# Patient Record
Sex: FEMALE | Race: WHITE | NOT HISPANIC OR LATINO | Employment: PART TIME | URBAN - METROPOLITAN AREA
[De-identification: names, ages, dates, MRNs, and addresses within clinical notes are randomized per-mention and may not be internally consistent; named-entity substitution may affect disease eponyms.]

---

## 2017-01-18 ENCOUNTER — DOCUMENTATION ONLY (OUTPATIENT)
Dept: FAMILY MEDICINE | Facility: CLINIC | Age: 67
End: 2017-01-18

## 2017-01-18 DIAGNOSIS — M79.672 BILATERAL FOOT PAIN: Primary | ICD-10-CM

## 2017-01-18 DIAGNOSIS — M79.671 BILATERAL FOOT PAIN: Primary | ICD-10-CM

## 2017-01-18 NOTE — PROGRESS NOTES
Pre-Visit Chart Review  For Appointment Scheduled on 01/19/2017    Health Maintenance Due   Topic Date Due    Zoster Vaccine  12/13/2008

## 2017-01-19 ENCOUNTER — OFFICE VISIT (OUTPATIENT)
Dept: ORTHOPEDICS | Facility: CLINIC | Age: 67
End: 2017-01-19
Payer: MEDICARE

## 2017-01-19 ENCOUNTER — HOSPITAL ENCOUNTER (OUTPATIENT)
Dept: RADIOLOGY | Facility: HOSPITAL | Age: 67
Discharge: HOME | End: 2017-01-19
Attending: ORTHOPAEDIC SURGERY
Payer: MEDICARE

## 2017-01-19 ENCOUNTER — OFFICE VISIT (OUTPATIENT)
Dept: FAMILY MEDICINE | Facility: CLINIC | Age: 67
End: 2017-01-19
Payer: MEDICARE

## 2017-01-19 ENCOUNTER — TELEPHONE (OUTPATIENT)
Dept: FAMILY MEDICINE | Facility: CLINIC | Age: 67
End: 2017-01-19

## 2017-01-19 VITALS
DIASTOLIC BLOOD PRESSURE: 77 MMHG | WEIGHT: 188.5 LBS | TEMPERATURE: 99 F | BODY MASS INDEX: 31.4 KG/M2 | HEIGHT: 65 IN | SYSTOLIC BLOOD PRESSURE: 139 MMHG | HEART RATE: 85 BPM

## 2017-01-19 VITALS
HEART RATE: 74 BPM | SYSTOLIC BLOOD PRESSURE: 178 MMHG | HEIGHT: 65 IN | DIASTOLIC BLOOD PRESSURE: 81 MMHG | BODY MASS INDEX: 30.66 KG/M2 | WEIGHT: 184 LBS

## 2017-01-19 DIAGNOSIS — F41.9 ANXIETY: ICD-10-CM

## 2017-01-19 DIAGNOSIS — E03.8 SECONDARY HYPOTHYROIDISM: ICD-10-CM

## 2017-01-19 DIAGNOSIS — E66.9 OBESITY (BMI 30.0-34.9): ICD-10-CM

## 2017-01-19 DIAGNOSIS — M79.671 BILATERAL FOOT PAIN: ICD-10-CM

## 2017-01-19 DIAGNOSIS — Z00.00 ENCOUNTER FOR PREVENTIVE HEALTH EXAMINATION: Primary | ICD-10-CM

## 2017-01-19 DIAGNOSIS — M79.672 BILATERAL FOOT PAIN: ICD-10-CM

## 2017-01-19 DIAGNOSIS — E04.1 NODULAR THYROID DISEASE: ICD-10-CM

## 2017-01-19 DIAGNOSIS — I25.118 CORONARY ARTERY DISEASE OF NATIVE ARTERY OF NATIVE HEART WITH STABLE ANGINA PECTORIS: ICD-10-CM

## 2017-01-19 DIAGNOSIS — I10 ESSENTIAL HYPERTENSION: ICD-10-CM

## 2017-01-19 DIAGNOSIS — G47.33 OSA ON CPAP: ICD-10-CM

## 2017-01-19 DIAGNOSIS — R32 URINARY INCONTINENCE, UNSPECIFIED TYPE: ICD-10-CM

## 2017-01-19 DIAGNOSIS — M20.11 HALLUX VALGUS, ACQUIRED, BILATERAL: ICD-10-CM

## 2017-01-19 DIAGNOSIS — M20.12 HALLUX VALGUS, ACQUIRED, BILATERAL: ICD-10-CM

## 2017-01-19 DIAGNOSIS — E78.5 HYPERLIPIDEMIA, UNSPECIFIED HYPERLIPIDEMIA TYPE: ICD-10-CM

## 2017-01-19 PROCEDURE — 99214 OFFICE O/P EST MOD 30 MIN: CPT | Mod: S$GLB,,, | Performed by: ORTHOPAEDIC SURGERY

## 2017-01-19 PROCEDURE — 3077F SYST BP >= 140 MM HG: CPT | Mod: S$GLB,,, | Performed by: ORTHOPAEDIC SURGERY

## 2017-01-19 PROCEDURE — 3079F DIAST BP 80-89 MM HG: CPT | Mod: S$GLB,,, | Performed by: ORTHOPAEDIC SURGERY

## 2017-01-19 PROCEDURE — 99999 PR PBB SHADOW E&M-EST. PATIENT-LVL III: CPT | Mod: PBBFAC,,, | Performed by: PHYSICIAN ASSISTANT

## 2017-01-19 PROCEDURE — 1160F RVW MEDS BY RX/DR IN RCRD: CPT | Mod: S$GLB,,, | Performed by: ORTHOPAEDIC SURGERY

## 2017-01-19 PROCEDURE — 99499 UNLISTED E&M SERVICE: CPT | Mod: S$GLB,,, | Performed by: ORTHOPAEDIC SURGERY

## 2017-01-19 PROCEDURE — G0439 PPPS, SUBSEQ VISIT: HCPCS | Mod: S$GLB,,, | Performed by: PHYSICIAN ASSISTANT

## 2017-01-19 PROCEDURE — 3075F SYST BP GE 130 - 139MM HG: CPT | Mod: S$GLB,,, | Performed by: PHYSICIAN ASSISTANT

## 2017-01-19 PROCEDURE — 99499 UNLISTED E&M SERVICE: CPT | Mod: S$GLB,,, | Performed by: PHYSICIAN ASSISTANT

## 2017-01-19 PROCEDURE — 3078F DIAST BP <80 MM HG: CPT | Mod: S$GLB,,, | Performed by: PHYSICIAN ASSISTANT

## 2017-01-19 PROCEDURE — 99999 PR PBB SHADOW E&M-EST. PATIENT-LVL III: CPT | Mod: PBBFAC,,, | Performed by: ORTHOPAEDIC SURGERY

## 2017-01-19 PROCEDURE — 1157F ADVNC CARE PLAN IN RCRD: CPT | Mod: S$GLB,,, | Performed by: ORTHOPAEDIC SURGERY

## 2017-01-19 PROCEDURE — 1159F MED LIST DOCD IN RCRD: CPT | Mod: S$GLB,,, | Performed by: ORTHOPAEDIC SURGERY

## 2017-01-19 PROCEDURE — 1126F AMNT PAIN NOTED NONE PRSNT: CPT | Mod: S$GLB,,, | Performed by: ORTHOPAEDIC SURGERY

## 2017-01-19 PROCEDURE — 73630 X-RAY EXAM OF FOOT: CPT | Mod: 26,50,, | Performed by: RADIOLOGY

## 2017-01-19 NOTE — TELEPHONE ENCOUNTER
Please call patient and let her know that Dr. Jordan does manage sleep apnea. She can be seen in his office on 2/21/17 at 9:00 AM--arrive at 8:45 AM. [address].

## 2017-01-19 NOTE — Clinical Note
Primary Care Providers: Jersey Seymour Jr., MD, MD        (General) Bernard Sahu III, MD (Inactiv* (Family Medicine)  Your patient was seen today for a HRA visit. Gap(s) in care (HEDIS gaps) have been identified during this visit that require additional testing and possible follow up.  No orders of the defined types were placed in this encounter.   These orders were placed using [facility] approved protocol and any results will be forwarded to your office for appropriate follow up. I have included a copy of my visit note; please review the note and feel free to contact me with any questions.   Thank you for allowing me to participate in the care of your patients. Gladys Romero PA-C

## 2017-01-19 NOTE — PROGRESS NOTES
"HPI: Kathy Seymour is a  66 y.o. female who is a previous patient of mine who is here for bilateral feet. She notes swelling of both feet. She rates her pain as 0/10 today. She had her bunions and hammertoes fixed by Dr. Chema DPM 1 year ago for the right side and 10 months for the left side. She just wants a second opinion to make sure everything is ok. She has throbbing and burning pain in the feet, but she has a h/o bilateral peripheral neuropathy. She is not taking her neurontin at this time.     PAST MEDICAL/SURGICAL/FAMILY/SOCIAL/ HISTORY: REVIEWED    ALLERGIES/MEDICATIONS: REVIEWED       Review of Systems:     Constitution: Negative.   HEENT: Negative.   Eyes: Negative.   Cardiovascular: Negative.   Respiratory: Negative.   Endocrine: Negative.   Hematologic/Lymphatic: Negative.   Skin: Negative.   Musculoskeletal: Positive for bilateral foot pain   Gastrointestinal: Negative.   Genitourinary: Negative.   Neurological: Negative.   Psychiatric/Behavioral: Negative.   Allergic/Immunologic: Negative.       PHYSICAL EXAM:  Vitals:    01/19/17 1304   BP: (!) 178/81   Pulse: 74     Ht Readings from Last 1 Encounters:   01/19/17 5' 5" (1.651 m)     Wt Readings from Last 1 Encounters:   01/19/17 83.5 kg (184 lb)       GENERAL: Well developed, well nourished, no acute distress.  SKIN: Skin is intact. No atrophy, abrasions or lesions are noted.   Neurological: Normal mental status. Appropriate and conversant. Alert and oriented x 3.  GAIT: Walks with non-antalgic gait.    Left  lower extremity:  2+ dorsalis pedis pulse.  Capillary refill < 3 seconds.  Decreased range of motion 1st .mtpj. 2nd toe hammertoe.5/5 strength EHL, FHL, tibialis anterior, gastrocsoleus, tibialis posterior and peroneals. Sensation to light touch intact sural, saphenous, superficial peroneal and deep peroneal nerves. Minimal  swelling, ecchymosis or deformity. No lymphadenopathy, no masses or tumors palpated.    Right lower extremity:2+ " dorsalis pedis pulse.  Capillary refill < 3 seconds.  Decreased range of motion 1st .mtpj. 2nd toe hammertoe.5/5 strength EHL, FHL, tibialis anterior, gastrocsoleus, tibialis posterior and peroneals. Sensation to light touch intact sural, saphenous, superficial peroneal and deep peroneal nerves. Mild swelling, ecchymosis or deformity. No lymphadenopathy, no masses or tumors palpated.    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 Two smart toes one in each 2nd and 3rd toe. Staple fixation at the proximal phalanx. Hardware is intact.      ASSESSMENT:   Bilateral foot pain    PLAN:  I discussed with her that her surgical treatment was appropriate and her alignment looks good. I agree with Dr. Bear that the burning pain and swelling are most likely due to her neuropathy. F/u with Dr. Bear or myself prn.

## 2017-01-19 NOTE — MR AVS SNAPSHOT
[facility]  [address]  [place] [address]  Phone: [phone]  Fax: [phone]                  Kathy Seymour   2017 3:00 PM   Office Visit    Description:  Female : 1950   Provider:  WON DrakeC   Department:  [place] - Family Medicine           Reason for Visit     Health Risk Assessment           Diagnoses this Visit        Comments    Encounter for preventive health examination    -  Primary     Anxiety         RAMONA on CPAP         Coronary artery disease involving native coronary artery of native heart without angina pectoris         Essential hypertension         Thyroid disease         Hyperlipidemia, unspecified hyperlipidemia type         Nodular thyroid disease                To Do List           Future Appointments        Provider Department Dept Phone    2017 4:00 PM MD Kimi Fontenotll - Endo/Diabetes [phone]    2017 9:00 AM Jersey Seymour Jr., MD [facility] [phone]      Goals (5 Years of Data)     None      [facility] On [facility]     [facility] On Call Nurse Care Line -  Assistance  Registered nurses in the [facility] On Call Center provide clinical advisement, health education, appointment booking, and other advisory services.  Call for this free service at [phone].             Medications           Message regarding Medications     Verify the changes and/or additions to your medication regime listed below are the same as discussed with your clinician today.  If any of these changes or additions are incorrect, please notify your healthcare provider.             Verify that the below list of medications is an accurate representation of the medications you are currently taking.  If none reported, the list may be blank. If incorrect, please contact your healthcare provider. Carry this list with you in case of emergency.           Current Medications     alprazolam (XANAX) 0.5 MG tablet Take 1 tablet  "(0.5 mg total) by mouth 2 (two) times daily as needed.    ascorbic acid (VITAMIN C) 500 MG tablet Take 500 mg by mouth once daily.    clopidogrel (PLAVIX) 75 mg tablet TAKE 1 TABLET ONE TIME DAILY    escitalopram oxalate (LEXAPRO) 10 MG tablet TAKE 1 TABLET ONE TIME DAILY    losartan-hydrochlorothiazide 100-25 mg (HYZAAR) 100-25 mg per tablet TAKE 1 TABLET ONE TIME DAILY    omeprazole (PRILOSEC) 20 MG capsule Take 20 mg by mouth once daily.    simvastatin (ZOCOR) 20 MG tablet Take 1 tablet (20 mg total) by mouth every evening.    vitamin D 1000 units Tab Take by mouth once daily.           Clinical Reference Information           Vital Signs - Last Recorded  Most recent update: 1/19/2017  3:12 PM by Faith Sandy MA    BP Pulse Temp Ht Wt BMI    139/77 (BP Location: Right arm, Patient Position: Sitting, BP Method: Automatic) 85 98.5 °F (36.9 °C) (Oral) 5' 5" (1.651 m) 85.5 kg (188 lb 7.9 oz) 31.37 kg/m2      Blood Pressure          Most Recent Value    BP  139/77      Allergies as of 1/19/2017     Wellbutrin [Bupropion Hcl]      Immunizations Administered on Date of Encounter - 1/19/2017     None      Instructions      Weight Management: Getting Started  Healthy bodies come in all shapes and sizes. Not all bodies are made to be thin. For some people, a healthy weight is higher than the average weight listed on weight charts. Your healthcare provider can help you decide on a healthy weight for you.    Reasons to lose weight  Losing weight can help with some health problems, such as high blood pressure, heart disease, diabetes, sleep apnea, and arthritis. You may also feel more energy.  Set your long-term goal  Your goal doesn't even have to be a specific weight. You may decide on a fitness goal (such as being able to walk 10 miles a week), or a health goal (such as lowering your blood pressure). Choose a goal that is measurable and reasonable, so you know when you've reached it. A goal of reaching a BMI of less " than 25 is not always reasonable (or possible).   Make an action plan  Habits dont change overnight. Setting your goals too high can leave you feeling discouraged if you cant reach them. Be realistic. Choose one or two small changes you can make now. Set an action plan for how you are going to make these changes. When you can stick to this plan, keep making a few more small changes. Taking small steps will help you stay on the path to success.  Track your progress  Write down your goals. Then, keep a daily record of your progress. Write down what you eat and how active you are. This record lets you look back on how much youve done. It may also help when youre feeling frustrated. Reward yourself for success. Even if you dont reach every goal, give yourself credit for what you do get done.  Get support  Encouragement from others can help make losing weight easier. Ask your family members and friends for support. They may even want to join you. Also look to your healthcare provider, registered dietitian, and  for help. Your local hospital can give you more information about nutrition, exercise, and weight loss.  © 3427-3493 [employer]. [address] [place] [address], [place] [address]. All rights reserved. This information is not intended as a substitute for professional medical care. Always follow your healthcare professional's instructions.        Walking for Fitness  Fitness walking has something for everyone, even people who are already fit. Walking is one of the safest ways to condition your body aerobically. It can boost energy, help you lose weight, and reduce stress.    Physical benefits  · Walking strengthens your heart and lungs, and tones your muscles.  · When walking, your feet land with less impact than in other sports. This reduces chances of muscle, bone, and joint injury.  · Regular walking improves your cholesterol levels and lowers your risk of heart disease. And it  helps you control your blood sugar if you have diabetes.  · Walking is a weight-bearing activity, which helps maintain bone density. This can help prevent osteoporosis.  Personal rewards  · Taking walks can help you relax and manage stress. And fitness walking may make you feel better about yourself.  · Walking can help you sleep better at night and make you less likely to be depressed.  · Regular walking may help maintain your memory as you get older.  · Walking is a great way to spend extra time with friends and family members. Be sure to invite your dog along!  Q&A about fitness walking  Q: Will walking keep me fit?  A: Yes. Regular walking at the right pace gives you all the benefits of other aerobic activities, such as jogging and swimming.  Q: Will walking help me lose weight and keep it off?  A: Yes. Per mile, walking can burn as many calories as jogging. Your health care provider can help work walking into your weight-loss plan.  Q: Is walking safe for my health?  A: Yes. Walking is safe if you have high blood pressure, diabetes, heart disease, or other conditions. Talk to your health care provider before you start.  © 5905-6532 [employer]. [address] [place] [address], [place] [address]. All rights reserved. This information is not intended as a substitute for professional medical care. Always follow your healthcare professional's instructions.        Controlling High Blood Pressure  High blood pressure (hypertension) is often called the silent killer. This is because many people who have it dont know it. High blood pressure is defined as 140/90 mm Hg or higher. Know your blood pressure and remember to check it regularly. Doing so can save your life. Here are some things you can do to help control your blood pressure.    Choose heart-healthy foods  · Select low-salt, low-fat foods. Limit sodium intake to 2,400 mg per day or the amount suggested by your healthcare provider.  · Limit canned, dried,  cured, packaged, and fast foods. These can contain a lot of salt.  · Eat 8 to 10 servings of fruits and vegetables every day.  · Choose lean meats, fish, or chicken.  · Eat whole-grain pasta, brown rice, and beans.  · Eat 2 to 3 servings of low-fat or fat-free dairy products.  · Ask your doctor about the DASH eating plan. This plan helps reduce blood pressure.  · When you go to a restaurant, ask that your meal be prepared with no added salt.  Maintain a healthy weight  · Ask your healthcare provider how many calories to eat a day. Then stick to that number.  · Ask your healthcare provider what weight range is healthiest for you. If you are overweight, a weight loss of only 3% to 5% of your body weight can help lower blood pressure. Generally, a good weight loss goal is to lose 10% of your body weight in a year.  · Limit snacks and sweets.  · Get regular exercise.  Get up and get active  · Choose activities you enjoy. Find ones you can do with friends or family. This includes bicycling, dancing, walking, and jogging.  · Park farther away from building entrances.  · Use stairs instead of the elevator.  · When you can, walk or bike instead of driving.  · [place] leaves, garden, or do household repairs.  · Be active at a moderate to vigorous level of physical activity for at least 40 minutes for a minimum of 3 to 4 days a week.   Manage stress  · Make time to relax and enjoy life. Find time to laugh.  · Communicate your concerns with your loved ones and your healthcare provider.  · Visit with family and friends, and keep up with hobbies.  Limit alcohol and quit smoking  · Men should have no more than 2 drinks per day.  · Women should have no more than 1 drink per day.  · Talk with your healthcare provider about quitting smoking. Smoking significantly increases your risk for heart disease and stroke. Ask your healthcare provider about community smoking cessation programs and other options.  Medicines  If lifestyle changes  arent enough, your healthcare provider may prescribe high blood pressure medicine. Take all medicines as prescribed. If you have any questions about your medicines, ask your healthcare provider before stopping or changing them.   © 9239-5464 [employer] [address] [place] [address], [place] [address]. All rights reserved. This information is not intended as a substitute for professional medical care. Always follow your healthcare professional's instructions.        Counseling and Referral of Other Preventative  (Italic type indicates deductible and co-insurance are waived)    Patient Name: Kathy Seymour  Today's Date: 1/19/2017      SERVICE LIMITATIONS RECOMMENDATION    Vaccines    · Pneumococcal (once after 65)    · Influenza (annually)    · Hepatitis B (if medium/high risk)    · Prevnar 13      Hepatitis B medium/high risk factors:       - End-stage renal disease       - Hemophiliacs who received Factor VII or         IX concentrates       - Clients of institutions for the mentally             retarded       - Persons who live in the same house as          a HepB carrier       - Homosexual men       - Illicit injectable drug abusers     Pneumococcal: Done, no repeat necessary     Influenza: Recommended to patient, declined     Hepatitis B: Not indicated     Prevnar 13: Done, no repeat necessary    Mammogram (biennial age 50-74)  Annually (age 40 or over)  Last done 8/2015, recommend to repeat every 1  years    Pap (up to age 70 and after 70 if unknown history or abnormal study last 10 years)    Not indicated     The USPSTF recommends against screening for cervical cancer in women who have had a hysterectomy with removal of the cervix and who do not have a history of a high-grade precancerous lesion (cervical intraepithelial neoplasia [DEVONTE] grade 2 or 3) or cervical cancer.     Colorectal cancer screening (to age 75)    · Fecal occult blood test (annual)  · Flexible sigmoidoscopy (5y)  · Screening  colonoscopy (10y)  · Barium enema   Last done 2010, recommend to repeat every 10  years    Diabetes self-management training (no USPSTF recommendations)  Requires referral by treating physician for patient with diabetes or renal disease. 10 hours of initial DSMT sessions of no less than 30 minutes each in a continuous 12-month period. 2 hours of follow-up DSMT in subsequent years.  Not indicated    Bone mass measurements (age 65 & older, biennial)  Requires diagnosis related to osteoporosis or estrogen deficiency. Biennial benefit unless patient has history of long-term glucocorticoid  Last done 2015, recommend to repeat every 3-5  years    Glaucoma screening (no USPSTF recommendation)  Diabetes mellitus, family history   , age 50 or over    American, age 65 or over  Done this year, repeat every year    Medical nutrition therapy for diabetes or renal disease (no recommended schedule)  Requires referral by treating physician for patient with diabetes or renal disease or kidney transplant within the past 3 years.  Can be provided in same year as diabetes self-management training (DSMT), and CMS recommends medical nutrition therapy take place after DSMT. Up to 3 hours for initial year and 2 hours in subsequent years.  Not indicated    Cardiovascular screening blood tests (every 5 years)  · Fasting lipid panel  Order as a panel if possible  Done this year, repeat every year    Diabetes screening tests (at least every 3 years, Medicare covers annually or at 6-month intervals for prediabetic patients)  · Fasting blood sugar (FBS) or glucose tolerance test (GTT)  Patient must be diagnosed with one of the following:       - Hypertension       - Dyslipidemia       - Obesity (BMI 30kg/m2)       - Previous elevated impaired FBS or GTT       ... or any two of the following:       - Overweight (BMI 25 but <30)       - Family history of diabetes       - Age 65 or older       - History of gestational  diabetes or birth of baby weighing more than 9 pounds  Done this year, repeat every year    Abdominal aortic aneurysm screening (once)  · Sonogram   Limited to patients who meet one of the following criteria:       - Men who are 65-75 years old and have smoked more than 100 cigarette in their lifetime       - Anyone with a family history of abdominal aortic aneurysm       - Anyone recommended for screening by the USPSTF Not indicated    HIV screening (annually for increased risk patients)  · HIV-1 and HIV-2 by EIA, or EVAN, rapid antibody test or oral mucosa transudate  Patients must be at increased risk for HIV infection per USPSTF guidelines or pregnant. Tests covered annually for patient at increased risk or as requested by the patient. Pregnant patients may receive up to 3 tests during pregnancy.  Risks discussed, screening is not recommended    Smoking cessation counseling (up to 8 sessions per year)  Patients must be asymptomatic of tobacco-related conditions to receive as a preventative service. Former smoker    Subsequent annual wellness visit  At least 12 months since last AWV  Return in one year     The following information is provided to all patients.  This information is to help you find resources for any of the problems found today that may be affecting your health:                Living healthy guide: www.[facility].[place].gov      Understanding Diabetes: www.diabetes.org      Eating healthy: www.cdc.gov/healthyweight      CDC home safety checklist: www.cdc.gov/steadi/patient.html      Agency on Aging: www.goea.[place].gov      Alcoholics anonymous (AA): www.aa.org      Physical Activity: www.melo.nih.gov/pj2oxfv      Tobacco use: www.quitwithusla.org

## 2017-01-19 NOTE — PATIENT INSTRUCTIONS
Weight Management: Getting Started  Healthy bodies come in all shapes and sizes. Not all bodies are made to be thin. For some people, a healthy weight is higher than the average weight listed on weight charts. Your healthcare provider can help you decide on a healthy weight for you.    Reasons to lose weight  Losing weight can help with some health problems, such as high blood pressure, heart disease, diabetes, sleep apnea, and arthritis. You may also feel more energy.  Set your long-term goal  Your goal doesn't even have to be a specific weight. You may decide on a fitness goal (such as being able to walk 10 miles a week), or a health goal (such as lowering your blood pressure). Choose a goal that is measurable and reasonable, so you know when you've reached it. A goal of reaching a BMI of less than 25 is not always reasonable (or possible).   Make an action plan  Habits dont change overnight. Setting your goals too high can leave you feeling discouraged if you cant reach them. Be realistic. Choose one or two small changes you can make now. Set an action plan for how you are going to make these changes. When you can stick to this plan, keep making a few more small changes. Taking small steps will help you stay on the path to success.  Track your progress  Write down your goals. Then, keep a daily record of your progress. Write down what you eat and how active you are. This record lets you look back on how much youve done. It may also help when youre feeling frustrated. Reward yourself for success. Even if you dont reach every goal, give yourself credit for what you do get done.  Get support  Encouragement from others can help make losing weight easier. Ask your family members and friends for support. They may even want to join you. Also look to your healthcare provider, registered dietitian, and  for help. Your local hospital can give you more information about nutrition, exercise, and  weight loss.  © 8295-2066 [employer]. [address] [place] [address], [place] [address]. All rights reserved. This information is not intended as a substitute for professional medical care. Always follow your healthcare professional's instructions.        Walking for Fitness  Fitness walking has something for everyone, even people who are already fit. Walking is one of the safest ways to condition your body aerobically. It can boost energy, help you lose weight, and reduce stress.    Physical benefits  · Walking strengthens your heart and lungs, and tones your muscles.  · When walking, your feet land with less impact than in other sports. This reduces chances of muscle, bone, and joint injury.  · Regular walking improves your cholesterol levels and lowers your risk of heart disease. And it helps you control your blood sugar if you have diabetes.  · Walking is a weight-bearing activity, which helps maintain bone density. This can help prevent osteoporosis.  Personal rewards  · Taking walks can help you relax and manage stress. And fitness walking may make you feel better about yourself.  · Walking can help you sleep better at night and make you less likely to be depressed.  · Regular walking may help maintain your memory as you get older.  · Walking is a great way to spend extra time with friends and family members. Be sure to invite your dog along!  Q&A about fitness walking  Q: Will walking keep me fit?  A: Yes. Regular walking at the right pace gives you all the benefits of other aerobic activities, such as jogging and swimming.  Q: Will walking help me lose weight and keep it off?  A: Yes. Per mile, walking can burn as many calories as jogging. Your health care provider can help work walking into your weight-loss plan.  Q: Is walking safe for my health?  A: Yes. Walking is safe if you have high blood pressure, diabetes, heart disease, or other conditions. Talk to your health care provider before you  start.  © 1979-6418 [employer]. [address] [place] [address], [place] [address]. All rights reserved. This information is not intended as a substitute for professional medical care. Always follow your healthcare professional's instructions.        Controlling High Blood Pressure  High blood pressure (hypertension) is often called the silent killer. This is because many people who have it dont know it. High blood pressure is defined as 140/90 mm Hg or higher. Know your blood pressure and remember to check it regularly. Doing so can save your life. Here are some things you can do to help control your blood pressure.    Choose heart-healthy foods  · Select low-salt, low-fat foods. Limit sodium intake to 2,400 mg per day or the amount suggested by your healthcare provider.  · Limit canned, dried, cured, packaged, and fast foods. These can contain a lot of salt.  · Eat 8 to 10 servings of fruits and vegetables every day.  · Choose lean meats, fish, or chicken.  · Eat whole-grain pasta, brown rice, and beans.  · Eat 2 to 3 servings of low-fat or fat-free dairy products.  · Ask your doctor about the DASH eating plan. This plan helps reduce blood pressure.  · When you go to a restaurant, ask that your meal be prepared with no added salt.  Maintain a healthy weight  · Ask your healthcare provider how many calories to eat a day. Then stick to that number.  · Ask your healthcare provider what weight range is healthiest for you. If you are overweight, a weight loss of only 3% to 5% of your body weight can help lower blood pressure. Generally, a good weight loss goal is to lose 10% of your body weight in a year.  · Limit snacks and sweets.  · Get regular exercise.  Get up and get active  · Choose activities you enjoy. Find ones you can do with friends or family. This includes bicycling, dancing, walking, and jogging.  · Park farther away from building entrances.  · Use stairs instead of the elevator.  · When you can,  walk or bike instead of driving.  · [place] leaves, garden, or do household repairs.  · Be active at a moderate to vigorous level of physical activity for at least 40 minutes for a minimum of 3 to 4 days a week.   Manage stress  · Make time to relax and enjoy life. Find time to laugh.  · Communicate your concerns with your loved ones and your healthcare provider.  · Visit with family and friends, and keep up with hobbies.  Limit alcohol and quit smoking  · Men should have no more than 2 drinks per day.  · Women should have no more than 1 drink per day.  · Talk with your healthcare provider about quitting smoking. Smoking significantly increases your risk for heart disease and stroke. Ask your healthcare provider about community smoking cessation programs and other options.  Medicines  If lifestyle changes arent enough, your healthcare provider may prescribe high blood pressure medicine. Take all medicines as prescribed. If you have any questions about your medicines, ask your healthcare provider before stopping or changing them.   © 3383-2113 [employer]. [address] [place] [address], [place] [address]. All rights reserved. This information is not intended as a substitute for professional medical care. Always follow your healthcare professional's instructions.        Counseling and Referral of Other Preventative  (Italic type indicates deductible and co-insurance are waived)    Patient Name: Kathy Seymour  Today's Date: 1/19/2017      SERVICE LIMITATIONS RECOMMENDATION    Vaccines    · Pneumococcal (once after 65)    · Influenza (annually)    · Hepatitis B (if medium/high risk)    · Prevnar 13      Hepatitis B medium/high risk factors:       - End-stage renal disease       - Hemophiliacs who received Factor VII or         IX concentrates       - Clients of institutions for the mentally             retarded       - Persons who live in the same house as          a HepB carrier       - Homosexual men       -  Illicit injectable drug abusers     Pneumococcal: Done, no repeat necessary     Influenza: Recommended to patient, declined     Hepatitis B: Not indicated     Prevnar 13: Done, no repeat necessary    Mammogram (biennial age 50-74)  Annually (age 40 or over)  Last done 8/2015, recommend to repeat every 1  years    Pap (up to age 70 and after 70 if unknown history or abnormal study last 10 years)    Not indicated     The USPSTF recommends against screening for cervical cancer in women who have had a hysterectomy with removal of the cervix and who do not have a history of a high-grade precancerous lesion (cervical intraepithelial neoplasia [DEVONTE] grade 2 or 3) or cervical cancer.     Colorectal cancer screening (to age 75)    · Fecal occult blood test (annual)  · Flexible sigmoidoscopy (5y)  · Screening colonoscopy (10y)  · Barium enema   Last done 2010, recommend to repeat every 10  years    Diabetes self-management training (no USPSTF recommendations)  Requires referral by treating physician for patient with diabetes or renal disease. 10 hours of initial DSMT sessions of no less than 30 minutes each in a continuous 12-month period. 2 hours of follow-up DSMT in subsequent years.  Not indicated    Bone mass measurements (age 65 & older, biennial)  Requires diagnosis related to osteoporosis or estrogen deficiency. Biennial benefit unless patient has history of long-term glucocorticoid  Last done 2015, recommend to repeat every 3-5  years    Glaucoma screening (no USPSTF recommendation)  Diabetes mellitus, family history   , age 50 or over    American, age 65 or over  Done this year, repeat every year    Medical nutrition therapy for diabetes or renal disease (no recommended schedule)  Requires referral by treating physician for patient with diabetes or renal disease or kidney transplant within the past 3 years.  Can be provided in same year as diabetes self-management training (DSMT), and CMS  recommends medical nutrition therapy take place after DSMT. Up to 3 hours for initial year and 2 hours in subsequent years.  Not indicated    Cardiovascular screening blood tests (every 5 years)  · Fasting lipid panel  Order as a panel if possible  Done this year, repeat every year    Diabetes screening tests (at least every 3 years, Medicare covers annually or at 6-month intervals for prediabetic patients)  · Fasting blood sugar (FBS) or glucose tolerance test (GTT)  Patient must be diagnosed with one of the following:       - Hypertension       - Dyslipidemia       - Obesity (BMI 30kg/m2)       - Previous elevated impaired FBS or GTT       ... or any two of the following:       - Overweight (BMI 25 but <30)       - Family history of diabetes       - Age 65 or older       - History of gestational diabetes or birth of baby weighing more than 9 pounds  Done this year, repeat every year    Abdominal aortic aneurysm screening (once)  · Sonogram   Limited to patients who meet one of the following criteria:       - Men who are 65-75 years old and have smoked more than 100 cigarette in their lifetime       - Anyone with a family history of abdominal aortic aneurysm       - Anyone recommended for screening by the USPSTF Not indicated    HIV screening (annually for increased risk patients)  · HIV-1 and HIV-2 by EIA, or EVAN, rapid antibody test or oral mucosa transudate  Patients must be at increased risk for HIV infection per USPSTF guidelines or pregnant. Tests covered annually for patient at increased risk or as requested by the patient. Pregnant patients may receive up to 3 tests during pregnancy.  Risks discussed, screening is not recommended    Smoking cessation counseling (up to 8 sessions per year)  Patients must be asymptomatic of tobacco-related conditions to receive as a preventative service. Former smoker    Subsequent annual wellness visit  At least 12 months since last AWV  Return in one year     The  following information is provided to all patients.  This information is to help you find resources for any of the problems found today that may be affecting your health:                Living healthy guide: www.[facility].[place].[facility]      Understanding Diabetes: www.diabetes.org      Eating healthy: www.cdc.gov/healthyweight      CDC home safety checklist: www.cdc.gov/steadi/patient.html      Agency on Aging: www.goea.[place].[facility]      Alcoholics anonymous (AA): www.aa.org      Physical Activity: www.melo.nih.gov/tm6mhve      Tobacco use: www.quitwithusla.org

## 2017-01-20 PROBLEM — E66.9 OBESITY (BMI 30.0-34.9): Status: ACTIVE | Noted: 2017-01-20

## 2017-01-20 PROBLEM — E66.811 OBESITY (BMI 30.0-34.9): Status: ACTIVE | Noted: 2017-01-20

## 2017-01-20 PROBLEM — R32 URINARY INCONTINENCE: Status: ACTIVE | Noted: 2017-01-20

## 2017-01-20 RX ORDER — GABAPENTIN 800 MG/1
800 TABLET ORAL NIGHTLY
COMMUNITY
End: 2017-02-06

## 2017-01-20 RX ORDER — NAPROXEN 500 MG/1
500 TABLET ORAL 2 TIMES DAILY WITH MEALS
COMMUNITY
End: 2018-07-30 | Stop reason: SDUPTHER

## 2017-01-20 NOTE — TELEPHONE ENCOUNTER
Patient made aware of appointment with Dr. Jordan and that he does manage sleep apnea. Patient verbalized understanding.

## 2017-01-20 NOTE — PROGRESS NOTES
Subjective:       Patient ID: Kathy Seymour is here for   Chief Complaint   Patient presents with    Health Risk Assessment       Kathy Seymour was seen today in a face to face encounter for a comprehensive Health Risk Assessment. This visit included a review of her total medical record available at the time of this visit.        Past Medical, Family, and Surgical History:      This information was reviewed and reconciled today during this encounter. Medications were reviewed and reconciled today. The active problem list has been reviewed/updated and reconciled today. These active problems are listed in the diagnosis list below.    **See completed HRA forms/Questionnaires/Flowsheets for ROS information.    Physical Exam   Constitutional: She is oriented to person, place, and time. She appears well-developed.   HENT:   Head: Normocephalic and atraumatic.   Right Ear: Hearing and external ear normal.   Left Ear: Hearing and external ear normal.   Eyes: Conjunctivae and EOM are normal. Pupils are equal, round, and reactive to light.   Cardiovascular: Normal rate, regular rhythm, normal heart sounds and intact distal pulses.    Pulmonary/Chest: Effort normal and breath sounds normal.   Neurological: She is alert and oriented to person, place, and time.   Skin: Skin is warm and dry.   Psychiatric: She has a normal mood and affect.         Diagnoses (identified today) and assoicated plan for each diagnosis:         Encounter for preventive health examination    Anxiety  Comments:  Stable, on lexapro and xanax PRN for severe panic, continue to follow with PCP    RAMONA on CPAP, 100% use  Comments:  Stable, continue CPAP, recommend referral to Sleep Medicine for further management    Coronary artery disease of native artery of native heart with stable angina pectoris  Comments:  Stable, s/p PCI, on plavix, losartan/hctz, simvastatin, continue to follow with Cardiology Dr. Alberto Peter  hypertension  Comments:  Controlled, continue losartan/hctz, continue to follow with PCP    Hyperlipidemia, unspecified hyperlipidemia type  Comments:  Controlled, continue simvastatin as prescribed, continue to follow with PCP    Nodular thyroid disease  Comments:  Stable, recent FNA 2 nodules on the left were benign and a nodule on the right had insufficient tissue, continue to follow with Endocrinology    Secondary hypothyroidism  Comments:  Stable, not currently on any thyroid medication, continue to follow with Endocrinology    Urinary incontinence, unspecified type  Comments:  Stable, pt is wearing pads, declines referral to Urology at this time.    Obesity (BMI 30.0-34.9)  Comments:  Uncontrolled, recommend weight loss and increasing physical activity    Recommended annual mammogram for breast cancer screening, pt declined at this time. Advised pt to obtain zoster vaccine at insurance approved pharmacy.     Patient readiness: acceptance and barriers:none    During the course of the visit the patient was educated and counseled about the following:     Hypertension:   Medication: no change.  Obesity:   Regular aerobic exercise program discussed.    Goals: Hypertension: Reduce Blood Pressure and Obesity: Reduce calorie intake and BMI    Did patient meet goals/outcomes: No    The following self management tools provided: declined    Patient Instructions (the written plan) was given to the patient/family.     Time spent with patient: 55 minutes    Follow up with PCP in 3-6 months as scheduled.

## 2017-01-31 ENCOUNTER — PATIENT MESSAGE (OUTPATIENT)
Dept: FAMILY MEDICINE | Facility: CLINIC | Age: 67
End: 2017-01-31

## 2017-01-31 RX ORDER — OMEPRAZOLE 20 MG/1
20 CAPSULE, DELAYED RELEASE ORAL DAILY
Qty: 90 CAPSULE | Refills: 3 | Status: SHIPPED | OUTPATIENT
Start: 2017-01-31 | End: 2018-08-28 | Stop reason: SDUPTHER

## 2017-02-06 ENCOUNTER — OFFICE VISIT (OUTPATIENT)
Dept: ENDOCRINOLOGY | Facility: CLINIC | Age: 67
End: 2017-02-06
Payer: MEDICARE

## 2017-02-06 ENCOUNTER — LAB VISIT (OUTPATIENT)
Dept: LAB | Facility: HOSPITAL | Age: 67
End: 2017-02-06
Attending: INTERNAL MEDICINE
Payer: MEDICARE

## 2017-02-06 VITALS
BODY MASS INDEX: 31.33 KG/M2 | HEIGHT: 65 IN | WEIGHT: 188.06 LBS | SYSTOLIC BLOOD PRESSURE: 137 MMHG | DIASTOLIC BLOOD PRESSURE: 77 MMHG | RESPIRATION RATE: 18 BRPM | HEART RATE: 74 BPM

## 2017-02-06 DIAGNOSIS — E55.9 HYPOVITAMINOSIS D: ICD-10-CM

## 2017-02-06 DIAGNOSIS — E04.1 NODULAR THYROID DISEASE: Primary | ICD-10-CM

## 2017-02-06 DIAGNOSIS — E07.9 THYROID DISEASE: ICD-10-CM

## 2017-02-06 DIAGNOSIS — R73.03 PREDIABETES: ICD-10-CM

## 2017-02-06 DIAGNOSIS — R73.9 HYPERGLYCEMIA: ICD-10-CM

## 2017-02-06 DIAGNOSIS — I25.83 CORONARY ARTERY DISEASE DUE TO LIPID RICH PLAQUE: ICD-10-CM

## 2017-02-06 DIAGNOSIS — E66.9 OBESITY (BMI 30.0-34.9): ICD-10-CM

## 2017-02-06 DIAGNOSIS — I10 ESSENTIAL HYPERTENSION: ICD-10-CM

## 2017-02-06 DIAGNOSIS — E88.810 DYSMETABOLIC SYNDROME: ICD-10-CM

## 2017-02-06 DIAGNOSIS — I25.10 CORONARY ARTERY DISEASE DUE TO LIPID RICH PLAQUE: ICD-10-CM

## 2017-02-06 DIAGNOSIS — E04.1 NODULAR THYROID DISEASE: ICD-10-CM

## 2017-02-06 DIAGNOSIS — G47.33 OSA ON CPAP: ICD-10-CM

## 2017-02-06 DIAGNOSIS — E03.8 SECONDARY HYPOTHYROIDISM: ICD-10-CM

## 2017-02-06 LAB
25(OH)D3+25(OH)D2 SERPL-MCNC: 35 NG/ML
ALBUMIN SERPL BCP-MCNC: 3.9 G/DL
ALP SERPL-CCNC: 110 U/L
ALT SERPL W/O P-5'-P-CCNC: 17 U/L
ANION GAP SERPL CALC-SCNC: 7 MMOL/L
AST SERPL-CCNC: 21 U/L
BASOPHILS # BLD AUTO: 0.01 K/UL
BASOPHILS NFR BLD: 0.1 %
BILIRUB SERPL-MCNC: 0.4 MG/DL
BUN SERPL-MCNC: 22 MG/DL
CALCIUM SERPL-MCNC: 9.3 MG/DL
CHLORIDE SERPL-SCNC: 103 MMOL/L
CHOLEST/HDLC SERPL: 3 {RATIO}
CO2 SERPL-SCNC: 32 MMOL/L
CREAT SERPL-MCNC: 0.9 MG/DL
DIFFERENTIAL METHOD: NORMAL
EOSINOPHIL # BLD AUTO: 0.2 K/UL
EOSINOPHIL NFR BLD: 2.8 %
ERYTHROCYTE [DISTWIDTH] IN BLOOD BY AUTOMATED COUNT: 14 %
EST. GFR  (AFRICAN AMERICAN): >60 ML/MIN/1.73 M^2
EST. GFR  (NON AFRICAN AMERICAN): >60 ML/MIN/1.73 M^2
GLUCOSE SERPL-MCNC: 109 MG/DL
HCT VFR BLD AUTO: 39.4 %
HDL/CHOLESTEROL RATIO: 32.9 %
HDLC SERPL-MCNC: 161 MG/DL
HDLC SERPL-MCNC: 53 MG/DL
HGB BLD-MCNC: 12.8 G/DL
LDLC SERPL CALC-MCNC: 85.8 MG/DL
LYMPHOCYTES # BLD AUTO: 2.5 K/UL
LYMPHOCYTES NFR BLD: 32.5 %
MCH RBC QN AUTO: 29 PG
MCHC RBC AUTO-ENTMCNC: 32.5 %
MCV RBC AUTO: 89 FL
MONOCYTES # BLD AUTO: 0.7 K/UL
MONOCYTES NFR BLD: 9.1 %
NEUTROPHILS # BLD AUTO: 4.3 K/UL
NEUTROPHILS NFR BLD: 55.4 %
NONHDLC SERPL-MCNC: 108 MG/DL
PLATELET # BLD AUTO: 206 K/UL
PMV BLD AUTO: 12 FL
POTASSIUM SERPL-SCNC: 3.6 MMOL/L
PROT SERPL-MCNC: 7.6 G/DL
RBC # BLD AUTO: 4.41 M/UL
SODIUM SERPL-SCNC: 142 MMOL/L
T3 SERPL-MCNC: 142 NG/DL
T4 FREE SERPL-MCNC: 0.9 NG/DL
TRIGL SERPL-MCNC: 111 MG/DL
TSH SERPL DL<=0.005 MIU/L-ACNC: 0.18 UIU/ML
URATE SERPL-MCNC: 6.8 MG/DL
WBC # BLD AUTO: 7.82 K/UL

## 2017-02-06 PROCEDURE — 80053 COMPREHEN METABOLIC PANEL: CPT

## 2017-02-06 PROCEDURE — 84550 ASSAY OF BLOOD/URIC ACID: CPT

## 2017-02-06 PROCEDURE — 1160F RVW MEDS BY RX/DR IN RCRD: CPT | Mod: S$GLB,,, | Performed by: INTERNAL MEDICINE

## 2017-02-06 PROCEDURE — 80061 LIPID PANEL: CPT

## 2017-02-06 PROCEDURE — 3078F DIAST BP <80 MM HG: CPT | Mod: S$GLB,,, | Performed by: INTERNAL MEDICINE

## 2017-02-06 PROCEDURE — 3075F SYST BP GE 130 - 139MM HG: CPT | Mod: S$GLB,,, | Performed by: INTERNAL MEDICINE

## 2017-02-06 PROCEDURE — 99499 UNLISTED E&M SERVICE: CPT | Mod: S$GLB,,, | Performed by: INTERNAL MEDICINE

## 2017-02-06 PROCEDURE — 83036 HEMOGLOBIN GLYCOSYLATED A1C: CPT

## 2017-02-06 PROCEDURE — 99999 PR PBB SHADOW E&M-EST. PATIENT-LVL IV: CPT | Mod: PBBFAC,,, | Performed by: INTERNAL MEDICINE

## 2017-02-06 PROCEDURE — 1157F ADVNC CARE PLAN IN RCRD: CPT | Mod: S$GLB,,, | Performed by: INTERNAL MEDICINE

## 2017-02-06 PROCEDURE — 84439 ASSAY OF FREE THYROXINE: CPT

## 2017-02-06 PROCEDURE — 84480 ASSAY TRIIODOTHYRONINE (T3): CPT

## 2017-02-06 PROCEDURE — 99214 OFFICE O/P EST MOD 30 MIN: CPT | Mod: S$GLB,,, | Performed by: INTERNAL MEDICINE

## 2017-02-06 PROCEDURE — 84443 ASSAY THYROID STIM HORMONE: CPT

## 2017-02-06 PROCEDURE — 36415 COLL VENOUS BLD VENIPUNCTURE: CPT | Mod: PO

## 2017-02-06 PROCEDURE — 85025 COMPLETE CBC W/AUTO DIFF WBC: CPT

## 2017-02-06 PROCEDURE — 1126F AMNT PAIN NOTED NONE PRSNT: CPT | Mod: S$GLB,,, | Performed by: INTERNAL MEDICINE

## 2017-02-06 PROCEDURE — 84432 ASSAY OF THYROGLOBULIN: CPT

## 2017-02-06 PROCEDURE — 1159F MED LIST DOCD IN RCRD: CPT | Mod: S$GLB,,, | Performed by: INTERNAL MEDICINE

## 2017-02-06 PROCEDURE — 82308 ASSAY OF CALCITONIN: CPT

## 2017-02-06 PROCEDURE — 82306 VITAMIN D 25 HYDROXY: CPT

## 2017-02-06 NOTE — PROGRESS NOTES
Subjective:      Patient ID: Kathy Seymour is a 66 y.o. female.    Chief Complaint:  66 yr old lady with thyroid nodular disease and prediabetes seen in [facility] today.    History of Present Illness    Patient is a 66yr old lady with thyroid nodular disease seen in [facility] today. She had previously zac seen by Dr Yao but last saw him over 3 yrs ago. At that time she had subclinical hyperthyroidism and documented thyroid nodular disease. She had been on methimazole for a short period of time then but she still remains on methimazole 5mg Daily. The possibility of secondary consequent hypothyroidism has recently been raised.  The patients most recent thyroid sonogram was from 09/16 and showed multinodular goiter with 3  dominant thyroid nodules for which she had USS guided FNAB. Two of these nodules showed featutes consistent with benign colloid nodules while insufficient cellls were obtained from the right sided nodule to evaluate.  She has no local neck symptoms but is concerned about not having definitive diagnosis regarding the right sided dominant nodule.    She in addition has essential hypertension, hyperlipidemia with hypercholesterolemia, CAD, hypovitaminosis D on vitamin D supplementation as well as grade 1 obesity and is postmenopausal.  Most recent lipid profile referenced below shows patients LDL at desired goal of being under 70 on statin therapy with Zocor 20mg Daily. Her most recent DEXA from 08/15 was normal and thus her intended [facility] study should be for ~ 08/17.     Patient still continues to have fatigue especially related to exertion. She also continues to have intermittent hot flashes though she is now ~ 16yrs post menopausal ff EAN and BSO.  Patients current Worcester score is 11 and she does have established OSAS and uses a CPAP mask.  She stopped smoking about 5 yrs ago and has noticed some interval weight gain (~ 40lbs) since then.    Review of Systems   Constitutional: Negative for chills and  "fatigue.   HENT: Negative for facial swelling and trouble swallowing.    Eyes: Negative for visual disturbance.   Respiratory: Negative for cough and shortness of breath.    Endocrine: Negative for polyuria.   Genitourinary: Negative for dysuria and frequency.   Musculoskeletal: Positive for arthralgias (mainly in joints of the hands and feet bilaterally.) and joint swelling (mild and intermittent especially involving right hand for which she is s/p prior carpal tunnel surgery). Negative for gait problem and myalgias.   Skin: Negative for color change, pallor and rash.   Neurological: Negative for dizziness, tremors, numbness and headaches.   Hematological: Does not bruise/bleed easily.   Psychiatric/Behavioral: Negative for confusion. The patient is not nervous/anxious.        Objective:  Visit Vitals    /77    Pulse 74    Resp 18    Ht 5' 5" (1.651 m)    Wt 85.3 kg (188 lb 0.8 oz)    BMI 31.29 kg/m2        Physical Exam   Constitutional: She is oriented to person, place, and time. She appears well-developed and well-nourished. No distress.   HENT:   Head: Normocephalic and atraumatic.   Eyes: Conjunctivae and EOM are normal. Pupils are equal, round, and reactive to light. No scleral icterus.   Neck: Normal range of motion. Neck supple. No JVD present.   Cardiovascular: Normal rate, regular rhythm and normal heart sounds.    Pulmonary/Chest: Effort normal and breath sounds normal. No respiratory distress. She has no wheezes.   Abdominal: There is no tenderness.   Musculoskeletal: She exhibits deformity (Evidence of mild OA in both hands bilaterally.).   Has Herbedens and Bouchards nodes in both hands.  Has healed surgical bunionectomy scars on both big toes bilaterally.     Neurological: She is alert and oriented to person, place, and time. No cranial nerve deficit.   Skin: Skin is warm and dry. No rash noted. She is not diaphoretic. No erythema. No pallor.   Psychiatric: She has a normal mood and " affect. Her behavior is normal. Judgment and thought content normal.   Vitals reviewed.      Lab Review:     Results for DAVON HERRERA (MRN [id number]) as of 2/6/2017 16:03   Ref. Range 9/19/2016 10:03 9/20/2016 15:35   WBC Latest Ref Range: 3.90 - 12.70 K/uL 7.55    RBC Latest Ref Range: 4.00 - 5.40 M/uL 4.52    Hemoglobin Latest Ref Range: 12.0 - 16.0 g/dL 13.1    Hematocrit Latest Ref Range: 37.0 - 48.5 % 40.0    MCV Latest Ref Range: 82 - 98 fL 89    MCH Latest Ref Range: 27.0 - 31.0 pg 29.0    MCHC Latest Ref Range: 32.0 - 36.0 % 32.8    RDW Latest Ref Range: 11.5 - 14.5 % 13.9    Platelets Latest Ref Range: 150 - 350 K/uL 205    MPV Latest Ref Range: 9.2 - 12.9 fL 11.5    Gran% Latest Ref Range: 38.0 - 73.0 % 61.4    Gran # Latest Ref Range: 1.8 - 7.7 K/uL 4.6    Lymph% Latest Ref Range: 18.0 - 48.0 % 27.2    Lymph # Latest Ref Range: 1.0 - 4.8 K/uL 2.1    Mono% Latest Ref Range: 4.0 - 15.0 % 9.1    Mono # Latest Ref Range: 0.3 - 1.0 K/uL 0.7    Eosinophil% Latest Ref Range: 0.0 - 8.0 % 1.7    Eos # Latest Ref Range: 0.0 - 0.5 K/uL 0.1    Basophil% Latest Ref Range: 0.0 - 1.9 % 0.3    Baso # Latest Ref Range: 0.00 - 0.20 K/uL 0.02    Sodium Latest Ref Range: 136 - 145 mmol/L 140    Potassium Latest Ref Range: 3.5 - 5.1 mmol/L 4.0    Chloride Latest Ref Range: 95 - 110 mmol/L 103    CO2 Latest Ref Range: 23 - 29 mmol/L 25    Anion Gap Latest Ref Range: 8 - 16 mmol/L 12    BUN, Bld Latest Ref Range: 8 - 23 mg/dL 18    Creatinine Latest Ref Range: 0.5 - 1.4 mg/dL 0.9    eGFR if non African American Latest Ref Range: >60 mL/min/1.73 m^2 >60.0    eGFR if African American Latest Ref Range: >60 mL/min/1.73 m^2 >60.0    Glucose Latest Ref Range: 70 - 110 mg/dL 96    Calcium Latest Ref Range: 8.7 - 10.5 mg/dL 9.6    Calcium, Ion Latest Ref Range: 1.06 - 1.42 mmol/L 1.19    Alkaline Phosphatase Latest Ref Range: 55 - 135 U/L 108    Total Protein Latest Ref Range: 6.0 - 8.4 g/dL 7.5    Albumin Latest Ref Range: 3.5 -  5.2 g/dL 3.9    Uric Acid Latest Ref Range: 2.4 - 5.7 mg/dL 7.1 (H)    Total Bilirubin Latest Ref Range: 0.1 - 1.0 mg/dL 0.5    AST Latest Ref Range: 10 - 40 U/L 19    ALT Latest Ref Range: 10 - 44 U/L 17    Vit D, 25-Hydroxy Latest Ref Range: 30 - 96 ng/mL 33    Hemoglobin A1C Latest Ref Range: 4.5 - 6.2 % 5.9    Estimated Avg Glucose Latest Ref Range: 68 - 131 mg/dL 123    TSH Latest Ref Range: 0.400 - 4.000 uIU/mL 1.746    T3, Total Latest Ref Range: 60 - 180 ng/dL 138    Free T4 Latest Ref Range: 0.71 - 1.51 ng/dL 0.88    Thyroglobulin Interpretation Unknown SEE BELOW    Thyroglobulin Antibody Screen Latest Ref Range: <4.0 IU/mL <1.8    Thyroglobulin, Tumor Marker Latest Units: ng/mL 54 (H)    Thyrotropin Receptor Ab Latest Ref Range: 0.00 - 1.75 IU/L <1.00    Thyroperoxidase Antibodies Latest Ref Range: <6.0 IU/mL <6.0    Thyroid-Stim IG Quantitative Latest Ref Range: <0.10 IU/L <0.10    PTH Latest Ref Range: 9.0 - 77.0 pg/mL 58.0    Calcitonin Latest Units: pg/mL <5.0    US SOFT TISSUE HEAD NECK THYROID Unknown  Rpt   Differential Method Unknown Automated    T3, Reverse Latest Ref Range: 8 - 25 ng/dL 15    Thyroglobulin Ab Screen Latest Ref Range: 0.0 - 3.9 IU/mL <4.0        Assessment:     1. Nodular thyroid disease  Calcitonin    TSH    T4, free    Thyroglobulin    T3   2. Thyroid disease  Calcitonin    TSH    T4, free    Thyroglobulin    T3   3. Secondary hypothyroidism  T3    CBC auto differential   4. Prediabetes  Lipid panel    CBC auto differential    Hemoglobin A1c   5. Obesity (BMI 30.0-34.9)  Vitamin D   6. Coronary artery disease due to lipid rich plaque  Lipid panel   7. RAMONA on CPAP, 100% use  Lipid panel   8. Essential hypertension  Comprehensive metabolic panel    Lipid panel    Uric acid    Vitamin D   9. Dysmetabolic syndrome  Comprehensive metabolic panel    Uric acid    Hemoglobin A1c   10. Hyperglycemia  Hemoglobin A1c   11. Hypovitaminosis D  Vitamin D        Regarding thyroid functional  status; Patient remains clinically euthyroid of methimazole.    Regarding thyroid nodular disease; to obtain ffup thyroid USS to re-evaluate thyroid nodule status especially of the right dominant nodule for which we still dont have a definitive pathologic diagnosis.  Patient wants to have elective thyroidectomy to get this problem treated once and for all; referred for elective thyroidectomy to Dr Hortencia Gilbert.  Regarding bone health; to obtain vit D level and for ffup DEXA ~ 08/17.  Regarding hypertension; well controlled. To continue present antihypertensive regimen and continue serial ambulatory tracking of BP trends. Will also check urine for microalbumin excretion.  Regarding hyperlipidemia; most recent lipid profile was at goal. To continue antilpidemics and plavix for platelet aggregation inhibition as before.  Regarding OSAS; CPAP therapy to continue as before.  Regarding CAD; ongoing surviellance and management as per her PCP and cardiology team.    Plan:     FFup in ~ 4mths

## 2017-02-06 NOTE — MR AVS SNAPSHOT
[facility]  [address]  [place] [address]  Phone: [phone]                  Kathy Seymour   2017 4:00 PM   Office Visit    Description:  Female : 1950   Provider:  Reilly Washington MD   Department:  [facility]           Diagnoses this Visit        Comments    Nodular thyroid disease    -  Primary     Thyroid disease         Secondary hypothyroidism         Prediabetes         Obesity (BMI 30.0-34.9)         Coronary artery disease due to lipid rich plaque         RAMONA on CPAP         Essential hypertension         Dysmetabolic syndrome         Hyperglycemia         Hypovitaminosis D                To Do List           Future Appointments        Provider Department Dept Phone    2/10/2017 10:00 AM [facility] [phone]    2017 9:00 AM MD [facility] [phone]    2017 11:00 AM MD [place] [facility] [phone]    2017 9:00 AM Jersey Seymour Jr., MD [facility] [phone]      Goals (5 Years of Data)     None      [facility] On [facility]     [facility] On Call Nurse Care Line -  Assistance  Registered nurses in the [facility] Center provide clinical advisement, health education, appointment booking, and other advisory services.  Call for this free service at [phone].             Medications           Message regarding Medications     Verify the changes and/or additions to your medication regime listed below are the same as discussed with your clinician today.  If any of these changes or additions are incorrect, please notify your healthcare provider.        STOP taking these medications     gabapentin (NEURONTIN) 800 MG tablet Take 800 mg by mouth every evening.           Verify that the below list of medications is an accurate representation of the medications you are currently taking.  If none reported, the list may be blank. If  "incorrect, please contact your healthcare provider. Carry this list with you in case of emergency.           Current Medications     alprazolam (XANAX) 0.5 MG tablet Take 1 tablet (0.5 mg total) by mouth 2 (two) times daily as needed.    ascorbic acid (VITAMIN C) 500 MG tablet Take 500 mg by mouth once daily.    clopidogrel (PLAVIX) 75 mg tablet TAKE 1 TABLET ONE TIME DAILY    escitalopram oxalate (LEXAPRO) 10 MG tablet TAKE 1 TABLET ONE TIME DAILY    losartan-hydrochlorothiazide 100-25 mg (HYZAAR) 100-25 mg per tablet TAKE 1 TABLET ONE TIME DAILY    naproxen (NAPROSYN) 500 MG tablet Take 500 mg by mouth 2 (two) times daily with meals.    omeprazole (PRILOSEC) 20 MG capsule Take 1 capsule (20 mg total) by mouth once daily.    simvastatin (ZOCOR) 20 MG tablet Take 1 tablet (20 mg total) by mouth every evening.    vitamin D 1000 units Tab Take by mouth once daily.           Clinical Reference Information           Your Vitals Were     BP Pulse Resp Height Weight BMI    137/77 74 18 5' 5" (1.651 m) 85.3 kg (188 lb 0.8 oz) 31.29 kg/m2      Blood Pressure          Most Recent Value    BP  137/77      Allergies as of 2/6/2017     Wellbutrin [Bupropion Hcl]      Immunizations Administered on Date of Encounter - 2/6/2017     None      Orders Placed During Today's Visit      Normal Orders This Visit    Ambulatory referral to General Surgery     Future Labs/Procedures Expected by Expires    Calcitonin  2/6/2017 4/7/2018    CBC auto differential  2/6/2017 4/7/2018    Comprehensive metabolic panel  2/6/2017 4/7/2018    Hemoglobin A1c  2/6/2017 4/7/2018    Lipid panel  2/6/2017 4/7/2018    T3  2/6/2017 4/7/2018    T4, free  2/6/2017 4/7/2018    Thyroglobulin  2/6/2017 4/7/2018    TSH  2/6/2017 4/7/2018    Uric acid  2/6/2017 4/7/2018    US Soft Tissue Head Neck Thyroid  2/6/2017 2/6/2018    Vitamin D  2/6/2017 4/7/2018      Language Assistance Services     ATTENTION: Language assistance services are available, free of charge. " Please call [phone].      ATENCIÓN: Si habla español, tiene a sherman disposición servicios gratuitos de asistencia lingüística. Llame al [phone].     CHÚ Ý: N?u b?n nói Ti?ng Vi?t, có các d?ch v? h? tr? ngôn ng? mi?n phí dành cho b?n. G?i s? [phone].         [facility] complies with applicable Federal civil rights laws and does not discriminate on the basis of race, color, national origin, age, disability, or sex.

## 2017-02-07 DIAGNOSIS — R73.03 PREDIABETES: Primary | ICD-10-CM

## 2017-02-07 PROBLEM — E05.90 SUBCLINICAL HYPERTHYROIDISM: Status: ACTIVE | Noted: 2017-02-07

## 2017-02-07 LAB
ESTIMATED AVG GLUCOSE: 126 MG/DL
HBA1C MFR BLD HPLC: 6 %

## 2017-02-07 RX ORDER — METFORMIN HYDROCHLORIDE 500 MG/1
500 TABLET ORAL 2 TIMES DAILY WITH MEALS
Qty: 180 TABLET | Refills: 0 | Status: SHIPPED | OUTPATIENT
Start: 2017-02-07 | End: 2017-05-07 | Stop reason: SDUPTHER

## 2017-02-08 LAB
CALCIT SERPL-MCNC: <5 PG/ML
THRYOGLOBULIN INTERPRETATION: ABNORMAL
THYROGLOB AB SERPL-ACNC: <1.8 IU/ML
THYROGLOB SERPL-MCNC: 39 NG/ML

## 2017-02-09 ENCOUNTER — TELEPHONE (OUTPATIENT)
Dept: ENDOCRINOLOGY | Facility: CLINIC | Age: 67
End: 2017-02-09

## 2017-02-09 NOTE — TELEPHONE ENCOUNTER
----- Message from Cecelia Romero sent at 2/9/2017  9:58 AM CST -----  Please call patient in regards to a medication that was called in for her & she isnt sure what it is for, [phone] (home)

## 2017-02-10 ENCOUNTER — HOSPITAL ENCOUNTER (OUTPATIENT)
Dept: RADIOLOGY | Facility: CLINIC | Age: 67
Discharge: HOME | End: 2017-02-10
Attending: INTERNAL MEDICINE
Payer: MEDICARE

## 2017-02-10 DIAGNOSIS — E04.1 NODULAR THYROID DISEASE: ICD-10-CM

## 2017-02-10 PROCEDURE — 76536 US EXAM OF HEAD AND NECK: CPT | Mod: 26,,, | Performed by: RADIOLOGY

## 2017-02-10 PROCEDURE — 76536 US EXAM OF HEAD AND NECK: CPT | Mod: TC,PO

## 2017-02-21 ENCOUNTER — OFFICE VISIT (OUTPATIENT)
Dept: PULMONOLOGY | Facility: CLINIC | Age: 67
End: 2017-02-21
Payer: MEDICARE

## 2017-02-21 VITALS
WEIGHT: 186.75 LBS | BODY MASS INDEX: 31.11 KG/M2 | SYSTOLIC BLOOD PRESSURE: 136 MMHG | OXYGEN SATURATION: 96 % | DIASTOLIC BLOOD PRESSURE: 78 MMHG | HEIGHT: 65 IN | HEART RATE: 83 BPM

## 2017-02-21 DIAGNOSIS — R06.00 DYSPNEA, UNSPECIFIED TYPE: Primary | ICD-10-CM

## 2017-02-21 DIAGNOSIS — G47.33 OSA ON CPAP: ICD-10-CM

## 2017-02-21 PROCEDURE — 1159F MED LIST DOCD IN RCRD: CPT | Mod: S$GLB,,, | Performed by: INTERNAL MEDICINE

## 2017-02-21 PROCEDURE — 3075F SYST BP GE 130 - 139MM HG: CPT | Mod: S$GLB,,, | Performed by: INTERNAL MEDICINE

## 2017-02-21 PROCEDURE — 99205 OFFICE O/P NEW HI 60 MIN: CPT | Mod: S$GLB,,, | Performed by: INTERNAL MEDICINE

## 2017-02-21 PROCEDURE — 3078F DIAST BP <80 MM HG: CPT | Mod: S$GLB,,, | Performed by: INTERNAL MEDICINE

## 2017-02-21 PROCEDURE — 99999 PR PBB SHADOW E&M-EST. PATIENT-LVL IV: CPT | Mod: PBBFAC,,, | Performed by: INTERNAL MEDICINE

## 2017-02-21 PROCEDURE — 1157F ADVNC CARE PLAN IN RCRD: CPT | Mod: S$GLB,,, | Performed by: INTERNAL MEDICINE

## 2017-02-21 PROCEDURE — 1125F AMNT PAIN NOTED PAIN PRSNT: CPT | Mod: S$GLB,,, | Performed by: INTERNAL MEDICINE

## 2017-02-21 NOTE — PROGRESS NOTES
"2/21/2017    Kathy Seymour  New Patient Consult    Chief Complaint   Patient presents with    Apnea    Shortness of Breath       HPI: dx osas 10 yrs ago, ahi na.  On nasal pillars- cpap 6, new machine 5 yrs ago.  Gained 45 post stent around 2012.      Prior to cpap, severe snorer, fatigue, with no other prominent symptoms recalled.      Now no snoring noted with cpap. No nasal obstruction.    Mentation a little slow but vague.      The chief compliant  problem is new to me",   PFSH:  Past Medical History   Diagnosis Date    Angina pectoris     Anticoagulant long-term use     Anxiety     Arthritis     Back pain     Cataract     Chronic bronchitis     Coronary artery disease      STENT X 2    CTS (carpal tunnel syndrome)      RIGHT    Depression     Hyperlipidemia     Hypertension     Hypothyroidism     Obesity     Polyneuropathy     Sleep apnea      USES CPAP    Thyroid disease     Tobacco dependence     Trouble in sleeping     Urinary incontinence     Wears glasses      ONE CONTAC         Past Surgical History   Procedure Laterality Date    Appendectomy      Bilateral salpingoophorectomy      Eye surgery       bilat cataract removal    Tonsillectomy      Cardiac catheterization      Coronary angioplasty      Coronary stent placement       PCI  of the LAD with LETTY    Hysterectomy       complete     Social History   Substance Use Topics    Smoking status: Former Smoker     Packs/day: 1.00     Years: 50.00     Types: Cigarettes     Start date: 7/9/1964     Quit date: 8/13/2012    Smokeless tobacco: Never Used    Alcohol use 1.2 oz/week     2 Shots of liquor per week      Comment: Social - vodka on wednesday     Family History   Problem Relation Age of Onset    Hypertension Sister     Arthritis Sister     Heart failure Mother     Hypertension Mother     Heart failure Father     Hypertension Father     No Known Problems Brother     No Known Problems Son     Heart disease " "Brother     Arthritis Sister     Hypertension Sister     Cancer Sister     Asthma Son     Arthritis Son      psoriatic arthritis     Review of patient's allergies indicates:   Allergen Reactions    Wellbutrin [bupropion hcl] Other (See Comments)     cranky       Performance Status:The patient's activity level is no limits with regular activity.      Review of Systems:  a review of eleven systems covering constitutional, Eye, HEENT, Psych, Respiratory, Cardiac, GI, , Musculoskeletal, Endocrine, Dermatologic was negative except for pertinent findings as listed ABOVE and below: all good     Exam:Comprehensive exam done.   Visit Vitals    /78 (BP Location: Right arm, Patient Position: Sitting, BP Method: Automatic)    Pulse 83    Ht 5' 5" (1.651 m)    Wt 84.7 kg (186 lb 11.7 oz)    SpO2 96%    BMI 31.07 kg/m2     Exam included Vitals as listed, and patient's appearance and affect and alertness and mood, oral exam for yeast and hygiene and pharynx lesions and Mallapatti (M) score, neck with inspection for jvd and masses and thyroid abnormalities and lymph nodes (supraclavicular and infraclavicular nodes and axillary also examined and noted if abn), chest exam included symmetry and effort and fremitus and percussion and auscultation, cardiac exam included rhythm and gallops and murmur and rubs and jvd and edema, abdominal exam for mass and hepatosplenomegaly and tenderness and hernias and bowel sounds, Musculoskeletal exam with muscle tone and posture and mobility/gait and  strength, and skin for rashes and cyanosis and pallor and turgor, extremity for clubbing.  Findings were normal except for pertinent findings listed below:  M4 and not nasal, no edema    Radiographs (ct chest and cxr) reviewed: view by direct vision  March 2014 cxr hyper inflated.    Labs reviewed   PFT was not done     Plan:  Clinical impression is resonably certain and repeated evaluation prn +/- follow up will be needed as " below.    Kathy was seen today for apnea and shortness of breath.    Diagnoses and all orders for this visit:    Dyspnea, unspecified type  -     umeclidinium-vilanterol (ANORO ELLIPTA) 62.5-25 mcg/actuation DsDv; Inhale 1 puff into the lungs once daily.    RAMONA on CPAP, 100% use  -     PULSE OXIMETRY OVERNIGHT        Return if symptoms worsen or fail to improve.    Discussed with patient above for education the following:       Oximetry   If mildly abn - consider increase pressure a bit   If significant off - re study     If good - good    Copd by chest xray   Try anoro one a day. Continue if any benefit.   Could do pft, but have good function.

## 2017-02-21 NOTE — LETTER
February 21, 2017      Gladys Romero PA-C  [address]  [place] [address]           [facility]  [address]  [place] [address]  Phone: [phone]          Patient: Kathy Seymour   MR Number: [id number]   YOB: 1950   Date of Visit: 2/21/2017       Dear Gladys Romero:    Thank you for referring Kathy Seymour to me for evaluation. Attached you will find relevant portions of my assessment and plan of care.    If you have questions, please do not hesitate to call me. I look forward to following Kathy Seymour along with you.    Sincerely,    Tayo Jordan MD    Enclosure  CC:  No Recipients    If you would like to receive this communication electronically, please contact [email or web address] or [phone] to request more information on [employer] Link access.    For providers and/or their staff who would like to refer a patient to [facility], please contact us through our one-stop-shop provider referral line, [facility], at [phone].    If you feel you have received this communication in error or would no longer like to receive these types of communications, please e-mail [email or web address]

## 2017-02-21 NOTE — MR AVS SNAPSHOT
[facility]  [address]  [place] [address]  Phone: [phone]                  Kathy Seymour   2017 9:00 AM   Office Visit    Description:  Female : 1950   Provider:  Tayo Jordan MD   Department:  [place] [facility]           Reason for Visit     Apnea     Shortness of Breath           Diagnoses this Visit        Comments    Dyspnea, unspecified type    -  Primary     RAMONA on CPAP                To Do List           Future Appointments        Provider Department Dept Phone    2017 11:00 AM MD [place] [facility] [phone]    2017 9:00 AM MD [place] [facility] [phone]      Goals (5 Years of Data)     None      Follow-Up and Disposition     Return if symptoms worsen or fail to improve.    Follow-up and Disposition History       These Medications        Disp Refills Start End    umeclidinium-vilanterol (ANORO ELLIPTA) 62.5-25 mcg/actuation DsDv 1 each 11 2017     Inhale 1 puff into the lungs once daily. - Inhalation    Pharmacy: [facility] #[id number] - [place] - [address]. Ph #: [phone]         [facility] On Call     [facility] On Call Nurse Care Line - 24/7 Assistance  Registered nurses in the [facility] Center provide clinical advisement, health education, appointment booking, and other advisory services.  Call for this free service at [phone].             Medications           Message regarding Medications     Verify the changes and/or additions to your medication regime listed below are the same as discussed with your clinician today.  If any of these changes or additions are incorrect, please notify your healthcare provider.        START taking these NEW medications        Refills    umeclidinium-vilanterol (ANORO ELLIPTA) 62.5-25 mcg/actuation DsDv 11    Sig: Inhale 1 puff into the lungs once daily.    Class: Print    Route: Inhalation           Verify  "that the below list of medications is an accurate representation of the medications you are currently taking.  If none reported, the list may be blank. If incorrect, please contact your healthcare provider. Carry this list with you in case of emergency.           Current Medications     ascorbic acid (VITAMIN C) 500 MG tablet Take 500 mg by mouth once daily.    clopidogrel (PLAVIX) 75 mg tablet TAKE 1 TABLET ONE TIME DAILY    escitalopram oxalate (LEXAPRO) 10 MG tablet TAKE 1 TABLET ONE TIME DAILY    losartan-hydrochlorothiazide 100-25 mg (HYZAAR) 100-25 mg per tablet TAKE 1 TABLET ONE TIME DAILY    simvastatin (ZOCOR) 20 MG tablet Take 1 tablet (20 mg total) by mouth every evening.    vitamin D 1000 units Tab Take by mouth once daily.    alprazolam (XANAX) 0.5 MG tablet Take 1 tablet (0.5 mg total) by mouth 2 (two) times daily as needed.    metformin (GLUCOPHAGE) 500 MG tablet Take 1 tablet (500 mg total) by mouth 2 (two) times daily with meals.    naproxen (NAPROSYN) 500 MG tablet Take 500 mg by mouth 2 (two) times daily with meals.    omeprazole (PRILOSEC) 20 MG capsule Take 1 capsule (20 mg total) by mouth once daily.    umeclidinium-vilanterol (ANORO ELLIPTA) 62.5-25 mcg/actuation DsDv Inhale 1 puff into the lungs once daily.           Clinical Reference Information           Your Vitals Were     BP Pulse Height Weight SpO2 BMI    136/78 (BP Location: Right arm, Patient Position: Sitting, BP Method: Automatic) 83 5' 5" (1.651 m) 84.7 kg (186 lb 11.7 oz) 96% 31.07 kg/m2      Blood Pressure          Most Recent Value    BP  136/78      Allergies as of 2/21/2017     Wellbutrin [Bupropion Hcl]      Immunizations Administered on Date of Encounter - 2/21/2017     None      Orders Placed During Today's Visit      Normal Orders This Visit    PULSE OXIMETRY OVERNIGHT       Instructions    Oximetry   If mildly abn - consider increase pressure a bit   If significant off - re study     If good - good    Copd by chest " xray   Try anoro one a day. Continue if any benefit.   Could do pft, but have good function.       Language Assistance Services     ATTENTION: Language assistance services are available, free of charge. Please call [phone].      ATENCIÓN: Si val white, tiene a sherman disposición servicios gratuitos de asistencia lingüística. Llame al [phone].     CHÚ Ý: N?u b?n nói Ti?ng Vi?t, có các d?ch v? h? tr? ngôn ng? mi?n phí dành cho b?n. G?i s? [phone].         [facility] complies with applicable Federal civil rights laws and does not discriminate on the basis of race, color, national origin, age, disability, or sex.

## 2017-02-21 NOTE — PATIENT INSTRUCTIONS
Oximetry   If mildly abn - consider increase pressure a bit   If significant off - re study     If good - good    Copd by chest xray   Try anoro one a day. Continue if any benefit.   Could do pft, but have good function.

## 2017-03-01 ENCOUNTER — HOSPITAL ENCOUNTER (OUTPATIENT)
Dept: RADIOLOGY | Facility: HOSPITAL | Age: 67
Discharge: HOME | End: 2017-03-01
Attending: ORTHOPAEDIC SURGERY
Payer: MEDICARE

## 2017-03-01 ENCOUNTER — TELEPHONE (OUTPATIENT)
Dept: PULMONOLOGY | Facility: CLINIC | Age: 67
End: 2017-03-01

## 2017-03-01 ENCOUNTER — OFFICE VISIT (OUTPATIENT)
Dept: ORTHOPEDICS | Facility: CLINIC | Age: 67
End: 2017-03-01
Payer: MEDICARE

## 2017-03-01 VITALS — WEIGHT: 186 LBS | BODY MASS INDEX: 30.99 KG/M2 | HEIGHT: 65 IN

## 2017-03-01 DIAGNOSIS — M94.262 CHONDROMALACIA, KNEE, LEFT: Primary | ICD-10-CM

## 2017-03-01 DIAGNOSIS — M25.562 LEFT KNEE PAIN, UNSPECIFIED CHRONICITY: Primary | ICD-10-CM

## 2017-03-01 DIAGNOSIS — M25.562 ACUTE PAIN OF LEFT KNEE: Primary | ICD-10-CM

## 2017-03-01 DIAGNOSIS — M25.562 ACUTE PAIN OF LEFT KNEE: ICD-10-CM

## 2017-03-01 DIAGNOSIS — S83.282A TEAR OF LATERAL MENISCUS OF LEFT KNEE, CURRENT, UNSPECIFIED TEAR TYPE, INITIAL ENCOUNTER: ICD-10-CM

## 2017-03-01 PROCEDURE — 73564 X-RAY EXAM KNEE 4 OR MORE: CPT | Mod: 26,LT,, | Performed by: RADIOLOGY

## 2017-03-01 PROCEDURE — 1157F ADVNC CARE PLAN IN RCRD: CPT | Mod: S$GLB,,, | Performed by: ORTHOPAEDIC SURGERY

## 2017-03-01 PROCEDURE — 73562 X-RAY EXAM OF KNEE 3: CPT | Mod: 26,59,RT, | Performed by: RADIOLOGY

## 2017-03-01 PROCEDURE — 99214 OFFICE O/P EST MOD 30 MIN: CPT | Mod: S$GLB,,, | Performed by: ORTHOPAEDIC SURGERY

## 2017-03-01 PROCEDURE — 99999 PR PBB SHADOW E&M-EST. PATIENT-LVL II: CPT | Mod: PBBFAC,,, | Performed by: ORTHOPAEDIC SURGERY

## 2017-03-01 PROCEDURE — 1159F MED LIST DOCD IN RCRD: CPT | Mod: S$GLB,,, | Performed by: ORTHOPAEDIC SURGERY

## 2017-03-01 PROCEDURE — 73564 X-RAY EXAM KNEE 4 OR MORE: CPT | Mod: TC,PN,LT

## 2017-03-01 PROCEDURE — 1160F RVW MEDS BY RX/DR IN RCRD: CPT | Mod: S$GLB,,, | Performed by: ORTHOPAEDIC SURGERY

## 2017-03-01 PROCEDURE — 1126F AMNT PAIN NOTED NONE PRSNT: CPT | Mod: S$GLB,,, | Performed by: ORTHOPAEDIC SURGERY

## 2017-03-01 NOTE — TELEPHONE ENCOUNTER
----- Message from Reyna Mustafa sent at 3/1/2017 11:37 AM CST -----  Contact: blanca   Test results  Call back  [phone]

## 2017-03-01 NOTE — TELEPHONE ENCOUNTER
Spoke with Pt and told her we have not received the results from her test yet, but we will notify her when we do.

## 2017-03-01 NOTE — PROGRESS NOTES
DATE: 3/1/2017  PATIENT: Kathy Seymour  REFERRING MD:  CHIEF COMPLAINT:   Chief Complaint   Patient presents with    Left Knee - Pain, Injury       HISTORY:  Kathy Seymour is a 66 y.o. female  who presents for initial evaluation of a new problem regarding her left knee.  She states she is well until 12/13/16 when she twisted her knee while standing on one leg.  She had pain and some swelling.  She noted that it got better over the next couple weeks.  She did have x-rays taken which were unremarkable for fracture.  She was doing better and then noted over the last 2 weeks increasing discomfort.  She takes naproxen but doesn't take regularly due to GI distress.  She is employed doing computer work part-time.  States her pain currently is 0/10 but worsens with movement.  She has trouble getting up from a sitting position.  She denies any previous surgery to her knee.      PAST MEDICAL/SURGICAL HISTORY:  Past Medical History:   Diagnosis Date    Angina pectoris     Anticoagulant long-term use     Anxiety     Arthritis     Back pain     Cataract     Chronic bronchitis     Coronary artery disease     STENT X 2    CTS (carpal tunnel syndrome)     RIGHT    Depression     Hyperlipidemia     Hypertension     Hypothyroidism     Obesity     Polyneuropathy     Sleep apnea     USES CPAP    Thyroid disease     Tobacco dependence     Trouble in sleeping     Urinary incontinence     Wears glasses     ONE CONTAC     Past Surgical History:   Procedure Laterality Date    APPENDECTOMY      BILATERAL SALPINGOOPHORECTOMY      CARDIAC CATHETERIZATION      CORONARY ANGIOPLASTY      CORONARY STENT PLACEMENT      PCI  of the LAD with LETTY    EYE SURGERY      bilat cataract removal    HYSTERECTOMY      complete    TONSILLECTOMY         Current Medications:   Current Outpatient Prescriptions:     alprazolam (XANAX) 0.5 MG tablet, Take 1 tablet (0.5 mg total) by mouth 2 (two) times daily as needed., Disp:  50 tablet, Rfl: 0    ascorbic acid (VITAMIN C) 500 MG tablet, Take 500 mg by mouth once daily., Disp: , Rfl:     clopidogrel (PLAVIX) 75 mg tablet, TAKE 1 TABLET ONE TIME DAILY, Disp: 90 tablet, Rfl: 3    escitalopram oxalate (LEXAPRO) 10 MG tablet, TAKE 1 TABLET ONE TIME DAILY, Disp: 90 tablet, Rfl: 3    losartan-hydrochlorothiazide 100-25 mg (HYZAAR) 100-25 mg per tablet, TAKE 1 TABLET ONE TIME DAILY, Disp: 90 tablet, Rfl: 3    metformin (GLUCOPHAGE) 500 MG tablet, Take 1 tablet (500 mg total) by mouth 2 (two) times daily with meals., Disp: 180 tablet, Rfl: 0    naproxen (NAPROSYN) 500 MG tablet, Take 500 mg by mouth 2 (two) times daily with meals., Disp: , Rfl:     omeprazole (PRILOSEC) 20 MG capsule, Take 1 capsule (20 mg total) by mouth once daily., Disp: 90 capsule, Rfl: 3    simvastatin (ZOCOR) 20 MG tablet, Take 1 tablet (20 mg total) by mouth every evening., Disp: 90 tablet, Rfl: 2    umeclidinium-vilanterol (ANORO ELLIPTA) 62.5-25 mcg/actuation DsDv, Inhale 1 puff into the lungs once daily., Disp: 1 each, Rfl: 11    vitamin D 1000 units Tab, Take by mouth once daily., Disp: , Rfl:     Social History:   Social History     Social History    Marital status:      Spouse name: N/A    Number of children: N/A    Years of education: N/A     Occupational History    Not on file.     Social History Main Topics    Smoking status: Former Smoker     Packs/day: 1.00     Years: 50.00     Types: Cigarettes     Start date: 7/9/1964     Quit date: 8/13/2012    Smokeless tobacco: Never Used    Alcohol use 1.2 oz/week     2 Shots of liquor per week      Comment: Social - vodka on wednesday    Drug use: No    Sexual activity: Yes     Partners: Male     Other Topics Concern    Not on file     Social History Narrative       ROS:  Constitution: Negative for chills, fever, and sweats. Negative for unexplained weight loss.  HENT: Negative for headaches and blurry vision.   Cardiovascular: Negative for  "chest pain, irregular heartbeat, leg swelling and palpitations.   Respiratory: Negative for cough and shortness of breath.   Gastrointestinal: Negative for abdominal pain, heartburn, nausea and vomiting.   Genitourinary: Negative for bladder incontinence and dysuria.   Musculoskeletal: Negative for systemic arthritis, muscle weakness and myalgias.   Neurological: Negative for numbness.   Psychiatric/Behavioral: Negative for depression.  Endocrine: Negative for polyuria.   Hematologic/Lymphatic: Negative for bleeding disorders.   Skin: Negative for poor wound healing.        PHYSICAL EXAM:  Ht 5' 5" (1.651 m)  Wt 84.4 kg (186 lb)  BMI 30.95 kg/m2  Kathy Seymour is a well developed, well nourished female in no acute distress. Physical examination of the left knee evaluated the following:    Gait and Alignment  Inspection for ecchymosis, swelling, atrophy, or deformity  Inspection for intra-articular and/or bursal effusions  Tenderness to palpation over the bony and soft tissue structures around the knee  Range of Motion and presence of extensor lag/contractures  Sensation and motor strength  Varus/valgus or anterior/posterior/rotatory instability  Flexion pinch and Ely's Tests  Patellar alignment/tracking/pain to palpation  Vascular exam to include skin temperature/color/capillary refill    Remarkable findings included:  No ecchymosis, swelling or effusion.  There is mild medial lateral joint line tenderness.  No instability to varus or valgus stress.  Lachman and anterior drawer both negative.  Ely's test and flexion pinch are nonspecific but referrable to the lateral compartment.  Sensation intact to lower extremity.        IMAGING:   The patient's knee radiographs were personally reviewed. No acute fractures or dislocations are seen. The medial and lateral compartments are well preserved without degenerative changes or malalignment. The patellofemoral joint is also without degenerative changes or " malalignment. No bony or soft tissue lesions are seen. No loose bodies or calcifications are present.    ASSESSMENT:   Chondromalacia versus meniscus tear left knee    PLAN:  The nature of the diagnosis, using models and diagrams when appropriate, was explained to the patient in detail.Treatment option discussed included observation, cortisone injection, and MRI.  As she is now had symptoms for almost 10 weeks, we've agreed on MRI to rule out a meniscus tear.  Follow-up after MRI has been performed discussed results and further treatment recommendations.       This note was dictated using voice recognition software and may contain grammatical errors

## 2017-03-01 NOTE — MR AVS SNAPSHOT
[facility]  [place] [address]  Phone: [phone]                  Kathy Seymour   3/1/2017 1:00 PM   Office Visit    Description:  Female : 1950   Provider:  Mark Desouza MD   Department:  [facility]           Reason for Visit     Left Knee - Pain, Injury           Diagnoses this Visit        Comments    Chondromalacia, knee, left    -  Primary     Tear of lateral meniscus of left knee, current, unspecified tear type, initial encounter                To Do List           Future Appointments        Provider Department Dept Phone    3/2/2017 6:00 PM NM MRI1 [address] LIMIT [facility] [phone]    3/8/2017 2:45 PM Mark Desouza MD [facility] [phone]    2017 11:00 AM Reilly Washington MD [facility] [phone]    2017 9:00 AM Jersey Seymour Jr., MD [facility] [phone]      Goals (5 Years of Data)     None      Follow-Up and Disposition     Return for MRI results.      [facility]     [facility] Care [facility] -  Assistance  Registered nurses in the [facility] provide clinical advisement, health education, appointment booking, and other advisory services.  Call for this free service at [phone].             Medications           Message regarding Medications     Verify the changes and/or additions to your medication regime listed below are the same as discussed with your clinician today.  If any of these changes or additions are incorrect, please notify your healthcare provider.             Verify that the below list of medications is an accurate representation of the medications you are currently taking.  If none reported, the list may be blank. If incorrect, please contact your healthcare provider. Carry this list with you in case of emergency.           Current Medications     alprazolam (XANAX) 0.5 MG tablet  "Take 1 tablet (0.5 mg total) by mouth 2 (two) times daily as needed.    ascorbic acid (VITAMIN C) 500 MG tablet Take 500 mg by mouth once daily.    clopidogrel (PLAVIX) 75 mg tablet TAKE 1 TABLET ONE TIME DAILY    escitalopram oxalate (LEXAPRO) 10 MG tablet TAKE 1 TABLET ONE TIME DAILY    losartan-hydrochlorothiazide 100-25 mg (HYZAAR) 100-25 mg per tablet TAKE 1 TABLET ONE TIME DAILY    metformin (GLUCOPHAGE) 500 MG tablet Take 1 tablet (500 mg total) by mouth 2 (two) times daily with meals.    naproxen (NAPROSYN) 500 MG tablet Take 500 mg by mouth 2 (two) times daily with meals.    omeprazole (PRILOSEC) 20 MG capsule Take 1 capsule (20 mg total) by mouth once daily.    simvastatin (ZOCOR) 20 MG tablet Take 1 tablet (20 mg total) by mouth every evening.    umeclidinium-vilanterol (ANORO ELLIPTA) 62.5-25 mcg/actuation DsDv Inhale 1 puff into the lungs once daily.    vitamin D 1000 units Tab Take by mouth once daily.           Clinical Reference Information           Your Vitals Were     Height                   5' 5" (1.651 m)           Allergies as of 3/1/2017     Wellbutrin [Bupropion Hcl]      Immunizations Administered on Date of Encounter - 3/1/2017     None      Language Assistance Services     ATTENTION: Language assistance services are available, free of charge. Please call [phone].      ATENCIÓN: Si habla español, tiene a sherman disposición servicios gratuitos de asistencia lingüística. Llame al [phone].     [facility] Ý: N?u b?n nói Ti?ng Vi?t, có các d?ch v? h? tr? ngôn ng? mi?n phí dành cho b?n. G?i s? [phone].         [facility] complies with applicable Federal civil rights laws and does not discriminate on the basis of race, color, national origin, age, disability, or sex.        "

## 2017-03-02 ENCOUNTER — HOSPITAL ENCOUNTER (OUTPATIENT)
Dept: RADIOLOGY | Facility: HOSPITAL | Age: 67
Discharge: HOME | End: 2017-03-02
Attending: ORTHOPAEDIC SURGERY
Payer: MEDICARE

## 2017-03-02 DIAGNOSIS — M25.562 LEFT KNEE PAIN, UNSPECIFIED CHRONICITY: ICD-10-CM

## 2017-03-02 PROCEDURE — 73721 MRI JNT OF LWR EXTRE W/O DYE: CPT | Mod: TC,LT

## 2017-03-02 PROCEDURE — 73721 MRI JNT OF LWR EXTRE W/O DYE: CPT | Mod: 26,LT,, | Performed by: RADIOLOGY

## 2017-03-08 ENCOUNTER — OFFICE VISIT (OUTPATIENT)
Dept: ORTHOPEDICS | Facility: CLINIC | Age: 67
End: 2017-03-08
Payer: MEDICARE

## 2017-03-08 VITALS — BODY MASS INDEX: 30.99 KG/M2 | WEIGHT: 186 LBS | HEIGHT: 65 IN

## 2017-03-08 DIAGNOSIS — S83.282D TEAR OF LATERAL MENISCUS OF LEFT KNEE, CURRENT, UNSPECIFIED TEAR TYPE, SUBSEQUENT ENCOUNTER: Primary | ICD-10-CM

## 2017-03-08 PROCEDURE — 1160F RVW MEDS BY RX/DR IN RCRD: CPT | Mod: S$GLB,,, | Performed by: ORTHOPAEDIC SURGERY

## 2017-03-08 PROCEDURE — 99999 PR PBB SHADOW E&M-EST. PATIENT-LVL II: CPT | Mod: PBBFAC,,, | Performed by: ORTHOPAEDIC SURGERY

## 2017-03-08 PROCEDURE — 1157F ADVNC CARE PLAN IN RCRD: CPT | Mod: S$GLB,,, | Performed by: ORTHOPAEDIC SURGERY

## 2017-03-08 PROCEDURE — 1125F AMNT PAIN NOTED PAIN PRSNT: CPT | Mod: S$GLB,,, | Performed by: ORTHOPAEDIC SURGERY

## 2017-03-08 PROCEDURE — 1159F MED LIST DOCD IN RCRD: CPT | Mod: S$GLB,,, | Performed by: ORTHOPAEDIC SURGERY

## 2017-03-08 PROCEDURE — 99214 OFFICE O/P EST MOD 30 MIN: CPT | Mod: S$GLB,,, | Performed by: ORTHOPAEDIC SURGERY

## 2017-03-08 RX ORDER — CLOPIDOGREL BISULFATE 75 MG/1
TABLET ORAL
Qty: 90 TABLET | Refills: 0 | Status: SHIPPED | OUTPATIENT
Start: 2017-03-08 | End: 2017-06-02 | Stop reason: SDUPTHER

## 2017-03-08 NOTE — PROGRESS NOTES
"DATE: 3/8/2017  PATIENT: Kathy Seymour    Attending Physician: Mark Desouza M.D.    HISTORY:  Kathy Seymour is a 66 y.o. female who returns for follow up evaluation of  her left knee.  She did undergo an MRI which revealed a lateral meniscus tear and fluid in the popliteal fossa.  He reports moderate improvement since taking the Naprosyn and since her last visit last week.  She reports her pain at 1/10 today.  She presents discuss her MRI results.    PMH/PSH/FamHx/SocHx:  Reviewed and unchanged from prior visit    ROS:  Constitution: Negative for chills, fever, and sweats. Negative for unexplained weight loss.  HENT: Negative for headaches and blurry vision.   Cardiovascular: Negative for chest pain, irregular heartbeat, leg swelling and palpitations.   Respiratory: Negative for cough and shortness of breath.   Gastrointestinal: Negative for abdominal pain, heartburn, nausea and vomiting.   Genitourinary: Negative for bladder incontinence and dysuria.   Musculoskeletal: Negative for systemic arthritis, joint swelling, muscle weakness and myalgias.   Neurological: Negative for numbness.   Psychiatric/Behavioral: Negative for depression.   Endocrine: Negative for polyuria.   Hematologic/Lymphatic: Negative for bleeding disorders.  Skin: Negative for poor wound healing.       EXAM:  Ht 5' 5" (1.651 m)  Wt 84.4 kg (186 lb)  BMI 30.95 kg/m2  Kathy Seymour is a well developed, well nourished female in no acute distress. Physical examination of the left knee evaluated the following:    Gait and Alignment  Inspection for ecchymosis, swelling, atrophy, or deformity  Inspection for intra-articular and/or bursal effusions  Tenderness to palpation over the bony and soft tissue structures around the knee  Range of Motion and presence of extensor lag/contractures  Sensation and motor strength  Varus/valgus or anterior/posterior/rotatory instability  Flexion pinch and Ely's Tests  Patellar " alignment/tracking/pain to palpation  Vascular exam to include skin temperature/color/capillary refill    Remarkable findings included:  Normal alignment.  No ecchymosis, swelling or effusion.  Mild lateral joint line tenderness.  Range of motion 0-130° of flexion.  No instability on exam.  Negative flexion pinch.  Negative Ely's.      IMAGING:   MRI is personally reviewed and consistent with a report.  Lateral meniscus tear identified.  Signal change in the medial meniscus consistent with degenerative changes noted.  Fluid in the popliteal fossa no evidence of Baker cyst identified.    ASSESSMENT:  Lateral meniscus tear left knee    PLAN:  The implications of the patient's evolution of symptoms and findings were explained to the patient in detail.  As Kathy is feeling much better with the anti-inflammatory medication, I recommended observation and activities as tolerated.  However should her symptoms are worsening consideration for cortisone injection versus arthroscopy will be discussed.  Follow-up if not improving or worse.    This note was dictated using voice recognition software and may contain grammatical errors  Answers for HPI/ROS submitted by the patient on 3/6/2017   Leg pain  unexpected weight change: No  appetite change : No  sleep disturbance: Yes  IMMUNOCOMPROMISED: No  nervous/ anxious: No  dysphoric mood: No  rash: No  visual disturbance: No  eye redness: No  eye pain: No  ear pain: No  tinnitus: No  hearing loss: No  sinus pressure : No  nosebleeds: No  enviro allergies: No  food allergies: No  cough: No  shortness of breath: Yes  sweating: Yes  dysuria: No  frequency: Yes  difficulty urinating: No  hematuria: No  painful intercourse: No  chest pain: No  palpitations: No  nausea: No  vomiting: No  diarrhea: No  blood in stool: No  constipation: No  headaches: No  dizziness: No  numbness: No  seizures: No  joint swelling: Yes  myalgia: Yes  weakness: No  back pain: Yes  Pain Chronicity:  recurrent  History of trauma: No  Onset: more than 1 month ago  Frequency: daily  Progression since onset: gradually improving  Injury mechanism: twisting  injury location: at home  pain- numeric: 5/10  pain location: left knee  pain quality: aching  Radiating Pain: Yes  If your pain is radiating, to what part of the body?: left thigh  Aggravating factors: bending  fever: No  inability to bear weight: No  itching: No  joint locking: No  limited range of motion: Yes  stiffness: Yes  tingling: No  Treatments tried: NSAIDs  physical therapy: not tried  Improvement on treatment: moderate

## 2017-04-06 ENCOUNTER — LAB VISIT (OUTPATIENT)
Dept: LAB | Facility: HOSPITAL | Age: 67
End: 2017-04-06
Attending: FAMILY MEDICINE
Payer: MEDICARE

## 2017-04-06 ENCOUNTER — TELEPHONE (OUTPATIENT)
Dept: FAMILY MEDICINE | Facility: CLINIC | Age: 67
End: 2017-04-06

## 2017-04-06 ENCOUNTER — OFFICE VISIT (OUTPATIENT)
Dept: FAMILY MEDICINE | Facility: CLINIC | Age: 67
End: 2017-04-06
Payer: MEDICARE

## 2017-04-06 VITALS
WEIGHT: 186.31 LBS | SYSTOLIC BLOOD PRESSURE: 154 MMHG | HEIGHT: 65 IN | TEMPERATURE: 98 F | HEART RATE: 78 BPM | BODY MASS INDEX: 31.04 KG/M2 | DIASTOLIC BLOOD PRESSURE: 91 MMHG

## 2017-04-06 DIAGNOSIS — R19.7 DIARRHEA, UNSPECIFIED TYPE: ICD-10-CM

## 2017-04-06 DIAGNOSIS — R19.7 DIARRHEA, UNSPECIFIED TYPE: Primary | ICD-10-CM

## 2017-04-06 DIAGNOSIS — R35.0 URINARY FREQUENCY: ICD-10-CM

## 2017-04-06 LAB
BILIRUB SERPL-MCNC: ABNORMAL MG/DL
BLOOD, POC UA: ABNORMAL
GLUCOSE UR QL STRIP: NORMAL
KETONES UR QL STRIP: ABNORMAL
LEUKOCYTE ESTERASE URINE, POC: ABNORMAL
NITRITE, POC UA: ABNORMAL
PH, POC UA: 8
PROTEIN, POC: ABNORMAL
SPECIFIC GRAVITY, POC UA: 1
UROBILINOGEN, POC UA: NORMAL

## 2017-04-06 PROCEDURE — 1125F AMNT PAIN NOTED PAIN PRSNT: CPT | Mod: S$GLB,,, | Performed by: FAMILY MEDICINE

## 2017-04-06 PROCEDURE — 99999 PR PBB SHADOW E&M-EST. PATIENT-LVL III: CPT | Mod: PBBFAC,,, | Performed by: FAMILY MEDICINE

## 2017-04-06 PROCEDURE — 87046 STOOL CULTR AEROBIC BACT EA: CPT | Mod: 59

## 2017-04-06 PROCEDURE — 87045 FECES CULTURE AEROBIC BACT: CPT

## 2017-04-06 PROCEDURE — 3080F DIAST BP >= 90 MM HG: CPT | Mod: S$GLB,,, | Performed by: FAMILY MEDICINE

## 2017-04-06 PROCEDURE — 81000 URINALYSIS NONAUTO W/SCOPE: CPT | Mod: S$GLB,,, | Performed by: FAMILY MEDICINE

## 2017-04-06 PROCEDURE — 3077F SYST BP >= 140 MM HG: CPT | Mod: S$GLB,,, | Performed by: FAMILY MEDICINE

## 2017-04-06 PROCEDURE — 1159F MED LIST DOCD IN RCRD: CPT | Mod: S$GLB,,, | Performed by: FAMILY MEDICINE

## 2017-04-06 PROCEDURE — 87427 SHIGA-LIKE TOXIN AG IA: CPT | Mod: 59

## 2017-04-06 PROCEDURE — 1160F RVW MEDS BY RX/DR IN RCRD: CPT | Mod: S$GLB,,, | Performed by: FAMILY MEDICINE

## 2017-04-06 PROCEDURE — 99214 OFFICE O/P EST MOD 30 MIN: CPT | Mod: 25,S$GLB,, | Performed by: FAMILY MEDICINE

## 2017-04-06 PROCEDURE — 1157F ADVNC CARE PLAN IN RCRD: CPT | Mod: S$GLB,,, | Performed by: FAMILY MEDICINE

## 2017-04-06 RX ORDER — SULFAMETHOXAZOLE AND TRIMETHOPRIM 800; 160 MG/1; MG/1
1 TABLET ORAL 2 TIMES DAILY
Qty: 20 TABLET | Refills: 0 | Status: SHIPPED | OUTPATIENT
Start: 2017-04-06 | End: 2017-04-16

## 2017-04-06 NOTE — PROGRESS NOTES
Subjective:       Patient ID: Kathy Seymour is a 66 y.o. female.    Chief Complaint: Stomach cramps    HPI Comments: Also feels like a UTI may be starting.  Always has frequency but now having diarrhea with each urination.    Diarrhea    This is a new problem. The current episode started in the past 7 days. The problem occurs 5 to 10 times per day. The problem has been unchanged. The stool consistency is described as mucous. The patient states that diarrhea does not awaken her from sleep. Associated symptoms include abdominal pain (crampy). Pertinent negatives include no fever or vomiting. Risk factors: At casino over weekend; ate out a lot prior to onset. She has tried change of diet for the symptoms. The treatment provided no relief.     Review of Systems   Constitutional: Negative for fever.   Respiratory: Negative for shortness of breath.    Cardiovascular: Negative for chest pain.   Gastrointestinal: Positive for abdominal pain (crampy) and diarrhea. Negative for nausea and vomiting.   Skin: Negative for rash.   All other systems reviewed and are negative.      Objective:      Physical Exam   Constitutional: She appears well-developed and well-nourished.   Eyes: Pupils are equal, round, and reactive to light. No scleral icterus.   Neck: Neck supple.   Cardiovascular: Normal rate and regular rhythm.    No murmur heard.  Pulmonary/Chest: Effort normal and breath sounds normal.   Abdominal: Soft. Bowel sounds are normal. There is no tenderness.   Musculoskeletal: She exhibits no edema or tenderness.   Lymphadenopathy:     She has no cervical adenopathy.   Skin: Skin is warm and dry.       Assessment:       1. Diarrhea, unspecified type    2. Urinary frequency        Plan:         Kathy was seen today for stomach cramps.    Diagnoses and all orders for this visit:    Diarrhea, unspecified type  -     POCT Urinalysis  -     CULTURE, STOOL; Future  -     CLOSTRIDIUM DIFFICILE; Future    Urinary frequency  -      POCT Urinalysis  -     CULTURE, STOOL; Future  -     CLOSTRIDIUM DIFFICILE; Future  -     Urine culture     UA shows only Trace blood; will culture.  Start progressive diet and add probiotics.  Empiric Rx with Bactrim for now.

## 2017-04-06 NOTE — TELEPHONE ENCOUNTER
----- Message from Alejandro Weaver MD sent at 4/6/2017 11:23 AM CDT -----  Notify that UA showed only Trace blood; will start Bactrim to cover both UTI and possible bacterial diarrhea; will see if urine and stool cultures show anything specific.

## 2017-04-06 NOTE — PATIENT INSTRUCTIONS
Weight Management: Getting Started  Healthy bodies come in all shapes and sizes. Not all bodies are made to be thin. For some people, a healthy weight is higher than the average weight listed on weight charts. Your healthcare provider can help you decide on a healthy weight for you.    Reasons to lose weight  Losing weight can help with some health problems, such as high blood pressure, heart disease, diabetes, sleep apnea, and arthritis. You may also feel more energy.  Set your long-term goal  Your goal doesn't even have to be a specific weight. You may decide on a fitness goal (such as being able to walk 10 miles a week), or a health goal (such as lowering your blood pressure). Choose a goal that is measurable and reasonable, so you know when you've reached it. A goal of reaching a BMI of less than 25 is not always reasonable (or possible).   Make an action plan  Habits dont change overnight. Setting your goals too high can leave you feeling discouraged if you cant reach them. Be realistic. Choose one or two small changes you can make now. Set an action plan for how you are going to make these changes. When you can stick to this plan, keep making a few more small changes. Taking small steps will help you stay on the path to success.  Track your progress  Write down your goals. Then, keep a daily record of your progress. Write down what you eat and how active you are. This record lets you look back on how much youve done. It may also help when youre feeling frustrated. Reward yourself for success. Even if you dont reach every goal, give yourself credit for what you do get done.  Get support  Encouragement from others can help make losing weight easier. Ask your family members and friends for support. They may even want to join you. Also look to your healthcare provider, registered dietitian, and  for help. Your local hospital can give you more information about nutrition, exercise, and  weight loss.  Date Last Reviewed: 1/31/2016 © 2000-2016 [employer]. [address] [place] [address], [place] [address]. All rights reserved. This information is not intended as a substitute for professional medical care. Always follow your healthcare professional's instructions.        Walking for Fitness  Fitness walking has something for everyone, even people who are already fit. Walking is one of the safest ways to condition your body aerobically. It can boost energy, help you lose weight, and reduce stress.    Physical benefits  · Walking strengthens your heart and lungs, and tones your muscles.  · When walking, your feet land with less impact than in other sports. This reduces chances of muscle, bone, and joint injury.  · Regular walking improves your cholesterol levels and lowers your risk of heart disease. And it helps you control your blood sugar if you have diabetes.  · Walking is a weight-bearing activity, which helps maintain bone density. This can help prevent osteoporosis.  Personal rewards  · Taking walks can help you relax and manage stress. And fitness walking may make you feel better about yourself.  · Walking can help you sleep better at night and make you less likely to be depressed.  · Regular walking may help maintain your memory as you get older.  · Walking is a great way to spend extra time with friends and family members. Be sure to invite your dog along!  Q&A about fitness walking  Q: Will walking keep me fit?  A: Yes. Regular walking at the right pace gives you all the benefits of other aerobic activities, such as jogging and swimming.  Q: Will walking help me lose weight and keep it off?  A: Yes. Per mile, walking can burn as many calories as jogging. Your health care provider can help work walking into your weight-loss plan.  Q: Is walking safe for my health?  A: Yes. Walking is safe if you have high blood pressure, diabetes, heart disease, or other conditions. Talk to your health  care provider before you start.  Date Last Reviewed: 5/9/2015  © 9380-3759 [employer]. [address] [place] [address], [place] [address]. All rights reserved. This information is not intended as a substitute for professional medical care. Always follow your healthcare professional's instructions.        Controlling High Blood Pressure  High blood pressure (hypertension) is often called the silent killer. This is because many people who have it dont know it. High blood pressure is defined as 140/90 mm Hg or higher. Know your blood pressure and remember to check it regularly. Doing so can save your life. Here are some things you can do to help control your blood pressure.    Choose heart-healthy foods  · Select low-salt, low-fat foods. Limit sodium intake to 2,400 mg per day or the amount suggested by your healthcare provider.  · Limit canned, dried, cured, packaged, and fast foods. These can contain a lot of salt.  · Eat 8 to 10 servings of fruits and vegetables every day.  · Choose lean meats, fish, or chicken.  · Eat whole-grain pasta, brown rice, and beans.  · Eat 2 to 3 servings of low-fat or fat-free dairy products.  · Ask your doctor about the DASH eating plan. This plan helps reduce blood pressure.  · When you go to a restaurant, ask that your meal be prepared with no added salt.  Maintain a healthy weight  · Ask your healthcare provider how many calories to eat a day. Then stick to that number.  · Ask your healthcare provider what weight range is healthiest for you. If you are overweight, a weight loss of only 3% to 5% of your body weight can help lower blood pressure. Generally, a good weight loss goal is to lose 10% of your body weight in a year.  · Limit snacks and sweets.  · Get regular exercise.  Get up and get active  · Choose activities you enjoy. Find ones you can do with friends or family. This includes bicycling, dancing, walking, and jogging.  · Park farther away from building  entrances.  · Use stairs instead of the elevator.  · When you can, walk or bike instead of driving.  · Chester leaves, garden, or do household repairs.  · Be active at a moderate to vigorous level of physical activity for at least 40 minutes for a minimum of 3 to 4 days a week.   Manage stress  · Make time to relax and enjoy life. Find time to laugh.  · Communicate your concerns with your loved ones and your healthcare provider.  · Visit with family and friends, and keep up with hobbies.  Limit alcohol and quit smoking  · Men should have no more than 2 drinks per day.  · Women should have no more than 1 drink per day.  · Talk with your healthcare provider about quitting smoking. Smoking significantly increases your risk for heart disease and stroke. Ask your healthcare provider about community smoking cessation programs and other options.  Medicines  If lifestyle changes arent enough, your healthcare provider may prescribe high blood pressure medicine. Take all medicines as prescribed. If you have any questions about your medicines, ask your healthcare provider before stopping or changing them.   Date Last Reviewed: 4/27/2016 © 2000-2016 [employer]. [address] [place] [address], [place] [address]. All rights reserved. This information is not intended as a substitute for professional medical care. Always follow your healthcare professional's instructions.

## 2017-04-07 ENCOUNTER — PATIENT MESSAGE (OUTPATIENT)
Dept: PULMONOLOGY | Facility: CLINIC | Age: 67
End: 2017-04-07

## 2017-04-09 LAB
BACTERIA UR CULT: NORMAL
E COLI SXT1 STL QL IA: NEGATIVE
E COLI SXT2 STL QL IA: NEGATIVE

## 2017-04-10 LAB — BACTERIA STL CULT: NORMAL

## 2017-04-12 ENCOUNTER — TELEPHONE (OUTPATIENT)
Dept: FAMILY MEDICINE | Facility: CLINIC | Age: 67
End: 2017-04-12

## 2017-04-12 NOTE — TELEPHONE ENCOUNTER
----- Message from Alejandro Weaver MD sent at 4/11/2017  9:23 PM CDT -----  Contact: Lab Client Services      ----- Message -----     From: Freddy Porras     Sent: 4/10/2017   6:56 AM       To: Alejandro Weaver MD    Hi my name is Freddy I work in the lab client services. We had a problem with one of the labs on your patient. If someone from your office could please call us at [phone] or [phone] that would be great. ANYONE in our department can help. Thank You.

## 2017-04-19 ENCOUNTER — PATIENT MESSAGE (OUTPATIENT)
Dept: FAMILY MEDICINE | Facility: CLINIC | Age: 67
End: 2017-04-19

## 2017-04-19 ENCOUNTER — PATIENT MESSAGE (OUTPATIENT)
Dept: PULMONOLOGY | Facility: CLINIC | Age: 67
End: 2017-04-19

## 2017-04-19 NOTE — TELEPHONE ENCOUNTER
Patient wanted an antibiotic just in case of a UTI. Test was neg. Patient farida having any symptoms.  Advised patient to drink plenty water and to call office if any symptoms occur. Verbalized understanding

## 2017-04-28 ENCOUNTER — TELEPHONE (OUTPATIENT)
Dept: PULMONOLOGY | Facility: CLINIC | Age: 67
End: 2017-04-28

## 2017-04-28 ENCOUNTER — PATIENT MESSAGE (OUTPATIENT)
Dept: PULMONOLOGY | Facility: CLINIC | Age: 67
End: 2017-04-28

## 2017-04-28 NOTE — TELEPHONE ENCOUNTER
----- Message from Alana Kumar sent at 4/28/2017  9:04 AM CDT -----  Chel with [facility] is requesting a call back concerning a prescription she received for oxygen on the above patient, she states she needs to go over the qualification, contact her at [phone].    Thank you

## 2017-05-02 ENCOUNTER — PATIENT MESSAGE (OUTPATIENT)
Dept: ENDOCRINOLOGY | Facility: CLINIC | Age: 67
End: 2017-05-02

## 2017-05-03 DIAGNOSIS — E55.9 HYPOVITAMINOSIS D: ICD-10-CM

## 2017-05-03 DIAGNOSIS — R73.03 PREDIABETES: ICD-10-CM

## 2017-05-03 DIAGNOSIS — E04.1 NODULAR THYROID DISEASE: Primary | ICD-10-CM

## 2017-05-03 DIAGNOSIS — I10 ESSENTIAL HYPERTENSION: ICD-10-CM

## 2017-05-03 DIAGNOSIS — E78.5 HYPERLIPIDEMIA, UNSPECIFIED HYPERLIPIDEMIA TYPE: ICD-10-CM

## 2017-05-03 DIAGNOSIS — E66.9 OBESITY (BMI 30-39.9): ICD-10-CM

## 2017-05-05 ENCOUNTER — LAB VISIT (OUTPATIENT)
Dept: LAB | Facility: HOSPITAL | Age: 67
End: 2017-05-05
Attending: INTERNAL MEDICINE
Payer: MEDICARE

## 2017-05-05 DIAGNOSIS — E66.9 OBESITY (BMI 30-39.9): ICD-10-CM

## 2017-05-05 DIAGNOSIS — I10 ESSENTIAL HYPERTENSION: ICD-10-CM

## 2017-05-05 DIAGNOSIS — E78.5 HYPERLIPIDEMIA, UNSPECIFIED HYPERLIPIDEMIA TYPE: ICD-10-CM

## 2017-05-05 DIAGNOSIS — R73.03 PREDIABETES: ICD-10-CM

## 2017-05-05 DIAGNOSIS — E04.1 NODULAR THYROID DISEASE: ICD-10-CM

## 2017-05-05 DIAGNOSIS — E55.9 HYPOVITAMINOSIS D: ICD-10-CM

## 2017-05-05 LAB
25(OH)D3+25(OH)D2 SERPL-MCNC: 40 NG/ML
ALBUMIN SERPL BCP-MCNC: 3.9 G/DL
ALP SERPL-CCNC: 98 U/L
ALT SERPL W/O P-5'-P-CCNC: 18 U/L
ANION GAP SERPL CALC-SCNC: 8 MMOL/L
AST SERPL-CCNC: 21 U/L
BASOPHILS # BLD AUTO: 0.01 K/UL
BASOPHILS NFR BLD: 0.2 %
BILIRUB SERPL-MCNC: 0.5 MG/DL
BUN SERPL-MCNC: 20 MG/DL
CA-I BLDV-SCNC: 1.24 MMOL/L
CALCIUM SERPL-MCNC: 9.5 MG/DL
CHLORIDE SERPL-SCNC: 103 MMOL/L
CO2 SERPL-SCNC: 32 MMOL/L
CREAT SERPL-MCNC: 0.9 MG/DL
DIFFERENTIAL METHOD: NORMAL
EOSINOPHIL # BLD AUTO: 0.1 K/UL
EOSINOPHIL NFR BLD: 2.1 %
ERYTHROCYTE [DISTWIDTH] IN BLOOD BY AUTOMATED COUNT: 14.1 %
EST. GFR  (AFRICAN AMERICAN): >60 ML/MIN/1.73 M^2
EST. GFR  (NON AFRICAN AMERICAN): >60 ML/MIN/1.73 M^2
GLUCOSE SERPL-MCNC: 119 MG/DL
HCT VFR BLD AUTO: 40.1 %
HGB BLD-MCNC: 13 G/DL
LYMPHOCYTES # BLD AUTO: 1.9 K/UL
LYMPHOCYTES NFR BLD: 29.2 %
MCH RBC QN AUTO: 29.5 PG
MCHC RBC AUTO-ENTMCNC: 32.4 %
MCV RBC AUTO: 91 FL
MONOCYTES # BLD AUTO: 0.4 K/UL
MONOCYTES NFR BLD: 6 %
NEUTROPHILS # BLD AUTO: 4.1 K/UL
NEUTROPHILS NFR BLD: 62.3 %
PLATELET # BLD AUTO: 215 K/UL
PMV BLD AUTO: 11.7 FL
POTASSIUM SERPL-SCNC: 3.8 MMOL/L
PROT SERPL-MCNC: 7.7 G/DL
PTH-INTACT SERPL-MCNC: 42 PG/ML
RBC # BLD AUTO: 4.4 M/UL
SODIUM SERPL-SCNC: 143 MMOL/L
T3 SERPL-MCNC: 138 NG/DL
T4 FREE SERPL-MCNC: 1 NG/DL
TSH SERPL DL<=0.005 MIU/L-ACNC: 0.29 UIU/ML
WBC # BLD AUTO: 6.64 K/UL

## 2017-05-05 PROCEDURE — 80053 COMPREHEN METABOLIC PANEL: CPT

## 2017-05-05 PROCEDURE — 82330 ASSAY OF CALCIUM: CPT

## 2017-05-05 PROCEDURE — 82306 VITAMIN D 25 HYDROXY: CPT

## 2017-05-05 PROCEDURE — 84480 ASSAY TRIIODOTHYRONINE (T3): CPT

## 2017-05-05 PROCEDURE — 84443 ASSAY THYROID STIM HORMONE: CPT

## 2017-05-05 PROCEDURE — 85025 COMPLETE CBC W/AUTO DIFF WBC: CPT

## 2017-05-05 PROCEDURE — 82308 ASSAY OF CALCITONIN: CPT

## 2017-05-05 PROCEDURE — 86316 IMMUNOASSAY TUMOR OTHER: CPT

## 2017-05-05 PROCEDURE — 83970 ASSAY OF PARATHORMONE: CPT

## 2017-05-05 PROCEDURE — 84439 ASSAY OF FREE THYROXINE: CPT

## 2017-05-05 PROCEDURE — 84432 ASSAY OF THYROGLOBULIN: CPT

## 2017-05-05 PROCEDURE — 83036 HEMOGLOBIN GLYCOSYLATED A1C: CPT

## 2017-05-06 LAB
ESTIMATED AVG GLUCOSE: 123 MG/DL
HBA1C MFR BLD HPLC: 5.9 %

## 2017-05-07 DIAGNOSIS — R73.03 PREDIABETES: ICD-10-CM

## 2017-05-07 RX ORDER — METFORMIN HYDROCHLORIDE 500 MG/1
TABLET ORAL
Qty: 180 TABLET | Refills: 0 | Status: SHIPPED | OUTPATIENT
Start: 2017-05-07 | End: 2018-01-08 | Stop reason: SDDI

## 2017-05-08 ENCOUNTER — OFFICE VISIT (OUTPATIENT)
Dept: ENDOCRINOLOGY | Facility: CLINIC | Age: 67
End: 2017-05-08
Payer: MEDICARE

## 2017-05-08 VITALS
HEART RATE: 65 BPM | DIASTOLIC BLOOD PRESSURE: 64 MMHG | RESPIRATION RATE: 18 BRPM | HEIGHT: 65 IN | WEIGHT: 181.88 LBS | SYSTOLIC BLOOD PRESSURE: 137 MMHG | BODY MASS INDEX: 30.3 KG/M2

## 2017-05-08 DIAGNOSIS — R73.03 PREDIABETES: ICD-10-CM

## 2017-05-08 DIAGNOSIS — G47.33 OSA ON CPAP: ICD-10-CM

## 2017-05-08 DIAGNOSIS — E04.1 NODULAR THYROID DISEASE: ICD-10-CM

## 2017-05-08 DIAGNOSIS — E88.810 DYSMETABOLIC SYNDROME: ICD-10-CM

## 2017-05-08 DIAGNOSIS — E05.90 SUBCLINICAL HYPERTHYROIDISM: ICD-10-CM

## 2017-05-08 DIAGNOSIS — Z78.0 POSTMENOPAUSAL: ICD-10-CM

## 2017-05-08 DIAGNOSIS — I25.83 CORONARY ARTERY DISEASE DUE TO LIPID RICH PLAQUE: ICD-10-CM

## 2017-05-08 DIAGNOSIS — R53.82 CHRONIC FATIGUE: ICD-10-CM

## 2017-05-08 DIAGNOSIS — I10 ESSENTIAL HYPERTENSION: ICD-10-CM

## 2017-05-08 DIAGNOSIS — I25.10 CORONARY ARTERY DISEASE DUE TO LIPID RICH PLAQUE: ICD-10-CM

## 2017-05-08 DIAGNOSIS — E78.5 HYPERLIPIDEMIA, UNSPECIFIED HYPERLIPIDEMIA TYPE: ICD-10-CM

## 2017-05-08 DIAGNOSIS — F41.9 ANXIETY: ICD-10-CM

## 2017-05-08 DIAGNOSIS — E03.8 SECONDARY HYPOTHYROIDISM: ICD-10-CM

## 2017-05-08 DIAGNOSIS — E04.1 THYROID NODULE: Primary | ICD-10-CM

## 2017-05-08 DIAGNOSIS — E55.9 HYPOVITAMINOSIS D: ICD-10-CM

## 2017-05-08 DIAGNOSIS — E66.9 OBESITY (BMI 30.0-34.9): ICD-10-CM

## 2017-05-08 LAB
CALCIT SERPL-MCNC: <5 PG/ML
THRYOGLOBULIN INTERPRETATION: ABNORMAL
THYROGLOB AB SERPL-ACNC: <1.8 IU/ML
THYROGLOB SERPL-MCNC: 38 NG/ML

## 2017-05-08 PROCEDURE — 99999 PR PBB SHADOW E&M-EST. PATIENT-LVL III: CPT | Mod: PBBFAC,,, | Performed by: INTERNAL MEDICINE

## 2017-05-08 PROCEDURE — 99214 OFFICE O/P EST MOD 30 MIN: CPT | Mod: S$GLB,,, | Performed by: INTERNAL MEDICINE

## 2017-05-08 PROCEDURE — 3078F DIAST BP <80 MM HG: CPT | Mod: S$GLB,,, | Performed by: INTERNAL MEDICINE

## 2017-05-08 PROCEDURE — 1160F RVW MEDS BY RX/DR IN RCRD: CPT | Mod: S$GLB,,, | Performed by: INTERNAL MEDICINE

## 2017-05-08 PROCEDURE — 99499 UNLISTED E&M SERVICE: CPT | Mod: S$GLB,,, | Performed by: INTERNAL MEDICINE

## 2017-05-08 PROCEDURE — 3075F SYST BP GE 130 - 139MM HG: CPT | Mod: S$GLB,,, | Performed by: INTERNAL MEDICINE

## 2017-05-08 PROCEDURE — 1126F AMNT PAIN NOTED NONE PRSNT: CPT | Mod: S$GLB,,, | Performed by: INTERNAL MEDICINE

## 2017-05-08 PROCEDURE — 1159F MED LIST DOCD IN RCRD: CPT | Mod: S$GLB,,, | Performed by: INTERNAL MEDICINE

## 2017-05-08 NOTE — PROGRESS NOTES
Subjective:      Patient ID: Kathy Seymour is a 66 y.o. female.    Chief Complaint:      66 yr old lady with thyroid nodular disease and prediabetes seen in [facility] today.    History of Present Illness    Patient is a 66yr old lady with thyroid nodular disease seen in up today.     The patients most recent thyroid sonogram was from 09/16 and showed multinodular goiter with 3 dominant thyroid nodules for which she had USS guided FNAB. Two of these nodules showed featutes consistent with benign colloid nodules while insufficient cellls were obtained from the right sided nodule to evaluate.  She has no local neck symptoms but is concerned about not having definitive diagnosis regarding the right sided dominant nodule. She was to supposed to have elective thyroidectomy but she has recently had several other medical issues arise including pulmonary related flare ups (SOB) and arthralgias in addition to worsening fatigue and intermittent abdominal cramps and hyperdefecation..     She in addition has essential hypertension, hyperlipidemia with hypercholesterolemia, CAD, hypovitaminosis D on vitamin D supplementation as well as grade 1 obesity and is postmenopausal.  Most recent lipid profile referenced below shows patients LDL at desired goal of being under 70 on statin therapy with Zocor 20mg Daily. Her most recent DEXA from 08/15 was normal and thus her intended [facility] study should be for ~ 08/17.      Patient still continues to have fatigue especially related to exertion. She also continues to have intermittent hot flashes though she is now ~ 16yrs post menopausal ffing EAN and BSO.  Patients current Kinston score is 11 and she does have established OSAS and uses a CPAP mask.  She stopped smoking about 5 yrs ago and has noticed some interval weight gain (~ 40lbs) since then.    Review of Systems   Constitutional: Negative for chills and fatigue.   HENT: Negative for facial swelling and trouble swallowing.    Eyes:  "Negative for visual disturbance.   Respiratory: Negative for cough and shortness of breath.    Gastrointestinal: Positive for abdominal pain (intermittent) and diarrhea (intermittent and associated with upper abdominal cramping). Negative for constipation, nausea and vomiting.   Endocrine: Negative for polyuria.   Genitourinary: Negative for dysuria and frequency.   Musculoskeletal: Positive for arthralgias (mainly in joints of the hands and feet bilaterally.She is s/p recent bunion surgery.). Negative for gait problem and myalgias.   Skin: Negative for color change, pallor and rash.   Neurological: Negative for dizziness, tremors, numbness and headaches.   Hematological: Does not bruise/bleed easily.   Psychiatric/Behavioral: Positive for sleep disturbance (Established OSAS on CPAP therapy.). Negative for confusion. The patient is not nervous/anxious.        Objective:  /64  Pulse 65  Resp 18  Ht 5' 5" (1.651 m)  Wt 82.5 kg (181 lb 14.1 oz)  BMI 30.27 kg/m2     Physical Exam   Constitutional: She is oriented to person, place, and time. She appears well-developed and well-nourished. No distress.   HENT:   Head: Normocephalic and atraumatic.   Eyes: Conjunctivae and EOM are normal. Pupils are equal, round, and reactive to light. No scleral icterus.   Neck: Normal range of motion. Neck supple. No JVD present.   Cardiovascular: Normal rate, regular rhythm and normal heart sounds.    Pulmonary/Chest: Effort normal and breath sounds normal. No respiratory distress. She has no wheezes.   Abdominal: Soft. There is no tenderness.   Musculoskeletal: She exhibits deformity (Evidence of mild OA in both hands bilaterally.).   Has Herbedens and Bouchards nodes in both hands.  Has healed surgical bunionectomy scars on both big toes bilaterally.     Neurological: She is alert and oriented to person, place, and time. No cranial nerve deficit.   Skin: Skin is warm and dry. No rash noted. She is not diaphoretic. No " erythema. No pallor.   Psychiatric: She has a normal mood and affect. Her behavior is normal. Judgment and thought content normal.   Vitals reviewed.      Lab Review:     Results for DAVON HERRERA (MRN [id number]) as of 5/8/2017 11:35   Ref. Range 4/6/2017 11:18 5/5/2017 12:33   WBC Latest Ref Range: 3.90 - 12.70 K/uL  6.64   RBC Latest Ref Range: 4.00 - 5.40 M/uL  4.40   Hemoglobin Latest Ref Range: 12.0 - 16.0 g/dL  13.0   Hematocrit Latest Ref Range: 37.0 - 48.5 %  40.1   MCV Latest Ref Range: 82 - 98 fL  91   MCH Latest Ref Range: 27.0 - 31.0 pg  29.5   MCHC Latest Ref Range: 32.0 - 36.0 %  32.4   RDW Latest Ref Range: 11.5 - 14.5 %  14.1   Platelets Latest Ref Range: 150 - 350 K/uL  215   MPV Latest Ref Range: 9.2 - 12.9 fL  11.7   Gran% Latest Ref Range: 38.0 - 73.0 %  62.3   Gran # Latest Ref Range: 1.8 - 7.7 K/uL  4.1   Lymph% Latest Ref Range: 18.0 - 48.0 %  29.2   Lymph # Latest Ref Range: 1.0 - 4.8 K/uL  1.9   Mono% Latest Ref Range: 4.0 - 15.0 %  6.0   Mono # Latest Ref Range: 0.3 - 1.0 K/uL  0.4   Eosinophil% Latest Ref Range: 0.0 - 8.0 %  2.1   Eos # Latest Ref Range: 0.0 - 0.5 K/uL  0.1   Basophil% Latest Ref Range: 0.0 - 1.9 %  0.2   Baso # Latest Ref Range: 0.00 - 0.20 K/uL  0.01   Sodium Latest Ref Range: 136 - 145 mmol/L  143   Potassium Latest Ref Range: 3.5 - 5.1 mmol/L  3.8   Chloride Latest Ref Range: 95 - 110 mmol/L  103   CO2 Latest Ref Range: 23 - 29 mmol/L  32 (H)   Anion Gap Latest Ref Range: 8 - 16 mmol/L  8   BUN, Bld Latest Ref Range: 8 - 23 mg/dL  20   Creatinine Latest Ref Range: 0.5 - 1.4 mg/dL  0.9   eGFR if non African American Latest Ref Range: >60 mL/min/1.73 m^2  >60.0   eGFR if African American Latest Ref Range: >60 mL/min/1.73 m^2  >60.0   Glucose Latest Ref Range: 70 - 110 mg/dL  119 (H)   Calcium Latest Ref Range: 8.7 - 10.5 mg/dL  9.5   Calcium, Ion Latest Ref Range: 1.06 - 1.42 mmol/L  1.24   Alkaline Phosphatase Latest Ref Range: 55 - 135 U/L  98   Total Protein  Latest Ref Range: 6.0 - 8.4 g/dL  7.7   Albumin Latest Ref Range: 3.5 - 5.2 g/dL  3.9   Total Bilirubin Latest Ref Range: 0.1 - 1.0 mg/dL  0.5   AST Latest Ref Range: 10 - 40 U/L  21   ALT Latest Ref Range: 10 - 44 U/L  18   Vit D, 25-Hydroxy Latest Ref Range: 30 - 96 ng/mL  40   Hemoglobin A1C Latest Ref Range: 4.5 - 6.2 %  5.9   Estimated Avg Glucose Latest Ref Range: 68 - 131 mg/dL  123   TSH Latest Ref Range: 0.400 - 4.000 uIU/mL  0.291 (L)   T3, Total Latest Ref Range: 60 - 180 ng/dL  138   Free T4 Latest Ref Range: 0.71 - 1.51 ng/dL  1.00   Thyroglobulin Interpretation Unknown  SEE BELOW   Thyroglobulin Antibody Screen Latest Ref Range: <4.0 IU/mL  <1.8   Thyroglobulin, Tumor Marker Latest Units: ng/mL  38 (H)   PTH Latest Ref Range: 9.0 - 77.0 pg/mL  42.0       Assessment:     1. Thyroid nodule     2. Subclinical hyperthyroidism  DXA Bone Density Spine And Hip   3. Nodular thyroid disease     4. Coronary artery disease due to lipid rich plaque     5. Secondary hypothyroidism     6. RAMONA on CPAP, 100% use     7. Anxiety     8. Essential hypertension  DXA Bone Density Spine And Hip   9. Dysmetabolic syndrome     10. Obesity (BMI 30.0-34.9)     11. Hyperlipidemia, unspecified hyperlipidemia type     12. Hypovitaminosis D  DXA Bone Density Spine And Hip   13. Prediabetes     14. Postmenopausal  DXA Bone Density Spine And Hip   15. Chronic fatigue  Ambulatory consult for Sleep Study        Regarding thyroid functional status; Patient remains clinically stable of methimazole with only mild subclinical hyperthyroidism and no overt clinical symptomatology.    Regarding thyroid nodular disease; Will wait for resolution of the current active medical issue before scheduling for elective thyroidectomy. She is to have appointment with gastroenterology and pulmonology i this regard. Patient wants to have elective thyroidectomy to treat the thyroid nodular disease and will decide on where to have thyroidectomy when she has  stabilization of other medical problems. She feels that she would prefer to have the elective thyroidectomy done here @ the [facility] with Dr Loco Campos.  Regarding bone health; to obtain vit D level and for ffup DEXA ~ 08/17.  Regarding hypertension; well controlled. To continue present antihypertensive regimen and continue serial ambulatory tracking of BP trends. Will also check urine for microalbumin excretion.  Regarding hyperlipidemia; most recent lipid profile was at goal. To continue antilpidemics and plavix for platelet aggregation inhibition as before.  Regarding OSAS; CPAP therapy to continue as before but to refer to Dr Roman to review her current settings to see if these need adjustment in view of her progressive fatigue.  Regarding CAD; ongoing surviellance and management as per her PCP and cardiology team.    Plan:     FFup in ~ 4mths

## 2017-05-10 LAB — CGA SERPL-MCNC: 7 NG/ML

## 2017-05-22 ENCOUNTER — OFFICE VISIT (OUTPATIENT)
Dept: CARDIOLOGY | Facility: CLINIC | Age: 67
End: 2017-05-22
Payer: MEDICARE

## 2017-05-22 VITALS
HEIGHT: 65 IN | OXYGEN SATURATION: 96 % | SYSTOLIC BLOOD PRESSURE: 137 MMHG | DIASTOLIC BLOOD PRESSURE: 62 MMHG | BODY MASS INDEX: 31.51 KG/M2 | HEART RATE: 69 BPM | WEIGHT: 189.13 LBS

## 2017-05-22 DIAGNOSIS — E65 ABDOMINAL OBESITY: ICD-10-CM

## 2017-05-22 DIAGNOSIS — G47.36 NOCTURNAL HYPOXEMIA DUE TO OBESITY: ICD-10-CM

## 2017-05-22 DIAGNOSIS — Z95.5 S/P CORONARY ARTERY STENT PLACEMENT: Primary | ICD-10-CM

## 2017-05-22 DIAGNOSIS — Z01.810 PREOP CARDIOVASCULAR EXAM: ICD-10-CM

## 2017-05-22 DIAGNOSIS — E78.00 PURE HYPERCHOLESTEROLEMIA: ICD-10-CM

## 2017-05-22 DIAGNOSIS — I11.9 HYPERTENSIVE LEFT VENTRICULAR HYPERTROPHY, WITHOUT HEART FAILURE: ICD-10-CM

## 2017-05-22 DIAGNOSIS — E66.9 NOCTURNAL HYPOXEMIA DUE TO OBESITY: ICD-10-CM

## 2017-05-22 DIAGNOSIS — G47.33 OSA ON CPAP: ICD-10-CM

## 2017-05-22 DIAGNOSIS — R53.82 CHRONIC FATIGUE: ICD-10-CM

## 2017-05-22 DIAGNOSIS — Z99.81 ON HOME OXYGEN THERAPY: ICD-10-CM

## 2017-05-22 PROBLEM — R53.83 FATIGUE: Status: ACTIVE | Noted: 2017-05-22

## 2017-05-22 PROCEDURE — 99499 UNLISTED E&M SERVICE: CPT | Mod: S$GLB,,, | Performed by: INTERNAL MEDICINE

## 2017-05-22 PROCEDURE — 99999 PR PBB SHADOW E&M-EST. PATIENT-LVL III: CPT | Mod: PBBFAC,,, | Performed by: INTERNAL MEDICINE

## 2017-05-22 PROCEDURE — 1126F AMNT PAIN NOTED NONE PRSNT: CPT | Mod: S$GLB,,, | Performed by: INTERNAL MEDICINE

## 2017-05-22 PROCEDURE — 3078F DIAST BP <80 MM HG: CPT | Mod: S$GLB,,, | Performed by: INTERNAL MEDICINE

## 2017-05-22 PROCEDURE — 1159F MED LIST DOCD IN RCRD: CPT | Mod: S$GLB,,, | Performed by: INTERNAL MEDICINE

## 2017-05-22 PROCEDURE — 99215 OFFICE O/P EST HI 40 MIN: CPT | Mod: S$GLB,,, | Performed by: INTERNAL MEDICINE

## 2017-05-22 PROCEDURE — 1160F RVW MEDS BY RX/DR IN RCRD: CPT | Mod: S$GLB,,, | Performed by: INTERNAL MEDICINE

## 2017-05-22 PROCEDURE — 3075F SYST BP GE 130 - 139MM HG: CPT | Mod: S$GLB,,, | Performed by: INTERNAL MEDICINE

## 2017-05-22 NOTE — PROGRESS NOTES
Subjective:    Patient ID:  Kathy Seymour is a 66 y.o. female who presents for evaluation of Follow-up  For CAD post LETTY in 2012 and 9/2013, HTN, dyslipidemia, RAMONA on CPAP, fatigue  PCP: Dr. Dawn Jr, see biannually  Cardiologist: Dr. Williamson, last seen 6/2015  Orthopedic: Dr. Bear, now Dr. Desouza  GI:  Vladimir  Pulmonary:  Julian  Lives with , Rafita, non-smoker  Part-time  for [employer] 23 hours week, enjoys swimming, 2 granddaughters, Stacey 17 yo, Kendal 22 yo      WF here for annual review of post LETTY. Had review by Dr. Grant in 3/14 for pre-op evaluation. Underwent bunion correction without complication. Denies any CP nor SOB. Previously active until operation in 1/2016. ECG is normal.     Last [facility] 9/2013 - HEMODYNAMIC RESULTS:    LVEDP (Pre): 16 mmHg  LVEDP (Post): 16 mmHg  Ejection Fraction: 60%    C. ANGIOGRAPHIC RESULTS:    DIAGNOSTIC:       Patient has a right dominant coronary artery.        - Left Main Coronary Artery:             The ostial LM has luminal irregularities. There is MARCUS 3 flow.       - Left Anterior Descending Artery:             The proximal LAD has a 99% stenosis. There is MARCUS 3 flow. The remaining portion of the vessel has luminal irregularities.       - Left Circumflex Artery:             The LCX is normal. There is MARCUS 3 flow.       - Right Coronary Artery:             The RCA has luminal irregularities. There is MARCUS 3 flow.    INTERVENTION:         Proximal LAD:              The lesion was successfully intervened. Post-stenosis of 0% and post-MARCUS 3 flow. The vessel was accessed natively.  The following items were used: Stent Xience 3.0x23 (LETTY).    D. SUMMARY:    1. Successful PCI/LETTY of the proximal LAD    E. RECOMMENDATIONS:    1. Routine post PCI care.  2. Routine post-cath care.  3. Maximize medical management.  4. Continue medical management.  5. Risk factor reductions.  6. ASA 81mg.    Previous PCI 9/2012 - INTERVENTION:         Proximal  LAD:              The lesion was successfully intervened. Post-stenosis of 0% and post-MARCUS 3 flow. The following items were used: 3.5X15 Quantum Balloon and Stent Promus Eelent  2.75x20 (LETTY).       Mid LAD:              The lesion was successfully intervened. Post-stenosis of 0% and post-MARCUS 3 flow. The following items were used: Stent Pro Element  2.50x16 (LETTY).    D. SUMMARY:    1. Single vessel coronary artery disease.  2. Normal LVEF.    In 5/2017, post bunion surgery, no complication, noted some chronic fatigue over the past year, uses CPAP 100% and still some some morning fatigue, last sleep study years ago. No CP or SOB. Home /78. Compliant with medications, diet is a problem, working at [employer]. LDL in 2/2017 86, in 5/2016 was 66, baseline 134, goal is <67. Note some blood in stool for colonoscopy with Dr. Gilbert, request clearance. ECG NSR, rate 71, PRWP.     DSE 5/2016 - EKG Conclusions:    1. The EKG portion of this study is negative for ischemia at a peak heart rate of 139 bpm (95% of predicted).   2. Blood pressure remained stable throughout the protocol  (Presenting BP: 117/63 Peak BP: 160/61).   3. The following arrhythmias were present: couplets.   4. There were no symptoms of chest discomfort or significant dyspnea throughout the protocol.     ECHO CONCLUSIONS     1 - Concentric hypertrophy.     2 - Normal left ventricular systolic function (EF 60-65%).     3 - Mild to moderate mitral regurgitation.     4 - Normal left ventricular diastolic function.     5 - Normal right ventricular systolic function .     6 - The estimated PA systolic pressure is 26 mmHg.     7 - Increase evidence of MR and no ischemic response noted when compare to Echo in 8/2012.     No evidence of stress induced myocardial ischemia.         Review of Systems   Constitution: Positive for diaphoresis, malaise/fatigue and weight gain (45 lbs since 2012, after quiting cigarettes). Negative for fever, weakness and night  "sweats.   HENT: Negative for headaches, nosebleeds and tinnitus.    Eyes: Negative for visual disturbance.   Cardiovascular: Positive for dyspnea on exertion (don't feel limited). Negative for chest pain, claudication, cyanosis, irregular heartbeat, leg swelling, near-syncope, orthopnea, palpitations and paroxysmal nocturnal dyspnea.   Respiratory: Negative for cough, shortness of breath, sleep disturbances due to breathing, snoring and wheezing.    Endocrine: Positive for polyuria. Negative for polydipsia.   Hematologic/Lymphatic: Does not bruise/bleed easily.   Skin: Negative for color change, flushing, nail changes, poor wound healing and suspicious lesions.   Musculoskeletal: Positive for arthritis, joint pain and myalgias. Negative for falls, gout, joint swelling, muscle cramps and muscle weakness.   Gastrointestinal: Positive for change in bowel habit, bowel incontinence, diarrhea, excessive appetite, heartburn, hematochezia and hemorrhoids. Negative for hematemesis, melena and nausea.   Genitourinary: Positive for frequency and urgency.   Neurological: Negative for disturbances in coordination, excessive daytime sleepiness, dizziness, focal weakness, light-headedness, loss of balance, numbness and vertigo.   Psychiatric/Behavioral: Negative for depression and substance abuse. The patient has insomnia. The patient is not nervous/anxious.      [place] score 2 now 4       Objective:    Physical Exam   Constitutional: She is oriented to person, place, and time. She appears well-developed and well-nourished.   HENT:   Head: Normocephalic.   Eyes: Conjunctivae and EOM are normal. Pupils are equal, round, and reactive to light.   Neck: Normal range of motion. Neck supple. No JVD present. No thyromegaly present.   Circumference 17 reduced to 15"   Cardiovascular: Normal rate, regular rhythm, normal heart sounds and intact distal pulses.  Exam reveals no gallop and no friction rub.    No murmur " "heard.  Pulmonary/Chest: Effort normal and breath sounds normal. She has no rales. She exhibits no tenderness.   Abdominal: Soft. Bowel sounds are normal. There is no tenderness.   Waist 39.5", hip 45"   Musculoskeletal: Normal range of motion. She exhibits no edema.   Lymphadenopathy:     She has no cervical adenopathy.   Neurological: She is alert and oriented to person, place, and time.   Skin: Skin is warm and dry. No rash noted.         Assessment:       1. S/P coronary artery stent placement, 2 LETTY 9/2012, 1 LETTY 9/2013    2. Pure hypercholesterolemia    3. RAMONA on CPAP, 100% use    4. Abdominal obesity    5. Nocturnal hypoxemia due to obesity, started 5/2017    6. Chronic fatigue    7. On home oxygen therapy    8. Preop cardiovascular exam    9. Hypertensive left ventricular hypertrophy, without heart failure         Plan:       Kathy was seen today for follow-up.    Diagnoses and all orders for this visit:    S/P coronary artery stent placement, 2 LETTY 9/2012, 1 LETTY 9/2013  -     Lipid panel; Future; Expected date: 08/22/2017    Pure hypercholesterolemia  -     Lipid panel; Future; Expected date: 08/22/2017    RAMONA on CPAP, 100% use    Abdominal obesity    Nocturnal hypoxemia due to obesity, started 5/2017    Chronic fatigue    On home oxygen therapy    Preop cardiovascular exam    Hypertensive left ventricular hypertrophy, without heart failure      - Recommend recheck of Lipid panel after 3 months.Goal of Rx < 67, will review with PCP in 3 months to consider change in Statin Rx.  - Clear for Surgery and anesthesia with acceptable risk from the cardiac standpoint. Can hold Plavix for 5-7 days pre-op.    Essential hypertension controlled    BMI 30.0-30.9,adult, today 31.4    Thyroid nodule  - CV status stable, continue current Rx, all medications reviewed, patient acknowledge good understanding.  - Instruction for Mediterranean diet and heart healthy exercise given.  - Highly recommend 30 minutes of exercise " daily, can have Sunday off, with 2-3 sessions of muscle strengthening weekly. A  would be very helpful.  - Weigh twice weekly, try to lose 1-2 lbs per week  - Recommend at least annual cardiovascular evaluation in view of her significant risk factors.      Patient Active Problem List   Diagnosis    Abnormal thyroid function test    Anxiety    CAD (coronary artery disease)    S/P coronary artery stent placement, 2 LETTY 9/2012, 1 LETTY 9/2013    Leg pain, bilateral    Varicose veins of lower extremities with other complications    Hyperlipidemia, baseline     Lumbar spondylosis    Carpal tunnel syndrome    Secondary hypothyroidism    Unspecified chronic bronchitis    BMI 30.0-30.9,adult, today 31.4    Consumes alcohol weekly    Hypertension, onset before 1995    Thyroid nodule    Abdominal obesity    RAMONA on CPAP, 100% use    Nodular thyroid disease    Thyroid disease    Hypovitaminosis D    Prediabetes    Hallux valgus, acquired, bilateral    Urinary incontinence    Obesity (BMI 30.0-34.9)    Dysmetabolic syndrome    Subclinical hyperthyroidism    Dyspnea    Nocturnal hypoxemia due to obesity, started 5/2017    Fatigue    On home oxygen therapy    Hypertensive left ventricular hypertrophy, without heart failure     Total face-to-face time with the patient was 40 minutes and greater than 50% was spent in counseling and coordination of care. The above assessment and plan have been discussed at length. Physician's note reviewed. Labs and procedure over the last 6 months reviewed. Problem List updated. Asked to bring in all active medications / pills bottles with next visit.

## 2017-06-02 RX ORDER — CLOPIDOGREL BISULFATE 75 MG/1
TABLET ORAL
Qty: 90 TABLET | Refills: 3 | Status: SHIPPED | OUTPATIENT
Start: 2017-06-02 | End: 2017-06-12 | Stop reason: SDUPTHER

## 2017-06-12 ENCOUNTER — PATIENT MESSAGE (OUTPATIENT)
Dept: CARDIOLOGY | Facility: CLINIC | Age: 67
End: 2017-06-12

## 2017-06-12 RX ORDER — CLOPIDOGREL BISULFATE 75 MG/1
75 TABLET ORAL DAILY
Qty: 90 TABLET | Refills: 3 | Status: SHIPPED | OUTPATIENT
Start: 2017-06-12 | End: 2017-09-14 | Stop reason: SDUPTHER

## 2017-07-10 ENCOUNTER — OFFICE VISIT (OUTPATIENT)
Dept: PULMONOLOGY | Facility: CLINIC | Age: 67
End: 2017-07-10
Payer: MEDICARE

## 2017-07-10 VITALS
OXYGEN SATURATION: 96 % | HEIGHT: 65 IN | WEIGHT: 187.63 LBS | BODY MASS INDEX: 31.26 KG/M2 | HEART RATE: 71 BPM | SYSTOLIC BLOOD PRESSURE: 141 MMHG | DIASTOLIC BLOOD PRESSURE: 75 MMHG

## 2017-07-10 DIAGNOSIS — J42 UNSPECIFIED CHRONIC BRONCHITIS: Primary | ICD-10-CM

## 2017-07-10 DIAGNOSIS — G47.33 OSA ON CPAP: ICD-10-CM

## 2017-07-10 DIAGNOSIS — R06.00 DYSPNEA, UNSPECIFIED TYPE: ICD-10-CM

## 2017-07-10 DIAGNOSIS — R53.83 FATIGUE, UNSPECIFIED TYPE: ICD-10-CM

## 2017-07-10 DIAGNOSIS — Z99.81 ON HOME OXYGEN THERAPY: ICD-10-CM

## 2017-07-10 PROCEDURE — 99499 UNLISTED E&M SERVICE: CPT | Mod: S$GLB,,, | Performed by: INTERNAL MEDICINE

## 2017-07-10 PROCEDURE — 99999 PR PBB SHADOW E&M-EST. PATIENT-LVL IV: CPT | Mod: PBBFAC,,, | Performed by: INTERNAL MEDICINE

## 2017-07-10 PROCEDURE — 1126F AMNT PAIN NOTED NONE PRSNT: CPT | Mod: S$GLB,,, | Performed by: INTERNAL MEDICINE

## 2017-07-10 PROCEDURE — 1159F MED LIST DOCD IN RCRD: CPT | Mod: S$GLB,,, | Performed by: INTERNAL MEDICINE

## 2017-07-10 PROCEDURE — 99214 OFFICE O/P EST MOD 30 MIN: CPT | Mod: S$GLB,,, | Performed by: INTERNAL MEDICINE

## 2017-07-10 RX ORDER — ALBUTEROL SULFATE 90 UG/1
AEROSOL, METERED RESPIRATORY (INHALATION)
Qty: 1 INHALER | Refills: 11 | Status: SHIPPED | OUTPATIENT
Start: 2017-07-10 | End: 2018-05-31

## 2017-07-10 RX ORDER — WITCH HAZEL 50 %
2000 PADS, MEDICATED (EA) TOPICAL DAILY
COMMUNITY
End: 2019-03-12 | Stop reason: ALTCHOICE

## 2017-07-10 RX ORDER — MODAFINIL 200 MG/1
200 TABLET ORAL 2 TIMES DAILY
Qty: 60 TABLET | Refills: 0 | Status: SHIPPED | OUTPATIENT
Start: 2017-07-10 | End: 2017-08-09

## 2017-07-10 NOTE — PATIENT INSTRUCTIONS
Fatigue hard to figure.  Excessive sleepy would be more specific for sleep apnea, but your 02 tracing was a bit off and cpap would be optimal way to manage sleep apnea.  If cpap adequate, 02 should not be needed.    Loss of activity from bunions/debility/arthritis, may increase fatigue.    Stress/nerves may increase fatigue.      Alertness and vigilance may respond to medication to treat excess sleepiness.  provigil would be reasonable.    Copd may be present, as needed albuterol reasonable.  Breathing test should give clearer picture of lung disease if present.    So, will    Check cpap titration - sleep study to adjust pressure.  Discontinue o2 once pressure optimal   Check lung capacity - if over 50% , you may not need regular use medications. Albuterol reasonable for as needed use?   Could try provigil- may help, use 1/2 pill early am, use minimally and sparingly. Go to one pill early am if needed, max dose would be twice daily, add 1/2 to 1 pill early afternoon if needed.

## 2017-07-10 NOTE — PROGRESS NOTES
"7/10/2017    Kathy Seymour  F/u office    Chief Complaint   Patient presents with    Follow-up    Apnea     July 10, 2017      2/27/2017HPI: dx osas 10 yrs ago, ahi na.  On nasal pillars- cpap 6, new machine 5 yrs ago.  Gained 45 post stent around 2012.      Prior to cpap, severe snorer, fatigue, with no other prominent symptoms recalled.      Now no snoring noted with cpap. No nasal obstruction.    Mentation a little slow but vague.      The chief compliant  problem is new to me",   PFSH:  Past Medical History:   Diagnosis Date    Angina pectoris     Anticoagulant long-term use     Anxiety     Arthritis     Back pain     Cataract     Chronic bronchitis     Coronary artery disease     STENT X 2    CTS (carpal tunnel syndrome)     RIGHT    Depression     Hyperlipidemia     Hypertension     Hypothyroidism     Obesity     Polyneuropathy     Sleep apnea     USES CPAP    Thyroid disease     Tobacco dependence     Trouble in sleeping     Urinary incontinence     Wears glasses     ONE CONTAC         Past Surgical History:   Procedure Laterality Date    APPENDECTOMY      BILATERAL SALPINGOOPHORECTOMY      CARDIAC CATHETERIZATION      CORONARY ANGIOPLASTY      CORONARY STENT PLACEMENT      PCI  of the LAD with LETTY    EYE SURGERY      bilat cataract removal    HYSTERECTOMY      complete    TONSILLECTOMY       Social History   Substance Use Topics    Smoking status: Former Smoker     Packs/day: 1.00     Years: 50.00     Types: Cigarettes     Start date: 7/9/1964     Quit date: 8/13/2012    Smokeless tobacco: Never Used    Alcohol use 1.2 oz/week     2 Shots of liquor per week      Comment: Social - vodka on wednesday     Family History   Problem Relation Age of Onset    Hypertension Sister     Arthritis Sister     Heart failure Mother     Hypertension Mother     Heart failure Father     Hypertension Father     No Known Problems Brother     No Known Problems Son     Heart disease " "Brother     Arthritis Sister     Hypertension Sister     Cancer Sister     Asthma Son     Arthritis Son      psoriatic arthritis     Review of patient's allergies indicates:   Allergen Reactions    Wellbutrin [bupropion hcl] Other (See Comments)     cranky       Performance Status:The patient's activity level is no limits with regular activity.      Review of Systems:  a review of eleven systems covering constitutional, Eye, HEENT, Psych, Respiratory, Cardiac, GI, , Musculoskeletal, Endocrine, Dermatologic was negative except for pertinent findings as listed ABOVE and below: all good     Exam:Comprehensive exam done.   BP (!) 141/75 (BP Location: Right arm, Patient Position: Sitting, BP Method: Automatic)   Pulse 71   Ht 5' 5" (1.651 m)   Wt 85.1 kg (187 lb 9.8 oz)   SpO2 96%   BMI 31.22 kg/m²   Exam included Vitals as listed, and patient's appearance and affect and alertness and mood, oral exam for yeast and hygiene and pharynx lesions and Mallapatti (M) score, neck with inspection for jvd and masses and thyroid abnormalities and lymph nodes (supraclavicular and infraclavicular nodes and axillary also examined and noted if abn), chest exam included symmetry and effort and fremitus and percussion and auscultation, cardiac exam included rhythm and gallops and murmur and rubs and jvd and edema, abdominal exam for mass and hepatosplenomegaly and tenderness and hernias and bowel sounds, Musculoskeletal exam with muscle tone and posture and mobility/gait and  strength, and skin for rashes and cyanosis and pallor and turgor, extremity for clubbing.  Findings were normal except for pertinent findings listed below:  M4 and not nasal, no edema    Radiographs (ct chest and cxr) reviewed: view by direct vision  March 2014 cxr hyper inflated.    Labs reviewed   PFT was not done     Plan:  Clinical impression is resonably certain and repeated evaluation prn +/- follow up will be needed as below.    Kathy was " seen today for follow-up and apnea.    Diagnoses and all orders for this visit:    Unspecified chronic bronchitis  -     albuterol 90 mcg/actuation inhaler; 2 puffs every 4 hours as needed for cough, wheeze, or shortness of breath  -     Complete PFT with bronchodilator; Future    On home oxygen therapy  -     albuterol 90 mcg/actuation inhaler; 2 puffs every 4 hours as needed for cough, wheeze, or shortness of breath  -     Complete PFT with bronchodilator; Future    Dyspnea, unspecified type  -     albuterol 90 mcg/actuation inhaler; 2 puffs every 4 hours as needed for cough, wheeze, or shortness of breath  -     Complete PFT with bronchodilator; Future    RAMONA on CPAP, 100% use  -     CPAP titration (Must have diagnosis of RAMONA from previous sleep study.); Future  -     modafinil (PROVIGIL) 200 MG Tab; Take 1 tablet (200 mg total) by mouth 2 (two) times daily.    Fatigue, unspecified type  -     modafinil (PROVIGIL) 200 MG Tab; Take 1 tablet (200 mg total) by mouth 2 (two) times daily.        Return in about 6 weeks (around 8/21/2017).    Discussed with patient above for education the following:          Fatigue hard to figure.  Excessive sleepy would be more specific for sleep apnea, but your 02 tracing was a bit off and cpap would be optimal way to manage sleep apnea.  If cpap adequate, 02 should not be needed.    Loss of activity from bunions/debility/arthritis, may increase fatigue.    Stress/nerves may increase fatigue.      Alertness and vigilance may respond to medication to treat excess sleepiness.  provigil would be reasonable.    Copd may be present, as needed albuterol reasonable.  Breathing test should give clearer picture of lung disease if present.    So, will    Check cpap titration - sleep study to adjust pressure.  Discontinue o2 once pressure optimal   Check lung capacity - if over 50% , you may not need regular use medications. Albuterol reasonable for as needed use?   Could try provigil- may  help, use 1/2 pill early am, use minimally and sparingly. Go to one pill early am if needed, max dose would be twice daily, add 1/2 to 1 pill early afternoon if needed.

## 2017-07-11 ENCOUNTER — DOCUMENTATION ONLY (OUTPATIENT)
Dept: FAMILY MEDICINE | Facility: CLINIC | Age: 67
End: 2017-07-11

## 2017-07-11 NOTE — PROGRESS NOTES
Pre-Visit Chart Review  For Appointment Scheduled on 07/13/2017      Health Maintenance Due   Topic Date Due    Zoster Vaccine  12/13/2008    Mammogram  08/14/2017

## 2017-07-13 ENCOUNTER — OFFICE VISIT (OUTPATIENT)
Dept: FAMILY MEDICINE | Facility: CLINIC | Age: 67
End: 2017-07-13
Payer: MEDICARE

## 2017-07-13 VITALS
HEART RATE: 78 BPM | WEIGHT: 189.13 LBS | SYSTOLIC BLOOD PRESSURE: 111 MMHG | DIASTOLIC BLOOD PRESSURE: 67 MMHG | TEMPERATURE: 98 F | HEIGHT: 65 IN | BODY MASS INDEX: 31.51 KG/M2

## 2017-07-13 DIAGNOSIS — I10 ESSENTIAL HYPERTENSION: ICD-10-CM

## 2017-07-13 DIAGNOSIS — F41.9 ANXIETY: ICD-10-CM

## 2017-07-13 DIAGNOSIS — E66.9 OBESITY (BMI 30.0-34.9): ICD-10-CM

## 2017-07-13 DIAGNOSIS — E78.00 PURE HYPERCHOLESTEROLEMIA: Chronic | ICD-10-CM

## 2017-07-13 DIAGNOSIS — J44.9 COPD MIXED TYPE: ICD-10-CM

## 2017-07-13 DIAGNOSIS — G62.9 NEUROPATHY: Primary | ICD-10-CM

## 2017-07-13 PROBLEM — G47.36 NOCTURNAL HYPOXEMIA DUE TO OBESITY: Status: RESOLVED | Noted: 2017-05-22 | Resolved: 2017-07-13

## 2017-07-13 PROBLEM — M20.11 HALLUX VALGUS, ACQUIRED, BILATERAL: Status: RESOLVED | Noted: 2017-01-19 | Resolved: 2017-07-13

## 2017-07-13 PROBLEM — M20.12 HALLUX VALGUS, ACQUIRED, BILATERAL: Status: RESOLVED | Noted: 2017-01-19 | Resolved: 2017-07-13

## 2017-07-13 PROBLEM — R53.83 FATIGUE: Status: RESOLVED | Noted: 2017-05-22 | Resolved: 2017-07-13

## 2017-07-13 PROBLEM — R06.00 DYSPNEA: Status: RESOLVED | Noted: 2017-02-21 | Resolved: 2017-07-13

## 2017-07-13 PROCEDURE — 1159F MED LIST DOCD IN RCRD: CPT | Mod: S$GLB,,, | Performed by: PHYSICIAN ASSISTANT

## 2017-07-13 PROCEDURE — 99499 UNLISTED E&M SERVICE: CPT | Mod: S$GLB,,, | Performed by: PHYSICIAN ASSISTANT

## 2017-07-13 PROCEDURE — 99999 PR PBB SHADOW E&M-EST. PATIENT-LVL III: CPT | Mod: PBBFAC,,, | Performed by: PHYSICIAN ASSISTANT

## 2017-07-13 PROCEDURE — 99214 OFFICE O/P EST MOD 30 MIN: CPT | Mod: S$GLB,,, | Performed by: PHYSICIAN ASSISTANT

## 2017-07-13 PROCEDURE — 1126F AMNT PAIN NOTED NONE PRSNT: CPT | Mod: S$GLB,,, | Performed by: PHYSICIAN ASSISTANT

## 2017-07-13 RX ORDER — DULOXETIN HYDROCHLORIDE 30 MG/1
30 CAPSULE, DELAYED RELEASE ORAL DAILY
Qty: 30 CAPSULE | Refills: 11 | Status: SHIPPED | OUTPATIENT
Start: 2017-07-13 | End: 2017-08-11 | Stop reason: SDUPTHER

## 2017-07-13 RX ORDER — ALPRAZOLAM 0.5 MG/1
0.5 TABLET ORAL 2 TIMES DAILY PRN
Qty: 50 TABLET | Refills: 0 | Status: CANCELLED
Start: 2017-07-13

## 2017-07-13 NOTE — PATIENT INSTRUCTIONS
Weight Management: Getting Started  Healthy bodies come in all shapes and sizes. Not all bodies are made to be thin. For some people, a healthy weight is higher than the average weight listed on weight charts. Your healthcare provider can help you decide on a healthy weight for you.    Reasons to lose weight  Losing weight can help with some health problems, such as high blood pressure, heart disease, diabetes, sleep apnea, and arthritis. You may also feel more energy.  Set your long-term goal  Your goal doesn't even have to be a specific weight. You may decide on a fitness goal (such as being able to walk 10 miles a week), or a health goal (such as lowering your blood pressure). Choose a goal that is measurable and reasonable, so you know when you've reached it. A goal of reaching a BMI of less than 25 is not always reasonable (or possible).   Make an action plan  Habits dont change overnight. Setting your goals too high can leave you feeling discouraged if you cant reach them. Be realistic. Choose one or two small changes you can make now. Set an action plan for how you are going to make these changes. When you can stick to this plan, keep making a few more small changes. Taking small steps will help you stay on the path to success.  Track your progress  Write down your goals. Then, keep a daily record of your progress. Write down what you eat and how active you are. This record lets you look back on how much youve done. It may also help when youre feeling frustrated. Reward yourself for success. Even if you dont reach every goal, give yourself credit for what you do get done.  Get support  Encouragement from others can help make losing weight easier. Ask your family members and friends for support. They may even want to join you. Also look to your healthcare provider, registered dietitian, and  for help. Your local hospital can give you more information about nutrition, exercise, and  weight loss.  Date Last Reviewed: 1/31/2016 © 2000-2016 [employer]. [address] [place] [address], [place] [address]. All rights reserved. This information is not intended as a substitute for professional medical care. Always follow your healthcare professional's instructions.        Walking for Fitness  Fitness walking has something for everyone, even people who are already fit. Walking is one of the safest ways to condition your body aerobically. It can boost energy, help you lose weight, and reduce stress.    Physical benefits  · Walking strengthens your heart and lungs, and tones your muscles.  · When walking, your feet land with less impact than in other sports. This reduces chances of muscle, bone, and joint injury.  · Regular walking improves your cholesterol levels and lowers your risk of heart disease. And it helps you control your blood sugar if you have diabetes.  · Walking is a weight-bearing activity, which helps maintain bone density. This can help prevent osteoporosis.  Personal rewards  · Taking walks can help you relax and manage stress. And fitness walking may make you feel better about yourself.  · Walking can help you sleep better at night and make you less likely to be depressed.  · Regular walking may help maintain your memory as you get older.  · Walking is a great way to spend extra time with friends and family members. Be sure to invite your dog along!  Q&A about fitness walking  Q: Will walking keep me fit?  A: Yes. Regular walking at the right pace gives you all the benefits of other aerobic activities, such as jogging and swimming.  Q: Will walking help me lose weight and keep it off?  A: Yes. Per mile, walking can burn as many calories as jogging. Your health care provider can help work walking into your weight-loss plan.  Q: Is walking safe for my health?  A: Yes. Walking is safe if you have high blood pressure, diabetes, heart disease, or other conditions. Talk to your health  care provider before you start.  Date Last Reviewed: 5/9/2015  © 9323-5151 [employer]. [address] [place] [address], [place] [address]. All rights reserved. This information is not intended as a substitute for professional medical care. Always follow your healthcare professional's instructions.

## 2017-07-13 NOTE — PROGRESS NOTES
Subjective:       Patient ID: Kathy Seymour is a 66 y.o. female.    Chief Complaint: Follow-up (6 month )    Patient is here for routine follow up of:    Patient Active Problem List:     Anxiety     S/P coronary artery stent placement, 2 LETTY 9/2012, 1 LETTY 9/2013     Varicose veins of lower extremities with other complications     Hyperlipidemia, baseline      Lumbar spondylosis     Carpal tunnel syndrome     Secondary hypothyroidism     BMI 30.0-30.9,adult, today 31.4     Hypertension, onset before 1995     Thyroid nodule     Abdominal obesity     RAMONA on CPAP, 100% use     Nodular thyroid disease     Prediabetes     Urinary incontinence     Obesity (BMI 30.0-34.9)     Dysmetabolic syndrome     Subclinical hyperthyroidism     On home oxygen therapy     Hypertensive left ventricular hypertrophy, without heart failure     COPD mixed type    She states her neuropathy is not well controlled but did not find that Neurontin helped her and she could not afford lyrica.  Her anxiety is well controlled and only uses Xanax in the even of a panic attack which occur 2-3 times a month.  No suicidal or homicidal ideations.         Review of Systems   Constitutional: Negative for activity change and appetite change.   HENT: Negative for nosebleeds.    Eyes: Negative for pain and visual disturbance.   Respiratory: Negative for chest tightness and shortness of breath.    Cardiovascular: Negative for chest pain, palpitations and leg swelling.   Genitourinary: Negative for difficulty urinating, dysuria and frequency.   Musculoskeletal: Positive for arthralgias and joint swelling. Negative for back pain, gait problem and neck pain.   Skin: Negative.    Neurological: Positive for numbness. Negative for dizziness, tremors, weakness, light-headedness and headaches.       Objective:      Physical Exam   Constitutional: She is oriented to person, place, and time. She appears well-developed and well-nourished.   HENT:   Head:  Normocephalic and atraumatic.   Eyes: Conjunctivae are normal. Pupils are equal, round, and reactive to light.   Neck: Normal range of motion. Neck supple. No JVD present.   Cardiovascular: Normal rate and regular rhythm.  Exam reveals no gallop and no friction rub.    No murmur heard.  Pulmonary/Chest: Effort normal and breath sounds normal. No respiratory distress. She has no wheezes. She has no rales.   Neurological: She is alert and oriented to person, place, and time.   Skin: Skin is warm and dry.   Psychiatric: She has a normal mood and affect. Her behavior is normal. Judgment and thought content normal.       Assessment:       1. Neuropathy    2. Anxiety    3. COPD mixed type    4. Essential hypertension    5. Pure hypercholesterolemia    6. Obesity (BMI 30.0-34.9)        Plan:       Kathy was seen today for follow-up.    Diagnoses and all orders for this visit:    Neuropathy  -     duloxetine (CYMBALTA) 30 MG capsule; Take 1 capsule (30 mg total) by mouth once daily.    Anxiety  -     duloxetine (CYMBALTA) 30 MG capsule; Take 1 capsule (30 mg total) by mouth once daily.  -     alprazolam (XANAX) 0.5 MG tablet; Take 1 tablet (0.5 mg total) by mouth 2 (two) times daily as needed.    COPD mixed type    Essential hypertension    Pure hypercholesterolemia    Stop Lexapro and start Cymbalta and take for 6 weeks and if no improvement in symptoms can increase to 60mg.     Patient readiness: acceptance and barriers:none    During the course of the visit the patient was educated and counseled about the following:     Hypertension:   Regular aerobic exercise.  Obesity:   Regular aerobic exercise program discussed.    Goals: Hypertension: Reduce Blood Pressure and Obesity: Reduce calorie intake and BMI    Did patient meet goals/outcomes: No    The following self management tools provided: declined    Patient Instructions (the written plan) was given to the patient/family.     Time spent with patient: 45  minutes

## 2017-07-18 ENCOUNTER — TELEPHONE (OUTPATIENT)
Dept: PULMONOLOGY | Facility: CLINIC | Age: 67
End: 2017-07-18

## 2017-07-18 ENCOUNTER — HOSPITAL ENCOUNTER (OUTPATIENT)
Dept: RESPIRATORY THERAPY | Facility: HOSPITAL | Age: 67
Discharge: HOME | End: 2017-07-18
Attending: INTERNAL MEDICINE
Payer: MEDICARE

## 2017-07-18 DIAGNOSIS — Z99.81 ON HOME OXYGEN THERAPY: ICD-10-CM

## 2017-07-18 DIAGNOSIS — J42 UNSPECIFIED CHRONIC BRONCHITIS: ICD-10-CM

## 2017-07-18 DIAGNOSIS — R06.00 DYSPNEA, UNSPECIFIED TYPE: ICD-10-CM

## 2017-07-18 PROCEDURE — 94727 GAS DIL/WSHOT DETER LNG VOL: CPT | Mod: 26,,, | Performed by: INTERNAL MEDICINE

## 2017-07-18 PROCEDURE — 94060 EVALUATION OF WHEEZING: CPT

## 2017-07-18 PROCEDURE — 94729 DIFFUSING CAPACITY: CPT

## 2017-07-18 PROCEDURE — 94060 EVALUATION OF WHEEZING: CPT | Mod: 26,,, | Performed by: INTERNAL MEDICINE

## 2017-07-18 PROCEDURE — 94729 DIFFUSING CAPACITY: CPT | Mod: 26,,, | Performed by: INTERNAL MEDICINE

## 2017-07-18 PROCEDURE — 94727 GAS DIL/WSHOT DETER LNG VOL: CPT

## 2017-07-18 NOTE — TELEPHONE ENCOUNTER
Pt needed appt time and date for cpap titration. Advised her to call [facility] .       ----- Message from Sona Paul sent at 7/18/2017 10:32 AM CDT -----  Patient is calling about her sleep study that doctor set up for her. Please call back at [phone] with details.

## 2017-07-19 NOTE — PROCEDURES
Pulmonary Functions, including spirometry and bronchodilator response and lung volumes and diffusion, study was done July 28, 2017.  Spirometry shows loss of vital capacity and fev1 with no obstruction and no bronchodilator response.   FEV1 is 71% or 1.65 liters.  Lung volumes show  normal TLC and elevated residual volume.  Diffusion shows reduced but falls within normal range when corrected for lung volumes.    Pulmonary functions show low vital capacity and low mvv with some air trapping,but rest is wnl. Clinical correlation recommended.     Tayo Jordan M.D.

## 2017-07-20 ENCOUNTER — TELEPHONE (OUTPATIENT)
Dept: PULMONOLOGY | Facility: CLINIC | Age: 67
End: 2017-07-20

## 2017-07-20 NOTE — TELEPHONE ENCOUNTER
Patient advised per Dr. Jordan that her PFT was near normal and he will discuss at her next visit.

## 2017-07-20 NOTE — TELEPHONE ENCOUNTER
----- Message from Tayo Jordan MD sent at 7/19/2017  2:38 PM CDT -----  pft was near normal, discuss at f/u

## 2017-07-31 RX ORDER — ALPRAZOLAM 0.5 MG/1
0.5 TABLET ORAL 2 TIMES DAILY PRN
Qty: 50 TABLET | Refills: 0
Start: 2017-07-13 | End: 2018-01-08 | Stop reason: SDUPTHER

## 2017-08-11 ENCOUNTER — OFFICE VISIT (OUTPATIENT)
Dept: PULMONOLOGY | Facility: CLINIC | Age: 67
End: 2017-08-11
Payer: MEDICARE

## 2017-08-11 VITALS
OXYGEN SATURATION: 96 % | HEIGHT: 65 IN | BODY MASS INDEX: 30.81 KG/M2 | DIASTOLIC BLOOD PRESSURE: 77 MMHG | HEART RATE: 81 BPM | SYSTOLIC BLOOD PRESSURE: 132 MMHG | WEIGHT: 184.94 LBS

## 2017-08-11 DIAGNOSIS — G62.9 NEUROPATHY: ICD-10-CM

## 2017-08-11 DIAGNOSIS — G47.33 OSA ON CPAP: ICD-10-CM

## 2017-08-11 DIAGNOSIS — G47.33 OBSTRUCTIVE SLEEP APNEA SYNDROME: Primary | ICD-10-CM

## 2017-08-11 DIAGNOSIS — F41.9 ANXIETY: ICD-10-CM

## 2017-08-11 PROCEDURE — 1159F MED LIST DOCD IN RCRD: CPT | Mod: S$GLB,,, | Performed by: INTERNAL MEDICINE

## 2017-08-11 PROCEDURE — 1126F AMNT PAIN NOTED NONE PRSNT: CPT | Mod: S$GLB,,, | Performed by: INTERNAL MEDICINE

## 2017-08-11 PROCEDURE — 99999 PR PBB SHADOW E&M-EST. PATIENT-LVL III: CPT | Mod: PBBFAC,,, | Performed by: INTERNAL MEDICINE

## 2017-08-11 PROCEDURE — 99499 UNLISTED E&M SERVICE: CPT | Mod: S$GLB,,, | Performed by: INTERNAL MEDICINE

## 2017-08-11 PROCEDURE — 3078F DIAST BP <80 MM HG: CPT | Mod: S$GLB,,, | Performed by: INTERNAL MEDICINE

## 2017-08-11 PROCEDURE — 3075F SYST BP GE 130 - 139MM HG: CPT | Mod: S$GLB,,, | Performed by: INTERNAL MEDICINE

## 2017-08-11 PROCEDURE — 99213 OFFICE O/P EST LOW 20 MIN: CPT | Mod: S$GLB,,, | Performed by: INTERNAL MEDICINE

## 2017-08-11 RX ORDER — DULOXETIN HYDROCHLORIDE 30 MG/1
30 CAPSULE, DELAYED RELEASE ORAL DAILY
Qty: 90 CAPSULE | Refills: 3 | Status: SHIPPED | OUTPATIENT
Start: 2017-08-11 | End: 2017-08-11 | Stop reason: SDUPTHER

## 2017-08-11 RX ORDER — DULOXETIN HYDROCHLORIDE 30 MG/1
30 CAPSULE, DELAYED RELEASE ORAL DAILY
Qty: 90 CAPSULE | Refills: 3 | Status: SHIPPED | OUTPATIENT
Start: 2017-08-11 | End: 2018-01-08 | Stop reason: SDUPTHER

## 2017-08-11 NOTE — PATIENT INSTRUCTIONS
Need auto titrate cpap 10-20 (12 estimated effective) rather than prior 6.    Lung capacity is good at 70+%, treatment did not improve and not needed.    Exercise should help.

## 2017-08-11 NOTE — PROGRESS NOTES
"8/11/2017    Kathy Seymour  F/u office    Chief Complaint   Patient presents with    Follow-up     6 wk    COPD    Apnea     July 10, 2017-on cymbalta- helps neuropathy but having poor sleep  With nocturia ppt arousal.  Sleep study done with no rem sleep on cymbalta?  10-20 pressure rather than 6.        2/27/2017HPI: dx osas 10 yrs ago, ahi na.  On nasal pillars- cpap 6, new machine 5 yrs ago.  Gained 45 post stent around 2012.      Prior to cpap, severe snorer, fatigue, with no other prominent symptoms recalled.      Now no snoring noted with cpap. No nasal obstruction.    Mentation a little slow but vague.      The chief compliant  problem is new to me",   PFSH:  Past Medical History:   Diagnosis Date    Angina pectoris     Anticoagulant long-term use     Anxiety     Arthritis     Back pain     Cataract     Chronic bronchitis     Coronary artery disease     STENT X 2    CTS (carpal tunnel syndrome)     RIGHT    Depression     Hallux valgus, acquired, bilateral 1/19/2017    Hyperlipidemia     Hypertension     Hypothyroidism     Obesity     Polyneuropathy     Sleep apnea     USES CPAP    Thyroid disease     Tobacco dependence     Trouble in sleeping     Urinary incontinence     Wears glasses     ONE CONTAC         Past Surgical History:   Procedure Laterality Date    APPENDECTOMY      BILATERAL SALPINGOOPHORECTOMY      CARDIAC CATHETERIZATION      CORONARY ANGIOPLASTY      CORONARY STENT PLACEMENT      PCI  of the LAD with LETTY    EYE SURGERY      bilat cataract removal    HYSTERECTOMY      complete    TONSILLECTOMY       Social History   Substance Use Topics    Smoking status: Former Smoker     Packs/day: 1.00     Years: 50.00     Types: Cigarettes     Start date: 7/9/1964     Quit date: 8/13/2012    Smokeless tobacco: Never Used    Alcohol use 1.2 oz/week     2 Shots of liquor per week      Comment: Social - vodka on wednesday     Family History   Problem Relation Age of " "Onset    Hypertension Sister     Arthritis Sister     Heart failure Mother     Hypertension Mother     Heart failure Father     Hypertension Father     No Known Problems Brother     No Known Problems Son     Heart disease Brother     Arthritis Sister     Hypertension Sister     Cancer Sister     Asthma Son     Arthritis Son      psoriatic arthritis     Review of patient's allergies indicates:   Allergen Reactions    Wellbutrin [bupropion hcl] Other (See Comments)     cranky       Performance Status:The patient's activity level is no limits with regular activity.      Review of Systems:  a review of eleven systems covering constitutional, Eye, HEENT, Psych, Respiratory, Cardiac, GI, , Musculoskeletal, Endocrine, Dermatologic was negative except for pertinent findings as listed ABOVE and below: all good     Exam:Comprehensive exam done.   /77 (BP Location: Right arm, Patient Position: Sitting)   Pulse 81   Ht 5' 5" (1.651 m)   Wt 83.9 kg (184 lb 15.5 oz)   SpO2 96%   BMI 30.78 kg/m²   Exam included Vitals as listed, and patient's appearance and affect and alertness and mood, oral exam for yeast and hygiene and pharynx lesions and Mallapatti (M) score, neck with inspection for jvd and masses and thyroid abnormalities and lymph nodes (supraclavicular and infraclavicular nodes and axillary also examined and noted if abn), chest exam included symmetry and effort and fremitus and percussion and auscultation, cardiac exam included rhythm and gallops and murmur and rubs and jvd and edema, abdominal exam for mass and hepatosplenomegaly and tenderness and hernias and bowel sounds, Musculoskeletal exam with muscle tone and posture and mobility/gait and  strength, and skin for rashes and cyanosis and pallor and turgor, extremity for clubbing.  Findings were normal except for pertinent findings listed below:  M4 and not nasal, no edema    Radiographs (ct chest and cxr) reviewed: view by direct " vision  March 2014 cxr hyper inflated.    Labs reviewed   PFT  Pulmonary Functions, including spirometry and bronchodilator response and lung volumes and diffusion, study was done July 28, 2017.  Spirometry shows loss of vital capacity and fev1 with no obstruction and no bronchodilator response.   FEV1 is 71% or 1.65 liters.  Lung volumes show  normal TLC and elevated residual volume.  Diffusion shows reduced but falls within normal range when corrected for lung volumes.     Pulmonary functions show low vital capacity and low mvv with some air trapping,but rest is wnl. Clinical correlation recommended.     Tayo Jordan M.D.          Plan:  Clinical impression is resonably certain and repeated evaluation prn +/- follow up will be needed as below.    Kathy was seen today for follow-up, copd and apnea.    Diagnoses and all orders for this visit:    Obstructive sleep apnea syndrome  -     Cancel: CPAP FOR HOME USE  -     CPAP/BIPAP SUPPLIES  -     CPAP FOR HOME USE    Anxiety  -     Discontinue: duloxetine (CYMBALTA) 30 MG capsule; Take 1 capsule (30 mg total) by mouth once daily.  -     duloxetine (CYMBALTA) 30 MG capsule; Take 1 capsule (30 mg total) by mouth once daily.    Neuropathy  -     Discontinue: duloxetine (CYMBALTA) 30 MG capsule; Take 1 capsule (30 mg total) by mouth once daily.  -     duloxetine (CYMBALTA) 30 MG capsule; Take 1 capsule (30 mg total) by mouth once daily.    RAMONA on CPAP, 100% use  -     CPAP FOR HOME USE        Return if symptoms worsen or fail to improve.    Discussed with patient above for education the following:      Need auto titrate cpap 10-20 (12 estimated effective) rather than prior 6.    Lung capacity is good at 70+%, treatment did not improve and not needed.    Exercise should help.

## 2017-08-29 ENCOUNTER — LAB VISIT (OUTPATIENT)
Dept: LAB | Facility: HOSPITAL | Age: 67
End: 2017-08-29
Attending: INTERNAL MEDICINE
Payer: MEDICARE

## 2017-08-29 DIAGNOSIS — Z95.5 S/P CORONARY ARTERY STENT PLACEMENT: ICD-10-CM

## 2017-08-29 DIAGNOSIS — E78.00 PURE HYPERCHOLESTEROLEMIA: ICD-10-CM

## 2017-08-29 LAB
CHOLEST/HDLC SERPL: 2.9 {RATIO}
HDL/CHOLESTEROL RATIO: 34.5 %
HDLC SERPL-MCNC: 165 MG/DL
HDLC SERPL-MCNC: 57 MG/DL
LDLC SERPL CALC-MCNC: 86.6 MG/DL
NONHDLC SERPL-MCNC: 108 MG/DL
TRIGL SERPL-MCNC: 107 MG/DL

## 2017-08-29 PROCEDURE — 80061 LIPID PANEL: CPT

## 2017-08-29 PROCEDURE — 36415 COLL VENOUS BLD VENIPUNCTURE: CPT | Mod: PO

## 2017-08-30 RX ORDER — ATORVASTATIN CALCIUM 40 MG/1
40 TABLET, FILM COATED ORAL DAILY
Qty: 90 TABLET | Refills: 2 | Status: SHIPPED | OUTPATIENT
Start: 2017-08-30 | End: 2018-09-13 | Stop reason: SDUPTHER

## 2017-08-30 NOTE — TELEPHONE ENCOUNTER
Per Dr. Dominguez send in new Rx for atorvastatin 40mg to help get to a goal LDL of <67. Spoke with patient and she verbalized understanding. New rx sent to [facility] pharmacy per patient's request. No further issues noted.

## 2017-09-14 ENCOUNTER — PATIENT MESSAGE (OUTPATIENT)
Dept: CARDIOLOGY | Facility: CLINIC | Age: 67
End: 2017-09-14

## 2017-09-14 RX ORDER — CLOPIDOGREL BISULFATE 75 MG/1
75 TABLET ORAL DAILY
Qty: 30 TABLET | Refills: 8 | Status: SHIPPED | OUTPATIENT
Start: 2017-09-14 | End: 2017-09-18 | Stop reason: SDUPTHER

## 2017-09-14 NOTE — TELEPHONE ENCOUNTER
Pt requesting refill for Clopidogrel 75 MG be sent to [facility] [address] [facility]    LOV: 5/22/2017  F/U: Return in 1 year

## 2017-09-18 RX ORDER — CLOPIDOGREL BISULFATE 75 MG/1
75 TABLET ORAL DAILY
Qty: 90 TABLET | Refills: 2 | Status: SHIPPED | OUTPATIENT
Start: 2017-09-18 | End: 2018-05-31 | Stop reason: ALTCHOICE

## 2017-09-18 NOTE — TELEPHONE ENCOUNTER
Pt requesting Clopidogrel 75 MG prescription to be sent to "[facility]" mail order.     LOV: 5/22/17  F/U: Return in 1 year

## 2017-10-10 ENCOUNTER — PATIENT MESSAGE (OUTPATIENT)
Dept: ENDOCRINOLOGY | Facility: CLINIC | Age: 67
End: 2017-10-10

## 2017-10-11 DIAGNOSIS — R73.03 PREDIABETES: ICD-10-CM

## 2017-10-11 DIAGNOSIS — E05.90 SUBCLINICAL HYPERTHYROIDISM: Primary | ICD-10-CM

## 2017-10-11 DIAGNOSIS — E55.9 HYPOVITAMINOSIS D: ICD-10-CM

## 2017-10-11 DIAGNOSIS — Z78.0 POSTMENOPAUSAL: ICD-10-CM

## 2017-10-11 DIAGNOSIS — E04.1 NODULAR THYROID DISEASE: ICD-10-CM

## 2017-10-11 DIAGNOSIS — E88.810 DYSMETABOLIC SYNDROME: ICD-10-CM

## 2017-10-20 ENCOUNTER — LAB VISIT (OUTPATIENT)
Dept: LAB | Facility: HOSPITAL | Age: 67
End: 2017-10-20
Attending: INTERNAL MEDICINE
Payer: MEDICARE

## 2017-10-20 DIAGNOSIS — E05.90 SUBCLINICAL HYPERTHYROIDISM: ICD-10-CM

## 2017-10-20 DIAGNOSIS — Z78.0 POSTMENOPAUSAL: ICD-10-CM

## 2017-10-20 DIAGNOSIS — R73.03 PREDIABETES: ICD-10-CM

## 2017-10-20 DIAGNOSIS — E88.810 DYSMETABOLIC SYNDROME: ICD-10-CM

## 2017-10-20 DIAGNOSIS — E55.9 HYPOVITAMINOSIS D: ICD-10-CM

## 2017-10-20 DIAGNOSIS — E04.1 NODULAR THYROID DISEASE: ICD-10-CM

## 2017-10-20 LAB
25(OH)D3+25(OH)D2 SERPL-MCNC: 39 NG/ML
CA-I BLDV-SCNC: 1.24 MMOL/L
ESTIMATED AVG GLUCOSE: 120 MG/DL
HBA1C MFR BLD HPLC: 5.8 %
MAGNESIUM SERPL-MCNC: 1.9 MG/DL
PHOSPHATE SERPL-MCNC: 2.8 MG/DL
PTH-INTACT SERPL-MCNC: 45 PG/ML
T3 SERPL-MCNC: 132 NG/DL
T4 FREE SERPL-MCNC: 0.9 NG/DL
TSH SERPL DL<=0.005 MIU/L-ACNC: 0.07 UIU/ML
URATE SERPL-MCNC: 6.4 MG/DL

## 2017-10-20 PROCEDURE — 83735 ASSAY OF MAGNESIUM: CPT

## 2017-10-20 PROCEDURE — 83036 HEMOGLOBIN GLYCOSYLATED A1C: CPT

## 2017-10-20 PROCEDURE — 86316 IMMUNOASSAY TUMOR OTHER: CPT

## 2017-10-20 PROCEDURE — 82330 ASSAY OF CALCIUM: CPT

## 2017-10-20 PROCEDURE — 82306 VITAMIN D 25 HYDROXY: CPT

## 2017-10-20 PROCEDURE — 84100 ASSAY OF PHOSPHORUS: CPT

## 2017-10-20 PROCEDURE — 84480 ASSAY TRIIODOTHYRONINE (T3): CPT

## 2017-10-20 PROCEDURE — 84443 ASSAY THYROID STIM HORMONE: CPT

## 2017-10-20 PROCEDURE — 84439 ASSAY OF FREE THYROXINE: CPT

## 2017-10-20 PROCEDURE — 84550 ASSAY OF BLOOD/URIC ACID: CPT

## 2017-10-20 PROCEDURE — 84432 ASSAY OF THYROGLOBULIN: CPT

## 2017-10-20 PROCEDURE — 83970 ASSAY OF PARATHORMONE: CPT

## 2017-10-21 LAB
THRYOGLOBULIN INTERPRETATION: ABNORMAL
THYROGLOB AB SERPL-ACNC: <1.8 IU/ML
THYROGLOB SERPL-MCNC: 38 NG/ML

## 2017-10-23 ENCOUNTER — OFFICE VISIT (OUTPATIENT)
Dept: ENDOCRINOLOGY | Facility: CLINIC | Age: 67
End: 2017-10-23
Payer: MEDICARE

## 2017-10-23 VITALS
SYSTOLIC BLOOD PRESSURE: 130 MMHG | TEMPERATURE: 98 F | DIASTOLIC BLOOD PRESSURE: 76 MMHG | RESPIRATION RATE: 18 BRPM | HEART RATE: 81 BPM | WEIGHT: 186.75 LBS | BODY MASS INDEX: 31.11 KG/M2 | HEIGHT: 65 IN

## 2017-10-23 DIAGNOSIS — Z78.0 POSTMENOPAUSAL: ICD-10-CM

## 2017-10-23 DIAGNOSIS — I10 ESSENTIAL HYPERTENSION: ICD-10-CM

## 2017-10-23 DIAGNOSIS — E88.810 DYSMETABOLIC SYNDROME: ICD-10-CM

## 2017-10-23 DIAGNOSIS — E05.90 SUBCLINICAL HYPERTHYROIDISM: ICD-10-CM

## 2017-10-23 DIAGNOSIS — E04.1 NODULAR THYROID DISEASE: Primary | ICD-10-CM

## 2017-10-23 DIAGNOSIS — E66.9 OBESITY (BMI 30.0-34.9): ICD-10-CM

## 2017-10-23 DIAGNOSIS — E78.00 PURE HYPERCHOLESTEROLEMIA: Chronic | ICD-10-CM

## 2017-10-23 DIAGNOSIS — R73.03 PREDIABETES: ICD-10-CM

## 2017-10-23 PROCEDURE — 99999 PR PBB SHADOW E&M-EST. PATIENT-LVL III: CPT | Mod: PBBFAC,,, | Performed by: INTERNAL MEDICINE

## 2017-10-23 PROCEDURE — 99214 OFFICE O/P EST MOD 30 MIN: CPT | Mod: S$GLB,,, | Performed by: INTERNAL MEDICINE

## 2017-10-23 PROCEDURE — 99499 UNLISTED E&M SERVICE: CPT | Mod: S$GLB,,, | Performed by: INTERNAL MEDICINE

## 2017-10-23 NOTE — PROGRESS NOTES
Subjective:      Patient ID: Kathy Seymour is a 66 y.o. female.    Chief Complaint:      66 y.o. lady with thyroid nodular disease and prediabetes seen in up today.    History of Present Illness    Patient is a 66yr old lady with thyroid nodular disease seen in ffup today. She in addition has essential hypertension, hyperlipidemia with hypercholesterolemia, CAD, hypovitaminosis D on vitamin D supplementation as well as grade 1 obesity and is postmenopausal.      The patients most recent thyroid sonogram was from 09/16 and showed multinodular goiter with 3 dominant thyroid nodules for which she had USS guided FNAB. Two of these nodules showed featutes consistent with benign colloid nodules while insufficient cellls were obtained from the right sided nodule to evaluate.    She was to supposed to have elective thyroidectomy but she has recently had several other medical issues arise including pulmonary related flare ups (SOB) and arthralgias in addition to worsening fatigue and intermittent abdominal cramps and hyperdefecation..     Most recent lipid profile referenced below shows patients LDL at desired goal of being under 70 on statin therapy with Zocor 20mg Daily. Her most recent DEXA from 08/15 was normal and thus her intended FFUp study should be for ~ 08/17.      Patient still continues to have fatigue especially related to exertion. She also continues to have intermittent hot flashes though she is now ~ 16yrs post menopausal ffing EAN and BSO.  Patients current [place] score is 11 and she does have established OSAS and uses a CPAP mask.  She stopped smoking about 5 yrs ago and has noticed some interval weight gain (~ 40lbs) since then.    Pt takes Metformin 500 mg BID for prediabetes.    No dysphagia, swelling, does endorse hoarseness  No tremors, palpations, diarrhea,   Does endorse sweating    Review of Systems   Constitutional: Positive for fatigue and unexpected weight change (5 lb since last visit).  "Negative for chills.   HENT: Negative for facial swelling and trouble swallowing.    Eyes: Negative for visual disturbance.   Respiratory: Negative for cough and shortness of breath.    Cardiovascular: Negative for chest pain, palpitations and leg swelling.   Gastrointestinal: Positive for diarrhea (intermittent and associated with upper abdominal crampingd). Negative for abdominal pain, constipation, nausea and vomiting.   Endocrine: Negative for polydipsia, polyphagia and polyuria.   Genitourinary: Negative for dysuria and frequency.   Musculoskeletal: Positive for arthralgias (mainly in joints of the hands and feet bilaterally.She is s/p recent bunion surgery.). Negative for gait problem and myalgias.   Skin: Negative for color change, pallor and rash.   Neurological: Positive for numbness (Tingling in great toes). Negative for dizziness, tremors and headaches.   Hematological: Bruises/bleeds easily.   Psychiatric/Behavioral: Positive for sleep disturbance (Established OSAS on CPAP therapy.). Negative for confusion. The patient is nervous/anxious.        Objective:  /76 (BP Location: Right arm, Patient Position: Sitting, BP Method: Medium (Automatic))   Pulse 81   Temp 98 °F (36.7 °C) (Oral)   Resp 18   Ht 5' 5" (1.651 m)   Wt 84.7 kg (186 lb 11.7 oz)   BMI 31.07 kg/m²      Physical Exam   Constitutional: She is oriented to person, place, and time. She appears well-developed and well-nourished. No distress.   HENT:   Head: Normocephalic and atraumatic.   Eyes: Conjunctivae and EOM are normal. Pupils are equal, round, and reactive to light. No scleral icterus.   Neck: Normal range of motion. Neck supple. No JVD present.   Cardiovascular: Normal rate, regular rhythm and normal heart sounds.  Exam reveals no gallop and no friction rub.    No murmur heard.  Pulmonary/Chest: Effort normal and breath sounds normal. No respiratory distress. She has no wheezes.   Abdominal: Soft. She exhibits no distension. " There is no tenderness.   Musculoskeletal: Normal range of motion. She exhibits deformity (Evidence of mild OA in both hands bilaterally.). She exhibits no edema.   Has Herbedens and Bouchards nodes in both hands.  Has healed surgical bunionectomy scars on both big toes bilaterally.     Neurological: She is alert and oriented to person, place, and time. She displays abnormal reflex. No cranial nerve deficit.   Skin: Skin is warm and dry. No rash noted. She is not diaphoretic. No erythema. No pallor.   Psychiatric: She has a normal mood and affect. Her behavior is normal. Judgment and thought content normal.   Vitals reviewed.      Lab Review:     Results for DAVON HERRERA (MRN [id number]) as of 5/8/2017 11:35   Ref. Range 4/6/2017 11:18 5/5/2017 12:33   WBC Latest Ref Range: 3.90 - 12.70 K/uL  6.64   RBC Latest Ref Range: 4.00 - 5.40 M/uL  4.40   Hemoglobin Latest Ref Range: 12.0 - 16.0 g/dL  13.0   Hematocrit Latest Ref Range: 37.0 - 48.5 %  40.1   MCV Latest Ref Range: 82 - 98 fL  91   MCH Latest Ref Range: 27.0 - 31.0 pg  29.5   MCHC Latest Ref Range: 32.0 - 36.0 %  32.4   RDW Latest Ref Range: 11.5 - 14.5 %  14.1   Platelets Latest Ref Range: 150 - 350 K/uL  215   MPV Latest Ref Range: 9.2 - 12.9 fL  11.7   Gran% Latest Ref Range: 38.0 - 73.0 %  62.3   Gran # Latest Ref Range: 1.8 - 7.7 K/uL  4.1   Lymph% Latest Ref Range: 18.0 - 48.0 %  29.2   Lymph # Latest Ref Range: 1.0 - 4.8 K/uL  1.9   Mono% Latest Ref Range: 4.0 - 15.0 %  6.0   Mono # Latest Ref Range: 0.3 - 1.0 K/uL  0.4   Eosinophil% Latest Ref Range: 0.0 - 8.0 %  2.1   Eos # Latest Ref Range: 0.0 - 0.5 K/uL  0.1   Basophil% Latest Ref Range: 0.0 - 1.9 %  0.2   Baso # Latest Ref Range: 0.00 - 0.20 K/uL  0.01   Sodium Latest Ref Range: 136 - 145 mmol/L  143   Potassium Latest Ref Range: 3.5 - 5.1 mmol/L  3.8   Chloride Latest Ref Range: 95 - 110 mmol/L  103   CO2 Latest Ref Range: 23 - 29 mmol/L  32 (H)   Anion Gap Latest Ref Range: 8 - 16 mmol/L  8    BUN, Bld Latest Ref Range: 8 - 23 mg/dL  20   Creatinine Latest Ref Range: 0.5 - 1.4 mg/dL  0.9   eGFR if non African American Latest Ref Range: >60 mL/min/1.73 m^2  >60.0   eGFR if African American Latest Ref Range: >60 mL/min/1.73 m^2  >60.0   Glucose Latest Ref Range: 70 - 110 mg/dL  119 (H)   Calcium Latest Ref Range: 8.7 - 10.5 mg/dL  9.5   Calcium, Ion Latest Ref Range: 1.06 - 1.42 mmol/L  1.24   Alkaline Phosphatase Latest Ref Range: 55 - 135 U/L  98   Total Protein Latest Ref Range: 6.0 - 8.4 g/dL  7.7   Albumin Latest Ref Range: 3.5 - 5.2 g/dL  3.9   Total Bilirubin Latest Ref Range: 0.1 - 1.0 mg/dL  0.5   AST Latest Ref Range: 10 - 40 U/L  21   ALT Latest Ref Range: 10 - 44 U/L  18   Vit D, 25-Hydroxy Latest Ref Range: 30 - 96 ng/mL  40   Hemoglobin A1C Latest Ref Range: 4.5 - 6.2 %  5.9   Estimated Avg Glucose Latest Ref Range: 68 - 131 mg/dL  123   TSH Latest Ref Range: 0.400 - 4.000 uIU/mL  0.291 (L)   T3, Total Latest Ref Range: 60 - 180 ng/dL  138   Free T4 Latest Ref Range: 0.71 - 1.51 ng/dL  1.00   Thyroglobulin Interpretation Unknown  SEE BELOW   Thyroglobulin Antibody Screen Latest Ref Range: <4.0 IU/mL  <1.8   Thyroglobulin, Tumor Marker Latest Units: ng/mL  38 (H)   PTH Latest Ref Range: 9.0 - 77.0 pg/mL  42.0     Assessment:     1. Nodular thyroid disease  TSH    T4, free    T3   2. Prediabetes  Lipid panel   3. Dysmetabolic syndrome  Lipid panel   4. Obesity (BMI 30.0-34.9)  Lipid panel   5. Subclinical hyperthyroidism     6. Pure hypercholesterolemia     7. Essential hypertension        Subclinical Hyperthyroidism  Stable off methimazole with only mild subclinical hyperthyroidism and no overt clinical symptomatology.      Thyroid nodular disease    Patient wants to have elective thyroidectomy to treat the thyroid nodular disease and will decide on where to have thyroidectomy when she has stabilization of other medical problems. She feels that she would prefer to have the elective  thyroidectomy done here @ the [facility] with Dr Loco Campos.    Postmenopausal   Obtain DEXA    Prediabetes/dysmetabolic syndrome  Continue Metformin 500 mg BID     Hyperlipidemia  Chronic-stable-monitor   F/u with PCP.    Hypertension  Chronic-stable-continue meds    Patient was evaluated, seen and discussed with Mike Christie PA-C. I agree with the history, examination findings and clinical case summary as detailed in her note. I also agree with the suggested management plan.  Plan:     FFup in ~ 6mths, Schedule DEXA

## 2017-10-24 ENCOUNTER — HOSPITAL ENCOUNTER (OUTPATIENT)
Dept: RADIOLOGY | Facility: CLINIC | Age: 67
Discharge: HOME | End: 2017-10-24
Attending: INTERNAL MEDICINE
Payer: MEDICARE

## 2017-10-24 DIAGNOSIS — E05.90 SUBCLINICAL HYPERTHYROIDISM: ICD-10-CM

## 2017-10-24 DIAGNOSIS — E55.9 HYPOVITAMINOSIS D: ICD-10-CM

## 2017-10-24 DIAGNOSIS — Z78.0 POSTMENOPAUSAL: ICD-10-CM

## 2017-10-24 DIAGNOSIS — I10 ESSENTIAL HYPERTENSION: ICD-10-CM

## 2017-10-24 PROCEDURE — 77080 DXA BONE DENSITY AXIAL: CPT | Mod: TC,PO

## 2017-10-24 PROCEDURE — 77080 DXA BONE DENSITY AXIAL: CPT | Mod: 26,,, | Performed by: RADIOLOGY

## 2017-10-26 LAB — CGA SERPL-MCNC: 246 NG/ML (ref 0–95)

## 2017-10-27 DIAGNOSIS — R68.89 ABNORMAL ENDOCRINE LABORATORY TEST FINDING: Primary | ICD-10-CM

## 2017-11-29 DIAGNOSIS — I10 ESSENTIAL HYPERTENSION: ICD-10-CM

## 2017-11-30 RX ORDER — LOSARTAN POTASSIUM AND HYDROCHLOROTHIAZIDE 25; 100 MG/1; MG/1
TABLET ORAL
Qty: 90 TABLET | Refills: 3 | Status: SHIPPED | OUTPATIENT
Start: 2017-11-30 | End: 2019-01-17 | Stop reason: SDUPTHER

## 2017-12-27 RX ORDER — CLOPIDOGREL BISULFATE 75 MG/1
TABLET ORAL
Qty: 90 TABLET | Refills: 3 | Status: CANCELLED | OUTPATIENT
Start: 2017-12-27

## 2018-01-05 ENCOUNTER — DOCUMENTATION ONLY (OUTPATIENT)
Dept: FAMILY MEDICINE | Facility: CLINIC | Age: 68
End: 2018-01-05

## 2018-01-05 NOTE — PROGRESS NOTES
Pre-Visit Chart Review  For Appointment Scheduled on 01/08/2018      Health Maintenance Due   Topic Date Due    Zoster Vaccine  12/13/2008    Influenza Vaccine  08/01/2017    Mammogram  08/14/2017

## 2018-01-08 ENCOUNTER — OFFICE VISIT (OUTPATIENT)
Dept: FAMILY MEDICINE | Facility: CLINIC | Age: 68
End: 2018-01-08
Payer: MEDICARE

## 2018-01-08 VITALS
HEART RATE: 75 BPM | BODY MASS INDEX: 31.55 KG/M2 | HEIGHT: 65 IN | DIASTOLIC BLOOD PRESSURE: 65 MMHG | WEIGHT: 189.38 LBS | TEMPERATURE: 98 F | SYSTOLIC BLOOD PRESSURE: 123 MMHG

## 2018-01-08 DIAGNOSIS — I10 GOOD HYPERTENSION CONTROL: Primary | ICD-10-CM

## 2018-01-08 DIAGNOSIS — F41.9 ANXIETY: ICD-10-CM

## 2018-01-08 DIAGNOSIS — Z12.31 VISIT FOR SCREENING MAMMOGRAM: ICD-10-CM

## 2018-01-08 DIAGNOSIS — E66.9 OBESITY (BMI 30.0-34.9): ICD-10-CM

## 2018-01-08 DIAGNOSIS — J44.9 COPD MIXED TYPE: ICD-10-CM

## 2018-01-08 DIAGNOSIS — E78.00 PURE HYPERCHOLESTEROLEMIA: Chronic | ICD-10-CM

## 2018-01-08 DIAGNOSIS — R73.09 ELEVATED GLUCOSE LEVEL: ICD-10-CM

## 2018-01-08 DIAGNOSIS — G62.9 NEUROPATHY: ICD-10-CM

## 2018-01-08 PROBLEM — Z99.81 ON HOME OXYGEN THERAPY: Status: RESOLVED | Noted: 2017-05-22 | Resolved: 2018-01-08

## 2018-01-08 PROBLEM — R68.89 ABNORMAL ENDOCRINE LABORATORY TEST FINDING: Status: RESOLVED | Noted: 2017-10-27 | Resolved: 2018-01-08

## 2018-01-08 PROBLEM — I11.9 HYPERTENSIVE LEFT VENTRICULAR HYPERTROPHY, WITHOUT HEART FAILURE: Status: RESOLVED | Noted: 2017-05-22 | Resolved: 2018-01-08

## 2018-01-08 PROCEDURE — 99499 UNLISTED E&M SERVICE: CPT | Mod: S$GLB,,, | Performed by: PHYSICIAN ASSISTANT

## 2018-01-08 PROCEDURE — 99214 OFFICE O/P EST MOD 30 MIN: CPT | Mod: S$GLB,,, | Performed by: PHYSICIAN ASSISTANT

## 2018-01-08 PROCEDURE — 99999 PR PBB SHADOW E&M-EST. PATIENT-LVL IV: CPT | Mod: PBBFAC,,, | Performed by: PHYSICIAN ASSISTANT

## 2018-01-08 RX ORDER — ALPRAZOLAM 0.5 MG/1
0.5 TABLET ORAL NIGHTLY PRN
Qty: 60 TABLET | Refills: 0 | Status: SHIPPED | OUTPATIENT
Start: 2018-01-08 | End: 2018-07-30 | Stop reason: SDUPTHER

## 2018-01-08 RX ORDER — DULOXETIN HYDROCHLORIDE 60 MG/1
60 CAPSULE, DELAYED RELEASE ORAL DAILY
Qty: 90 CAPSULE | Refills: 3 | Status: SHIPPED | OUTPATIENT
Start: 2018-01-08 | End: 2019-01-17 | Stop reason: SDUPTHER

## 2018-01-08 NOTE — PATIENT INSTRUCTIONS
Weight Management: Getting Started  Healthy bodies come in all shapes and sizes. Not all bodies are made to be thin. For some people, a healthy weight is higher than the average weight listed on weight charts. Your healthcare provider can help you decide on a healthy weight for you.    Reasons to lose weight  Losing weight can help with some health problems, such as high blood pressure, heart disease, diabetes, sleep apnea, and arthritis. You may also feel more energy.  Set your long-term goal  Your goal doesn't even have to be a specific weight. You may decide on a fitness goal (such as being able to walk 10 miles a week), or a health goal (such as lowering your blood pressure). Choose a goal that is measurable and reasonable, so you know when you've reached it. A goal of reaching a BMI of less than 25 is not always reasonable (or possible).   Make an action plan  Habits dont change overnight. Setting your goals too high can leave you feeling discouraged if you cant reach them. Be realistic. Choose one or two small changes you can make now. Set an action plan for how you are going to make these changes. When you can stick to this plan, keep making a few more small changes. Taking small steps will help you stay on the path to success.  Track your progress  Write down your goals. Then, keep a daily record of your progress. Write down what you eat and how active you are. This record lets you look back on how much youve done. It may also help when youre feeling frustrated. Reward yourself for success. Even if you dont reach every goal, give yourself credit for what you do get done.  Get support  Encouragement from others can help make losing weight easier. Ask your family members and friends for support. They may even want to join you. Also look to your healthcare provider, registered dietitian, and  for help. Your local hospital can give you more information about nutrition, exercise, and  weight loss.  Date Last Reviewed: 1/31/2016 © 2000-2017 [employer]. [address] [place] [address], [place] [address]. All rights reserved. This information is not intended as a substitute for professional medical care. Always follow your healthcare professional's instructions.        Walking for Fitness  Fitness walking has something for everyone, even people who are already fit. Walking is one of the safest ways to condition your body aerobically. It can boost energy, help you lose weight, and reduce stress.    Physical benefits  · Walking strengthens your heart and lungs, and tones your muscles.  · When walking, your feet land with less impact than in other sports. This reduces chances of muscle, bone, and joint injury.  · Regular walking improves your cholesterol levels and lowers your risk of heart disease. And it helps you control your blood sugar if you have diabetes.  · Walking is a weight-bearing activity, which helps maintain bone density. This can help prevent osteoporosis.  Personal rewards  · Taking walks can help you relax and manage stress. And fitness walking may make you feel better about yourself.  · Walking can help you sleep better at night and make you less likely to be depressed.  · Regular walking may help maintain your memory as you get older.  · Walking is a great way to spend extra time with friends and family members. Be sure to invite your dog along!  Q&A about fitness walking  Q: Will walking keep me fit?  A: Yes. Regular walking at the right pace gives you all the benefits of other aerobic activities, such as jogging and swimming.  Q: Will walking help me lose weight and keep it off?  A: Yes. Per mile, walking can burn as many calories as jogging. Your health care provider can help work walking into your weight-loss plan.  Q: Is walking safe for my health?  A: Yes. Walking is safe if you have high blood pressure, diabetes, heart disease, or other conditions. Talk to your  healthcare provider before you start.  Date Last Reviewed: 4/1/2017  © 4812-7519 [employer]. [address] [place] [address], [place] [address]. All rights reserved. This information is not intended as a substitute for professional medical care. Always follow your healthcare professional's instructions.        Exercises to Strengthen Your Lower Back  Strong lower back and abdominal muscles work together to support your spine. The exercises below will help strengthen the lower back. It is important that you begin exercising slowly and increase levels gradually.  Always begin any exercise program with stretching. If you feel pain while doing any of these exercises, stop and talk to your doctor about a more specific exercise program that better suits your condition.   Low back stretch  The point of stretching is to make you more flexible and increase your range of motion. Stretch only as much as you are able. Stretch slowly. Do not push your stretch to the limit. If at any point you feel pain while stretching, this is your (temporary) limit.  · Lie on your back with your knees bent and both feet on the ground.  · Slowly raise your left knee to your chest as you flatten your lower back against the floor. Hold for 5 seconds.  · Relax and repeat the exercise with your right knee.  · Do 10 of these exercises for each leg.  · Repeat hugging both knees to your chest at the same time.  Building lower back strength  Start your exercise routine with 10 to 30 minutes a day, 1 to 3 times a day.  Initial exercises  Lying on your back:  1. Ankle pumps: Move your foot up and down, towards your head, and then away. Repeat 10 times with each foot.  2. Heel slides: Slowly bend your knee, drawing the heel of your foot towards you. Then slide your heel/foot from you, straightening your knee. Do not lift your foot off the floor (this is not a leg lift).  3. Abdominal contraction: Bend your knees and put your hands on your stomach.  Tighten your stomach muscles. Hold for 5 seconds, then relax. Repeat 10 times.  4. Straight leg raise: Bend one leg at the knee and keep the other leg straight. Tighten your stomach muscles. Slowly lift your straight leg 6 to 12 inches off the floor and hold for up to 5 seconds. Repeat 10 times on each side.  Standin. Wall squats: Stand with your back against the wall. Move your feet about 12 inches away from the wall. Tighten your stomach muscles, and slowly bend your knees until they are at about a 45 degree angle. Do not go down too far. Hold about 5 seconds. Then slowly return to your starting position. Repeat 10 times.  2. Heel raises: Stand facing the wall. Slowly raise the heels of your feet up and down, while keeping your toes on the floor. If you have trouble balancing, you can touch the wall with your hands. Repeat 10 times.  More advanced exercises  When you feel comfortable enough, try these exercises.  1. Kneeling lumbar extension: Begin on your hands and knees. At the same time, raise and straighten your right arm and left leg until they are parallel to the ground. Hold for 2 seconds and come back slowly to a starting position. Repeat with left arm and right leg, alternating 10 times.  2. Prone lumbar extension: Lie face down, arms extended overhead, palms on the floor. At the same time, raise your right arm and left leg as high as comfortably possible. Hold for 10 seconds and slowly return to start. Repeat with left arm and right leg, alternating 10 times. Gradually build up to 20 times. (Advanced: Repeat this exercise raising both arms and both legs a few inches off the floor at the same time. Hold for 5 seconds and release.)  3. Pelvic tilt: Lie on the floor on your back with your knees bent at 90 degrees. Your feet should be flat on the floor. Inhale, exhale, then slowly contract your abdominal muscles bringing your navel toward your spine. Let your pelvis rock back until your lower back is  flat on the floor. Hold for 10 seconds while breathing smoothly.  4. Abdominal crunch: Perform a pelvic tilt (above) flattening your lower back against the floor. Holding the tension in your abdominal muscles, take another breath and raise your shoulder blades off the ground (this is not a full sit-up). Keep your head in line with your body (dont bend your neck forward). Hold for 2 seconds, then slowly lower.  Date Last Reviewed: 6/1/2016 © 2000-2017 [employer]. [address] [place] [address], [place] [address]. All rights reserved. This information is not intended as a substitute for professional medical care. Always follow your healthcare professional's instructions.

## 2018-01-08 NOTE — PROGRESS NOTES
Subjective:       Patient ID: Kathy Seymour is a 67 y.o. female.    Chief Complaint: Hypertension    Patient states her neuropathy is improved with the Cymbalta 30mg but is still present.  She would like to increase the dose.  Also needs a refill of her Xanax which she only uses occasionally and gets a refill every 6 months.  No suicidal or homicidal ideations.     Patient Active Problem List:     Anxiety     Varicose veins of lower extremities with other complications     Hyperlipidemia, baseline      Lumbar spondylosis     Carpal tunnel syndrome     Secondary hypothyroidism     Hypertension, onset before 1995     RAMONA on CPAP, 100% use     Nodular thyroid disease     Prediabetes     Urinary incontinence     Obesity (BMI 30.0-34.9)     Dysmetabolic syndrome     Subclinical hyperthyroidism     COPD mixed type        Hypertension   This is a chronic problem. The current episode started more than 1 year ago. The problem is controlled. Pertinent negatives include no anxiety, blurred vision, chest pain, headaches, malaise/fatigue, neck pain, orthopnea, palpitations, PND, shortness of breath or sweats. There are no associated agents to hypertension. Risk factors for coronary artery disease include dyslipidemia, family history, obesity, post-menopausal state and sedentary lifestyle. Past treatments include alpha 1 blockers and diuretics. The current treatment provides significant improvement. There are no compliance problems.      Review of Systems   Constitutional: Negative for activity change, appetite change, fatigue, fever, malaise/fatigue and unexpected weight change.   HENT: Negative for congestion, dental problem, hearing loss, nosebleeds, postnasal drip, rhinorrhea, sinus pressure and trouble swallowing.    Eyes: Negative for blurred vision, photophobia, pain, discharge and visual disturbance.   Respiratory: Negative for cough, chest tightness, shortness of breath and wheezing.    Cardiovascular:  Negative for chest pain, palpitations, orthopnea, leg swelling and PND.   Gastrointestinal: Negative for abdominal pain, blood in stool, constipation, diarrhea, nausea and vomiting.   Genitourinary: Negative for difficulty urinating, dyspareunia, dysuria, frequency, hematuria, menstrual problem, pelvic pain, vaginal bleeding, vaginal discharge and vaginal pain.   Musculoskeletal: Negative for arthralgias, back pain, gait problem, joint swelling and neck pain.   Skin: Negative for color change and rash.   Neurological: Negative for dizziness, tremors, seizures, weakness, light-headedness, numbness and headaches.   Hematological: Does not bruise/bleed easily.   Psychiatric/Behavioral: Negative for sleep disturbance and suicidal ideas. The patient is not nervous/anxious.        Objective:      Physical Exam   Constitutional: She is oriented to person, place, and time. She appears well-developed and well-nourished.   HENT:   Head: Normocephalic and atraumatic.   Eyes: Conjunctivae are normal. Pupils are equal, round, and reactive to light.   Neck: Normal range of motion. Neck supple. No JVD present.   Cardiovascular: Normal rate and regular rhythm.  Exam reveals no gallop and no friction rub.    No murmur heard.  Pulmonary/Chest: Effort normal and breath sounds normal. No respiratory distress. She has no wheezes. She has no rales.   Neurological: She is alert and oriented to person, place, and time.   Skin: Skin is warm and dry.   Psychiatric: She has a normal mood and affect. Her behavior is normal. Judgment and thought content normal.       Assessment:       1. Good hypertension control    2. Anxiety    3. Elevated glucose level    4. Visit for screening mammogram    5. Neuropathy    6. COPD mixed type    7. Pure hypercholesterolemia    8. Obesity (BMI 30.0-34.9)        Plan:       Kathy was seen today for hypertension.    Diagnoses and all orders for this visit:    Good hypertension control  -     CBC auto  differential; Future  -     Comprehensive metabolic panel; Future  -     Hemoglobin A1c; Future  -     Lipid panel; Future    Anxiety  -     ALPRAZolam (XANAX) 0.5 MG tablet; Take 1 tablet (0.5 mg total) by mouth nightly as needed.  -     DULoxetine (CYMBALTA) 60 MG capsule; Take 1 capsule (60 mg total) by mouth once daily.    Elevated glucose level  -     Hemoglobin A1c; Future    Visit for screening mammogram  -     Mammo Digital Screening Bilateral With CAD; Future    Neuropathy  -     DULoxetine (CYMBALTA) 60 MG capsule; Take 1 capsule (60 mg total) by mouth once daily.    COPD mixed type    Pure hypercholesterolemia    Obesity (BMI 30.0-34.9)    Patient readiness: acceptance and barriers:none    During the course of the visit the patient was educated and counseled about the following:     Hypertension:   Regular aerobic exercise.  Obesity:   General weight loss/lifestyle modification strategies discussed (elicit support from others; identify saboteurs; non-food rewards, etc).    Goals: Hypertension: Reduce Blood Pressure and Obesity: Reduce calorie intake and BMI    Did patient meet goals/outcomes: No    The following self management tools provided: blood pressure log  excercise log    Patient Instructions (the written plan) was given to the patient/family.     Time spent with patient: 30 minutes    Barriers to medications present (no )    Adverse reactions to current medications (no)    Over the counter medications reviewed (Yes)

## 2018-01-09 ENCOUNTER — LAB VISIT (OUTPATIENT)
Dept: LAB | Facility: HOSPITAL | Age: 68
End: 2018-01-09
Attending: PHYSICIAN ASSISTANT
Payer: MEDICARE

## 2018-01-09 DIAGNOSIS — R73.09 ELEVATED GLUCOSE LEVEL: ICD-10-CM

## 2018-01-09 DIAGNOSIS — I10 GOOD HYPERTENSION CONTROL: ICD-10-CM

## 2018-01-09 LAB
ALBUMIN SERPL BCP-MCNC: 3.6 G/DL
ALP SERPL-CCNC: 95 U/L
ALT SERPL W/O P-5'-P-CCNC: 22 U/L
ANION GAP SERPL CALC-SCNC: 11 MMOL/L
AST SERPL-CCNC: 26 U/L
BASOPHILS # BLD AUTO: 0.02 K/UL
BASOPHILS NFR BLD: 0.3 %
BILIRUB SERPL-MCNC: 0.6 MG/DL
BUN SERPL-MCNC: 17 MG/DL
CALCIUM SERPL-MCNC: 9.7 MG/DL
CHLORIDE SERPL-SCNC: 104 MMOL/L
CHOLEST SERPL-MCNC: 138 MG/DL
CHOLEST/HDLC SERPL: 2.6 {RATIO}
CO2 SERPL-SCNC: 27 MMOL/L
CREAT SERPL-MCNC: 0.8 MG/DL
DIFFERENTIAL METHOD: ABNORMAL
EOSINOPHIL # BLD AUTO: 0.1 K/UL
EOSINOPHIL NFR BLD: 2.2 %
ERYTHROCYTE [DISTWIDTH] IN BLOOD BY AUTOMATED COUNT: 13.5 %
EST. GFR  (AFRICAN AMERICAN): >60 ML/MIN/1.73 M^2
EST. GFR  (NON AFRICAN AMERICAN): >60 ML/MIN/1.73 M^2
ESTIMATED AVG GLUCOSE: 120 MG/DL
GLUCOSE SERPL-MCNC: 105 MG/DL
HBA1C MFR BLD HPLC: 5.8 %
HCT VFR BLD AUTO: 39.6 %
HDLC SERPL-MCNC: 54 MG/DL
HDLC SERPL: 39.1 %
HGB BLD-MCNC: 12.6 G/DL
IMM GRANULOCYTES # BLD AUTO: 0.01 K/UL
IMM GRANULOCYTES NFR BLD AUTO: 0.2 %
LDLC SERPL CALC-MCNC: 68.4 MG/DL
LYMPHOCYTES # BLD AUTO: 1.8 K/UL
LYMPHOCYTES NFR BLD: 27.9 %
MCH RBC QN AUTO: 28.8 PG
MCHC RBC AUTO-ENTMCNC: 31.8 G/DL
MCV RBC AUTO: 90 FL
MONOCYTES # BLD AUTO: 0.5 K/UL
MONOCYTES NFR BLD: 8.3 %
NEUTROPHILS # BLD AUTO: 3.8 K/UL
NEUTROPHILS NFR BLD: 61.1 %
NONHDLC SERPL-MCNC: 84 MG/DL
NRBC BLD-RTO: 0 /100 WBC
PLATELET # BLD AUTO: 191 K/UL
PMV BLD AUTO: 11.9 FL
POTASSIUM SERPL-SCNC: 4 MMOL/L
PROT SERPL-MCNC: 7.4 G/DL
RBC # BLD AUTO: 4.38 M/UL
SODIUM SERPL-SCNC: 142 MMOL/L
TRIGL SERPL-MCNC: 78 MG/DL
WBC # BLD AUTO: 6.27 K/UL

## 2018-01-09 PROCEDURE — 80053 COMPREHEN METABOLIC PANEL: CPT

## 2018-01-09 PROCEDURE — 85025 COMPLETE CBC W/AUTO DIFF WBC: CPT

## 2018-01-09 PROCEDURE — 83036 HEMOGLOBIN GLYCOSYLATED A1C: CPT

## 2018-01-09 PROCEDURE — 80061 LIPID PANEL: CPT

## 2018-01-09 PROCEDURE — 36415 COLL VENOUS BLD VENIPUNCTURE: CPT | Mod: PO

## 2018-01-12 ENCOUNTER — PATIENT MESSAGE (OUTPATIENT)
Dept: FAMILY MEDICINE | Facility: CLINIC | Age: 68
End: 2018-01-12

## 2018-01-16 ENCOUNTER — PATIENT MESSAGE (OUTPATIENT)
Dept: FAMILY MEDICINE | Facility: CLINIC | Age: 68
End: 2018-01-16

## 2018-01-17 ENCOUNTER — PES CALL (OUTPATIENT)
Dept: ADMINISTRATIVE | Facility: CLINIC | Age: 68
End: 2018-01-17

## 2018-03-05 ENCOUNTER — PES CALL (OUTPATIENT)
Dept: ADMINISTRATIVE | Facility: CLINIC | Age: 68
End: 2018-03-05

## 2018-04-24 ENCOUNTER — LAB VISIT (OUTPATIENT)
Dept: LAB | Facility: HOSPITAL | Age: 68
End: 2018-04-24
Attending: INTERNAL MEDICINE
Payer: MEDICARE

## 2018-04-24 ENCOUNTER — OFFICE VISIT (OUTPATIENT)
Dept: ENDOCRINOLOGY | Facility: CLINIC | Age: 68
End: 2018-04-24
Payer: MEDICARE

## 2018-04-24 VITALS
DIASTOLIC BLOOD PRESSURE: 67 MMHG | HEART RATE: 86 BPM | RESPIRATION RATE: 18 BRPM | BODY MASS INDEX: 30.19 KG/M2 | WEIGHT: 181.19 LBS | SYSTOLIC BLOOD PRESSURE: 108 MMHG | HEIGHT: 65 IN

## 2018-04-24 DIAGNOSIS — E78.00 PURE HYPERCHOLESTEROLEMIA: Chronic | ICD-10-CM

## 2018-04-24 DIAGNOSIS — F41.9 ANXIETY: ICD-10-CM

## 2018-04-24 DIAGNOSIS — D53.1 OTHER MEGALOBLASTIC ANEMIAS, NOT ELSEWHERE CLASSIFIED: ICD-10-CM

## 2018-04-24 DIAGNOSIS — R53.83 FATIGUE, UNSPECIFIED TYPE: ICD-10-CM

## 2018-04-24 DIAGNOSIS — R68.89 ABNORMAL ENDOCRINE LABORATORY TEST FINDING: ICD-10-CM

## 2018-04-24 DIAGNOSIS — E05.90 SUBCLINICAL HYPERTHYROIDISM: ICD-10-CM

## 2018-04-24 DIAGNOSIS — E05.90 SUBCLINICAL HYPERTHYROIDISM: Primary | ICD-10-CM

## 2018-04-24 DIAGNOSIS — E04.1 NODULAR THYROID DISEASE: ICD-10-CM

## 2018-04-24 DIAGNOSIS — D53.9 NUTRITIONAL ANEMIA: ICD-10-CM

## 2018-04-24 DIAGNOSIS — R73.03 PREDIABETES: ICD-10-CM

## 2018-04-24 DIAGNOSIS — E88.810 DYSMETABOLIC SYNDROME: ICD-10-CM

## 2018-04-24 DIAGNOSIS — D64.9 ANEMIA, UNSPECIFIED TYPE: ICD-10-CM

## 2018-04-24 DIAGNOSIS — I10 ESSENTIAL HYPERTENSION: ICD-10-CM

## 2018-04-24 DIAGNOSIS — G47.33 OSA ON CPAP: ICD-10-CM

## 2018-04-24 DIAGNOSIS — E03.8 SECONDARY HYPOTHYROIDISM: ICD-10-CM

## 2018-04-24 DIAGNOSIS — R53.1 ASTHENIA: ICD-10-CM

## 2018-04-24 LAB
T3 SERPL-MCNC: 135 NG/DL
T4 FREE SERPL-MCNC: 1.07 NG/DL
TSH SERPL DL<=0.005 MIU/L-ACNC: 0.1 UIU/ML

## 2018-04-24 PROCEDURE — 86316 IMMUNOASSAY TUMOR OTHER: CPT

## 2018-04-24 PROCEDURE — 3074F SYST BP LT 130 MM HG: CPT | Mod: CPTII,S$GLB,, | Performed by: INTERNAL MEDICINE

## 2018-04-24 PROCEDURE — 84439 ASSAY OF FREE THYROXINE: CPT

## 2018-04-24 PROCEDURE — 84432 ASSAY OF THYROGLOBULIN: CPT

## 2018-04-24 PROCEDURE — 83018 HEAVY METAL QUAN EACH NES: CPT

## 2018-04-24 PROCEDURE — 82308 ASSAY OF CALCITONIN: CPT

## 2018-04-24 PROCEDURE — 83519 RIA NONANTIBODY: CPT | Mod: 59

## 2018-04-24 PROCEDURE — 84480 ASSAY TRIIODOTHYRONINE (T3): CPT

## 2018-04-24 PROCEDURE — 83519 RIA NONANTIBODY: CPT | Mod: 91

## 2018-04-24 PROCEDURE — 84443 ASSAY THYROID STIM HORMONE: CPT

## 2018-04-24 PROCEDURE — 83520 IMMUNOASSAY QUANT NOS NONAB: CPT

## 2018-04-24 PROCEDURE — 99499 UNLISTED E&M SERVICE: CPT | Mod: S$GLB,,, | Performed by: INTERNAL MEDICINE

## 2018-04-24 PROCEDURE — 99999 PR PBB SHADOW E&M-EST. PATIENT-LVL IV: CPT | Mod: PBBFAC,,, | Performed by: INTERNAL MEDICINE

## 2018-04-24 PROCEDURE — 3078F DIAST BP <80 MM HG: CPT | Mod: CPTII,S$GLB,, | Performed by: INTERNAL MEDICINE

## 2018-04-24 PROCEDURE — 84445 ASSAY OF TSI GLOBULIN: CPT

## 2018-04-24 PROCEDURE — 99214 OFFICE O/P EST MOD 30 MIN: CPT | Mod: S$GLB,,, | Performed by: INTERNAL MEDICINE

## 2018-04-24 NOTE — PROGRESS NOTES
Subjective:      Patient ID: Kathy Seymour is a 67 y.o. female.    Chief Complaint:      67 y.o.  postmenopausal lady with thyroid nodular disease and prediabetes seen in ffup today.    History of Present Illness    Patient is a 67yr old postmenopausal lady with thyroid nodular disease seen in ffup today. She in addition has essential hypertension, hyperlipidemia with hypercholesterolemia, CAD, hypovitaminosis D on vitamin D supplementation as well as grade 1 obesity and is postmenopausal.        The patients most recent thyroid sonogram was from 02/17 and showed multinodular goiter with 3 dominant thyroid nodules for which she had USS guided FNAB. Two of these nodules showed featutes consistent with benign colloid nodules while insufficient cellls were obtained from the right sided nodule to evaluate.     She was to supposed to have elective thyroidectomy but she has recently had several other medical issues arise including pulmonary related flare ups (SOB) and arthralgias in addition to worsening fatigue and intermittent abdominal cramps and hyperdefecation..     Most recent lipid profile referenced below shows patients LDL at desired goal of being under 70 on statin therapy with Zocor 20mg Daily. Her most recent DEXA from 10/17 was normal and thus her intended FFUp study should be for ~ 10/19.      Patient still continues to have fatigue especially related to exertion. She also continues to have intermittent hot flashes though she is now ~ 16yrs post menopausal ffing EAN and BSO.  Patients current [place] score is 11 and she does have established OSAS and uses a CPAP mask.  She stopped smoking about 5 yrs ago and has noticed some interval weight gain (~ 40lbs) since then.     Pt takes Metformin 500 mg BID for prediabetes. Her most recent HBA1c from 01/18 was 5.8.     No dysphagia, swelling, does endorse hoarseness  No tremors, palpations, diarrhea,   Does endorse sweating    Review of Systems  "  Constitutional: Positive for fatigue. Negative for chills and unexpected weight change.   HENT: Negative for facial swelling and trouble swallowing.    Eyes: Negative for visual disturbance.   Respiratory: Negative for cough and shortness of breath.    Cardiovascular: Negative for chest pain, palpitations and leg swelling.   Gastrointestinal: Positive for diarrhea (intermittent and associated with upper abdominal crampingd). Negative for abdominal pain, constipation, nausea and vomiting.   Endocrine: Negative for polydipsia, polyphagia and polyuria.   Genitourinary: Negative for dysuria and frequency.   Musculoskeletal: Positive for arthralgias (mainly in joints of the hands and feet bilaterally.She is s/p recent bunion surgery.). Negative for gait problem and myalgias.   Skin: Negative for color change, pallor and rash.   Neurological: Positive for numbness (Tingling in great toes). Negative for dizziness, tremors and headaches.   Hematological: Bruises/bleeds easily.   Psychiatric/Behavioral: Positive for sleep disturbance (Established OSAS on CPAP therapy.). Negative for confusion. The patient is nervous/anxious.        Objective:   /67 (BP Location: Right arm, Patient Position: Sitting, BP Method: Large (Automatic))   Pulse 86   Resp 18   Ht 5' 5" (1.651 m)   Wt 82.2 kg (181 lb 3.5 oz)   BMI 30.16 kg/m²  waist circ; 39" neck circ; 16"           Physical Exam   Constitutional: She is oriented to person, place, and time. She appears well-developed and well-nourished. No distress.   HENT:   Head: Normocephalic and atraumatic.   Eyes: Conjunctivae and EOM are normal. Pupils are equal, round, and reactive to light. No scleral icterus.   Neck: Trachea normal, normal range of motion and phonation normal. Neck supple. No JVD present. No tracheal deviation present. Thyroid mass (Left lower zone nodule ~ 2.5cm in diameter- mildly tender to palpation; firm. moves with deglutition) and thyromegaly present. "       Cardiovascular: Normal rate, regular rhythm and normal heart sounds.  Exam reveals no gallop and no friction rub.    No murmur heard.  Pulmonary/Chest: Effort normal and breath sounds normal. No respiratory distress. She has no wheezes.   Abdominal: Soft. She exhibits no distension. There is no tenderness.   Musculoskeletal: Normal range of motion. She exhibits deformity (Evidence of mild OA in both hands bilaterally.). She exhibits no edema.   Has Herbedens and Bouchards nodes in both hands.  Has healed surgical bunionectomy scars on both big toes bilaterally.     Neurological: She is alert and oriented to person, place, and time. She displays abnormal reflex. No cranial nerve deficit.   Skin: Skin is warm and dry. No rash noted. She is not diaphoretic. No erythema. No pallor.   Psychiatric: She has a normal mood and affect. Her behavior is normal. Judgment and thought content normal.   Vitals reviewed.      Lab Review:     Results for DAVON HERRERA (MRN [id number]) as of 4/24/2018 15:29   Ref. Range 10/20/2017 09:32 10/20/2017 09:47 10/24/2017 14:26 1/9/2018 09:32   WBC Latest Ref Range: 3.90 - 12.70 K/uL    6.27   RBC Latest Ref Range: 4.00 - 5.40 M/uL    4.38   Hemoglobin Latest Ref Range: 12.0 - 16.0 g/dL    12.6   Hematocrit Latest Ref Range: 37.0 - 48.5 %    39.6   MCV Latest Ref Range: 82 - 98 fL    90   MCH Latest Ref Range: 27.0 - 31.0 pg    28.8   MCHC Latest Ref Range: 32.0 - 36.0 g/dL    31.8 (L)   RDW Latest Ref Range: 11.5 - 14.5 %    13.5   Platelets Latest Ref Range: 150 - 350 K/uL    191   MPV Latest Ref Range: 9.2 - 12.9 fL    11.9   Gran% Latest Ref Range: 38.0 - 73.0 %    61.1   Gran # (ANC) Latest Ref Range: 1.8 - 7.7 K/uL    3.8   Immature Granulocytes Latest Ref Range: 0.0 - 0.5 %    0.2   Immature Grans (Abs) Latest Ref Range: 0.00 - 0.04 K/uL    0.01   Lymph% Latest Ref Range: 18.0 - 48.0 %    27.9   Lymph # Latest Ref Range: 1.0 - 4.8 K/uL    1.8   Mono% Latest Ref Range: 4.0 -  15.0 %    8.3   Mono # Latest Ref Range: 0.3 - 1.0 K/uL    0.5   Eosinophil% Latest Ref Range: 0.0 - 8.0 %    2.2   Eos # Latest Ref Range: 0.0 - 0.5 K/uL    0.1   Basophil% Latest Ref Range: 0.0 - 1.9 %    0.3   Baso # Latest Ref Range: 0.00 - 0.20 K/uL    0.02   nRBC Latest Ref Range: 0 /100 WBC    0   Sodium Latest Ref Range: 136 - 145 mmol/L    142   Potassium Latest Ref Range: 3.5 - 5.1 mmol/L    4.0   Chloride Latest Ref Range: 95 - 110 mmol/L    104   CO2 Latest Ref Range: 23 - 29 mmol/L    27   Anion Gap Latest Ref Range: 8 - 16 mmol/L    11   BUN, Bld Latest Ref Range: 8 - 23 mg/dL    17   Creatinine Latest Ref Range: 0.5 - 1.4 mg/dL    0.8   eGFR if non African American Latest Ref Range: >60 mL/min/1.73 m^2    >60.0   eGFR if African American Latest Ref Range: >60 mL/min/1.73 m^2    >60.0   Glucose Latest Ref Range: 70 - 110 mg/dL    105   Calcium Latest Ref Range: 8.7 - 10.5 mg/dL    9.7   Calcium, Ion Latest Ref Range: 1.06 - 1.42 mmol/L  1.24     Phosphorus Latest Ref Range: 2.7 - 4.5 mg/dL  2.8     Magnesium Latest Ref Range: 1.6 - 2.6 mg/dL  1.9     Alkaline Phosphatase Latest Ref Range: 55 - 135 U/L    95   Total Protein Latest Ref Range: 6.0 - 8.4 g/dL    7.4   Albumin Latest Ref Range: 3.5 - 5.2 g/dL    3.6   Uric Acid Latest Ref Range: 2.4 - 5.7 mg/dL  6.4 (H)     Total Bilirubin Latest Ref Range: 0.1 - 1.0 mg/dL    0.6   AST Latest Ref Range: 10 - 40 U/L    26   ALT Latest Ref Range: 10 - 44 U/L    22   Triglycerides Latest Ref Range: 30 - 150 mg/dL    78   Cholesterol Latest Ref Range: 120 - 199 mg/dL    138   HDL Latest Ref Range: 40 - 75 mg/dL    54   LDL Cholesterol Latest Ref Range: 63.0 - 159.0 mg/dL    68.4   Total Cholesterol/HDL Ratio Latest Ref Range: 2.0 - 5.0     2.6   Vit D, 25-Hydroxy Latest Ref Range: 30 - 96 ng/mL  39     Hemoglobin A1C Latest Ref Range: 4.0 - 5.6 %  5.8 (H)  5.8 (H)   Estimated Avg Glucose Latest Ref Range: 68 - 131 mg/dL  120  120   TSH Latest Ref Range: 0.400 -  4.000 uIU/mL  0.070 (L)     T3, Total Latest Ref Range: 60 - 180 ng/dL  132     Free T4 Latest Ref Range: 0.71 - 1.51 ng/dL  0.90     Thyroglobulin Interpretation Unknown  SEE BELOW     Thyroglobulin Antibody Screen Latest Ref Range: <4.0 IU/mL  <1.8     Thyroglobulin, Tumor Marker Latest Units: ng/mL  38 (H)     PTH Latest Ref Range: 9.0 - 77.0 pg/mL  45.0     Chromogranin A Latest Ref Range: 0 - 95 ng/mL  246 (H)     Specimen UA Unknown Urine, Clean Catch      Color, UA Latest Ref Range: Yellow, Straw, Jessica  Yellow      pH, UA Latest Ref Range: 5.0 - 8.0  5.0      Specific Gravity, UA Latest Ref Range: 1.005 - 1.030  1.020      Appearance, UA Latest Ref Range: Clear  Hazy (A)      Protein, UA Latest Ref Range: Negative  Negative      Glucose, UA Latest Ref Range: Negative  Negative      Ketones, UA Latest Ref Range: Negative  Negative      Occult Blood UA Latest Ref Range: Negative  2+ (A)      Nitrite, UA Latest Ref Range: Negative  Positive (A)      Urobilinogen, UA Latest Ref Range: <2.0 EU/dL Negative      Bilirubin (UA) Latest Ref Range: Negative  Negative      Leukocytes, UA Latest Ref Range: Negative  1+ (A)      RBC, UA Latest Ref Range: 0 - 4 /hpf 1      WBC, UA Latest Ref Range: 0 - 5 /hpf 20 (H)      Bacteria, UA Latest Ref Range: None-Occ /hpf Many (A)      Squam Epithel, UA Latest Units: /hpf 8      Hyaline Casts, UA Latest Ref Range: 0-1/lpf /lpf 1      Microscopic Comment Unknown SEE COMMENT      Microalbum.,U,Random Latest Units: ug/mL 18.0      Microalb Creat Ratio Latest Ref Range: 0.0 - 30.0 ug/mg 13.4      DEXA BONE DENSITY SPINE HIP Unknown   Rpt    Creatinine, Random Ur Latest Ref Range: 15.0 - 325.0 mg/dL 134.0      Differential Method Unknown    Automated   HDL/Chol Ratio Latest Ref Range: 20.0 - 50.0 %    39.1   Non-HDL Cholesterol Latest Units: mg/dL    84       Assessment:       1. Subclinical hyperthyroidism     2. Secondary hypothyroidism     3. Prediabetes     4. Nodular thyroid  disease  T4, free    T3    TSH    Thyroglobulin    Calcitonin    Iodine, Serum    Chromogranin A    US Soft Tissue Head Neck Thyroid   5. Dysmetabolic syndrome     6. RAMONA on CPAP, 100% use     7. Pure hypercholesterolemia     8. Essential hypertension     9. Anxiety              Subclinical Hyperthyroidism  In view of her present symptoms of fatigue and insomnia will consider restarting low dose methimazole +/- low dose bet blockade if her repeat TFTs continue to show suppressed TSH.     Thyroid nodular disease;      Patient wants to have elective thyroidectomy to treat the thyroid nodular disease and will decide on where to have thyroidectomy when she has stabilization of other medical problems. She feels that she would prefer to have the elective thyroidectomy done here @ the [facility] and will take a time to decide between on a preferred surgeon and will then inform us in that regard..     Postmenopausal   Obtain DEXA     Prediabetes/dysmetabolic syndrome  Continue Metformin 500 mg BID      Hyperlipidemia  Chronic-stable-monitor   F/u with PCP.     Hypertension  Chronic-stable-continue meds    Plan:       FFup in ~ 6mths

## 2018-04-25 DIAGNOSIS — E05.90 HYPERTHYROIDISM: Primary | ICD-10-CM

## 2018-04-25 RX ORDER — METHIMAZOLE 5 MG/1
5 TABLET ORAL 2 TIMES DAILY
Qty: 180 TABLET | Refills: 3 | Status: SHIPPED | OUTPATIENT
Start: 2018-04-25 | End: 2019-03-27 | Stop reason: SDUPTHER

## 2018-04-26 ENCOUNTER — PATIENT MESSAGE (OUTPATIENT)
Dept: ENDOCRINOLOGY | Facility: CLINIC | Age: 68
End: 2018-04-26

## 2018-04-26 LAB
CALCIT SERPL-MCNC: <5 PG/ML
CALCIT SERPL-MCNC: <5 PG/ML
IODINE SERPL-MCNC: 80 NG/ML (ref 40–92)
THRYOGLOBULIN INTERPRETATION: ABNORMAL
THYROGLOB AB SERPL-ACNC: <1.8 IU/ML
THYROGLOB SERPL-MCNC: 44 NG/ML
TSH RECEP AB SER-ACNC: <1 IU/L
TSI SER-ACNC: <0.1 IU/L

## 2018-04-27 LAB
CGA SERPL-MCNC: 116 NG/ML (ref 0–95)
CGA SERPL-MCNC: 116 NG/ML (ref 0–95)

## 2018-04-30 ENCOUNTER — HOSPITAL ENCOUNTER (OUTPATIENT)
Dept: RADIOLOGY | Facility: CLINIC | Age: 68
Discharge: HOME | End: 2018-04-30
Attending: INTERNAL MEDICINE
Payer: MEDICARE

## 2018-04-30 DIAGNOSIS — E04.1 NODULAR THYROID DISEASE: ICD-10-CM

## 2018-04-30 PROCEDURE — 76536 US EXAM OF HEAD AND NECK: CPT | Mod: 26,,, | Performed by: RADIOLOGY

## 2018-04-30 PROCEDURE — 76536 US EXAM OF HEAD AND NECK: CPT | Mod: TC,PO

## 2018-05-04 LAB — TSH BII SER-ACNC: 3 %INHIBITION

## 2018-05-31 ENCOUNTER — OFFICE VISIT (OUTPATIENT)
Dept: CARDIOLOGY | Facility: CLINIC | Age: 68
End: 2018-05-31
Payer: MEDICARE

## 2018-05-31 VITALS
BODY MASS INDEX: 30.04 KG/M2 | WEIGHT: 180.31 LBS | HEIGHT: 65 IN | SYSTOLIC BLOOD PRESSURE: 125 MMHG | DIASTOLIC BLOOD PRESSURE: 56 MMHG | OXYGEN SATURATION: 96 % | HEART RATE: 95 BPM | RESPIRATION RATE: 12 BRPM

## 2018-05-31 DIAGNOSIS — M54.31 BILATERAL SCIATICA: ICD-10-CM

## 2018-05-31 DIAGNOSIS — I10 HYPERTENSION, UNSPECIFIED TYPE: Primary | ICD-10-CM

## 2018-05-31 DIAGNOSIS — R06.09 DOE (DYSPNEA ON EXERTION): Primary | ICD-10-CM

## 2018-05-31 DIAGNOSIS — E78.00 PURE HYPERCHOLESTEROLEMIA: Chronic | ICD-10-CM

## 2018-05-31 DIAGNOSIS — G47.33 OSA ON CPAP: ICD-10-CM

## 2018-05-31 DIAGNOSIS — R41.3 MEMORY DIFFICULTIES: ICD-10-CM

## 2018-05-31 DIAGNOSIS — E65 ABDOMINAL OBESITY: ICD-10-CM

## 2018-05-31 DIAGNOSIS — G47.19 EXCESSIVE DAYTIME SLEEPINESS: ICD-10-CM

## 2018-05-31 DIAGNOSIS — I10 HYPERTENSION, UNSPECIFIED TYPE: ICD-10-CM

## 2018-05-31 DIAGNOSIS — Z95.5 S/P CORONARY ARTERY STENT PLACEMENT: Chronic | ICD-10-CM

## 2018-05-31 DIAGNOSIS — Z91.89 SEDENTARY LIFESTYLE: ICD-10-CM

## 2018-05-31 DIAGNOSIS — E66.9 OBESITY (BMI 30.0-34.9): ICD-10-CM

## 2018-05-31 DIAGNOSIS — M54.32 BILATERAL SCIATICA: ICD-10-CM

## 2018-05-31 PROCEDURE — 99999 PR PBB SHADOW E&M-EST. PATIENT-LVL III: CPT | Mod: PBBFAC,,, | Performed by: INTERNAL MEDICINE

## 2018-05-31 PROCEDURE — 99215 OFFICE O/P EST HI 40 MIN: CPT | Mod: S$GLB,,, | Performed by: INTERNAL MEDICINE

## 2018-05-31 PROCEDURE — 93000 ELECTROCARDIOGRAM COMPLETE: CPT | Mod: S$GLB,,, | Performed by: INTERNAL MEDICINE

## 2018-05-31 PROCEDURE — 99499 UNLISTED E&M SERVICE: CPT | Mod: S$PBB,,, | Performed by: INTERNAL MEDICINE

## 2018-05-31 PROCEDURE — 3074F SYST BP LT 130 MM HG: CPT | Mod: CPTII,S$GLB,, | Performed by: INTERNAL MEDICINE

## 2018-05-31 PROCEDURE — 3078F DIAST BP <80 MM HG: CPT | Mod: CPTII,S$GLB,, | Performed by: INTERNAL MEDICINE

## 2018-05-31 RX ORDER — NAPROXEN SODIUM 220 MG/1
81 TABLET, FILM COATED ORAL DAILY
Qty: 50 TABLET | Refills: 3 | Status: SHIPPED | OUTPATIENT
Start: 2018-05-31

## 2018-05-31 NOTE — PROGRESS NOTES
Subjective:    Patient ID:  Kathy Seymour is a 67 y.o. female who presents for evaluation of No chief complaint on file.  For CAD post LETTY in 2012 and 9/2013, HTN, dyslipidemia, RAMONA on CPAP, fatigue  PCP: Dr. Dawn Jr, see biannually  Cardiologist: Dr. Williamson, last seen 6/2015  Orthopedic: Dr. Bear, now Dr. Desouza  GI:  Vladimir  Pulmonary: Dr. Jordan  Lives with , Rafita, non-smoker  Part-time  for [employer] 23 hours week, enjoys swimming, 2 granddaughters, Stacey 15 yo, Kendal 22 yo      WF here for annual review of post LETTY. Had review by Dr. Grant in 3/14 for pre-op evaluation. Underwent bunion correction without complication. Denies any CP nor SOB. Previously active until operation in 1/2016. ECG is normal.     Last [facility] 9/2013 - HEMODYNAMIC RESULTS:    LVEDP (Pre): 16 mmHg  LVEDP (Post): 16 mmHg  Ejection Fraction: 60%    C. ANGIOGRAPHIC RESULTS:    DIAGNOSTIC:       Patient has a right dominant coronary artery.        - Left Main Coronary Artery:             The ostial LM has luminal irregularities. There is MARCUS 3 flow.       - Left Anterior Descending Artery:             The proximal LAD has a 99% stenosis. There is MARCUS 3 flow. The remaining portion of the vessel has luminal irregularities.       - Left Circumflex Artery:             The LCX is normal. There is MARCUS 3 flow.       - Right Coronary Artery:             The RCA has luminal irregularities. There is MARCUS 3 flow.    INTERVENTION:         Proximal LAD:              The lesion was successfully intervened. Post-stenosis of 0% and post-MARCUS 3 flow. The vessel was accessed natively.  The following items were used: Stent Xience 3.0x23 (LETTY).    D. SUMMARY:    1. Successful PCI/LETTY of the proximal LAD    E. RECOMMENDATIONS:    1. Routine post PCI care.  2. Routine post-cath care.  3. Maximize medical management.  4. Continue medical management.  5. Risk factor reductions.  6. ASA 81mg.    Previous PCI 9/2012 -  INTERVENTION:         Proximal LAD:              The lesion was successfully intervened. Post-stenosis of 0% and post-MARCUS 3 flow. The following items were used: 3.5X15 Quantum Balloon and Stent Promus Eelent  2.75x20 (LETTY).       Mid LAD:              The lesion was successfully intervened. Post-stenosis of 0% and post-MARCUS 3 flow. The following items were used: Stent Pro Element  2.50x16 (LETTY).    D. SUMMARY:    1. Single vessel coronary artery disease.  2. Normal LVEF.    In 5/2017, post bunion surgery, no complication, noted some chronic fatigue over the past year, uses CPAP 100% and still some some morning fatigue, last sleep study years ago. No CP or SOB. Home /78. Compliant with medications, diet is a problem, working at [employer]. LDL in 2/2017 86, in 5/2016 was 66, baseline 134, goal is <67. Note some blood in stool for colonoscopy with Dr. Gilbert, request clearance. ECG NSR, rate 71, PRWP.     DSE 5/2016 - EKG Conclusions:    1. The EKG portion of this study is negative for ischemia at a peak heart rate of 139 bpm (95% of predicted).   2. Blood pressure remained stable throughout the protocol  (Presenting BP: 117/63 Peak BP: 160/61).   3. The following arrhythmias were present: couplets.   4. There were no symptoms of chest discomfort or significant dyspnea throughout the protocol.     ECHO CONCLUSIONS     1 - Concentric hypertrophy.     2 - Normal left ventricular systolic function (EF 60-65%).     3 - Mild to moderate mitral regurgitation.     4 - Normal left ventricular diastolic function.     5 - Normal right ventricular systolic function .     6 - The estimated PA systolic pressure is 26 mmHg.     7 - Increase evidence of MR and no ischemic response noted when compare to Echo in 8/2012.     No evidence of stress induced myocardial ischemia.     HPI comments: in 5/2018, concern of MILLER, progressive over this past year, associated with sweating. No CP. Compliant with medications. No regular  exercise, sedentary with computer work. Willing to start the gym. ECG in NSR, rate 86, PRWP. LDL in 1/2018 68.4, gaol < 67.      Review of Systems   Constitution: Positive for diaphoresis, fever, malaise/fatigue, night sweats and weight loss (9 lbs since 5/2017). Negative for weakness and weight gain.   HENT: Positive for nosebleeds. Negative for tinnitus.    Eyes: Negative for visual disturbance.   Cardiovascular: Positive for dyspnea on exertion (don't feel limited). Negative for chest pain, claudication, cyanosis, irregular heartbeat, leg swelling, near-syncope, orthopnea, palpitations and paroxysmal nocturnal dyspnea.   Respiratory: Positive for shortness of breath and sleep disturbances due to breathing. Negative for cough, snoring and wheezing.    Endocrine: Positive for heat intolerance and polyuria. Negative for polydipsia.   Hematologic/Lymphatic: Positive for bleeding problem. Bruises/bleeds easily.   Skin: Negative for color change, flushing, nail changes, poor wound healing and suspicious lesions.   Musculoskeletal: Positive for arthritis, back pain, joint pain, muscle cramps and muscle weakness. Negative for falls, gout, joint swelling and myalgias.   Gastrointestinal: Positive for diarrhea, heartburn and nausea. Negative for hematemesis, hematochezia and melena.   Genitourinary: Positive for frequency, nocturia (1-2) and urgency.   Neurological: Positive for difficulty with concentration, excessive daytime sleepiness and paresthesias. Negative for disturbances in coordination, dizziness, focal weakness, headaches, light-headedness, loss of balance, numbness and vertigo.   Psychiatric/Behavioral: Positive for memory loss. Negative for depression and substance abuse. The patient has insomnia and is nervous/anxious.      [place] score 2 now 6       Objective:    Physical Exam   Constitutional: She is oriented to person, place, and time. She appears well-developed and well-nourished.   HENT:   Head:  "Normocephalic.   Eyes: Conjunctivae and EOM are normal. Pupils are equal, round, and reactive to light.   Neck: Normal range of motion. Neck supple. No JVD present. No thyromegaly present.   Circumference 17 reduced to 15"   Cardiovascular: Normal rate, regular rhythm, normal heart sounds and intact distal pulses.  Exam reveals no gallop and no friction rub.    No murmur heard.  Pulmonary/Chest: Effort normal and breath sounds normal. She has no rales. She exhibits no tenderness.   Abdominal: Soft. Bowel sounds are normal. There is no tenderness.   Waist 39.5" down  To 36", hip 45"   Musculoskeletal: Normal range of motion. She exhibits no edema.   Lymphadenopathy:     She has no cervical adenopathy.   Neurological: She is alert and oriented to person, place, and time.   Skin: Skin is warm and dry. No rash noted.         Assessment:       1. MILLER (dyspnea on exertion)    2. Hypertension, unspecified type    3. Memory difficulties    4. RAMONA on CPAP, 100% use    5. Excessive daytime sleepiness    6. S/P coronary artery stent placement, 2 LETTY 9/2012, 1 LETTY 9/2013    7. Pure hypercholesterolemia    8. Obesity (BMI 30.0-34.9), today 30    9. Sedentary lifestyle    10. Bilateral sciatica, R>L, onset 1/2018    11. Abdominal obesity         Plan:       Kathy was seen today for follow-up.    Diagnoses and all orders for this visit:    MILLER (dyspnea on exertion)  -     Ambulatory consult to Pulmonology  -     Echocardiogram stress test; Future    Hypertension, unspecified type  -     EKG 12-lead    Memory difficulties    RAMONA on CPAP, 100% use  -     Ambulatory consult to Pulmonology    Excessive daytime sleepiness  -     Ambulatory consult to Pulmonology    S/P coronary artery stent placement, 2 LETTY 9/2012, 1 LETTY 9/2013  -     Echocardiogram stress test; Future  -     aspirin 81 MG Chew; Take 1 tablet (81 mg total) by mouth once daily.  Dispense: 50 tablet; Refill: 3    Pure hypercholesterolemia    Obesity (BMI 30.0-34.9), " today 30    Sedentary lifestyle    Bilateral sciatica, R>L, onset 1/2018    Abdominal obesity      Thyroid nodule  - All medical issues reviewed, continue current Rx.  - CV status stable, continue current Rx except for change off Plavix, all medications reviewed, patient acknowledge good understanding.  - Instruction for Mediterranean diet and heart healthy exercise given.  - Highly recommend 30 minutes of exercise daily, can have Sunday off, with 2-3 sessions of muscle strengthening weekly. A  would be very helpful.  - Weigh twice weekly, try to lose 1-2 lbs per week  - Check home blood pressure, 2 days weekly, do 2 readings within 5 minutes in AM and PM, keep log for review.  - Recommend at least annual cardiovascular evaluation in view of her significant risk factors.  - Phone review / encourage use of [facility]      Patient Active Problem List   Diagnosis    Anxiety    S/P coronary artery stent placement, 2 LETTY 9/2012, 1 LETTY 9/2013    Varicose veins of lower extremities with other complications    Hyperlipidemia, baseline     Lumbar spondylosis    Carpal tunnel syndrome    Hypertension, onset before 1995    Abdominal obesity    RAMONA on CPAP, 100% use    Nodular thyroid disease    Prediabetes    Urinary incontinence    Obesity (BMI 30.0-34.9), today 30    Dysmetabolic syndrome    Subclinical hyperthyroidism    MILLER (dyspnea on exertion)    COPD mixed type    Memory difficulties    Excessive daytime sleepiness    Sedentary lifestyle    Bilateral sciatica, R>L, onset 1/2018     Total face-to-face time with the patient was 40 minutes and greater than 50% was spent in counseling and coordination of care. The above assessment and plan have been discussed at length. Labs and procedure over the last 6 months reviewed. Problem List updated. Asked to bring in all active medications / pills bottles with next visit.

## 2018-06-04 LAB — PANCREASTATIN SERPL-MCNC: 103 PG/ML

## 2018-06-18 ENCOUNTER — CLINICAL SUPPORT (OUTPATIENT)
Dept: CARDIOLOGY | Facility: CLINIC | Age: 68
End: 2018-06-18
Attending: INTERNAL MEDICINE
Payer: MEDICARE

## 2018-06-18 VITALS — BODY MASS INDEX: 30.04 KG/M2 | HEIGHT: 65 IN | WEIGHT: 180.31 LBS

## 2018-06-18 DIAGNOSIS — Z95.5 S/P CORONARY ARTERY STENT PLACEMENT: Chronic | ICD-10-CM

## 2018-06-18 DIAGNOSIS — R06.09 DOE (DYSPNEA ON EXERTION): ICD-10-CM

## 2018-06-18 PROCEDURE — 99999 PR PBB SHADOW E&M-EST. PATIENT-LVL I: CPT | Mod: PBBFAC,,,

## 2018-06-18 PROCEDURE — 93351 STRESS TTE COMPLETE: CPT | Mod: S$GLB,,, | Performed by: INTERNAL MEDICINE

## 2018-06-20 ENCOUNTER — PATIENT MESSAGE (OUTPATIENT)
Dept: CARDIOLOGY | Facility: CLINIC | Age: 68
End: 2018-06-20

## 2018-06-20 LAB
ASCENDING AORTA: 1.96 CM
AV MEAN GRADIENT: 2.87 MMHG
AV PEAK GRADIENT: 5.06 MMHG
AV VALVE AREA: 2.94 CM2
BSA FOR ECHO PROCEDURE: 1.94 M2
CV ECHO LV RWT: 0.5 CM
CV STRESS BASE HR: 74
CVPEAKDIABP: 76
CVPEAKSYSBP: 198
CVRESTDIABP: 65
CVRESTSYSBP: 139
DOP CALC AO PEAK VEL: 1.13 M/S
DOP CALC AO VTI: 27.87 CM
DOP CALC LVOT AREA: 3.14 CM2
DOP CALC LVOT DIAMETER: 2 CM
DOP CALC LVOT STROKE VOLUME: 82.05 CM3
DOP CALCLVOT PEAK VEL VTI: 26.13 CM
E WAVE DECELERATION TIME: 194.86 MSEC
E/A RATIO: 0.81
E/E' RATIO: 13.45
ECHO LV POSTERIOR WALL: 1.01 CM (ref 0.6–1.1)
FRACTIONAL SHORTENING: 30 % (ref 28–44)
INTERVENTRICULAR SEPTUM: 1.07 CM (ref 0.6–1.1)
IVRT: 0.12 MSEC
LA MAJOR: 4.24 CM
LA MINOR: 4.51 CM
LA WIDTH: 2.73 CM
LEFT ATRIUM SIZE: 3.42 CM
LEFT ATRIUM VOLUME INDEX: 17.9 ML/M2
LEFT ATRIUM VOLUME: 34.69 CM3
LEFT INTERNAL DIMENSION IN SYSTOLE: 2.93 CM (ref 2.1–4)
LEFT VENTRICLE MASS INDEX: 74.2 G/M2
LEFT VENTRICULAR INTERNAL DIMENSION IN DIASTOLE: 4.18 CM (ref 3.5–6)
LEFT VENTRICULAR MASS: 143.94 G
LV LATERAL E/E' RATIO: 10.57
LV SEPTAL E/E' RATIO: 18.5
MV PEAK A VEL: 0.91 M/S
MV PEAK E VEL: 0.74 M/S
MV STENOSIS PRESSURE HALF TIME: 56.51 MS
MV VALVE AREA P 1/2 METHOD: 3.89 CM2
PISA TR MAX VEL: 2.71 M/S
PULM VEIN S/D RATIO: 1.2
PV PEAK D VEL: 0.44 M/S
PV PEAK S VEL: 0.53 M/S
RA MAJOR: 3.6 CM
RA WIDTH: 1.96 CM
RIGHT VENTRICULAR END-DIASTOLIC DIMENSION: 2.12 CM
SINUS: 2 CM
STJ: 1.78 CM
STRESS ECHO POST ESTIMATED WORKLOAD: 8 METS
STRESS ECHO POST EXERCISE DUR MIN: 5 MIN
STRESS ECHO POST EXERCISE DUR SEC: 0
TDI LATERAL: 0.07
TDI SEPTAL: 0.04
TDI: 0.06
TR MAX PG: 29.38 MMHG
TRICUSPID ANNULAR PLANE SYSTOLIC EXCURSION: 0.02 CM

## 2018-07-26 ENCOUNTER — PATIENT MESSAGE (OUTPATIENT)
Dept: FAMILY MEDICINE | Facility: CLINIC | Age: 68
End: 2018-07-26

## 2018-07-27 ENCOUNTER — DOCUMENTATION ONLY (OUTPATIENT)
Dept: FAMILY MEDICINE | Facility: CLINIC | Age: 68
End: 2018-07-27

## 2018-07-27 NOTE — PROGRESS NOTES
Pre-Visit Chart Review  For Appointment Scheduled on 07/30/2018    Health Maintenance Due   Topic Date Due    Mammogram  08/14/2017

## 2018-07-30 ENCOUNTER — HOSPITAL ENCOUNTER (OUTPATIENT)
Dept: RADIOLOGY | Facility: CLINIC | Age: 68
Discharge: HOME | End: 2018-07-30
Attending: PHYSICIAN ASSISTANT
Payer: MEDICARE

## 2018-07-30 ENCOUNTER — OFFICE VISIT (OUTPATIENT)
Dept: FAMILY MEDICINE | Facility: CLINIC | Age: 68
End: 2018-07-30
Payer: MEDICARE

## 2018-07-30 VITALS
TEMPERATURE: 98 F | HEART RATE: 78 BPM | SYSTOLIC BLOOD PRESSURE: 132 MMHG | HEIGHT: 65 IN | WEIGHT: 184.06 LBS | BODY MASS INDEX: 30.67 KG/M2 | DIASTOLIC BLOOD PRESSURE: 72 MMHG

## 2018-07-30 DIAGNOSIS — Z12.39 BREAST CANCER SCREENING: ICD-10-CM

## 2018-07-30 DIAGNOSIS — M79.605 PAIN IN BOTH LOWER EXTREMITIES: ICD-10-CM

## 2018-07-30 DIAGNOSIS — M79.604 PAIN IN BOTH LOWER EXTREMITIES: ICD-10-CM

## 2018-07-30 DIAGNOSIS — F41.9 ANXIETY: Primary | ICD-10-CM

## 2018-07-30 PROCEDURE — 77063 BREAST TOMOSYNTHESIS BI: CPT | Mod: TC,PO

## 2018-07-30 PROCEDURE — 99213 OFFICE O/P EST LOW 20 MIN: CPT | Mod: S$GLB,,, | Performed by: PHYSICIAN ASSISTANT

## 2018-07-30 PROCEDURE — 77067 SCR MAMMO BI INCL CAD: CPT | Mod: 26,,, | Performed by: RADIOLOGY

## 2018-07-30 PROCEDURE — 77063 BREAST TOMOSYNTHESIS BI: CPT | Mod: 26,,, | Performed by: RADIOLOGY

## 2018-07-30 PROCEDURE — 99999 PR PBB SHADOW E&M-EST. PATIENT-LVL III: CPT | Mod: PBBFAC,,, | Performed by: PHYSICIAN ASSISTANT

## 2018-07-30 PROCEDURE — 3078F DIAST BP <80 MM HG: CPT | Mod: CPTII,S$GLB,, | Performed by: PHYSICIAN ASSISTANT

## 2018-07-30 PROCEDURE — 3075F SYST BP GE 130 - 139MM HG: CPT | Mod: CPTII,S$GLB,, | Performed by: PHYSICIAN ASSISTANT

## 2018-07-30 RX ORDER — ALPRAZOLAM 0.5 MG/1
0.5 TABLET ORAL NIGHTLY PRN
Qty: 60 TABLET | Refills: 2 | Status: SHIPPED | OUTPATIENT
Start: 2018-07-30 | End: 2019-03-12 | Stop reason: SDUPTHER

## 2018-07-30 RX ORDER — NAPROXEN 500 MG/1
500 TABLET ORAL 2 TIMES DAILY WITH MEALS
Qty: 60 TABLET | Refills: 1 | Status: SHIPPED | OUTPATIENT
Start: 2018-07-30 | End: 2019-09-05 | Stop reason: SDUPTHER

## 2018-07-30 NOTE — PROGRESS NOTES
Subjective:       Patient ID: Kathy Seymour is a 67 y.o. female.    Chief Complaint: Hypertension and Anxiety    Hypertension   This is a chronic problem. The current episode started more than 1 year ago. The problem is controlled. Associated symptoms include anxiety. Pertinent negatives include no blurred vision, chest pain, headaches, malaise/fatigue, neck pain, orthopnea, palpitations, peripheral edema, PND, shortness of breath or sweats. There are no associated agents to hypertension. Past treatments include alpha 1 blockers. The current treatment provides significant improvement. There are no compliance problems.    Anxiety   Presents for follow-up visit. Symptoms include insomnia, irritability and nervous/anxious behavior. Patient reports no chest pain, compulsions, confusion, decreased concentration, depressed mood, dizziness, excessive worry, feeling of choking, impotence, malaise, muscle tension, nausea, obsessions, palpitations, panic, restlessness, shortness of breath or suicidal ideas. The severity of symptoms is moderate. The quality of sleep is poor. Nighttime awakenings: several.     Compliance with medications is %.     Review of Systems   Constitutional: Positive for irritability. Negative for activity change, appetite change, malaise/fatigue and unexpected weight change.   HENT: Positive for rhinorrhea. Negative for hearing loss, nosebleeds and trouble swallowing.    Eyes: Negative for blurred vision, pain, discharge and visual disturbance.   Respiratory: Negative for chest tightness, shortness of breath and wheezing.    Cardiovascular: Negative for chest pain, palpitations, orthopnea, leg swelling and PND.   Gastrointestinal: Negative for blood in stool, constipation, diarrhea, nausea and vomiting.   Endocrine: Negative for polydipsia and polyuria.   Genitourinary: Negative for difficulty urinating, dysuria, frequency, hematuria, impotence and menstrual problem.   Musculoskeletal:  Positive for arthralgias and joint swelling. Negative for back pain, gait problem and neck pain.   Neurological: Positive for weakness. Negative for dizziness, tremors, light-headedness, numbness and headaches.   Psychiatric/Behavioral: Positive for sleep disturbance. Negative for agitation, behavioral problems, confusion, decreased concentration, dysphoric mood, hallucinations, self-injury and suicidal ideas. The patient is nervous/anxious and has insomnia. The patient is not hyperactive.        Objective:      Physical Exam   Constitutional: She is oriented to person, place, and time. She appears well-developed and well-nourished.   HENT:   Head: Normocephalic and atraumatic.   Eyes: Conjunctivae are normal. Pupils are equal, round, and reactive to light.   Neck: Normal range of motion. Neck supple. No JVD present.   Cardiovascular: Normal rate and regular rhythm.  Exam reveals no gallop and no friction rub.    No murmur heard.  Pulmonary/Chest: Effort normal and breath sounds normal. No respiratory distress. She has no wheezes. She has no rales.   Neurological: She is alert and oriented to person, place, and time.   Skin: Skin is warm and dry.   Psychiatric: She has a normal mood and affect. Her behavior is normal. Judgment and thought content normal.       Assessment:       1. Anxiety    2. Breast cancer screening    3. Pain in both lower extremities        Plan:       Kathy was seen today for hypertension and anxiety.    Diagnoses and all orders for this visit:    Anxiety  -     ALPRAZolam (XANAX) 0.5 MG tablet; Take 1 tablet (0.5 mg total) by mouth nightly as needed.    Breast cancer screening  -     Mammo Digital Screening Bilateral With CAD; Future    Pain in both lower extremities  -     naproxen (NAPROSYN) 500 MG tablet; Take 1 tablet (500 mg total) by mouth 2 (two) times daily with meals.

## 2018-08-28 RX ORDER — OMEPRAZOLE 20 MG/1
20 CAPSULE, DELAYED RELEASE ORAL DAILY
Qty: 90 CAPSULE | Refills: 3 | Status: SHIPPED | OUTPATIENT
Start: 2018-08-28 | End: 2019-09-05 | Stop reason: SDUPTHER

## 2018-09-13 RX ORDER — ATORVASTATIN CALCIUM 40 MG/1
40 TABLET, FILM COATED ORAL DAILY
Qty: 90 TABLET | Refills: 2 | Status: SHIPPED | OUTPATIENT
Start: 2018-09-13 | End: 2019-05-28 | Stop reason: SDUPTHER

## 2018-09-24 ENCOUNTER — OFFICE VISIT (OUTPATIENT)
Dept: PULMONOLOGY | Facility: CLINIC | Age: 68
End: 2018-09-24
Payer: MEDICARE

## 2018-09-24 VITALS
WEIGHT: 189.13 LBS | BODY MASS INDEX: 31.51 KG/M2 | OXYGEN SATURATION: 95 % | HEART RATE: 80 BPM | SYSTOLIC BLOOD PRESSURE: 122 MMHG | HEIGHT: 65 IN | DIASTOLIC BLOOD PRESSURE: 79 MMHG

## 2018-09-24 DIAGNOSIS — J44.9 COPD MIXED TYPE: Primary | ICD-10-CM

## 2018-09-24 DIAGNOSIS — R06.09 DOE (DYSPNEA ON EXERTION): ICD-10-CM

## 2018-09-24 DIAGNOSIS — G47.33 OSA ON CPAP: ICD-10-CM

## 2018-09-24 PROCEDURE — 3078F DIAST BP <80 MM HG: CPT | Mod: CPTII,,, | Performed by: NURSE PRACTITIONER

## 2018-09-24 PROCEDURE — 99214 OFFICE O/P EST MOD 30 MIN: CPT | Mod: S$PBB,,, | Performed by: NURSE PRACTITIONER

## 2018-09-24 PROCEDURE — 3074F SYST BP LT 130 MM HG: CPT | Mod: CPTII,,, | Performed by: NURSE PRACTITIONER

## 2018-09-24 PROCEDURE — 99213 OFFICE O/P EST LOW 20 MIN: CPT | Mod: PBBFAC,PO | Performed by: NURSE PRACTITIONER

## 2018-09-24 PROCEDURE — 99999 PR PBB SHADOW E&M-EST. PATIENT-LVL III: CPT | Mod: PBBFAC,,, | Performed by: NURSE PRACTITIONER

## 2018-09-24 PROCEDURE — 1101F PT FALLS ASSESS-DOCD LE1/YR: CPT | Mod: CPTII,,, | Performed by: NURSE PRACTITIONER

## 2018-09-24 NOTE — PATIENT INSTRUCTIONS
Sleep apnea:  Reorder automatic titration machine, contact clinic if insurance denies again    Order full nose mask    COPD  Anoro daily; if no decrease in shortness of breath ok to discontinue and re-evaluate in three months.    Exercise and weight loss should help

## 2018-09-25 ENCOUNTER — PATIENT MESSAGE (OUTPATIENT)
Dept: PULMONOLOGY | Facility: CLINIC | Age: 68
End: 2018-09-25

## 2018-09-25 NOTE — TELEPHONE ENCOUNTER
Responded to pts mychart email asking how we could help her. Waiting for pt reply. See [facility] response.

## 2018-10-08 ENCOUNTER — PATIENT MESSAGE (OUTPATIENT)
Dept: PULMONOLOGY | Facility: CLINIC | Age: 68
End: 2018-10-08

## 2018-10-15 ENCOUNTER — PATIENT MESSAGE (OUTPATIENT)
Dept: FAMILY MEDICINE | Facility: CLINIC | Age: 68
End: 2018-10-15

## 2018-12-10 ENCOUNTER — PATIENT MESSAGE (OUTPATIENT)
Dept: FAMILY MEDICINE | Facility: CLINIC | Age: 68
End: 2018-12-10

## 2018-12-10 ENCOUNTER — LAB VISIT (OUTPATIENT)
Dept: LAB | Facility: HOSPITAL | Age: 68
End: 2018-12-10
Attending: PHYSICIAN ASSISTANT
Payer: MEDICARE

## 2018-12-10 DIAGNOSIS — N39.0 URINARY TRACT INFECTION WITHOUT HEMATURIA, SITE UNSPECIFIED: Primary | ICD-10-CM

## 2018-12-10 DIAGNOSIS — N39.0 URINARY TRACT INFECTION WITHOUT HEMATURIA, SITE UNSPECIFIED: ICD-10-CM

## 2018-12-10 PROCEDURE — 87086 URINE CULTURE/COLONY COUNT: CPT | Mod: HCWC

## 2018-12-10 PROCEDURE — 87088 URINE BACTERIA CULTURE: CPT | Mod: HCWC

## 2018-12-10 PROCEDURE — 87186 SC STD MICRODIL/AGAR DIL: CPT | Mod: HCWC

## 2018-12-10 PROCEDURE — 87077 CULTURE AEROBIC IDENTIFY: CPT | Mod: 59,HCWC

## 2018-12-10 RX ORDER — CIPROFLOXACIN 500 MG/1
500 TABLET ORAL 2 TIMES DAILY
Qty: 10 TABLET | Refills: 0 | Status: SHIPPED | OUTPATIENT
Start: 2018-12-10 | End: 2018-12-15

## 2018-12-12 ENCOUNTER — PATIENT MESSAGE (OUTPATIENT)
Dept: FAMILY MEDICINE | Facility: CLINIC | Age: 68
End: 2018-12-12

## 2018-12-13 ENCOUNTER — TELEPHONE (OUTPATIENT)
Dept: FAMILY MEDICINE | Facility: CLINIC | Age: 68
End: 2018-12-13

## 2018-12-13 LAB
BACTERIA UR CULT: NORMAL
BACTERIA UR CULT: NORMAL

## 2018-12-13 RX ORDER — AMOXICILLIN AND CLAVULANATE POTASSIUM 500; 125 MG/1; MG/1
1 TABLET, FILM COATED ORAL 2 TIMES DAILY
Qty: 14 TABLET | Refills: 0 | Status: SHIPPED | OUTPATIENT
Start: 2018-12-13 | End: 2021-12-14 | Stop reason: SDUPTHER

## 2018-12-13 NOTE — TELEPHONE ENCOUNTER
Discussed on separate encounter.     ----- Message from Sona Paul sent at 12/13/2018  1:23 PM CST -----  Type:  Patient Returning Call    Who Called:  Patient  Who Left Message for Patient:  CAMILLA HOROWITZ  Does the patient know what this is regarding?: Test results  Best Call Back Number:  [phone]

## 2018-12-13 NOTE — TELEPHONE ENCOUNTER
Covering for Aubrie. UCX demonstrates infection with 2 different type of e. Coli. One of which is sensitive to cipro, the other is not. Instead of being on two different antibiotics, I would have her stop the cipro and take augmentin instead for 7 days.

## 2018-12-13 NOTE — TELEPHONE ENCOUNTER
Pt returned call to clinic. Informed pt of below results nad recommendations per Dr. Qiu. Instructed pt to discontinue Cipro and begin Augmentin. Confirmed pharmacy. Pt verbalized understanding with no further questions.

## 2018-12-17 ENCOUNTER — OFFICE VISIT (OUTPATIENT)
Dept: PULMONOLOGY | Facility: CLINIC | Age: 68
End: 2018-12-17
Payer: MEDICARE

## 2018-12-17 ENCOUNTER — PES CALL (OUTPATIENT)
Dept: ADMINISTRATIVE | Facility: CLINIC | Age: 68
End: 2018-12-17

## 2018-12-17 VITALS
TEMPERATURE: 97 F | HEIGHT: 65 IN | BODY MASS INDEX: 31.81 KG/M2 | HEART RATE: 80 BPM | OXYGEN SATURATION: 95 % | DIASTOLIC BLOOD PRESSURE: 77 MMHG | SYSTOLIC BLOOD PRESSURE: 159 MMHG | WEIGHT: 190.94 LBS | RESPIRATION RATE: 16 BRPM

## 2018-12-17 DIAGNOSIS — J44.9 COPD MIXED TYPE: Primary | ICD-10-CM

## 2018-12-17 DIAGNOSIS — R06.09 DOE (DYSPNEA ON EXERTION): ICD-10-CM

## 2018-12-17 DIAGNOSIS — G47.33 OBSTRUCTIVE SLEEP APNEA: ICD-10-CM

## 2018-12-17 PROCEDURE — 3078F DIAST BP <80 MM HG: CPT | Mod: CPTII,S$GLB,, | Performed by: NURSE PRACTITIONER

## 2018-12-17 PROCEDURE — 3077F SYST BP >= 140 MM HG: CPT | Mod: CPTII,S$GLB,, | Performed by: NURSE PRACTITIONER

## 2018-12-17 PROCEDURE — 1101F PT FALLS ASSESS-DOCD LE1/YR: CPT | Mod: CPTII,S$GLB,, | Performed by: NURSE PRACTITIONER

## 2018-12-17 PROCEDURE — 99999 PR PBB SHADOW E&M-EST. PATIENT-LVL IV: CPT | Mod: PBBFAC,,, | Performed by: NURSE PRACTITIONER

## 2018-12-17 PROCEDURE — 99213 OFFICE O/P EST LOW 20 MIN: CPT | Mod: S$GLB,,, | Performed by: NURSE PRACTITIONER

## 2018-12-17 RX ORDER — MONTELUKAST SODIUM 10 MG/1
10 TABLET ORAL NIGHTLY
Qty: 30 TABLET | Refills: 5 | Status: SHIPPED | OUTPATIENT
Start: 2018-12-17 | End: 2019-01-16

## 2018-12-17 RX ORDER — ALBUTEROL SULFATE 90 UG/1
2 AEROSOL, METERED RESPIRATORY (INHALATION) EVERY 6 HOURS PRN
Qty: 1 INHALER | Refills: 5 | Status: SHIPPED | OUTPATIENT
Start: 2018-12-17 | End: 2022-09-12 | Stop reason: SDUPTHER

## 2018-12-17 NOTE — PATIENT INSTRUCTIONS
Anoro is a daily medication intended to prevent breathing problems  Use Albuterol inhaler 1-2 puffs every 4 hours when needed for Shortness of breath      A full Face mask is needed to help with Obstructive sleep apnea due to sleeping with mouth open.

## 2018-12-17 NOTE — PROGRESS NOTES
12/17/2018    Kathy Seymour  Office Note    Chief Complaint   Patient presents with    Follow-up     sleep apnea       HPI: 12/17/18- has older machine states not working correctly. Using nightly, has day time drowsiness. Insurance states having to wait for next machine. Sleep is poor. Has new nasal face mask. Has tried nasal pillars, nasal pillow, and now has full nose mask. Nervous over full face mask causing clausaphobia.   Sensation of throat closing, Not using Anoro daily.    9/24/18- states had sleep titration study but never heard anything from it. Insurance denied automatic titration machine, states was not told she had COPD. Request a different face mask. States SOB with activity every day for a few minutes, day time only, relieved with rest. States does not use albuterol inhaler feels it does not help.  Has stopped smoking five years ago.     Previous HPI Dr. Jordan:  July 10, 2017-on cymbalta- helps neuropathy but having poor sleep  With nocturia ppt arousal.  Sleep study done with no rem sleep on cymbalta?  10-20 pressure rather than 6.          2/27/2017HPI: dx osas 10 yrs ago, ahi na.  On nasal pillars- cpap 6, new machine 5 yrs ago.  Gained 45 post stent around 2012.       Prior to cpap, severe snorer, fatigue, with no other prominent symptoms recalled.       Now no snoring noted with cpap. No nasal obstruction.     Mentation a little slow but vague.      The chief compliant  problem is stable  PFSH:  Past Medical History:   Diagnosis Date    Angina pectoris     Anticoagulant long-term use     Anxiety     Arthritis     Back pain     Cataract     Chronic bronchitis     Coronary artery disease     STENT X 2    CTS (carpal tunnel syndrome)     RIGHT    Depression     MILLER (dyspnea on exertion) 2/21/2017    Hallux valgus, acquired, bilateral 1/19/2017    Hyperlipidemia     Hypertension     Hypertensive left ventricular hypertrophy, without heart failure 5/22/2017    Hypothyroidism      Obesity     Polyneuropathy     S/P coronary artery stent placement, 2 LETTY 2012, 1 LETTY 2013 3/5/2013    Sleep apnea     USES CPAP    Thyroid disease     Tobacco dependence     Trouble in sleeping     Urinary incontinence     Wears glasses     ONE CONTAC         Past Surgical History:   Procedure Laterality Date    APPENDECTOMY      BILATERAL SALPINGOOPHORECTOMY      CARDIAC CATHETERIZATION      CORONARY ANGIOPLASTY      CORONARY STENT PLACEMENT      PCI  of the LAD with LETTY    EYE SURGERY      bilat cataract removal    HYSTERECTOMY      complete    OOPHORECTOMY      RELEASE, CARPAL TUNNEL Right 2014    Performed [facility]oles [facility]    TONSILLECTOMY       Social History     Tobacco Use    Smoking status: Former Smoker     Packs/day: 1.00     Years: 50.00     Pack years: 50.00     Types: Cigarettes     Start date: 1964     Last attempt to quit: 2012     Years since quittin.3    Smokeless tobacco: Never Used   Substance Use Topics    Alcohol use: Yes     Alcohol/week: 1.2 oz     Types: 2 Shots of liquor per week     Comment: Social - vodka on wednesday    Drug use: No     Family History   Problem Relation Age of Onset    Hypertension Sister     Arthritis Sister     Heart failure Mother     Hypertension Mother     Heart failure Father     Hypertension Father     No Known Problems Brother     No Known Problems Son     Heart disease Brother     Arthritis Sister     Hypertension Sister     Cancer Sister     Asthma Son     Arthritis Son         psoriatic arthritis     Review of patient's allergies indicates:   Allergen Reactions    Wellbutrin [bupropion hcl] Other (See Comments)     sven     I have reviewed past medical, family, and social history. I have reviewed previous nurse notes.    Performance Status:The patient's activity level is no limits with regular activity.      Review of Systems   Constitutional: Negative for activity change, appetite  "change, chills, diaphoresis, fever and unexpected weight change. Positive for daytime drowsiness  HENT: Negative for dental problem, postnasal drip, rhinorrhea, sinus pressure, sinus pain, sneezing, sore throat, trouble swallowing and voice change.    Respiratory: Negative for apnea, cough, chest tightness, shortness of breath, wheezing and stridor.    Cardiovascular: Negative for chest pain, palpitations and leg swelling.   Gastrointestinal: Negative for abdominal distention, abdominal pain, constipation and nausea.   Musculoskeletal: Negative for gait problem, myalgias and neck pain.   Skin: Negative for color change and pallor.   Allergic/Immunologic: Negative for environmental allergies and food allergies.   Neurological: Negative for dizziness, speech difficulty, weakness, light-headedness, numbness and headaches.   Hematological: Negative for adenopathy. Does not bruise/bleed easily.   Psychiatric/Behavioral: Negative for dysphoric mood and sleep disturbance. The patient is not nervous/anxious.           Exam:Comprehensive exam done. BP (!) 159/77 (BP Location: Right arm, Patient Position: Sitting, BP Method: Large (Automatic))   Pulse 80   Temp 97.3 °F (36.3 °C) (Oral)   Resp 16   Ht 5' 5" (1.651 m)   Wt 86.6 kg (190 lb 14.7 oz)   SpO2 95% Comment: room air  BMI 31.77 kg/m²   Exam included Vitals as listed  Constitutional: He is oriented to person, place, and time. He appears well-developed. No distress.   Nose: Nose normal.   Mouth/Throat: Uvula is midline, oropharynx is clear and moist and mucous membranes are normal. No dental caries. No oropharyngeal exudate, posterior oropharyngeal edema, posterior oropharyngeal erythema or tonsillar abscesses.  Mallapatti (M) score 3  Eyes: Pupils are equal, round, and reactive to light.   Neck: No JVD present. No thyromegaly present.   Cardiovascular: Normal rate, regular rhythm and normal heart sounds. Exam reveals no gallop and no friction rub.   No murmur " heard.  Pulmonary/Chest: Effort normal and breath sounds normal. No accessory muscle usage or stridor. No apnea and no tachypnea. No respiratory distress. He has no decreased breath sounds. He has no wheezes. He has no rhonchi. He has no rales. He exhibits no tenderness.   Abdominal: Soft. He exhibits no mass. There is no tenderness. No hepatosplenomegaly, hernias and normoactive bowel sounds  Musculoskeletal: Normal range of motion. He exhibits no edema.   Lymphadenopathy:     He has no cervical adenopathy.     He has no axillary adenopathy.   Neurological: He is alert and oriented to person, place, and time. He is not disoriented.   Skin: Skin is warm and dry. Capillary refill takes less 2 sec. No cyanosis or erythema. No pallor. Nails show no clubbing.   Psychiatric: He has a normal mood and affect. His behavior is normal. Judgment and thought content normal.       Radiographs (ct chest and cxr) reviewed: results reviewed   The mediastinal and cardiac size and contours are normal. The diaphragms and pleura are clear. There are no intra-pulmonary masses or infiltrates. There is no pneumothorax or pleural effusion.    Impression-Negative chest.      Electronically signed by:Hernán Mojica MD  Date: 03/26/14  Time:10:40     Labs reviewed  Lab Results   Component Value Date    WBC 6.27 01/09/2018    RBC 4.38 01/09/2018    HGB 12.6 01/09/2018    HCT 39.6 01/09/2018    MCV 90 01/09/2018    MCH 28.8 01/09/2018    MCHC 31.8 (L) 01/09/2018    RDW 13.5 01/09/2018     01/09/2018    MPV 11.9 01/09/2018    GRAN 3.8 01/09/2018    GRAN 61.1 01/09/2018    LYMPH 1.8 01/09/2018    LYMPH 27.9 01/09/2018    MONO 0.5 01/09/2018    MONO 8.3 01/09/2018    EOS 0.1 01/09/2018    BASO 0.02 01/09/2018    EOSINOPHIL 2.2 01/09/2018    BASOPHIL 0.3 01/09/2018       PFT results reviewed  Pulmonary Functions Testing Results:  Procedure Notes     Author Status Last  Updated Created   Tayo Jordan MD Signed Tayo Jordan MD  7/19/2017  2:38 PM 7/19/2017  2:35 PM          Assoc. Orders Procedures   None COMPLETE PFT WITH BRONCHODILATOR       Pre-Op Dx Post-Op Dx   Bronchitis None          Pulmonary Functions, including spirometry and bronchodilator response and lung volumes and diffusion, study was done July 28, 2017.  Spirometry shows loss of vital capacity and fev1 with no obstruction and no bronchodilator response.   FEV1 is 71% or 1.65 liters.  Lung volumes show  normal TLC and elevated residual volume.  Diffusion shows reduced but falls within normal range when corrected for lung volumes.     Pulmonary functions show low vital capacity and low mvv with some air trapping,but rest is wnl. Clinical correlation recommended.     Tayo Jordan M.D.               Plan:  Clinical impression is apparently straight forward and impression with management as below.    Kathy was seen today for follow-up.    Diagnoses and all orders for this visit:    COPD mixed type  -     albuterol (PROVENTIL/VENTOLIN HFA) 90 mcg/actuation inhaler; Inhale 2 puffs into the lungs every 6 (six) hours as needed for Wheezing or Shortness of Breath. Rescue  -     montelukast (SINGULAIR) 10 mg tablet; Take 1 tablet (10 mg total) by mouth every evening.    MILLER (dyspnea on exertion)  -     albuterol (PROVENTIL/VENTOLIN HFA) 90 mcg/actuation inhaler; Inhale 2 puffs into the lungs every 6 (six) hours as needed for Wheezing or Shortness of Breath. Rescue    Obstructive sleep apnea  -     CPAP/BIPAP SUPPLIES        Follow-up in about 6 months (around 6/17/2019), or if symptoms worsen or fail to improve.    Discussed with patient above for education the following:      Patient Instructions   Anoro is a daily medication intended to prevent breathing problems  Use Albuterol inhaler 1-2 puffs every 4 hours when needed for Shortness of breath      A full Face mask is needed to help with Obstructive sleep apnea due to sleeping with mouth open.

## 2019-01-14 ENCOUNTER — PATIENT MESSAGE (OUTPATIENT)
Dept: PULMONOLOGY | Facility: CLINIC | Age: 69
End: 2019-01-14

## 2019-01-15 DIAGNOSIS — G47.33 OSA ON CPAP: Primary | ICD-10-CM

## 2019-01-17 ENCOUNTER — PATIENT MESSAGE (OUTPATIENT)
Dept: FAMILY MEDICINE | Facility: CLINIC | Age: 69
End: 2019-01-17

## 2019-01-17 ENCOUNTER — TELEPHONE (OUTPATIENT)
Dept: PULMONOLOGY | Facility: CLINIC | Age: 69
End: 2019-01-17

## 2019-01-17 DIAGNOSIS — G62.9 NEUROPATHY: ICD-10-CM

## 2019-01-17 DIAGNOSIS — F41.9 ANXIETY: ICD-10-CM

## 2019-01-17 DIAGNOSIS — I10 ESSENTIAL HYPERTENSION: ICD-10-CM

## 2019-01-17 RX ORDER — LOSARTAN POTASSIUM AND HYDROCHLOROTHIAZIDE 25; 100 MG/1; MG/1
TABLET ORAL
Qty: 90 TABLET | Refills: 3 | Status: SHIPPED | OUTPATIENT
Start: 2019-01-17 | End: 2020-03-12 | Stop reason: SDUPTHER

## 2019-01-17 RX ORDER — DULOXETIN HYDROCHLORIDE 60 MG/1
CAPSULE, DELAYED RELEASE ORAL
Qty: 90 CAPSULE | Refills: 0 | Status: SHIPPED | OUTPATIENT
Start: 2019-01-17 | End: 2019-03-27 | Stop reason: SDUPTHER

## 2019-01-17 NOTE — TELEPHONE ENCOUNTER
Veronica stated she needs the polysomnogram in order to set the patient up for a CPAP. Stated pt is not eligible to get a new machine until April anyway.    ----- Message from Jyothi Anderson sent at 1/17/2019 11:26 AM CST -----  Contact: Veronica coello/ [facility] ph#  [phone]   Veronica coello/ [facility] ph#  [phone]   Returning phone call

## 2019-01-17 NOTE — TELEPHONE ENCOUNTER
Contacted pt. Advised we have not been able to find her polysomnogram. Pt states she will drive by the place this afternoon and call to see if they have her sleep study. I advised patient if we can not find the study she will have to repeat. Pt verbalized understanding.     ----- Message from Mike Beatty sent at 1/17/2019 11:57 AM CST -----  Contact: self   Placed call ton pod, patient miss call from your office please call back at [phone] (home)

## 2019-03-11 ENCOUNTER — DOCUMENTATION ONLY (OUTPATIENT)
Dept: FAMILY MEDICINE | Facility: CLINIC | Age: 69
End: 2019-03-11

## 2019-03-11 NOTE — PROGRESS NOTES
Pre-Visit Chart Review  For Appointment Scheduled on 03/12/2019  Health Maintenance Due   Topic Date Due    LDCT Lung Screen  12/13/2003    Influenza Vaccine  08/01/2018

## 2019-03-12 ENCOUNTER — OFFICE VISIT (OUTPATIENT)
Dept: FAMILY MEDICINE | Facility: CLINIC | Age: 69
End: 2019-03-12
Payer: MEDICARE

## 2019-03-12 ENCOUNTER — LAB VISIT (OUTPATIENT)
Dept: LAB | Facility: HOSPITAL | Age: 69
End: 2019-03-12
Attending: PHYSICIAN ASSISTANT
Payer: MEDICARE

## 2019-03-12 VITALS
SYSTOLIC BLOOD PRESSURE: 132 MMHG | HEART RATE: 69 BPM | HEIGHT: 65 IN | TEMPERATURE: 98 F | BODY MASS INDEX: 31 KG/M2 | WEIGHT: 186.06 LBS | DIASTOLIC BLOOD PRESSURE: 68 MMHG

## 2019-03-12 DIAGNOSIS — E66.9 OBESITY (BMI 30.0-34.9): ICD-10-CM

## 2019-03-12 DIAGNOSIS — E78.00 PURE HYPERCHOLESTEROLEMIA: Chronic | ICD-10-CM

## 2019-03-12 DIAGNOSIS — F41.9 ANXIETY: ICD-10-CM

## 2019-03-12 DIAGNOSIS — E04.1 NODULAR THYROID DISEASE: ICD-10-CM

## 2019-03-12 DIAGNOSIS — J44.9 COPD MIXED TYPE: ICD-10-CM

## 2019-03-12 DIAGNOSIS — I10 ESSENTIAL HYPERTENSION: ICD-10-CM

## 2019-03-12 DIAGNOSIS — Z87.891 HX OF TOBACCO USE, PRESENTING HAZARDS TO HEALTH: Primary | ICD-10-CM

## 2019-03-12 DIAGNOSIS — Z12.9 SCREENING FOR CANCER: ICD-10-CM

## 2019-03-12 DIAGNOSIS — N39.492 POSTURAL URINARY INCONTINENCE: ICD-10-CM

## 2019-03-12 PROBLEM — R06.09 DOE (DYSPNEA ON EXERTION): Status: RESOLVED | Noted: 2017-02-21 | Resolved: 2019-03-12

## 2019-03-12 LAB
ALBUMIN SERPL BCP-MCNC: 3.9 G/DL
ALP SERPL-CCNC: 101 U/L
ALT SERPL W/O P-5'-P-CCNC: 22 U/L
ANION GAP SERPL CALC-SCNC: 7 MMOL/L
AST SERPL-CCNC: 23 U/L
BASOPHILS # BLD AUTO: 0.02 K/UL
BASOPHILS NFR BLD: 0.3 %
BILIRUB SERPL-MCNC: 0.4 MG/DL
BUN SERPL-MCNC: 22 MG/DL
CALCIUM SERPL-MCNC: 9.6 MG/DL
CHLORIDE SERPL-SCNC: 105 MMOL/L
CHOLEST SERPL-MCNC: 145 MG/DL
CHOLEST/HDLC SERPL: 2.6 {RATIO}
CO2 SERPL-SCNC: 30 MMOL/L
CREAT SERPL-MCNC: 0.9 MG/DL
DIFFERENTIAL METHOD: ABNORMAL
EOSINOPHIL # BLD AUTO: 0.2 K/UL
EOSINOPHIL NFR BLD: 2.4 %
ERYTHROCYTE [DISTWIDTH] IN BLOOD BY AUTOMATED COUNT: 14.3 %
EST. GFR  (AFRICAN AMERICAN): >60 ML/MIN/1.73 M^2
EST. GFR  (NON AFRICAN AMERICAN): >60 ML/MIN/1.73 M^2
GLUCOSE SERPL-MCNC: 116 MG/DL
HCT VFR BLD AUTO: 41.4 %
HDLC SERPL-MCNC: 56 MG/DL
HDLC SERPL: 38.6 %
HGB BLD-MCNC: 12.6 G/DL
IMM GRANULOCYTES # BLD AUTO: 0.02 K/UL
IMM GRANULOCYTES NFR BLD AUTO: 0.3 %
LDLC SERPL CALC-MCNC: 72 MG/DL
LYMPHOCYTES # BLD AUTO: 1.9 K/UL
LYMPHOCYTES NFR BLD: 25.2 %
MCH RBC QN AUTO: 28.8 PG
MCHC RBC AUTO-ENTMCNC: 30.4 G/DL
MCV RBC AUTO: 95 FL
MONOCYTES # BLD AUTO: 0.6 K/UL
MONOCYTES NFR BLD: 8.1 %
NEUTROPHILS # BLD AUTO: 4.9 K/UL
NEUTROPHILS NFR BLD: 63.7 %
NONHDLC SERPL-MCNC: 89 MG/DL
NRBC BLD-RTO: 0 /100 WBC
PLATELET # BLD AUTO: 182 K/UL
PMV BLD AUTO: 11.4 FL
POTASSIUM SERPL-SCNC: 3.9 MMOL/L
PROT SERPL-MCNC: 7.3 G/DL
RBC # BLD AUTO: 4.37 M/UL
SODIUM SERPL-SCNC: 142 MMOL/L
TRIGL SERPL-MCNC: 85 MG/DL
TSH SERPL DL<=0.005 MIU/L-ACNC: 2.68 UIU/ML
WBC # BLD AUTO: 7.63 K/UL

## 2019-03-12 PROCEDURE — 80053 COMPREHEN METABOLIC PANEL: CPT | Mod: HCNC

## 2019-03-12 PROCEDURE — 3078F DIAST BP <80 MM HG: CPT | Mod: HCNC,CPTII,S$GLB, | Performed by: PHYSICIAN ASSISTANT

## 2019-03-12 PROCEDURE — 99999 PR PBB SHADOW E&M-EST. PATIENT-LVL V: CPT | Mod: PBBFAC,HCNC,, | Performed by: PHYSICIAN ASSISTANT

## 2019-03-12 PROCEDURE — 99999 PR PBB SHADOW E&M-EST. PATIENT-LVL V: ICD-10-PCS | Mod: PBBFAC,HCNC,, | Performed by: PHYSICIAN ASSISTANT

## 2019-03-12 PROCEDURE — 99499 UNLISTED E&M SERVICE: CPT | Mod: HCNC,S$GLB,, | Performed by: PHYSICIAN ASSISTANT

## 2019-03-12 PROCEDURE — 3075F SYST BP GE 130 - 139MM HG: CPT | Mod: HCNC,CPTII,S$GLB, | Performed by: PHYSICIAN ASSISTANT

## 2019-03-12 PROCEDURE — 99214 PR OFFICE/OUTPT VISIT, EST, LEVL IV, 30-39 MIN: ICD-10-PCS | Mod: HCNC,S$GLB,, | Performed by: PHYSICIAN ASSISTANT

## 2019-03-12 PROCEDURE — 99499 RISK ADDL DX/OHS AUDIT: ICD-10-PCS | Mod: HCNC,S$GLB,, | Performed by: PHYSICIAN ASSISTANT

## 2019-03-12 PROCEDURE — 99214 OFFICE O/P EST MOD 30 MIN: CPT | Mod: HCNC,S$GLB,, | Performed by: PHYSICIAN ASSISTANT

## 2019-03-12 PROCEDURE — 3075F PR MOST RECENT SYSTOLIC BLOOD PRESS GE 130-139MM HG: ICD-10-PCS | Mod: HCNC,CPTII,S$GLB, | Performed by: PHYSICIAN ASSISTANT

## 2019-03-12 PROCEDURE — 36415 COLL VENOUS BLD VENIPUNCTURE: CPT | Mod: HCNC,PO

## 2019-03-12 PROCEDURE — 3078F PR MOST RECENT DIASTOLIC BLOOD PRESSURE < 80 MM HG: ICD-10-PCS | Mod: HCNC,CPTII,S$GLB, | Performed by: PHYSICIAN ASSISTANT

## 2019-03-12 PROCEDURE — 85025 COMPLETE CBC W/AUTO DIFF WBC: CPT | Mod: HCNC

## 2019-03-12 PROCEDURE — 1101F PT FALLS ASSESS-DOCD LE1/YR: CPT | Mod: HCNC,CPTII,S$GLB, | Performed by: PHYSICIAN ASSISTANT

## 2019-03-12 PROCEDURE — 84443 ASSAY THYROID STIM HORMONE: CPT | Mod: HCNC

## 2019-03-12 PROCEDURE — 1101F PR PT FALLS ASSESS DOC 0-1 FALLS W/OUT INJ PAST YR: ICD-10-PCS | Mod: HCNC,CPTII,S$GLB, | Performed by: PHYSICIAN ASSISTANT

## 2019-03-12 PROCEDURE — 80061 LIPID PANEL: CPT | Mod: HCNC

## 2019-03-12 RX ORDER — ALPRAZOLAM 0.5 MG/1
0.5 TABLET ORAL NIGHTLY PRN
Qty: 30 TABLET | Refills: 2 | Status: SHIPPED | OUTPATIENT
Start: 2019-03-12 | End: 2019-06-04 | Stop reason: SDUPTHER

## 2019-03-12 NOTE — PATIENT INSTRUCTIONS
Weight Management: Getting Started  Healthy bodies come in all shapes and sizes. Not all bodies are made to be thin. For some people, a healthy weight is higher than the average weight listed on weight charts. Your healthcare provider can help you decide on a healthy weight for you.    Reasons to lose weight  Losing weight can help with some health problems, such as high blood pressure, heart disease, diabetes, sleep apnea, and arthritis. You may also feel more energy.  Set your long-term goal  Your goal doesn't even have to be a specific weight. You may decide on a fitness goal (such as being able to walk 10 miles a week), or a health goal (such as lowering your blood pressure). Choose a goal that is measurable and reasonable, so you know when you've reached it. A goal of reaching a BMI of less than 25 is not always reasonable (or possible).   Make an action plan  Habits dont change overnight. Setting your goals too high can leave you feeling discouraged if you cant reach them. Be realistic. Choose one or two small changes you can make now. Set an action plan for how you are going to make these changes. When you can stick to this plan, keep making a few more small changes. Taking small steps will help you stay on the path to success.  Track your progress  Write down your goals. Then, keep a daily record of your progress. Write down what you eat and how active you are. This record lets you look back on how much youve done. It may also help when youre feeling frustrated. Reward yourself for success. Even if you dont reach every goal, give yourself credit for what you do get done.  Get support  Encouragement from others can help make losing weight easier. Ask your family members and friends for support. They may even want to join you. Also look to your healthcare provider, registered dietitian, and  for help. Your local hospital can give you more information about nutrition, exercise, and  weight loss.  Date Last Reviewed: 1/31/2016 © 2000-2017 [employer]. [address] [place] [address], [place] [address]. All rights reserved. This information is not intended as a substitute for professional medical care. Always follow your healthcare professional's instructions.        Walking for Fitness  Fitness walking has something for everyone, even people who are already fit. Walking is one of the safest ways to condition your body aerobically. It can boost energy, help you lose weight, and reduce stress.    Physical benefits  · Walking strengthens your heart and lungs, and tones your muscles.  · When walking, your feet land with less impact than in other sports. This reduces chances of muscle, bone, and joint injury.  · Regular walking improves your cholesterol levels and lowers your risk of heart disease. And it helps you control your blood sugar if you have diabetes.  · Walking is a weight-bearing activity, which helps maintain bone density. This can help prevent osteoporosis.  Personal rewards  · Taking walks can help you relax and manage stress. And fitness walking may make you feel better about yourself.  · Walking can help you sleep better at night and make you less likely to be depressed.  · Regular walking may help maintain your memory as you get older.  · Walking is a great way to spend extra time with friends and family members. Be sure to invite your dog along!  Q&A about fitness walking  Q: Will walking keep me fit?  A: Yes. Regular walking at the right pace gives you all the benefits of other aerobic activities, such as jogging and swimming.  Q: Will walking help me lose weight and keep it off?  A: Yes. Per mile, walking can burn as many calories as jogging. Your health care provider can help work walking into your weight-loss plan.  Q: Is walking safe for my health?  A: Yes. Walking is safe if you have high blood pressure, diabetes, heart disease, or other conditions. Talk to your  healthcare provider before you start.  Date Last Reviewed: 4/1/2017  © 2803-3351 [employer], [employer]. [address] [place] [address], [place] [address]. All rights reserved. This information is not intended as a substitute for professional medical care. Always follow your healthcare professional's instructions.

## 2019-03-12 NOTE — PROGRESS NOTES
Subjective:       Patient ID: Kathy Seymour is a 68 y.o. female.    Chief Complaint: Hypertension and Medication Refill (xanax)    Patient is here for routine 6 month follow-up high blood pressure and anxiety.  Both of which are well controlled on her current medication regimen.  She is due for blood work, low-dose CT scan for lung cancer screening.  She complains of worsening urinary incontinence.  She states she has lost control of her bladder when she stands.  She has a history of a bladder lift at her time of hysterectomy.  She would prefer not to start medication, due to possible side effects.    Patient Active Problem List:     Anxiety     S/P coronary artery stent placement, 2 LETTY 9/2012, 1 LETTY 9/2013     Varicose veins of lower extremities with other complications     Hyperlipidemia, baseline      Lumbar spondylosis     Carpal tunnel syndrome     Hypertension, onset before 1995     Abdominal obesity     RAMONA on CPAP, 100% use     Nodular thyroid disease     Prediabetes     Urinary incontinence     Obesity (BMI 30.0-34.9), today 30     Dysmetabolic syndrome     Subclinical hyperthyroidism     COPD mixed type     Memory difficulties     Excessive daytime sleepiness     Sedentary lifestyle     Bilateral sciatica, R>L, onset 1/2018      Current Outpatient Medications:     albuterol (PROVENTIL/VENTOLIN HFA) 90 mcg/actuation inhaler, Inhale 2 puffs into the lungs every 6 (six) hours as needed for Wheezing or Shortness of Breath. Rescue, Disp: 1 Inhaler, Rfl: 5    ALPRAZolam (XANAX) 0.5 MG tablet, Take 1 tablet (0.5 mg total) by mouth nightly as needed., Disp: 30 tablet, Rfl: 2    aspirin 81 MG Chew, Take 1 tablet (81 mg total) by mouth once daily., Disp: 50 tablet, Rfl: 3    atorvastatin (LIPITOR) 40 MG tablet, Take 1 tablet (40 mg total) by mouth once daily., Disp: 90 tablet, Rfl: 2    DULoxetine (CYMBALTA) 60 MG capsule, TAKE 1 CAPSULE ONE TIME DAILY, Disp: 90 capsule, Rfl: 0     losartan-hydrochlorothiazide 100-25 mg (HYZAAR) 100-25 mg per tablet, TAKE 1 TABLET EVERY DAY, Disp: 90 tablet, Rfl: 3    methIMAzole (TAPAZOLE) 5 MG Tab, Take 1 tablet (5 mg total) by mouth 2 (two) times daily., Disp: 180 tablet, Rfl: 3    naproxen (NAPROSYN) 500 MG tablet, Take 1 tablet (500 mg total) by mouth 2 (two) times daily with meals., Disp: 60 tablet, Rfl: 1    omeprazole (PRILOSEC) 20 MG capsule, Take 1 capsule (20 mg total) by mouth once daily., Disp: 90 capsule, Rfl: 3    umeclidinium-vilanterol (ANORO ELLIPTA) 62.5-25 mcg/actuation DsDv, Inhale 1 puff into the lungs once daily. Controller, Disp: 1 each, Rfl: 5    vitamin D 1000 units Tab, Take by mouth once daily., Disp: , Rfl:       Review of Systems   Constitutional: Negative for activity change and appetite change.   HENT: Negative for nosebleeds.    Eyes: Negative for pain and visual disturbance.   Respiratory: Negative for chest tightness and shortness of breath.    Cardiovascular: Negative for chest pain, palpitations and leg swelling.   Genitourinary: Negative for decreased urine volume, difficulty urinating, dyspareunia, dysuria, enuresis, flank pain, frequency, genital sores, hematuria, menstrual problem, pelvic pain, urgency, vaginal bleeding and vaginal discharge.   Musculoskeletal: Negative for arthralgias, back pain, gait problem, joint swelling and neck pain.   Neurological: Negative for dizziness, tremors, weakness, light-headedness, numbness and headaches.       Objective:      Physical Exam   Constitutional: She is oriented to person, place, and time. She appears well-developed and well-nourished.   HENT:   Head: Normocephalic and atraumatic.   Eyes: Conjunctivae are normal. Pupils are equal, round, and reactive to light.   Neck: Normal range of motion. Neck supple. No JVD present.   Cardiovascular: Normal rate and regular rhythm. Exam reveals no gallop and no friction rub.   No murmur heard.  Pulmonary/Chest: Effort normal and  breath sounds normal. No respiratory distress. She has no wheezes. She has no rales.   Neurological: She is alert and oriented to person, place, and time.   Skin: Skin is warm and dry.   Psychiatric: She has a normal mood and affect. Her behavior is normal. Judgment and thought content normal.       Assessment:       1. Hx of tobacco use, presenting hazards to health    2. Anxiety    3. Pure hypercholesterolemia    4. Essential hypertension    5. Nodular thyroid disease    6. Screening for cancer    7. Postural urinary incontinence    8. COPD mixed type    9. Obesity (BMI 30.0-34.9), today 30        Plan:       Kathy was seen today for hypertension and medication refill.    Diagnoses and all orders for this visit:    Hx of tobacco use, presenting hazards to health  Comments:  CT scan ordered  Orders:  -     CT Chest Lung Screening Low Dose; Future    Anxiety  Comments:  Controlled, refill of Xanax given.  Orders:  -     ALPRAZolam (XANAX) 0.5 MG tablet; Take 1 tablet (0.5 mg total) by mouth nightly as needed.    Pure hypercholesterolemia  Comments:  Controlled, continue current regimen  Orders:  -     Lipid panel; Future    Essential hypertension  Comments:  Controlled, continue current regimen  Orders:  -     CBC auto differential; Future  -     Comprehensive metabolic panel; Future    Nodular thyroid disease  Comments:  Stable, followed by endocrinology  Orders:  -     TSH; Future    Screening for cancer  -     CT Chest Lung Screening Low Dose; Future    Postural urinary incontinence  Comments:  Uncontrolled, refer to Urology  Orders:  -     Ambulatory referral to Urology    COPD mixed type  Comments:  Controlled, continue current regimen    Obesity (BMI 30.0-34.9), today 30  Comments:  Uncontrolled, encourage diet and exercise    Patient readiness: acceptance and barriers:none    During the course of the visit the patient was educated and counseled about the following:     Hypertension:   Regular aerobic  exercise.  Obesity:   General weight loss/lifestyle modification strategies discussed (elicit support from others; identify saboteurs; non-food rewards, etc).    Goals: Hypertension: Reduce Blood Pressure and Obesity: Reduce calorie intake and BMI    Did patient meet goals/outcomes: No    The following self management tools provided: blood pressure log  excercise log    Patient Instructions (the written plan) was given to the patient/family.     Time spent with patient: 30 minutes    Barriers to medications present (no )    Adverse reactions to current medications (no)    Over the counter medications reviewed (Yes)

## 2019-03-19 ENCOUNTER — HOSPITAL ENCOUNTER (OUTPATIENT)
Dept: RADIOLOGY | Facility: HOSPITAL | Age: 69
Discharge: HOME | End: 2019-03-19
Attending: PHYSICIAN ASSISTANT
Payer: MEDICARE

## 2019-03-19 DIAGNOSIS — Z12.9 SCREENING FOR CANCER: ICD-10-CM

## 2019-03-19 DIAGNOSIS — Z87.891 HX OF TOBACCO USE, PRESENTING HAZARDS TO HEALTH: ICD-10-CM

## 2019-03-19 PROCEDURE — G0297 CT CHEST LUNG SCREENING LOW DOSE: ICD-10-PCS | Mod: 26,HCNC,, | Performed by: RADIOLOGY

## 2019-03-19 PROCEDURE — G0297 LDCT FOR LUNG CA SCREEN: HCPCS | Mod: 26,HCNC,, | Performed by: RADIOLOGY

## 2019-03-19 PROCEDURE — G0297 LDCT FOR LUNG CA SCREEN: HCPCS | Mod: TC,HCNC

## 2019-03-20 DIAGNOSIS — Z12.9 SCREENING FOR CANCER: ICD-10-CM

## 2019-03-20 DIAGNOSIS — R93.89 ABNORMAL CT OF THE CHEST: Primary | ICD-10-CM

## 2019-03-20 DIAGNOSIS — Z87.891 PERSONAL HISTORY OF NICOTINE DEPENDENCE: ICD-10-CM

## 2019-03-27 DIAGNOSIS — F41.9 ANXIETY: ICD-10-CM

## 2019-03-27 DIAGNOSIS — E05.90 HYPERTHYROIDISM: ICD-10-CM

## 2019-03-27 DIAGNOSIS — G62.9 NEUROPATHY: ICD-10-CM

## 2019-03-28 RX ORDER — DULOXETIN HYDROCHLORIDE 60 MG/1
60 CAPSULE, DELAYED RELEASE ORAL DAILY
Qty: 90 CAPSULE | Refills: 3 | Status: SHIPPED | OUTPATIENT
Start: 2019-03-28 | End: 2020-03-13 | Stop reason: SDUPTHER

## 2019-03-28 RX ORDER — METHIMAZOLE 5 MG/1
5 TABLET ORAL 2 TIMES DAILY
Qty: 180 TABLET | Refills: 3 | Status: SHIPPED | OUTPATIENT
Start: 2019-03-28 | End: 2020-04-30

## 2019-04-24 ENCOUNTER — PATIENT MESSAGE (OUTPATIENT)
Dept: PULMONOLOGY | Facility: CLINIC | Age: 69
End: 2019-04-24

## 2019-05-13 ENCOUNTER — TELEPHONE (OUTPATIENT)
Dept: PULMONOLOGY | Facility: CLINIC | Age: 69
End: 2019-05-13

## 2019-05-13 DIAGNOSIS — G47.33 OSA ON CPAP: Primary | ICD-10-CM

## 2019-05-13 NOTE — TELEPHONE ENCOUNTER
Pt stated the mask pressure is poli high. [facility] requested ordered for a decreased setting. NP notified.   ----- Message from Venita Olivares sent at 5/13/2019  1:21 PM CDT -----  Type: Needs Medical Advice    Who Called:  [facility]    Symptoms (please be specific):  Auto pap  How long has patient had these symptoms:     Pharmacy name and phone #:   [phone] / [facility]   Best Call Back Number:    Additional Information:  Pt is there / states the machine is set too high

## 2019-05-15 ENCOUNTER — PES CALL (OUTPATIENT)
Dept: ADMINISTRATIVE | Facility: CLINIC | Age: 69
End: 2019-05-15

## 2019-05-23 ENCOUNTER — PATIENT MESSAGE (OUTPATIENT)
Dept: FAMILY MEDICINE | Facility: CLINIC | Age: 69
End: 2019-05-23

## 2019-05-28 RX ORDER — ATORVASTATIN CALCIUM 40 MG/1
40 TABLET, FILM COATED ORAL DAILY
Qty: 90 TABLET | Refills: 0 | Status: SHIPPED | OUTPATIENT
Start: 2019-05-28 | End: 2019-10-15 | Stop reason: SDUPTHER

## 2019-05-30 ENCOUNTER — OFFICE VISIT (OUTPATIENT)
Dept: UROLOGY | Facility: CLINIC | Age: 69
End: 2019-05-30
Payer: MEDICARE

## 2019-05-30 ENCOUNTER — APPOINTMENT (OUTPATIENT)
Dept: LAB | Facility: HOSPITAL | Age: 69
End: 2019-05-30
Attending: UROLOGY
Payer: MEDICARE

## 2019-05-30 VITALS
DIASTOLIC BLOOD PRESSURE: 86 MMHG | SYSTOLIC BLOOD PRESSURE: 145 MMHG | HEIGHT: 65 IN | BODY MASS INDEX: 30.49 KG/M2 | HEART RATE: 85 BPM | WEIGHT: 183 LBS

## 2019-05-30 DIAGNOSIS — N39.46 MIXED INCONTINENCE: ICD-10-CM

## 2019-05-30 DIAGNOSIS — R31.29 MICROHEMATURIA: ICD-10-CM

## 2019-05-30 DIAGNOSIS — R35.0 FREQUENT URINATION: Primary | ICD-10-CM

## 2019-05-30 LAB
BILIRUB SERPL-MCNC: ABNORMAL MG/DL
BLOOD URINE, POC: ABNORMAL
COLOR, POC UA: YELLOW
GLUCOSE UR QL STRIP: ABNORMAL
KETONES UR QL STRIP: ABNORMAL
LEUKOCYTE ESTERASE URINE, POC: ABNORMAL
NITRITE, POC UA: ABNORMAL
PH, POC UA: 5.5
PROTEIN, POC: ABNORMAL
SPECIFIC GRAVITY, POC UA: 1.02
UROBILINOGEN, POC UA: ABNORMAL

## 2019-05-30 PROCEDURE — 81002 URINALYSIS NONAUTO W/O SCOPE: CPT | Mod: HCNC,S$GLB,, | Performed by: UROLOGY

## 2019-05-30 PROCEDURE — 99205 OFFICE O/P NEW HI 60 MIN: CPT | Mod: HCNC,25,S$GLB, | Performed by: UROLOGY

## 2019-05-30 PROCEDURE — 88112 CYTOPATH CELL ENHANCE TECH: CPT | Mod: 26,HCNC,, | Performed by: PATHOLOGY

## 2019-05-30 PROCEDURE — 87186 SC STD MICRODIL/AGAR DIL: CPT | Mod: HCNC

## 2019-05-30 PROCEDURE — 88112 CYTOLOGY SPECIMEN-URINE: ICD-10-PCS | Mod: 26,HCNC,, | Performed by: PATHOLOGY

## 2019-05-30 PROCEDURE — 99205 PR OFFICE/OUTPT VISIT, NEW, LEVL V, 60-74 MIN: ICD-10-PCS | Mod: HCNC,25,S$GLB, | Performed by: UROLOGY

## 2019-05-30 PROCEDURE — 99999 PR PBB SHADOW E&M-EST. PATIENT-LVL V: ICD-10-PCS | Mod: PBBFAC,HCNC,, | Performed by: UROLOGY

## 2019-05-30 PROCEDURE — 3079F PR MOST RECENT DIASTOLIC BLOOD PRESSURE 80-89 MM HG: ICD-10-PCS | Mod: HCNC,CPTII,S$GLB, | Performed by: UROLOGY

## 2019-05-30 PROCEDURE — 87086 URINE CULTURE/COLONY COUNT: CPT | Mod: HCNC

## 2019-05-30 PROCEDURE — 99999 PR PBB SHADOW E&M-EST. PATIENT-LVL V: CPT | Mod: PBBFAC,HCNC,, | Performed by: UROLOGY

## 2019-05-30 PROCEDURE — 3077F PR MOST RECENT SYSTOLIC BLOOD PRESSURE >= 140 MM HG: ICD-10-PCS | Mod: HCNC,CPTII,S$GLB, | Performed by: UROLOGY

## 2019-05-30 PROCEDURE — 88112 CYTOPATH CELL ENHANCE TECH: CPT | Mod: HCNC | Performed by: PATHOLOGY

## 2019-05-30 PROCEDURE — 87088 URINE BACTERIA CULTURE: CPT | Mod: HCNC

## 2019-05-30 PROCEDURE — 3077F SYST BP >= 140 MM HG: CPT | Mod: HCNC,CPTII,S$GLB, | Performed by: UROLOGY

## 2019-05-30 PROCEDURE — 87077 CULTURE AEROBIC IDENTIFY: CPT | Mod: HCNC

## 2019-05-30 PROCEDURE — 1101F PT FALLS ASSESS-DOCD LE1/YR: CPT | Mod: HCNC,CPTII,S$GLB, | Performed by: UROLOGY

## 2019-05-30 PROCEDURE — 3079F DIAST BP 80-89 MM HG: CPT | Mod: HCNC,CPTII,S$GLB, | Performed by: UROLOGY

## 2019-05-30 PROCEDURE — 1101F PR PT FALLS ASSESS DOC 0-1 FALLS W/OUT INJ PAST YR: ICD-10-PCS | Mod: HCNC,CPTII,S$GLB, | Performed by: UROLOGY

## 2019-05-30 PROCEDURE — 81002 POCT URINE DIPSTICK WITHOUT MICROSCOPE: ICD-10-PCS | Mod: HCNC,S$GLB,, | Performed by: UROLOGY

## 2019-05-30 NOTE — PATIENT INSTRUCTIONS
She is here to see  Me for oab and mixed incontinence that has been progressively worsening over the past few months. However she has microhematuria w/o obvious evidence of uti and also has strong history of smoking. Will proceed with  workup and tx for oab at the same time.     workup includes:   Ct urogram - do next week  Urine cytology -sending today  Urine culture - sending today (no sx of uti)  Cystoscopy, currently scheduled for July 1st unless any major abnormalities seen on ct or cytology. If we can wait preferable, she is going to [facility] on June 21st.  Cath Urine sample on a nurse visit on the 28th but if infection present will have to postpone cystoscopy.     For her OAB will proceed with tx for now with meds  -start with myrbetriq 50mg once daily  -try for 4 to 6 weeks  -given list if too expensive.    Types of incontinence (urine leakage)    You have mainly urge incontinence (see below)    STRESS incontinence- when you cough, laugh, jump or exercise and leak urine. This is NOT treated with medication. It is often caused by having babies and age causing weak tissues.  Sometimes it can improve with conservative treatment (voiding more often and kegels) or pessary. If it does not then if bothersome enough can move forward with surgical options (mesh/midurethral sling, urethral injections).   -First I recommend you empty your bladder every 2 to 3 hours. Empty well: wait a minute, lean forward on toilet but do  Not strain. The less you have in your bladder, the less you will leak urine.   -I also recommend you Do  KEGELS: 10 in the morning and 10 in the afternoon  holding each kegel for  10 seconds.    -Consider pelvic floor physical therapy in future.  -If there is no improvement in your leakage with coughing and laughing and  It is very bothersome we can consider surgery (mid urethral sling (small piece of mesh placed during an outpatient surgery) vs referral to urogynecology for pessary)    URGE  incontinence- when you have the overwhelming urge to urinate but cannot make it to the bathroom. This is typically treated with changing your diet and medications. Usually this is associated with overactive bladder (urinating frequently).  If trying a medication does not help then we can move forward with surgical options (bladder pacemaker or botox).   -Please fill out a Bladder diary for 3-7 days; document every time you urinate, every time you leak or change pads. Indicate your urgency (1 being least and 5 being very high)  -Avoid foods and drinks that irritate the bladder (see sheet). Keep a diary to try to identify what bothers your bladder. Any caffeine or fizzy drink is usually a culprit,  -When you feel urge to go, STOP, KEGEL, and when urge has passed, then go to bathroom.    -If avoiding caffeine and other foods that trigger your urgency do not help, then we can a try a medication. This is usually a lifelong medication. There are multiple medications available.  I will write you a prescription for myrbetriq 50mg to take once in the morning once daily if avoiding bladder irritants do not work.   · All of them cause dry mouth and constipation to some extent except for myrbetriq/mirbegron.   · All of the overactive bladder medications take four to 6 weeks to work except for ditropan/oxybutynin.  ·  If your insurance does not cover the medication I write you for, you will need to call them and find out your co-pays for overactive bladder medications and then notify us which ones are affordable. You can ask them about the following co-pays for each: Vesicare, Enablex, Sanctura, Detrol, Ditropan/oxtybuynin extended release, Myrbetriq.  · Oxybutynin/ditropan 5mg immediate release are the cheapest and have the most side effects and so I typically do not prescribe these.    · For dry mouth: Try sour, sugar free lozenge or gum or try therabreath products over the counter.    · For constipation you can take 2 fiber  gummies a day in addition to your regular regimen.   · We will have you return in 4 weeks with the nurse practioner to see how you are doing on the medication. If there is no improvement we will try another medication for 4 weeks. If there is still no improvement we can move forward. We are looking to see if you urinate less often, with less urge or leak less.   · Sometimes if you have significant stress incontinence, you can never hold enough in your bladder and you develop overactive bladder and urge incontinence that does not improve until your stress incontinence is treated.

## 2019-05-30 NOTE — LETTER
May 30, 2019      JENNIFER Mendoza  [address]  [place] [address]           [place] [facility]  [address] [place] [address] [address]. Price [address]reea  [place] [address]  Phone: [phone]  Fax: [phone]          Patient: Kathy Seymour   MR Number: [id number]   YOB: 1950   Date of Visit: 5/30/2019       Dear Aubrie Jimenez:    Thank you for referring Kathy Seymour to me for evaluation. Attached you will find relevant portions of my assessment and plan of care.    If you have questions, please do not hesitate to call me. I look forward to following Kathy Seymour along with you.    Sincerely,    Ro Wise MD    Enclosure  CC:  No Recipients    If you would like to receive this communication electronically, please contact [email or web address] or [phone] to request more information on [facility] Link access.    For providers and/or their staff who would like to refer a patient to [facility], please contact us through our one-stop-shop provider referral line, [facility], at [phone].    If you feel you have received this communication in error or would no longer like to receive these types of communications, please e-mail [email or web address]

## 2019-05-30 NOTE — PROGRESS NOTES
[facility] Note - [facility]  Staff: MD Billie    Referring provider and please cc:     Aubrie Jimenez, PA  [address] [place] [address]     PCP: James H Newcomb Jr, MD [facility]: active    Chief Complaint:   Chief Complaint   Patient presents with    Establish Care    Urinary Frequency     leakage    Nocturia     x2         Subjective:        HPI: Kathy Seymour is a 68 y.o. female presents with     She has been having OAB, voiding q30 min to 1 hour for the past years but increasing over the past year. Also have mixed incontinence but UUI>JUD. H/o bladder lift with hysteretomy . . On hctz. Wears 2-3 pads a day. 1 large cup of coffee in the am. Has never tried an OAB med. occ constipaton.     She does have a h/o UTI's about 1 a year or less and sx include dysuria, frequency, lower abdominal pain she thinks associated with her arthritis and inability to completely wipe. But none since dec 2018.     She does have mod blood in her urine today. No h/o GH. 1/2 siblings with stones. Father had stones. No personal hx of stones. 50 pack year hx of smoking but quit 5 years ago. On asa 81mg daily. +hysterectomy for metromenorrhagia. Last pap smear  by , which was normal and none since.     On low carb diet- lost 10 pounds.  Started 4 weeks ago. Going to [place].     ECOG Status: 0    Urinalysis void: mod blood  Urine history:   12/10/18 E.coli, void: n/a  10/20/17 No cx, void: nit+/2+bld/1+leuk  17  E.coli, void: tr bld  16  No cx, void:1+bld/nit+  13  No cx, void: 250 bld/wbc+  12 No cx, void: lrg bld/nit+/1+leuk  08  No cx, void: sml blood/nit+     G2, P 2, vaginal or , + hysterectomy. Gross HematuriaNo.       SHAWNA 6:   Do you usually experience frequent urination? 3.   Do you usually experience urine leakage associated with a feeling of urgency; that is, a strong sensation of needing to go to the bathroom? 3.   Do you usually  experience urine leakage related to coughing, sneezing, or laughing?2.   Do you experience small amounts of urine leakage (that is, drops)? 2.   Do you experience difficulty emptying your bladder? 0.   Do you usually experience pain or discomfort in the lower abdomen or genital region? 0  Total Score: 10  Bother score: 6  Quality of Life: 4 mostly dissatisfied       REVIEW OF SYSTEMS:  General ROS: no fevers, no chills  Psychological ROS: no depression  Endocrine ROS: no heat or cold  Respiratory ROS: no SOB  Cardiovascular ROS: no CP  Gastrointestinal ROS: no abdominal pain, no constipation, no diarrhea, noBRBPR  Musculoskeletal ROS: no muscle pain  Neurological ROS: no headaches  Dermatological ROS: no rashes  HEENT: noglasses, no sinus   ROS: per HPI     PMHx:  Past Medical History:   Diagnosis Date    Angina pectoris     Anticoagulant long-term use     Anxiety     Arthritis     Back pain     Cataract     Chronic bronchitis     Coronary artery disease     STENT X 2    CTS (carpal tunnel syndrome)     RIGHT    Depression     MILLER (dyspnea on exertion) 2/21/2017    Hallux valgus, acquired, bilateral 1/19/2017    Hyperlipidemia     Hypertension     Hypertensive left ventricular hypertrophy, without heart failure 5/22/2017    Hypothyroidism     Obesity     Polyneuropathy     S/P coronary artery stent placement, 2 LETTY 9/2012, 1 LETTY 9/2013 3/5/2013    Sleep apnea     USES CPAP    Thyroid disease     Tobacco dependence     Trouble in sleeping     Urinary incontinence     Wears glasses     ONE CONTAC     Kidney stones: No    PSHx:  Past Surgical History:   Procedure Laterality Date    APPENDECTOMY      BILATERAL SALPINGOOPHORECTOMY      CARDIAC CATHETERIZATION      CORONARY ANGIOPLASTY      CORONARY STENT PLACEMENT      PCI  of the LAD with LETTY    EYE SURGERY      bilat cataract removal    HYSTERECTOMY      complete    OOPHORECTOMY      RELEASE, CARPAL TUNNEL Right 4/2/2014    Performed  by [facility]    TONSILLECTOMY           Stents/Valves/Foreign Bodies: 3 stents, last one  (previously on plavix)  Cardiac Evaluation/Cardiologist:  for hrt dz, due in    Gastroenterologist/colonoscopy status: Dr.Pearce michael, none within last 5 years    Gynecologist/Pap Status: none since   Pulmonologist:, hx of RAMONA. Uses cpap     Fam Hx:   malignancies: No , gyn malignancies: Yes - 1/2 sister with brest cancer . 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 No wheezing.   Cardiovascular: No obvious extremity edema. Warm and well perfused.   GI: no palpation of masses. No tenderness. No hepatosplenomegaly to palpation.  Musculoskeletal: normal range of motion of bilateral upper extremities. Normal muscle strength and tone.  Skin: no obvious rashes or lesions. No tightening of skin noted.  Neurologic: CN grossly normal. Normal sensation.   Psychiatric: awake, alert and oriented x 3. Mood and affect normal. Cooperative.    Pelvic exam  Deferred    LABS REVIEW:    Lab Results   Component Value Date    WBC 7.63 2019    HGB 12.6 2019    HCT 41.4 2019    MCV 95 2019     2019     BMP  Lab Results   Component Value Date     2019     K 3.9 03/12/2019     03/12/2019    CO2 30 (H) 03/12/2019    BUN 22 03/12/2019    CREATININE 0.9 03/12/2019    CALCIUM 9.6 03/12/2019    ANIONGAP 7 (L) 03/12/2019    ESTGFRAFRICA >60.0 03/12/2019    EGFRNONAA >60.0 03/12/2019     Lab Results   Component Value Date    HGBA1C 5.8 (H) 01/09/2018       Pertinent urologic PATHOLOGY REVIEW:  none    Pertinent Urologic RADIOGRAPHIC REVIEW:  No abdominal imaging    Assessment:       1. Frequent urination    2. Microhematuria    3. Mixed incontinence          Plan:     She is here to see  Me for oab and mixed incontinence that has been progressively worsening over the past few months. However she has microhematuria w/o obvious evidence of uti and also has strong history of smoking. Will proceed with  workup and tx for oab at the same time.     workup includes:   Ct urogram - do next week  Urine cytology -sending today  Urine culture - sending today (no sx of uti)  Cystoscopy, currently scheduled for July 1st unless any major abnormalities seen on ct or cytology. If we can wait preferable, she is going to [facility] on June 21st.  Cath Urine sample on a nurse visit on the 28th but if infection present will have to postpone cystoscopy.    For her OAB will proceed with tx for now with meds  -start with myrbetriq 50mg once daily  -try for 4 to 6 weeks  -given list if too expensive.    Route Aubrie Jimenez and             [unfilled]  Orders Placed This Encounter   Procedures    Urine culture          CT Urogram Abd Pelvis W WO     Please obtain images all the way through pelvis for the contrast phase   Please re-scan delayed images 3 to 5 minutes through images pelvis if entire ureter is not visualized   Goal is to see distal ureters since they often have phelobiliths and we are trying to evaluate if it is a ureteral stone or phlebolith and we do this based on delayed ureteral images.     Standing Status:   Future     Standing Expiration Date:   5/30/2020      Scheduling Instructions:      Please obtain images all the way through pelvis for the contrast phase       Please re-scan delayed images 3 to 5 minutes through images pelvis if entire ureter is not visualized       Goal is to see distal ureters since they often have phelobiliths and we are trying to evaluate if it is a ureteral stone or phlebolith and we do this based on delayed ureteral images.     Order Specific Question:   Is the patient allergic to iodine or contrast?     Answer:   No     Order Specific Question:   Is the patient on ANY Metformin medication such as Glucophage/Glucovance ?     Answer:   No     Order Specific Question:   Does the patient have any of the following risk factors?     Answer:   None    Creatinine, serum     Standing Status:   Future     Standing Expiration Date:   7/28/2020    POCT URINE DIPSTICK WITHOUT MICROSCOPE    Urine Dip with micro, POC    Case Request Operating Room: CYSTOSCOPY     Order Specific Question:   CPT Code:     Answer:   GA CYSTOURETHROSCOPY [24923]     Order Specific Question:   Medical Necessity:     Answer:   Medically Non-Urgent [100]      CT UROGRAM ABD PELVIS W WO      Ro Wise MD

## 2019-05-30 NOTE — Clinical Note
Cath Urine sample on a nurse visit on the 28th but if infection present will have to postpone cystoscopy.

## 2019-06-02 LAB — BACTERIA UR CULT: NORMAL

## 2019-06-03 ENCOUNTER — PATIENT MESSAGE (OUTPATIENT)
Dept: SURGERY | Facility: AMBULARY SURGERY CENTER | Age: 69
End: 2019-06-03

## 2019-06-03 ENCOUNTER — TELEPHONE (OUTPATIENT)
Dept: UROLOGY | Facility: CLINIC | Age: 69
End: 2019-06-03

## 2019-06-03 NOTE — TELEPHONE ENCOUNTER
Patient states myrbetriq is too expensive. Patient call insurance company the only that is affordable is ditropan. Patient requesting for ditropan be sent to [facility] pharmacy.

## 2019-06-04 ENCOUNTER — OFFICE VISIT (OUTPATIENT)
Dept: PULMONOLOGY | Facility: CLINIC | Age: 69
End: 2019-06-04
Payer: MEDICARE

## 2019-06-04 ENCOUNTER — TELEPHONE (OUTPATIENT)
Dept: PULMONOLOGY | Facility: CLINIC | Age: 69
End: 2019-06-04

## 2019-06-04 VITALS
SYSTOLIC BLOOD PRESSURE: 134 MMHG | HEIGHT: 65 IN | OXYGEN SATURATION: 94 % | HEART RATE: 81 BPM | DIASTOLIC BLOOD PRESSURE: 65 MMHG | BODY MASS INDEX: 29.79 KG/M2 | WEIGHT: 178.81 LBS

## 2019-06-04 DIAGNOSIS — G47.33 OSA ON CPAP: Primary | ICD-10-CM

## 2019-06-04 DIAGNOSIS — G47.33 OBSTRUCTIVE SLEEP APNEA SYNDROME: Primary | ICD-10-CM

## 2019-06-04 DIAGNOSIS — F41.9 ANXIETY: ICD-10-CM

## 2019-06-04 DIAGNOSIS — R09.89 CHRONIC SINUS COMPLAINTS: ICD-10-CM

## 2019-06-04 DIAGNOSIS — J44.9 COPD MIXED TYPE: ICD-10-CM

## 2019-06-04 PROCEDURE — 99999 PR PBB SHADOW E&M-EST. PATIENT-LVL IV: CPT | Mod: PBBFAC,HCNC,, | Performed by: INTERNAL MEDICINE

## 2019-06-04 PROCEDURE — 3075F PR MOST RECENT SYSTOLIC BLOOD PRESS GE 130-139MM HG: ICD-10-PCS | Mod: HCNC,CPTII,S$GLB, | Performed by: INTERNAL MEDICINE

## 2019-06-04 PROCEDURE — 99214 OFFICE O/P EST MOD 30 MIN: CPT | Mod: HCNC,S$GLB,, | Performed by: INTERNAL MEDICINE

## 2019-06-04 PROCEDURE — 1101F PR PT FALLS ASSESS DOC 0-1 FALLS W/OUT INJ PAST YR: ICD-10-PCS | Mod: HCNC,CPTII,S$GLB, | Performed by: INTERNAL MEDICINE

## 2019-06-04 PROCEDURE — 3075F SYST BP GE 130 - 139MM HG: CPT | Mod: HCNC,CPTII,S$GLB, | Performed by: INTERNAL MEDICINE

## 2019-06-04 PROCEDURE — 3078F DIAST BP <80 MM HG: CPT | Mod: HCNC,CPTII,S$GLB, | Performed by: INTERNAL MEDICINE

## 2019-06-04 PROCEDURE — 1101F PT FALLS ASSESS-DOCD LE1/YR: CPT | Mod: HCNC,CPTII,S$GLB, | Performed by: INTERNAL MEDICINE

## 2019-06-04 PROCEDURE — 99999 PR PBB SHADOW E&M-EST. PATIENT-LVL IV: ICD-10-PCS | Mod: PBBFAC,HCNC,, | Performed by: INTERNAL MEDICINE

## 2019-06-04 PROCEDURE — 3078F PR MOST RECENT DIASTOLIC BLOOD PRESSURE < 80 MM HG: ICD-10-PCS | Mod: HCNC,CPTII,S$GLB, | Performed by: INTERNAL MEDICINE

## 2019-06-04 PROCEDURE — 99214 PR OFFICE/OUTPT VISIT, EST, LEVL IV, 30-39 MIN: ICD-10-PCS | Mod: HCNC,S$GLB,, | Performed by: INTERNAL MEDICINE

## 2019-06-04 RX ORDER — FLUTICASONE PROPIONATE 50 MCG
1 SPRAY, SUSPENSION (ML) NASAL DAILY
Qty: 3 BOTTLE | Refills: 3 | Status: SHIPPED | OUTPATIENT
Start: 2019-06-04 | End: 2020-07-30 | Stop reason: SDUPTHER

## 2019-06-04 RX ORDER — BUDESONIDE AND FORMOTEROL FUMARATE DIHYDRATE 160; 4.5 UG/1; UG/1
2 AEROSOL RESPIRATORY (INHALATION) EVERY 12 HOURS
Qty: 3 INHALER | Refills: 3 | Status: SHIPPED | OUTPATIENT
Start: 2019-06-04 | End: 2019-09-05 | Stop reason: SDUPTHER

## 2019-06-04 RX ORDER — OXYBUTYNIN CHLORIDE 10 MG/1
10 TABLET, EXTENDED RELEASE ORAL DAILY
Qty: 90 TABLET | Refills: 3 | Status: SHIPPED | OUTPATIENT
Start: 2019-06-04 | End: 2019-07-10

## 2019-06-04 RX ORDER — ALPRAZOLAM 0.5 MG/1
0.5 TABLET ORAL 3 TIMES DAILY PRN
Qty: 60 TABLET | Refills: 5 | Status: SHIPPED | OUTPATIENT
Start: 2019-06-04 | End: 2019-12-03 | Stop reason: SDUPTHER

## 2019-06-04 NOTE — PROGRESS NOTES
6/4/2019    Kathy Seymour  Office Note    Chief Complaint   Patient presents with    Follow-up    Apnea       HPI:       June 4, 2019- freq nasal stuffy,uses saline.  Has cpap  6 , last 2017 cpap titration had no rem sleep so rec was 10-20 with study done 8-12.  Pt has nasal stuffy freq uses saline.      Has some bingham.        12/17/18- has older machine states not working correctly. Using nightly, has day time drowsiness. Insurance states having to wait for next machine. Sleep is poor. Has new nasal face mask. Has tried nasal pillars, nasal pillow, and now has full nose mask. Nervous over full face mask causing clausaphobia.   Sensation of throat closing, Not using Anoro daily.  Patient Instructions   Anoro is a daily medication intended to prevent breathing problems  Use Albuterol inhaler 1-2 puffs every 4 hours when needed for Shortness of breath      A full Face mask is needed to help with Obstructive sleep apnea due to sleeping with mouth open.  9/24/18- states had sleep titration study but never heard anything from it. Insurance denied automatic titration machine, states was not told she had COPD. Request a different face mask. States SOB with activity every day for a few minutes, day time only, relieved with rest. States does not use albuterol inhaler feels it does not help.  Has stopped smoking five years ago.     Previous HPI Dr. Jordan:  July 10, 2017-on cymbalta- helps neuropathy but having poor sleep  With nocturia ppt arousal.  Sleep study done with no rem sleep on cymbalta?  10-20 pressure rather than 6.          2/27/2017HPI: dx osas 10 yrs ago, ahi na.  On nasal pillars- cpap 6, new machine 5 yrs ago.  Gained 45 post stent around 2012.       Prior to cpap, severe snorer, fatigue, with no other prominent symptoms recalled.       Now no snoring noted with cpap. No nasal obstruction.     Mentation a little slow but vague.      The chief compliant  problem is stable  PFSH:  Past Medical History:    Diagnosis Date    Angina pectoris     Anticoagulant long-term use     Anxiety     Arthritis     Back pain     Cataract     Chronic bronchitis     Coronary artery disease     STENT X 2    CTS (carpal tunnel syndrome)     RIGHT    Depression     MILLER (dyspnea on exertion) 2017    Hallux valgus, acquired, bilateral 2017    Hyperlipidemia     Hypertension     Hypertensive left ventricular hypertrophy, without heart failure 2017    Hypothyroidism     Obesity     Polyneuropathy     S/P coronary artery stent placement, 2 LETTY 2012, 1 LETTY 2013 3/5/2013    Sleep apnea     USES CPAP    Thyroid disease     Tobacco dependence     Trouble in sleeping     Urinary incontinence     Wears glasses     ONE CONTAC         Past Surgical History:   Procedure Laterality Date    APPENDECTOMY      BILATERAL SALPINGOOPHORECTOMY      CARDIAC CATHETERIZATION      CORONARY ANGIOPLASTY      CORONARY STENT PLACEMENT      PCI  of the LAD with LETTY    EYE SURGERY      bilat cataract removal    HYSTERECTOMY      complete    OOPHORECTOMY      RELEASE, CARPAL TUNNEL Right 2014    [facility] at [facility]    TONSILLECTOMY       Social History     Tobacco Use    Smoking status: Former Smoker     Packs/day: 1.00     Years: 50.00     Pack years: 50.00     Types: Cigarettes     Start date: 1964     Last attempt to quit: 2012     Years since quittin.8    Smokeless tobacco: Never Used   Substance Use Topics    Alcohol use: Yes     Alcohol/week: 1.2 oz     Types: 2 Shots of liquor per week     Comment: Social - vodka on wednesday    Drug use: No     Family History   Problem Relation Age of Onset    Hypertension Sister     Arthritis Sister     Heart failure Mother     Hypertension Mother     Heart failure Father     Hypertension Father     No Known Problems Brother     No Known Problems Son     Heart disease Brother     Arthritis Sister     Hypertension  "Sister     Cancer Sister     Asthma Son     Arthritis Son         psoriatic arthritis     Review of patient's allergies indicates:   Allergen Reactions    Wellbutrin [bupropion hcl] Other (See Comments)     sven     I have reviewed past medical, family, and social history. I have reviewed previous nurse notes.    Performance Status:The patient's activity level is no limits with regular activity.      Review of Systems   Constitutional: Negative for activity change, appetite change, chills, diaphoresis, fever and unexpected weight change. Positive for daytime drowsiness  HENT: Negative for dental problem, postnasal drip, rhinorrhea, sinus pressure, sinus pain, sneezing, sore throat, trouble swallowing and voice change.    Respiratory: Negative for apnea, cough, chest tightness, shortness of breath, wheezing and stridor.    Cardiovascular: Negative for chest pain, palpitations and leg swelling.   Gastrointestinal: Negative for abdominal distention, abdominal pain, constipation and nausea.   Musculoskeletal: Negative for gait problem, myalgias and neck pain.   Skin: Negative for color change and pallor.   Allergic/Immunologic: Negative for environmental allergies and food allergies.   Neurological: Negative for dizziness, speech difficulty, weakness, light-headedness, numbness and headaches.   Hematological: Negative for adenopathy. Does not bruise/bleed easily.   Psychiatric/Behavioral: Negative for dysphoric mood and sleep disturbance. The patient is not nervous/anxious.           Exam:Comprehensive exam done. /65 (BP Location: Left arm, Patient Position: Sitting)   Pulse 81   Ht 5' 5" (1.651 m)   Wt 81.1 kg (178 lb 12.7 oz)   SpO2 (!) 94% Comment: on room air  BMI 29.75 kg/m²   Exam included Vitals as listed  Constitutional:  oriented to person, place, and time. appears well-developed. No distress.   Nose: Nose normal.   Mouth/Throat: Uvula is midline, oropharynx is clear and moist and mucous " membranes are normal. No dental caries. No oropharyngeal exudate, posterior oropharyngeal edema, posterior oropharyngeal erythema or tonsillar abscesses.  Mallapatti (M) score 3  Eyes: Pupils are equal, round, and reactive to light.   Neck: No JVD present. No thyromegaly present.   Cardiovascular: Normal rate, regular rhythm and normal heart sounds. Exam reveals no gallop and no friction rub.   No murmur heard.  Pulmonary/Chest: Effort normal and breath sounds normal. No accessory muscle usage or stridor. No apnea and no tachypnea. No respiratory distress. He has no decreased breath sounds. He has no wheezes. He has no rhonchi. He has no rales. He exhibits no tenderness.   Abdominal: Soft. He exhibits no mass. There is no tenderness. No hepatosplenomegaly, hernias and normoactive bowel sounds  Musculoskeletal: Normal range of motion. He exhibits no edema.   Lymphadenopathy:     He has no cervical adenopathy.     He has no axillary adenopathy.   Neurological: He is alert and oriented to person, place, and time. He is not disoriented.   Skin: Skin is warm and dry.   No cyanosis or erythema. No pallor. Nails show no clubbing.   Psychiatric: He has a normal mood and affect. His behavior is normal. Judgment and thought content normal.       Radiographs (ct chest and cxr) reviewed: results reviewed   The mediastinal and cardiac size and contours are normal. The diaphragms and pleura are clear. There are no intra-pulmonary masses or infiltrates. There is no pneumothorax or pleural effusion.    Impression-Negative chest.      Electronically signed by:Hernán Mojica MD  Date: 03/26/14  Time:10:40     Labs reviewed  Lab Results   Component Value Date    WBC 7.63 03/12/2019    RBC 4.37 03/12/2019    HGB 12.6 03/12/2019    HCT 41.4 03/12/2019    MCV 95 03/12/2019    MCH 28.8 03/12/2019    MCHC 30.4 (L) 03/12/2019    RDW 14.3 03/12/2019     03/12/2019    MPV 11.4 03/12/2019    GRAN 4.9 03/12/2019    GRAN 63.7 03/12/2019     LYMPH 1.9 03/12/2019    LYMPH 25.2 03/12/2019    MONO 0.6 03/12/2019    MONO 8.1 03/12/2019    EOS 0.2 03/12/2019    BASO 0.02 03/12/2019    EOSINOPHIL 2.4 03/12/2019    BASOPHIL 0.3 03/12/2019       PFT results reviewed  Pulmonary Functions Testing Results:  Procedure Notes     Author Status Last  Updated Created   Tayo Jordan MD Signed Tayo Jordan MD 7/19/2017  2:38 PM 7/19/2017  2:35 PM          Assoc. Orders Procedures   None COMPLETE PFT WITH BRONCHODILATOR       Pre-Op Dx Post-Op Dx   Bronchitis None          Pulmonary Functions, including spirometry and bronchodilator response and lung volumes and diffusion, study was done July 28, 2017.  Spirometry shows loss of vital capacity and fev1 with no obstruction and no bronchodilator response.   FEV1 is 71% or 1.65 liters.  Lung volumes show  normal TLC and elevated residual volume.  Diffusion shows reduced but falls within normal range when corrected for lung volumes.     Pulmonary functions show low vital capacity and low mvv with some air trapping,but rest is wnl. Clinical correlation recommended.     Tayo Jordan M.D.               Plan:  Clinical impression is apparently straight forward and impression with management as below.    Kathy was seen today for follow-up and apnea.    Diagnoses and all orders for this visit:    Obstructive sleep apnea syndrome    Anxiety  Comments:  Controlled, refill of Xanax given.  Orders:  -     ALPRAZolam (XANAX) 0.5 MG tablet; Take 1 tablet (0.5 mg total) by mouth 3 (three) times daily as needed.    Chronic sinus complaints  -     fluticasone propionate (FLONASE) 50 mcg/actuation nasal spray; 1 spray (50 mcg total) by Each Nare route once daily.    COPD mixed type  -     budesonide-formoterol 160-4.5 mcg (SYMBICORT) 160-4.5 mcg/actuation HFAA; Inhale 2 puffs into the lungs every 12 (twelve) hours. Controller        Follow up in about 6 months (around 12/4/2019).    Discussed with patient above for education  the following:      Patient Instructions   Your ahi was 48 in 2017- fairly severe.    Nasal patency needed- use saline (afrin if stuffy) then flonase once open.    If nasal obstructs will need to open mouth - mouth leaks will make machine cycles.      Would use auto pap 8-12- numbers found to be adequate in 2017    Would try nasal oral mask in future.  Work on nose.    Exercise would likely be good.      Try symbicort 2 twice daily  - in place anoro.

## 2019-06-04 NOTE — PATIENT INSTRUCTIONS
Your ahi was 48 in 2017- fairly severe.    Nasal patency needed- use saline (afrin if stuffy) then flonase once open.    If nasal obstructs will need to open mouth - mouth leaks will make machine cycles.      Would use auto pap 8-12- numbers found to be adequate in 2017    Would try nasal oral mask in future.  Work on nose.    Exercise would likely be good.      Try symbicort 2 twice daily  - in place anoro.

## 2019-06-04 NOTE — TELEPHONE ENCOUNTER
Orders faxed  ----- Message from Gerardo Corrales sent at 6/4/2019  1:35 PM CDT -----  Contact: [facility]/Veronica Martin called in regarding the attached patient & stated patient came to office but cannot find her Rx for her pressure change.  Veronica would like a new Rx faxed to her at [phone] & call back number is [phone]

## 2019-06-05 NOTE — TELEPHONE ENCOUNTER
Phoned patient informed her prescription sent to the pharmacy. Patient verbally voiced understanding.

## 2019-06-06 ENCOUNTER — HOSPITAL ENCOUNTER (OUTPATIENT)
Dept: RADIOLOGY | Facility: HOSPITAL | Age: 69
Discharge: HOME | End: 2019-06-06
Attending: UROLOGY
Payer: MEDICARE

## 2019-06-06 DIAGNOSIS — R31.29 MICROHEMATURIA: ICD-10-CM

## 2019-06-06 DIAGNOSIS — R35.0 FREQUENT URINATION: ICD-10-CM

## 2019-06-06 PROCEDURE — 25500020 PHARM REV CODE 255: Mod: HCNC | Performed by: UROLOGY

## 2019-06-06 PROCEDURE — 74178 CT ABD&PLV WO CNTR FLWD CNTR: CPT | Mod: TC,HCNC

## 2019-06-06 PROCEDURE — 74178 CT UROGRAM ABD PELVIS W WO: ICD-10-PCS | Mod: 26,HCNC,, | Performed by: RADIOLOGY

## 2019-06-06 PROCEDURE — 74178 CT ABD&PLV WO CNTR FLWD CNTR: CPT | Mod: 26,HCNC,, | Performed by: RADIOLOGY

## 2019-06-06 RX ADMIN — IOHEXOL 125 ML: 350 INJECTION, SOLUTION INTRAVENOUS at 04:06

## 2019-06-14 ENCOUNTER — PATIENT MESSAGE (OUTPATIENT)
Dept: SURGERY | Facility: AMBULARY SURGERY CENTER | Age: 69
End: 2019-06-14

## 2019-07-01 ENCOUNTER — TELEPHONE (OUTPATIENT)
Dept: UROGYNECOLOGY | Facility: CLINIC | Age: 69
End: 2019-07-01

## 2019-07-01 ENCOUNTER — TELEPHONE (OUTPATIENT)
Dept: UROLOGY | Facility: CLINIC | Age: 69
End: 2019-07-01

## 2019-07-01 ENCOUNTER — HOSPITAL ENCOUNTER (OUTPATIENT)
Facility: AMBULARY SURGERY CENTER | Age: 69
Discharge: HOME | End: 2019-07-01
Attending: UROLOGY | Admitting: UROLOGY
Payer: MEDICARE

## 2019-07-01 DIAGNOSIS — R31.29 MICROHEMATURIA: ICD-10-CM

## 2019-07-01 DIAGNOSIS — R35.0 FREQUENT URINATION: ICD-10-CM

## 2019-07-01 LAB
BACTERIA SPEC CULT: NORMAL
BILIRUB SERPL-MCNC: NORMAL MG/DL
BLOOD URINE, POC: NORMAL
CASTS: NORMAL
COLOR, POC UA: NORMAL
CRYSTALS: NORMAL
GLUCOSE UR QL STRIP: NORMAL
KETONES UR QL STRIP: NORMAL
LEUKOCYTE ESTERASE URINE, POC: NORMAL
NITRITE, POC UA: NORMAL
PH, POC UA: 7
PROTEIN, POC: NORMAL
RBC CELLS COUNTED: NORMAL
SPECIFIC GRAVITY, POC UA: 1
UROBILINOGEN, POC UA: NORMAL
WHITE BLOOD CELLS: NORMAL

## 2019-07-01 PROCEDURE — 52000 CYSTOURETHROSCOPY: CPT | Mod: ,,, | Performed by: UROLOGY

## 2019-07-01 PROCEDURE — 87088 URINE BACTERIA CULTURE: CPT | Mod: HCNC

## 2019-07-01 PROCEDURE — 87077 CULTURE AEROBIC IDENTIFY: CPT | Mod: HCNC

## 2019-07-01 PROCEDURE — 87186 SC STD MICRODIL/AGAR DIL: CPT | Mod: HCNC

## 2019-07-01 PROCEDURE — 52000 CYSTOURETHROSCOPY: CPT | Performed by: UROLOGY

## 2019-07-01 PROCEDURE — 52000 PR CYSTOURETHROSCOPY: ICD-10-PCS | Mod: ,,, | Performed by: UROLOGY

## 2019-07-01 PROCEDURE — 87086 URINE CULTURE/COLONY COUNT: CPT | Mod: HCNC

## 2019-07-01 RX ORDER — NITROFURANTOIN (MACROCRYSTALS) 100 MG/1
100 CAPSULE ORAL EVERY 12 HOURS
Qty: 10 CAPSULE | Refills: 2 | Status: SHIPPED | OUTPATIENT
Start: 2019-07-01 | End: 2019-07-04

## 2019-07-01 RX ORDER — LIDOCAINE HYDROCHLORIDE 20 MG/ML
JELLY TOPICAL
Status: DISCONTINUED | OUTPATIENT
Start: 2019-07-01 | End: 2019-07-01 | Stop reason: HOSPADM

## 2019-07-01 RX ORDER — WATER 1 ML/ML
IRRIGANT IRRIGATION
Status: DISCONTINUED | OUTPATIENT
Start: 2019-07-01 | End: 2019-07-01 | Stop reason: HOSPADM

## 2019-07-01 NOTE — H&P
[facility] H&P Note - Fran  Staff: MD Billie    Referring provider and please cc:     Ro Wise MD  [address] [place] [address] [address]  [address]  [place] [address]     PCP: James H Newcomb Jr, MD [facility]: active    Chief Complaint:   No chief complaint on file.        Subjective:        HPI: Kathy Seymour is a 68 y.o. female presents with     She has been having OAB, voiding q30 min to 1 hour for the past years but increasing over the past year. Also have mixed incontinence but UUI>JUD. H/o bladder lift with hysteretomy . . On hctz. Wears 2-3 pads a day. 1 large cup of coffee in the am. Has never tried an OAB med. occ constipaton.     She does have a h/o UTI's about 1 a year or less and sx include dysuria, frequency, lower abdominal pain she thinks associated with her arthritis and inability to completely wipe. But none since dec 2018.     She did have mod blood in her urine when I saw her in consult on 19. No h/o GH. 1/2 siblings with stones. Father had stones. No personal hx of stones. 50 pack year hx of smoking but quit 5 years ago. On asa 81mg daily. +hysterectomy for metromenorrhagia. Last pap smear  by , which was normal and none since. Started on myrbetriq for oab sx but was also found to have + cx.     On low carb diet- lost 10 pounds.       workup includes:   Ct urogram 19 - nothing to explian . +lung noduels- pcp to follow  Urine cytology 19 -  ngative  Urine culture - e.coli, treated  [facility]    ECOG Status: 0    Urinalysis void: tr bld  Urine history:   19 E.coli, void: mod blood, asx but treated, cytology: neg  12/10/18 E.coli, void: n/a  10/20/17 No cx, void: nit+/2+bld/1+leuk  17  E.coli, void: tr bld  16  No cx, void:1+bld/nit+  13  No cx, void: 250 bld/wbc+  12 No cx, void: lrg bld/nit+/1+leuk  08  No cx, void: sml blood/nit+     G2, P 2, vaginal or , + hysterectomy. Gross  HematuriaNo.       SHAWNA 6:   Total Score: 10  Bother score: 6  Quality of Life: 4 mostly dissatisfied       REVIEW OF SYSTEMS:  General ROS: no fevers, no chills  Psychological ROS: no depression  Endocrine ROS: no heat or cold  Respiratory ROS: no SOB  Cardiovascular ROS: no CP  Gastrointestinal ROS: no abdominal pain, no constipation, no diarrhea, noBRBPR  Musculoskeletal ROS: no muscle pain  Neurological ROS: no headaches  Dermatological ROS: no rashes  HEENT: noglasses, no sinus   ROS: per HPI     PMHx:  Past Medical History:   Diagnosis Date    Angina pectoris     Anticoagulant long-term use     Anxiety     Arthritis     Back pain     Cataract     Chronic bronchitis     Coronary artery disease     STENT X 2    CTS (carpal tunnel syndrome)     RIGHT    Depression     MILLER (dyspnea on exertion) 2/21/2017    Hallux valgus, acquired, bilateral 1/19/2017    Hematuria     Hyperlipidemia     Hypertension     Hypertensive left ventricular hypertrophy, without heart failure 5/22/2017    Hypothyroidism     Obesity     Polyneuropathy     S/P coronary artery stent placement, 2 LETTY 9/2012, 1 LETTY 9/2013 3/5/2013    Sleep apnea     USES CPAP    Thyroid disease     Tobacco dependence     Trouble in sleeping     Urinary incontinence     Wears glasses     ONE CONTAC     Kidney stones: No    PSHx:  Past Surgical History:   Procedure Laterality Date    APPENDECTOMY      BILATERAL SALPINGOOPHORECTOMY      CARDIAC CATHETERIZATION      CORONARY ANGIOPLASTY      CORONARY STENT PLACEMENT      PCI  of the LAD with LETTY    EYE SURGERY      bilat cataract removal    HYSTERECTOMY      complete    OOPHORECTOMY      RELEASE, CARPAL TUNNEL Right 4/2/2014    [facility]    TONSILLECTOMY           Stents/Valves/Foreign Bodies: 3 stents, last one 2013 (previously on plavix)  Cardiac Evaluation/Cardiologist:  for hrt dz, due in June   Gastroenterologist/colonoscopy status:  Dr.Pearce michael, none within last 5 years    Gynecologist/Pap Status: none since   Pulmonologist:, hx of RAMONA. Uses cpap     Fam Hx:   malignancies: No , gyn malignancies: Yes - 1/2 sister with brest cancer . 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 No wheezing.   Cardiovascular: No obvious extremity edema. Warm and well perfused.   GI: no palpation of masses. No tenderness. No hepatosplenomegaly to palpation.  Musculoskeletal: normal range of motion of bilateral upper extremities. Normal muscle strength and tone.  Skin: no obvious rashes or lesions. No tightening of skin noted.  Neurologic: CN grossly normal. Normal sensation.   Psychiatric: awake, alert and oriented x 3. Mood and affect normal. Cooperative.    Pelvic exam  Deferred    LABS REVIEW:    Lab Results   Component Value Date    WBC 7.63 2019    HGB 12.6 2019    HCT 41.4 2019    MCV 95 2019     2019     BMP  Lab Results   Component Value Date     2019    K 3.9 2019     2019    CO2 30 (H) 2019    BUN 22 2019    CREATININE 1.0 2019    CALCIUM 9.6 2019    ANIONGAP 7 (L) 2019    ESTGFRAFRICA >60 2019    EGFRNONAA 57 (A)  06/06/2019       Lab Results   Component Value Date    HGBA1C 5.8 (H) 01/09/2018       Pertinent urologic PATHOLOGY REVIEW:  URINE CYTOLOGY 5/30/19:  NO HIGH GRADE UROTHELIAL NEOPLASIA IDENTIFIED ([facility] SYSTEM)    Pertinent Urologic RADIOGRAPHIC REVIEW:  ctu 5/30/19  1. No obvious explanation for the patient's hematuria is noted.  No obvious intrarenal, intraureteral, or intravesicular stones are noted.  2. A moderate quantity of stool is noted within the colon along with multiple diverticulum without obvious inflammation noted, the colon is not well distended or evaluated however.  3. Decreased liver density as can be seen with fatty infiltration of the liver.  4. Multiple pulmonary nodules stable since 03/19/2019.  Follow-up in 6 months was suggested at that time    Assessment:       1. Frequent urination    2. Microhematuria          Plan:     She is here to see  Me for oab and mixed incontinence that has been progressively worsening over the past few months. However she has microhematuria w/o obvious evidence of uti and also has strong history of smoking. Will proceed with  workup and tx for oab at the same time. Also had +cx. Currently on myrbetriq     workup includes:   Ct urogram 6/6/19 - nothing to explian . +lung noduels- pcp to follow  Urine cytology 5/30/19 -  ngative  Urine culture - e.coli, treated  Cystoscopy today at [facility]    For her OAB will proceed with tx for now with meds  -cont myrbetriq 50mg once daily  -try for 4 to 6 weeks  -given list if too expensive.    This patient has been cleared for surgery in ambulatory surgical facility.             [unfilled]  Orders Placed This Encounter   Procedures    Place in Outpatient     Standing Status:   Standing     Number of Occurrences:   1     Order Specific Question:   Diagnosis     Answer:   Frequent urination [[id number]]    Case Request Operating Room: CYSTOSCOPY     Order Specific Question:   CPT Code:     Answer:   [place] CYSTOURETHROSCOPY [67762]      Order Specific Question:   Medical Necessity:     Answer:   Medically Non-Urgent [100]      PLACE IN OUTPATIENT  IP VTE LOW RISK PATIENT      Ro Wise MD

## 2019-07-01 NOTE — OP NOTE
[facility] Procedure Note  Date: 07/01/2019    Procedure:   1. Flexible cysto-uretheroscopy    Pre Procedure Diagnosis:microhematuria    Post Procedure Diagnosis: same, see below for findings    Surgeon: Ro Wise MD    Specimen: urine from cystoscope sent for urine culture    Anesthesia: 2% uro-jet lidocaine jelly for local analgesia    Indications: Kathy Seymour is a 68 y.o. female  With above pre-procedure diagnosis. Urine reviewed. H&P reviewed.     Procedure in detail:   Flexible cysto-urethroscopy was performed after consent was obtained.  The risks and benefits were explained.    2% lidocaine urojet was used for local analgesia.  The genitalia was prepped and draped in the sterile fashion with betadine.    The flexible scope was advanced into the urethra and into the bladder.  Bilateral ureteral orifice were evaluated and noted to be normal with clear efflux.  The bladder was completely surveyed in a systematic fashion and the scope was retroflexed.    Cystoscopy findings as below in findings.   No strictures were noted.       The patient tolerated the procedure well without complication.      Findings: (pictures were  uploaded into media)  The patient does cystitis with some evidence of recent infection although she was never actually treated for the E coli UTI that following her last culture.  She had no tumors.  She had nothing suspicious for malignancy.  Her vaginal exam revealed stage 2 rectocele.  Below this she had a dimple I suspect from previous episiotomy although she does not remember having this done.  She has had a bladder lift many years ago at the time hysterectomy.    Assesment:  Chicho had micro hematuria and overactive bladder with a history of smoking, also has recurrent urinary tract infections that are mostly asymptomatic.  She has not been treated for her UTI and would like to treat her 1st before starting any overactive bladder medication.  Her cystoscopy  today showed recent UTI which she never was treated for.  She states that she has some fecal smear and also wears pads.  I have encouraged her to avoid pads if possible.  She states the rectocele is a little bit bothersome.    Plan:   Take Macrobid twice a day for 5 days - last culture sensitive to Macrobid  Follow-up urine from cystoscope  Only start oxybutynin/Ditropan if overactive bladder symptoms do not resolve  Follow-up in 2 months with Verenice to review oab symptoms  Follow-up with me in 1 year for micro hematuria and history of smoking.  We went over reasons for micro hematuria and explained that her likely from UTI.  Follow-up with Dr. Doan for rectocele      Ro Wise MD

## 2019-07-01 NOTE — TELEPHONE ENCOUNTER
----- Message from Ro Wise MD sent at 7/1/2019  2:20 PM CDT -----  Follow-up in 1 year with me  Follow-up in 2 months with Verenice for overactive bladder and micro hematuria

## 2019-07-01 NOTE — DISCHARGE INSTRUCTIONS
Take Macrobid twice a day for 5 days  Only start oxybutynin/Ditropan if overactive bladder symptoms do not resolve  Follow-up in 2 months with Verenice to review oab symptoms  Follow-up with me in 1 year for micro hematuria  Follow-up with Dr. Ash for rectocele         After the procedure    · Drink plenty of fluids.  · You may have burning or light bleeding when you urinate--this is normal.  · Medications may be prescribed to ease any discomfort or prevent infection. Take these as directed.  · Call your doctor if you have heavy bleeding or blood clots, burning that lasts more than a day, a fever over 100°F  (38° C), or trouble urinating.        Before leaving, please make sure you have all your personal belongings such as glasses, purses, wallets, keys, cell phones, jewelry, jackets etc

## 2019-07-01 NOTE — DISCHARGE SUMMARY
[facility]  Urology  Discharge Note - Short Stay      Patient Name: Kathy Seymour  MRN: [id number]  Discharge Date and Time:  07/01/2019 2:22 PM  Attending Physician: Ro Wise,*   Discharging Provider: Ro Wise MD  Primary Care Physician: Jersey Seymour Jr, MD    Final Active Diagnoses:    Diagnosis Date Noted POA    Frequent urination [R35.0] 07/01/2019 Yes      Problems Resolved During this Admission:       Final Diagnoses: Same as principal problem.    Hospital Course: Patient was admitted for an outpatient procedure and tolerated the procedure well with no complications.*    Procedure(s) (LRB):  CYSTOSCOPY (N/A)     Indwelling Lines/Drains at time of discharge:   Lines/Drains/Airways          None          Discharged Condition: good    Disposition: home    Follow Up:      Patient Instructions:   No discharge procedures on file.    Medications:  Reconciled Home Medications:      Medication List      START taking these medications    nitrofurantoin 100 MG capsule  Commonly known as:  MACRODANTIN  Take 1 capsule (100 mg total) by mouth every 12 (twelve) hours. for 5 doses        CONTINUE taking these medications    albuterol 90 mcg/actuation inhaler  Commonly known as:  PROVENTIL/VENTOLIN HFA  Inhale 2 puffs into the lungs every 6 (six) hours as needed for Wheezing or Shortness of Breath. Rescue     ALPRAZolam 0.5 MG tablet  Commonly known as:  XANAX  Take 1 tablet (0.5 mg total) by mouth 3 (three) times daily as needed.     aspirin 81 MG Chew  Take 1 tablet (81 mg total) by mouth once daily.     atorvastatin 40 MG tablet  Commonly known as:  LIPITOR  Take 1 tablet (40 mg total) by mouth once daily. Need office visit before next refill, last seen 5/2018. LAST Rx if visit is not made.     budesonide-formoterol 160-4.5 mcg 160-4.5 mcg/actuation Hfaa  Commonly known as:  SYMBICORT  Inhale 2 puffs into the lungs every 12 (twelve) hours. Controller     DULoxetine 60  MG capsule  Commonly known as:  CYMBALTA  Take 1 capsule (60 mg total) by mouth once daily.     fluticasone propionate 50 mcg/actuation nasal spray  Commonly known as:  FLONASE  1 spray (50 mcg total) by Each Nare route once daily.     losartan-hydrochlorothiazide 100-25 mg 100-25 mg per tablet  Commonly known as:  HYZAAR  TAKE 1 TABLET EVERY DAY     methIMAzole 5 MG Tab  Commonly known as:  TAPAZOLE  Take 1 tablet (5 mg total) by mouth 2 (two) times daily.     naproxen 500 MG tablet  Commonly known as:  NAPROSYN  Take 1 tablet (500 mg total) by mouth 2 (two) times daily with meals.     omeprazole 20 MG capsule  Commonly known as:  PRILOSEC  Take 1 capsule (20 mg total) by mouth once daily.     oxybutynin 10 MG 24 hr tablet  Commonly known as:  DITROPAN-XL  Take 1 tablet (10 mg total) by mouth once daily.     vitamin D 1000 units Tab  Commonly known as:  VITAMIN D3  Take by mouth once daily.            Discharge Procedure Orders (must include Diet, Follow-up, Activity):  No discharge procedures on file.         Ro Wise MD  Urology  [facility]

## 2019-07-01 NOTE — TELEPHONE ENCOUNTER
----- Message from Ro Wise MD sent at 7/1/2019  2:19 PM CDT -----  Patient has a symptomatic rectocele  Needs new patient appointment e

## 2019-07-02 VITALS
DIASTOLIC BLOOD PRESSURE: 70 MMHG | HEIGHT: 65 IN | TEMPERATURE: 98 F | HEART RATE: 76 BPM | OXYGEN SATURATION: 98 % | WEIGHT: 178 LBS | BODY MASS INDEX: 29.66 KG/M2 | SYSTOLIC BLOOD PRESSURE: 143 MMHG | RESPIRATION RATE: 18 BRPM

## 2019-07-02 NOTE — TELEPHONE ENCOUNTER
----- Message from Emily Hanson LPN sent at 7/1/2019  4:51 PM CDT -----  Left message for a return call to schedule appt with dr winslow.      Documentation     You  Kathy Seymour 2 hours ago (2:37 PM)    You 2 hours ago (2:37 PM)          ----- Message from Ro Wise MD sent at 7/1/2019  2:19 PM CDT -----  Patient has a symptomatic rectocele  Needs new patient appointment e         Documentation

## 2019-07-03 ENCOUNTER — PATIENT MESSAGE (OUTPATIENT)
Dept: UROGYNECOLOGY | Facility: CLINIC | Age: 69
End: 2019-07-03

## 2019-07-04 LAB — BACTERIA UR CULT: ABNORMAL

## 2019-07-10 ENCOUNTER — INITIAL CONSULT (OUTPATIENT)
Dept: UROGYNECOLOGY | Facility: CLINIC | Age: 69
End: 2019-07-10
Payer: MEDICARE

## 2019-07-10 VITALS
HEIGHT: 65 IN | SYSTOLIC BLOOD PRESSURE: 132 MMHG | BODY MASS INDEX: 29.9 KG/M2 | HEART RATE: 102 BPM | DIASTOLIC BLOOD PRESSURE: 74 MMHG | WEIGHT: 179.44 LBS

## 2019-07-10 DIAGNOSIS — N81.11 CYSTOCELE, MIDLINE: ICD-10-CM

## 2019-07-10 DIAGNOSIS — R35.0 URINARY FREQUENCY: Primary | ICD-10-CM

## 2019-07-10 DIAGNOSIS — N81.83 INCOMPETENCE OF RECTOVAGINAL TISSUE: ICD-10-CM

## 2019-07-10 LAB
BILIRUB SERPL-MCNC: ABNORMAL MG/DL
BLOOD URINE, POC: ABNORMAL
COLOR, POC UA: YELLOW
GLUCOSE UR QL STRIP: ABNORMAL
KETONES UR QL STRIP: ABNORMAL
LEUKOCYTE ESTERASE URINE, POC: ABNORMAL
NITRITE, POC UA: ABNORMAL
PH, POC UA: 6
PROTEIN, POC: ABNORMAL
SPECIFIC GRAVITY, POC UA: 1.01
UROBILINOGEN, POC UA: ABNORMAL

## 2019-07-10 PROCEDURE — 99999 PR PBB SHADOW E&M-EST. PATIENT-LVL IV: ICD-10-PCS | Mod: PBBFAC,HCNC,, | Performed by: OBSTETRICS & GYNECOLOGY

## 2019-07-10 PROCEDURE — 99203 PR OFFICE/OUTPT VISIT, NEW, LEVL III, 30-44 MIN: ICD-10-PCS | Mod: HCNC,S$GLB,, | Performed by: OBSTETRICS & GYNECOLOGY

## 2019-07-10 PROCEDURE — 3075F SYST BP GE 130 - 139MM HG: CPT | Mod: HCNC,CPTII,S$GLB, | Performed by: OBSTETRICS & GYNECOLOGY

## 2019-07-10 PROCEDURE — 81002 POCT URINE DIPSTICK WITHOUT MICROSCOPE: ICD-10-PCS | Mod: HCNC,S$GLB,, | Performed by: OBSTETRICS & GYNECOLOGY

## 2019-07-10 PROCEDURE — 1101F PR PT FALLS ASSESS DOC 0-1 FALLS W/OUT INJ PAST YR: ICD-10-PCS | Mod: HCNC,CPTII,S$GLB, | Performed by: OBSTETRICS & GYNECOLOGY

## 2019-07-10 PROCEDURE — 99999 PR PBB SHADOW E&M-EST. PATIENT-LVL IV: CPT | Mod: PBBFAC,HCNC,, | Performed by: OBSTETRICS & GYNECOLOGY

## 2019-07-10 PROCEDURE — 3078F PR MOST RECENT DIASTOLIC BLOOD PRESSURE < 80 MM HG: ICD-10-PCS | Mod: HCNC,CPTII,S$GLB, | Performed by: OBSTETRICS & GYNECOLOGY

## 2019-07-10 PROCEDURE — 99203 OFFICE O/P NEW LOW 30 MIN: CPT | Mod: HCNC,S$GLB,, | Performed by: OBSTETRICS & GYNECOLOGY

## 2019-07-10 PROCEDURE — 3075F PR MOST RECENT SYSTOLIC BLOOD PRESS GE 130-139MM HG: ICD-10-PCS | Mod: HCNC,CPTII,S$GLB, | Performed by: OBSTETRICS & GYNECOLOGY

## 2019-07-10 PROCEDURE — 3078F DIAST BP <80 MM HG: CPT | Mod: HCNC,CPTII,S$GLB, | Performed by: OBSTETRICS & GYNECOLOGY

## 2019-07-10 PROCEDURE — 1101F PT FALLS ASSESS-DOCD LE1/YR: CPT | Mod: HCNC,CPTII,S$GLB, | Performed by: OBSTETRICS & GYNECOLOGY

## 2019-07-10 PROCEDURE — 81002 URINALYSIS NONAUTO W/O SCOPE: CPT | Mod: HCNC,S$GLB,, | Performed by: OBSTETRICS & GYNECOLOGY

## 2019-07-10 RX ORDER — OXYBUTYNIN CHLORIDE 15 MG/1
15 TABLET, EXTENDED RELEASE ORAL DAILY
Qty: 30 TABLET | Refills: 12 | Status: SHIPPED | OUTPATIENT
Start: 2019-07-10 | End: 2020-07-30 | Stop reason: SDUPTHER

## 2019-07-10 NOTE — PROGRESS NOTES
Subjective:      Patient ID: Kathy Seymour is a 68 y.o. female.    Chief Complaint:  Retocele      History of Present Illness  68-year-old para 2 history of hysterectomy unclear etiology under the care of Urology for over a B and recurrent UTI.  She is here for evaluation of a stage II rectocele as          Findings from cystoscopy and there is Assessment from last note below    The patient does cystitis with some evidence of recent infection although she was never actually treated for the E coli UTI that following her last culture.  She had no tumors.  She had nothing suspicious for malignancy.  Her vaginal exam revealed stage 2 rectocele.  Below this she had a dimple I suspect from previous episiotomy although she does not remember having this done.  She has had a bladder lift many years ago at the time hysterectomy.     Assesment:  Chicho had micro hematuria and overactive bladder with a history of smoking, also has recurrent urinary tract infections that are mostly asymptomatic.  She has not been treated for her UTI and would like to treat her 1st before starting any overactive bladder medication.  Her cystoscopy today showed recent UTI which she never was treated for.  She states that she has some fecal smear and also wears pads.  I have encouraged her to avoid pads if possible.  She states the rectocele is a little bit bothersome.          Past Medical History:   Diagnosis Date    Angina pectoris     Anticoagulant long-term use     Anxiety     Arthritis     Back pain     Cataract     Chronic bronchitis     Coronary artery disease     STENT X 2    CTS (carpal tunnel syndrome)     RIGHT    Depression     MILLER (dyspnea on exertion) 2/21/2017    Hallux valgus, acquired, bilateral 1/19/2017    Hematuria     Hyperlipidemia     Hypertension     Hypertensive left ventricular hypertrophy, without heart failure 5/22/2017    Hypothyroidism     Obesity     Polyneuropathy     S/P coronary artery  stent placement, 2 LETTY 2012, 1 LETTY 2013 3/5/2013    Sleep apnea     USES CPAP    Thyroid disease     Tobacco dependence     Trouble in sleeping     Urinary incontinence     Wears glasses     ONE CONTAC       Past Surgical History:   Procedure Laterality Date    APPENDECTOMY      BILATERAL SALPINGOOPHORECTOMY      CARDIAC CATHETERIZATION      CORONARY ANGIOPLASTY      CORONARY STENT PLACEMENT      PCI  of the LAD with LETTY    CYSTOSCOPY N/A 2019    [facility]    EYE SURGERY      bilat cataract removal    HYSTERECTOMY      complete    OOPHORECTOMY      RELEASE, CARPAL TUNNEL Right 2014    Per[facility] by At[facility] OR    TONSILLECTOMY         GYN & OB History  No LMP recorded. Patient has had a hysterectomy.   Date of Last Pap: 2012    OB History    Para Term  AB Living   2 2 2         SAB TAB Ectopic Multiple Live Births                  # Outcome Date GA Lbr Will/2nd Weight Sex Delivery Anes PTL Lv   2 Term            1 Term                Health Maintenance       Date Due Completion Date    Shingles Vaccine (1 of 2) 1998 ---    Influenza Vaccine 2019 (Declined)    Override on 2018: Declined    LDCT Lung Screen 2020 3/19/2019    Mammogram 2020    DEXA SCAN 10/24/2022 10/24/2017    Lipid Panel 2024 3/12/2019    Colonoscopy 2024    Override on 2017: Done (Dr. Gilbert - sent to scanning)    Override on 2010: Done ([facility] Gilbert-sent to scaning 2016)    TETANUS VACCINE 2026          Family History   Problem Relation Age of Onset    Hypertension Sister     Arthritis Sister     Heart failure Mother     Hypertension Mother     Heart failure Father     Hypertension Father     No Known Problems Brother     No Known Problems Son     Heart disease Brother     Arthritis Sister     Hypertension Sister      "Cancer Sister     Asthma Son     Arthritis Son         psoriatic arthritis       Social History     Socioeconomic History    Marital status:      Spouse name: Not on file    Number of children: Not on file    Years of education: Not on file    Highest education level: Not on file   Occupational History    Not on file   Social Needs    Financial resource strain: Not on file    Food insecurity:     Worry: Not on file     Inability: Not on file    Transportation needs:     Medical: Not on file     Non-medical: Not on file   Tobacco Use    Smoking status: Former Smoker     Packs/day: 1.00     Years: 50.00     Pack years: 50.00     Types: Cigarettes     Start date: 1964     Last attempt to quit: 2012     Years since quittin.9    Smokeless tobacco: Never Used   Substance and Sexual Activity    Alcohol use: Yes     Alcohol/week: 1.2 oz     Types: 2 Shots of liquor per week     Comment: Social - vodka on wednesday    Drug use: No    Sexual activity: Yes     Partners: Male   Lifestyle    Physical activity:     Days per week: Not on file     Minutes per session: Not on file    Stress: Not on file   Relationships    Social connections:     Talks on phone: Not on file     Gets together: Not on file     Attends Sikhism service: Not on file     Active member of club or organization: Not on file     Attends meetings of clubs or organizations: Not on file     Relationship status: Not on file   Other Topics Concern    Not on file   Social History Narrative    Not on file       Review of Systems  Review of Systems   Genitourinary: Positive for frequency and urgency.   All other systems reviewed and are negative.         Objective:   /74   Pulse 102   Ht 5' 5" (1.651 m)   Wt 81.4 kg (179 lb 7.3 oz)   BMI 29.86 kg/m²     Physical Exam   Genitourinary: Pelvic exam was performed with patient supine. Vagina normal, skenes normal and bartholins normal. Left labia normal. Urethra exhibits " hypermobility. Urethra exhibits no urethral caruncle, no urethral diverticulum and no urethral mass. There is a rectocele and unspecified prolapse of vaginal walls in the vagina. Cervix exhibits absence. Uterus is absent.   Empty cough stress test: Negative.  Kegel: 1/5    POP-Q  Aa: -3 Ba: -2 C: -4   GH: 2 PB: 2 TVL: 8   Ap: 1 Bp:  D:                       Somewhat foreshortened vagina I think patient had some type of urethropexy done posterior apical post portion as well as distal posterior compartment revealed large volume rectocele.     Assessment:     1. Urinary frequency    2. Cystocele, midline    3. Incompetence of rectovaginal tissue            Plan:     1. Urinary frequency    2. Cystocele, midline    3. Incompetence of rectovaginal tissue          After examination I then utilized [employer] interactive web site to diagram the patient's level of prolapse and then we discussed  Expected management  Conservative therapy with pessary  Surgical options.  This finding patient wishes to simply watch it which we are happy to do so with her patient is known future she does want in oxybutynin refill she in she has got some benefit out of 10 mg daily she now wants to go to 15 mg daily      Total time of this visit today was 40 min greater than 50% of time 30 min spent direct discussion with the patient answering as well as highlighting her anatomical defects an options and answering questions        Patient Instructions                                     Expectant Management:  Patient understands we will continue to monitor her level of anatomic distortion without any type of active intervention until a time where symptomatology can no longer be tolerated by patient and she wishes to have active management.    Conservative Therapy:  Patient understands she will be utilizing a a supportive device within the vagina which will need maintenance.  The clinic will support her in her maintenance of the  pessary.  Patient understands this is an active process which will need her input to maintain the pessary properly.     Surgical Management:

## 2019-07-10 NOTE — LETTER
July 10, 2019      Ro Wise MD  [address] [place] [address]  [address]  [facility] [address]           [facility]  [address] [place] [address] Drive, [facility] [address]  [facility] [address]  Phone: [phone]  Fax: [phone]          Patient: Kathy Seymour   MR Number: [id number]   YOB: 1950   Date of Visit: 7/10/2019       Dear Dr. Ro Wise:    Thank you for referring Kathy Seymour to me for evaluation. Attached you will find relevant portions of my assessment and plan of care.    If you have questions, please do not hesitate to call me. I look forward to following Kathy Semyour along with you.    Sincerely,    Migel Ash MD    Enclosure  CC:  No Recipients    If you would like to receive this communication electronically, please contact [email or web address] or [phone] to request more information on [facility] Link access.    For providers and/or their staff who would like to refer a patient to [facility], please contact us through our one-stop-shop provider referral line, [facility] at [phone].    If you feel you have received this communication in error or would no longer like to receive these types of communications, please e-mail [email or web address]

## 2019-07-30 ENCOUNTER — OFFICE VISIT (OUTPATIENT)
Dept: FAMILY MEDICINE | Facility: CLINIC | Age: 69
End: 2019-07-30
Payer: MEDICARE

## 2019-07-30 VITALS
SYSTOLIC BLOOD PRESSURE: 134 MMHG | TEMPERATURE: 98 F | HEIGHT: 65 IN | DIASTOLIC BLOOD PRESSURE: 70 MMHG | HEART RATE: 75 BPM | BODY MASS INDEX: 29.53 KG/M2 | WEIGHT: 177.25 LBS

## 2019-07-30 DIAGNOSIS — G47.33 OSA ON CPAP: ICD-10-CM

## 2019-07-30 DIAGNOSIS — J30.2 SEASONAL ALLERGIES: ICD-10-CM

## 2019-07-30 DIAGNOSIS — E04.1 NODULAR THYROID DISEASE: ICD-10-CM

## 2019-07-30 DIAGNOSIS — R35.0 FREQUENT URINATION: ICD-10-CM

## 2019-07-30 DIAGNOSIS — T50.905A MEDICATION ADVERSE EFFECT, INITIAL ENCOUNTER: Primary | ICD-10-CM

## 2019-07-30 PROCEDURE — 99213 OFFICE O/P EST LOW 20 MIN: CPT | Mod: HCNC,S$GLB,, | Performed by: FAMILY MEDICINE

## 2019-07-30 PROCEDURE — 99213 PR OFFICE/OUTPT VISIT, EST, LEVL III, 20-29 MIN: ICD-10-PCS | Mod: HCNC,S$GLB,, | Performed by: FAMILY MEDICINE

## 2019-07-30 PROCEDURE — 1101F PT FALLS ASSESS-DOCD LE1/YR: CPT | Mod: HCNC,CPTII,S$GLB, | Performed by: FAMILY MEDICINE

## 2019-07-30 PROCEDURE — 3078F PR MOST RECENT DIASTOLIC BLOOD PRESSURE < 80 MM HG: ICD-10-PCS | Mod: HCNC,CPTII,S$GLB, | Performed by: FAMILY MEDICINE

## 2019-07-30 PROCEDURE — 3075F SYST BP GE 130 - 139MM HG: CPT | Mod: HCNC,CPTII,S$GLB, | Performed by: FAMILY MEDICINE

## 2019-07-30 PROCEDURE — 99999 PR PBB SHADOW E&M-EST. PATIENT-LVL III: CPT | Mod: PBBFAC,HCNC,, | Performed by: FAMILY MEDICINE

## 2019-07-30 PROCEDURE — 99999 PR PBB SHADOW E&M-EST. PATIENT-LVL III: ICD-10-PCS | Mod: PBBFAC,HCNC,, | Performed by: FAMILY MEDICINE

## 2019-07-30 PROCEDURE — 1101F PR PT FALLS ASSESS DOC 0-1 FALLS W/OUT INJ PAST YR: ICD-10-PCS | Mod: HCNC,CPTII,S$GLB, | Performed by: FAMILY MEDICINE

## 2019-07-30 PROCEDURE — 3078F DIAST BP <80 MM HG: CPT | Mod: HCNC,CPTII,S$GLB, | Performed by: FAMILY MEDICINE

## 2019-07-30 PROCEDURE — 3075F PR MOST RECENT SYSTOLIC BLOOD PRESS GE 130-139MM HG: ICD-10-PCS | Mod: HCNC,CPTII,S$GLB, | Performed by: FAMILY MEDICINE

## 2019-07-30 NOTE — PROGRESS NOTES
Subjective:       Patient ID: Kathy Seymour is a 68 y.o. female.    Chief Complaint: Dry mouth    HPI   Patient presents scheduled for symptoms of dry mouth and feeling like her throat was closing up but tells me she has figured out the problem and notes that symptoms started earlier this month with starting Ditropan and have improved with discontinuing the medication Sunday.  She would like to discuss risks of avoiding taking the medication.  She is seeing Urology within our system for this and was prescribed a medication for frequent urination but tells me she was never bothered by this and does not want to be on medication for urinary frequency.  Incontinence is listed on her problem list but she denies this and any other urinary complaints.  She denies any problems with chewing or swallowing, choking, neck masses, lymphadenopathy, wheezing or shortness of breath.  She is on a CPAP nightly with a humidifier but tells me she chronically has had some mild dry mouth with this.  She does have some chronic allergy symptoms but tells me she does not take her allergy medications as directed as she does not like to take medication.  She also follows with endocrinology outside of our system for hypothyroidism and notes she has thyroid nodules but is up-to-date with thyroid ultrasound and her nodules have remained unchanged.  She has also had a remote biopsy of her lab just nodule which showed no abnormalities.  She has no other concerns or complaints today.    Review of Systems   Constitutional: Negative for activity change, appetite change, fatigue and unexpected weight change.   HENT: Negative for congestion, dental problem, ear discharge, ear pain, facial swelling, hearing loss, mouth sores, postnasal drip, rhinorrhea, sinus pressure, sinus pain, sneezing, sore throat, trouble swallowing and voice change.    Respiratory: Negative for cough, chest tightness, shortness of breath and wheezing.    Cardiovascular:  "Negative for chest pain, palpitations and leg swelling.   Gastrointestinal: Negative for abdominal pain, blood in stool, constipation, diarrhea, nausea and vomiting.   Genitourinary: Positive for frequency. Negative for difficulty urinating, dysuria, enuresis, flank pain, genital sores, pelvic pain and urgency.   Musculoskeletal: Negative for arthralgias and myalgias.   Skin: Negative for rash and wound.   Hematological: Negative for adenopathy. Does not bruise/bleed easily.   Psychiatric/Behavioral: Negative for dysphoric mood and sleep disturbance. The patient is not nervous/anxious.          Objective:      Vitals:    07/30/19 0810   BP: 134/70   Pulse: 75   Temp: 97.9 °F (36.6 °C)   TempSrc: Oral   Weight: 80.4 kg (177 lb 4 oz)   Height: 5' 5" (1.651 m)   PainSc: 0-No pain     Physical Exam   Constitutional: She is oriented to person, place, and time. She appears well-developed and well-nourished. No distress.   HENT:   Head: Normocephalic and atraumatic.   Right Ear: Hearing, tympanic membrane, external ear and ear canal normal.   Left Ear: Hearing, tympanic membrane, external ear and ear canal normal.   Nose: Nose normal. Right sinus exhibits no maxillary sinus tenderness and no frontal sinus tenderness. Left sinus exhibits no maxillary sinus tenderness and no frontal sinus tenderness.   Mouth/Throat: Uvula is midline, oropharynx is clear and moist and mucous membranes are normal. No oral lesions. No trismus in the jaw. No uvula swelling.   Crowded posterior nasopharynx without abnormalities.   Neck: Normal range of motion. No JVD present. No tracheal deviation present. No thyromegaly present.   Cardiovascular: Normal rate, regular rhythm and normal heart sounds. Exam reveals no gallop and no friction rub.   No murmur heard.  Pulmonary/Chest: Effort normal and breath sounds normal. No respiratory distress. She has no wheezes. She exhibits no tenderness.   Abdominal: Soft. Bowel sounds are normal. She exhibits " no distension and no mass. There is no tenderness. There is no rebound and no guarding.   Musculoskeletal: Normal range of motion. She exhibits no edema, tenderness or deformity.   Lymphadenopathy:     She has no cervical adenopathy.   Neurological: She is alert and oriented to person, place, and time.   Skin: Skin is warm and dry. She is not diaphoretic.   Psychiatric: She has a normal mood and affect. Her behavior is normal. Judgment and thought content normal.   Nursing note and vitals reviewed.        Assessment:       1. Medication adverse effect, initial encounter    2. Frequent urination    3. RAMONA on CPAP, 100% use    4. Nodular thyroid disease    5. Seasonal allergies          Plan:   Medication adverse effect, initial encounter    Frequent urination    RAMONA on CPAP, 100% use    Nodular thyroid disease    Seasonal allergies      Follow up if symptoms worsen or fail to improve.    Kathy appears to be stable and doing well today after discontinuing her Ditropan with improving dry mouth and resolution of feelings of her throat closing.  I find no abnormalities on physical exam to explain the symptoms and I think they are clearly secondary to medication adverse effect.  I discussed other possible treatment options for frequent urination and incontinence even though she denies this and she had no interest in any other medication options at this time.  I did discuss use of pads if she does have any leakage and advised her to contact her urologist within our system to let them know she has stopped the medication had an adverse effect with this.  She will continue follow-up with her PCP (Dr. Seymour) at her normal recommended interval but advised her I am happy to see her back at any time if she has any problems and cannot be seen by him.

## 2019-08-04 ENCOUNTER — HOSPITAL ENCOUNTER (EMERGENCY)
Facility: HOSPITAL | Age: 69
Discharge: HOME | End: 2019-08-04
Attending: EMERGENCY MEDICINE
Payer: MEDICARE

## 2019-08-04 VITALS
HEART RATE: 80 BPM | TEMPERATURE: 99 F | RESPIRATION RATE: 18 BRPM | DIASTOLIC BLOOD PRESSURE: 72 MMHG | OXYGEN SATURATION: 96 % | WEIGHT: 175 LBS | SYSTOLIC BLOOD PRESSURE: 157 MMHG | BODY MASS INDEX: 29.16 KG/M2 | HEIGHT: 65 IN

## 2019-08-04 DIAGNOSIS — S83.91XA SPRAIN OF RIGHT KNEE, UNSPECIFIED LIGAMENT, INITIAL ENCOUNTER: Primary | ICD-10-CM

## 2019-08-04 DIAGNOSIS — T14.90XA INJURY: ICD-10-CM

## 2019-08-04 PROCEDURE — 99283 EMERGENCY DEPT VISIT LOW MDM: CPT | Mod: HCNC

## 2019-08-04 RX ORDER — DICLOFENAC SODIUM 75 MG/1
75 TABLET, DELAYED RELEASE ORAL 2 TIMES DAILY
Qty: 28 TABLET | Refills: 0 | Status: SHIPPED | OUTPATIENT
Start: 2019-08-04 | End: 2019-08-18

## 2019-08-04 NOTE — DISCHARGE INSTRUCTIONS
Follow RICE therapy as discussed. Follow up with primary care doctor and/or orthopedic as needed. Return to ED for new or worsening symptoms.

## 2019-08-04 NOTE — ED PROVIDER NOTES
"Encounter Date: 8/4/2019    SCRIBE #1 NOTE: I, Damon Augustine, am scribing for, and in the presence of,  Elzbieta Shepherd NP. I have scribed the entire note.       History     Chief Complaint   Patient presents with    Knee Pain     reports "twisted" rt knee last night      Time patient was seen by the provider: 5:08 PM      The patient is a 68 y.o. female with co-morbidities including: HTN, CAD, CTS, and polyneuropathy, who presents to the ED with a complaint of knee pain. Patient reports she was at a concert when she was swung around and she felt a slight pain in her knee. When she woke up today, the pain had worsened prompting her to present to the ED. Patient reports that she took 2 extra strength tylenol but these did not alleviate her symptoms. Patient confirms that she has had problems with this knee in the past.          Review of patient's allergies indicates:   Allergen Reactions    Wellbutrin [bupropion hcl] Other (See Comments)     sven     Past Medical History:   Diagnosis Date    Angina pectoris     Anticoagulant long-term use     Anxiety     Arthritis     Back pain     Cataract     Chronic bronchitis     Coronary artery disease     STENT X 2    CTS (carpal tunnel syndrome)     RIGHT    Depression     MILLER (dyspnea on exertion) 2/21/2017    Hallux valgus, acquired, bilateral 1/19/2017    Hematuria     Hyperlipidemia     Hypertension     Hypertensive left ventricular hypertrophy, without heart failure 5/22/2017    Hypothyroidism     Obesity     Polyneuropathy     S/P coronary artery stent placement, 2 LETTY 9/2012, 1 LETTY 9/2013 3/5/2013    Sleep apnea     USES CPAP    Thyroid disease     Tobacco dependence     Trouble in sleeping     Urinary incontinence     Wears glasses     ONE CONTAC     Past Surgical History:   Procedure Laterality Date    APPENDECTOMY      BILATERAL SALPINGOOPHORECTOMY      CARDIAC CATHETERIZATION      CORONARY ANGIOPLASTY      CORONARY STENT PLACEMENT   "    PCI  of the LAD with LETTY    CYSTOSCOPY N/A 2019    [facility]    EYE SURGERY      bilat cataract removal    HYSTERECTOMY      complete    OOPHORECTOMY      RELEASE, CARPAL TUNNEL Right 2014    [facility]    TONSILLECTOMY       Family History   Problem Relation Age of Onset    Hypertension Sister     Arthritis Sister     Heart failure Mother     Hypertension Mother     Heart failure Father     Hypertension Father     No Known Problems Brother     No Known Problems Son     Heart disease Brother     Arthritis Sister     Hypertension Sister     Cancer Sister     Asthma Son     Arthritis Son         psoriatic arthritis     Social History     Tobacco Use    Smoking status: Former Smoker     Packs/day: 1.00     Years: 50.00     Pack years: 50.00     Types: Cigarettes     Start date: 1964     Last attempt to quit: 2012     Years since quittin.9    Smokeless tobacco: Never Used   Substance Use Topics    Alcohol use: Yes     Alcohol/week: 1.2 oz     Types: 2 Shots of liquor per week     Comment: Social - vodka on wednesday    Drug use: No     Review of Systems   Constitutional: Negative for activity change, appetite change, chills and fever.   HENT: Negative for congestion, ear pain, rhinorrhea, sinus pressure, sinus pain, sneezing, sore throat and voice change.    Eyes: Negative for pain, discharge and redness.   Respiratory: Negative for cough, shortness of breath, wheezing and stridor.    Cardiovascular: Negative for chest pain, palpitations and leg swelling.   Gastrointestinal: Negative for abdominal distention, abdominal pain, blood in stool, constipation, diarrhea, nausea and vomiting.   Genitourinary: Negative for difficulty urinating, dysuria, flank pain, frequency, hematuria and urgency.   Musculoskeletal: Positive for joint swelling. Negative for arthralgias, gait problem and myalgias.         Knee pain   Skin: Negative for rash and wound.   Neurological: Negative for dizziness, syncope, facial asymmetry, speech difficulty, weakness, light-headedness, numbness and headaches.   Hematological: Negative for adenopathy.   Psychiatric/Behavioral: Negative.    All other systems reviewed and are negative.      Physical Exam     Initial Vitals [08/04/19 1651]   BP Pulse Resp Temp SpO2   (!) 157/72 80 18 98.5 °F (36.9 °C) 96 %      MAP       --         Physical Exam    Constitutional: She appears well-developed and well-nourished. She is not diaphoretic. She is active. No distress.   HENT:   Head: Normocephalic and atraumatic.   Right Ear: External ear normal.   Left Ear: External ear normal.   Nose: Nose normal.   Mouth/Throat: Oropharynx is clear and moist. No oropharyngeal exudate.   Eyes: Conjunctivae, EOM and lids are normal. Pupils are equal, round, and reactive to light. Right eye exhibits no chemosis and no discharge. Left eye exhibits no chemosis and no discharge. Right conjunctiva is not injected. Left conjunctiva is not injected.   Neck: Trachea normal and normal range of motion. Neck supple. No stridor present. No tracheal deviation present. No neck rigidity.   Cardiovascular: Normal rate, regular rhythm, normal heart sounds and normal pulses. Exam reveals no distant heart sounds and no friction rub.    No murmur heard.  Pulmonary/Chest: Breath sounds normal. No stridor. She has no wheezes. She has no rhonchi. She has no rales.   Musculoskeletal: Normal range of motion. She exhibits edema and tenderness.   Medial knee tenderness to palpitation. Full range of motion. No Laxity. +2 pedal pulses bilaterally. Able to ambulate without any issues.    Neurological: She is alert and oriented to person, place, and time. She has normal strength. No sensory deficit. GCS score is 15. GCS eye subscore is 4. GCS verbal subscore is 5. GCS motor subscore is 6.   Skin: Skin is warm, dry and intact. Capillary refill  takes less than 2 seconds. No rash noted. No erythema.   Psychiatric: She has a normal mood and affect. Her speech is normal and behavior is normal. Thought content normal.         ED Course   Procedures  Labs Reviewed - No data to display       Imaging Results          X-Ray Knee 3 View Right (In process)                  Medical Decision Making:   History:   Old Medical Records: I decided to obtain old medical records.  Differential Diagnosis:   Fracture  Dislocation  Sprain  Contusion  Strain  Spasm      Clinical Tests:   Radiological Study: Ordered and Reviewed       APC / Resident Notes:   The patient appears to have a knee sprain.  The patient's xrays show no signs of fracture, dislocation, or subluxation.  The patient could have a ligamentous injury, but the knee doesn't appear to be unstable.  The patient will be discharged home to follow up with their physician or the doctor provided.  They will be treated with supportive care. I have discussed pt with Dr Fleming who reviewed xrays and agrees with POC. Pt voices understanding and is agreeable to the plan.  She is given specific return precautions.            Scribe Attestation:   Scribe #1: I performed the above scribed service and the documentation accurately describes the services I performed. I attest to the accuracy of the note.    I, DOMINGO Snyder, personally performed the services described in this documentation. All medical record entries made by the scribe were at my direction and in my presence.  I have reviewed the chart and agree that the record reflects my personal performance and is accurate and complete. DOMINGO Snyder.  7:10 PM 08/04/2019          ED Course as of Aug 04 1910   Sun Aug 04, 2019   1840 Right knee xr: no fracture or subluxation as read by me, Dr. Ollie Fleming      [NP]      ED Course User Index  [NP] Ollie Fleming MD     Clinical Impression:       ICD-10-CM ICD-9-CM   1. Sprain of right knee, unspecified ligament, initial  encounter S83.91XA 844.9   2. Injury T14.90XA 959.9         Disposition:   Disposition: Discharged  Condition: Stable                        Elzbieta Shepherd NP  08/04/19 7572

## 2019-09-05 ENCOUNTER — PATIENT MESSAGE (OUTPATIENT)
Dept: FAMILY MEDICINE | Facility: CLINIC | Age: 69
End: 2019-09-05

## 2019-09-05 DIAGNOSIS — M79.605 PAIN IN BOTH LOWER EXTREMITIES: ICD-10-CM

## 2019-09-05 DIAGNOSIS — M79.604 PAIN IN BOTH LOWER EXTREMITIES: ICD-10-CM

## 2019-09-05 DIAGNOSIS — J44.9 COPD MIXED TYPE: ICD-10-CM

## 2019-09-05 RX ORDER — NAPROXEN 500 MG/1
500 TABLET ORAL 2 TIMES DAILY WITH MEALS
Qty: 60 TABLET | Refills: 1 | Status: SHIPPED | OUTPATIENT
Start: 2019-09-05 | End: 2020-04-30

## 2019-09-05 RX ORDER — BUDESONIDE AND FORMOTEROL FUMARATE DIHYDRATE 160; 4.5 UG/1; UG/1
2 AEROSOL RESPIRATORY (INHALATION) EVERY 12 HOURS
Qty: 3 INHALER | Refills: 3 | Status: SHIPPED | OUTPATIENT
Start: 2019-09-05 | End: 2021-01-25

## 2019-09-05 RX ORDER — OMEPRAZOLE 20 MG/1
20 CAPSULE, DELAYED RELEASE ORAL DAILY
Qty: 90 CAPSULE | Refills: 3 | Status: SHIPPED | OUTPATIENT
Start: 2019-09-05 | End: 2020-08-25 | Stop reason: SDUPTHER

## 2019-09-05 NOTE — TELEPHONE ENCOUNTER
----- Message from Fina Chowdhury sent at 9/5/2019 11:40 AM CDT -----  Type:  RX Refill Request    Who Called:  patient  Refill or New Rx:  new  RX Name and Strength:  Symbicort Inhaler  How is the patient currently taking it? (ex. 1XDay):  na  Is this a 30 day or 90 day RX:  na  Preferred Pharmacy with phone number:    [facility] - [place], [place] - [address]  [address] [address]  Phone: [phone] Fax: [phone]  Local or Mail Order:  Mail order  Ordering Provider:  Dr Tayo Jordan  [facility] Call Back Number:  [phone]  Additional Information:  Please advise

## 2019-09-06 ENCOUNTER — PATIENT MESSAGE (OUTPATIENT)
Dept: PULMONOLOGY | Facility: CLINIC | Age: 69
End: 2019-09-06

## 2019-09-09 ENCOUNTER — HOSPITAL ENCOUNTER (OUTPATIENT)
Dept: RADIOLOGY | Facility: HOSPITAL | Age: 69
Discharge: HOME | End: 2019-09-09
Attending: PHYSICIAN ASSISTANT
Payer: MEDICARE

## 2019-09-09 DIAGNOSIS — Z87.891 PERSONAL HISTORY OF NICOTINE DEPENDENCE: ICD-10-CM

## 2019-09-09 DIAGNOSIS — R93.89 ABNORMAL CT OF THE CHEST: ICD-10-CM

## 2019-09-09 DIAGNOSIS — Z12.9 SCREENING FOR CANCER: ICD-10-CM

## 2019-09-09 PROCEDURE — G0297 LDCT FOR LUNG CA SCREEN: HCPCS | Mod: 26,HCNC,, | Performed by: RADIOLOGY

## 2019-09-09 PROCEDURE — G0297 LDCT FOR LUNG CA SCREEN: HCPCS | Mod: TC,HCNC

## 2019-09-09 PROCEDURE — G0297 CT CHEST LUNG SCREENING LOW DOSE: ICD-10-PCS | Mod: 26,HCNC,, | Performed by: RADIOLOGY

## 2019-09-10 ENCOUNTER — OFFICE VISIT (OUTPATIENT)
Dept: UROLOGY | Facility: CLINIC | Age: 69
End: 2019-09-10
Payer: MEDICARE

## 2019-09-10 VITALS
WEIGHT: 178.38 LBS | HEIGHT: 65 IN | DIASTOLIC BLOOD PRESSURE: 78 MMHG | HEART RATE: 93 BPM | BODY MASS INDEX: 29.72 KG/M2 | RESPIRATION RATE: 18 BRPM | TEMPERATURE: 98 F | SYSTOLIC BLOOD PRESSURE: 144 MMHG

## 2019-09-10 DIAGNOSIS — N32.81 OVERACTIVE BLADDER: Primary | ICD-10-CM

## 2019-09-10 DIAGNOSIS — R31.29 MICROHEMATURIA: ICD-10-CM

## 2019-09-10 LAB
BILIRUB SERPL-MCNC: ABNORMAL MG/DL
BLOOD URINE, POC: ABNORMAL
COLOR, POC UA: ABNORMAL
GLUCOSE UR QL STRIP: ABNORMAL
KETONES UR QL STRIP: ABNORMAL
LEUKOCYTE ESTERASE URINE, POC: ABNORMAL
NITRITE, POC UA: ABNORMAL
PH, POC UA: 6
PROTEIN, POC: ABNORMAL
SPECIFIC GRAVITY, POC UA: 1.01
UROBILINOGEN, POC UA: 0.1

## 2019-09-10 PROCEDURE — 99214 OFFICE O/P EST MOD 30 MIN: CPT | Mod: HCNC,25,S$GLB, | Performed by: NURSE PRACTITIONER

## 2019-09-10 PROCEDURE — 1101F PR PT FALLS ASSESS DOC 0-1 FALLS W/OUT INJ PAST YR: ICD-10-PCS | Mod: HCNC,CPTII,S$GLB, | Performed by: NURSE PRACTITIONER

## 2019-09-10 PROCEDURE — 3078F DIAST BP <80 MM HG: CPT | Mod: HCNC,CPTII,S$GLB, | Performed by: NURSE PRACTITIONER

## 2019-09-10 PROCEDURE — 3078F PR MOST RECENT DIASTOLIC BLOOD PRESSURE < 80 MM HG: ICD-10-PCS | Mod: HCNC,CPTII,S$GLB, | Performed by: NURSE PRACTITIONER

## 2019-09-10 PROCEDURE — 3077F PR MOST RECENT SYSTOLIC BLOOD PRESSURE >= 140 MM HG: ICD-10-PCS | Mod: HCNC,CPTII,S$GLB, | Performed by: NURSE PRACTITIONER

## 2019-09-10 PROCEDURE — 99999 PR PBB SHADOW E&M-EST. PATIENT-LVL IV: ICD-10-PCS | Mod: PBBFAC,HCNC,, | Performed by: NURSE PRACTITIONER

## 2019-09-10 PROCEDURE — 3077F SYST BP >= 140 MM HG: CPT | Mod: HCNC,CPTII,S$GLB, | Performed by: NURSE PRACTITIONER

## 2019-09-10 PROCEDURE — 99214 PR OFFICE/OUTPT VISIT, EST, LEVL IV, 30-39 MIN: ICD-10-PCS | Mod: HCNC,25,S$GLB, | Performed by: NURSE PRACTITIONER

## 2019-09-10 PROCEDURE — 81002 POCT URINE DIPSTICK WITHOUT MICROSCOPE: ICD-10-PCS | Mod: HCNC,S$GLB,, | Performed by: NURSE PRACTITIONER

## 2019-09-10 PROCEDURE — 99999 PR PBB SHADOW E&M-EST. PATIENT-LVL IV: CPT | Mod: PBBFAC,HCNC,, | Performed by: NURSE PRACTITIONER

## 2019-09-10 PROCEDURE — 81002 URINALYSIS NONAUTO W/O SCOPE: CPT | Mod: HCNC,S$GLB,, | Performed by: NURSE PRACTITIONER

## 2019-09-10 PROCEDURE — 1101F PT FALLS ASSESS-DOCD LE1/YR: CPT | Mod: HCNC,CPTII,S$GLB, | Performed by: NURSE PRACTITIONER

## 2019-09-10 NOTE — PROGRESS NOTES
[facility] Note  Staff: JOHNNY TaylorC    PCP: Dr. Jersey Seymour Jr.    Chief Complaint: Gqhebs-wh-WNV symptoms    Subjective:        HPI: Kathy Seymour is a 68 y.o. female presents today for f/up/routine recheck of symptoms-she has hx of OAB symptoms and microhematuria.    She currently takes Oxybutynin XL 15 mg-1/2 tablet every evening.  Dr. Burton had increased her medication during last ov.  Dr. CA also evaluated her rectocele, which no further treatment is necessary at this time per MD.  Since taking the half tablet of Oxybutynin XL 15 mg her symptoms have improved by over 60% at this time.    Pt had a Flexible Cysto-Uretheroscopy done by Dr. Ro Wise on 07/01/2019 due to hx of microhematuria.  Findings: (pictures were  uploaded into media)  The patient does cystitis with some evidence of recent infection although she was never actually treated for the E coli UTI that following her last culture.  She had no tumors.  She had nothing suspicious for malignancy.  Her vaginal exam revealed stage 2 rectocele.  Below this she had a dimple I suspect from previous episiotomy although she does not remember having this done.  She has had a bladder lift many years ago at the time hysterectomy.     REVIEW OF SYSTEMS:  A comprehensive 10 system review was performed and is negative except as noted above in HPI    PMHx:  Past Medical History:   Diagnosis Date    Angina pectoris     Anticoagulant long-term use     Anxiety     Arthritis     Back pain     Cataract     Chronic bronchitis     Coronary artery disease     STENT X 2    CTS (carpal tunnel syndrome)     RIGHT    Depression     MILLER (dyspnea on exertion) 2/21/2017    Hallux valgus, acquired, bilateral 1/19/2017    Hematuria     Hyperlipidemia     Hypertension     Hypertensive left ventricular hypertrophy, without heart failure 5/22/2017    Hypothyroidism     Obesity     Polyneuropathy     S/P  coronary artery stent placement, 2 LETTY 9/2012, 1 LETTY 9/2013 3/5/2013    Sleep apnea     USES CPAP    Thyroid disease     Tobacco dependence     Trouble in sleeping     Urinary incontinence     Wears glasses     ONE CONTAC     PSHx:  Past Surgical History:   Procedure Laterality Date    APPENDECTOMY      BILATERAL SALPINGOOPHORECTOMY      CARDIAC CATHETERIZATION      CORONARY ANGIOPLASTY      CORONARY STENT PLACEMENT      PCI  of the LAD with LETTY    CYSTOSCOPY N/A 7/1/2019    [facility] at [facility] OR    EYE SURGERY      bilat cataract removal    HYSTERECTOMY      complete    OOPHORECTOMY      RELEASE, CARPAL TUNNEL Right 4/2/2014    [facility]    TONSILLECTOMY       Allergies:  Wellbutrin [bupropion hcl]    Medications: reviewed   Objective:     Vitals:    09/10/19 1436   BP: (!) 144/78   Pulse: 93   Resp: 18   Temp: 98.3 °F (36.8 °C)     Physical Exam   Vitals reviewed.  Constitutional: She is oriented to person, place, and time. She appears well-developed and well-nourished.   HENT:   Head: Normocephalic and atraumatic.   Eyes: Conjunctivae and EOM are normal. Pupils are equal, round, and reactive to light.   Neck: Normal range of motion. Neck supple.   Cardiovascular: Normal rate, regular rhythm, normal heart sounds and intact distal pulses.    Pulmonary/Chest: Effort normal and breath sounds normal.   Abdominal: Soft. Bowel sounds are normal.   Musculoskeletal: Normal range of motion.   Neurological: She is alert and oriented to person, place, and time. She has normal reflexes.   Skin: Skin is warm and dry.     Psychiatric: She has a normal mood and affect. Her behavior is normal. Judgment and thought content normal.     LABS REVIEW:  UA today:   Color:Clear, Yellow  Spec. Grav.  1.015  PH  6.0  Trace leukocytes  Small amt blood  Assessment:       1. Overactive bladder    2. Microhematuria          Plan:     I have encouraged pt to  increase to Oxybutynin XL 15 mg full tablet daily every evening with her dinner to see if her LUTS improve any more.    F/u with Dr. Wise in one year or sooner if her LUTS change.    [facility]: Active    Verenice Rodriguez, FNP-C

## 2019-09-26 ENCOUNTER — OFFICE VISIT (OUTPATIENT)
Dept: ORTHOPEDICS | Facility: CLINIC | Age: 69
End: 2019-09-26
Payer: MEDICARE

## 2019-09-26 VITALS
SYSTOLIC BLOOD PRESSURE: 137 MMHG | WEIGHT: 178 LBS | HEIGHT: 65 IN | DIASTOLIC BLOOD PRESSURE: 62 MMHG | HEART RATE: 91 BPM | BODY MASS INDEX: 29.66 KG/M2

## 2019-09-26 DIAGNOSIS — S83.241A ACUTE MEDIAL MENISCAL TEAR, RIGHT, INITIAL ENCOUNTER: Primary | ICD-10-CM

## 2019-09-26 PROCEDURE — 1101F PR PT FALLS ASSESS DOC 0-1 FALLS W/OUT INJ PAST YR: ICD-10-PCS | Mod: HCNC,CPTII,S$GLB, | Performed by: ORTHOPAEDIC SURGERY

## 2019-09-26 PROCEDURE — 1101F PT FALLS ASSESS-DOCD LE1/YR: CPT | Mod: HCNC,CPTII,S$GLB, | Performed by: ORTHOPAEDIC SURGERY

## 2019-09-26 PROCEDURE — 3075F PR MOST RECENT SYSTOLIC BLOOD PRESS GE 130-139MM HG: ICD-10-PCS | Mod: HCNC,CPTII,S$GLB, | Performed by: ORTHOPAEDIC SURGERY

## 2019-09-26 PROCEDURE — 3078F PR MOST RECENT DIASTOLIC BLOOD PRESSURE < 80 MM HG: ICD-10-PCS | Mod: HCNC,CPTII,S$GLB, | Performed by: ORTHOPAEDIC SURGERY

## 2019-09-26 PROCEDURE — 3075F SYST BP GE 130 - 139MM HG: CPT | Mod: HCNC,CPTII,S$GLB, | Performed by: ORTHOPAEDIC SURGERY

## 2019-09-26 PROCEDURE — 99999 PR PBB SHADOW E&M-EST. PATIENT-LVL III: CPT | Mod: PBBFAC,HCNC,, | Performed by: ORTHOPAEDIC SURGERY

## 2019-09-26 PROCEDURE — 3078F DIAST BP <80 MM HG: CPT | Mod: HCNC,CPTII,S$GLB, | Performed by: ORTHOPAEDIC SURGERY

## 2019-09-26 PROCEDURE — 99214 OFFICE O/P EST MOD 30 MIN: CPT | Mod: HCNC,S$GLB,, | Performed by: ORTHOPAEDIC SURGERY

## 2019-09-26 PROCEDURE — 99214 PR OFFICE/OUTPT VISIT, EST, LEVL IV, 30-39 MIN: ICD-10-PCS | Mod: HCNC,S$GLB,, | Performed by: ORTHOPAEDIC SURGERY

## 2019-09-26 PROCEDURE — 99999 PR PBB SHADOW E&M-EST. PATIENT-LVL III: ICD-10-PCS | Mod: PBBFAC,HCNC,, | Performed by: ORTHOPAEDIC SURGERY

## 2019-09-26 NOTE — PROGRESS NOTES
Past Medical History:   Diagnosis Date    Angina pectoris     Anticoagulant long-term use     Anxiety     Arthritis     Back pain     Cataract     Chronic bronchitis     Coronary artery disease     STENT X 2    CTS (carpal tunnel syndrome)     RIGHT    Depression     MILLER (dyspnea on exertion) 2/21/2017    Hallux valgus, acquired, bilateral 1/19/2017    Hematuria     Hyperlipidemia     Hypertension     Hypertensive left ventricular hypertrophy, without heart failure 5/22/2017    Hypothyroidism     Obesity     Polyneuropathy     S/P coronary artery stent placement, 2 LETTY 9/2012, 1 LETTY 9/2013 3/5/2013    Sleep apnea     USES CPAP    Thyroid disease     Tobacco dependence     Trouble in sleeping     Urinary incontinence     Wears glasses     ONE CONTAC       Past Surgical History:   Procedure Laterality Date    APPENDECTOMY      BILATERAL SALPINGOOPHORECTOMY      CARDIAC CATHETERIZATION      CORONARY ANGIOPLASTY      CORONARY STENT PLACEMENT      PCI  of the LAD with LETTY    EYE SURGERY      bilat cataract removal    HYSTERECTOMY      complete    OOPHORECTOMY      TONSILLECTOMY         Current Outpatient Medications   Medication Sig    albuterol (PROVENTIL/VENTOLIN HFA) 90 mcg/actuation inhaler Inhale 2 puffs into the lungs every 6 (six) hours as needed for Wheezing or Shortness of Breath. Rescue    ALPRAZolam (XANAX) 0.5 MG tablet Take 1 tablet (0.5 mg total) by mouth 3 (three) times daily as needed.    aspirin 81 MG Chew Take 1 tablet (81 mg total) by mouth once daily.    atorvastatin (LIPITOR) 40 MG tablet Take 1 tablet (40 mg total) by mouth once daily. Need office visit before next refill, last seen 5/2018. LAST Rx if visit is not made.    budesonide-formoterol 160-4.5 mcg (SYMBICORT) 160-4.5 mcg/actuation HFAA Inhale 2 puffs into the lungs every 12 (twelve) hours. Controller    DULoxetine (CYMBALTA) 60 MG capsule Take 1 capsule (60 mg total) by mouth once daily.     fluticasone propionate (FLONASE) 50 mcg/actuation nasal spray 1 spray (50 mcg total) by Each Nare route once daily.    losartan-hydrochlorothiazide 100-25 mg (HYZAAR) 100-25 mg per tablet TAKE 1 TABLET EVERY DAY    naproxen (NAPROSYN) 500 MG tablet Take 1 tablet (500 mg total) by mouth 2 (two) times daily with meals.    omeprazole (PRILOSEC) 20 MG capsule Take 1 capsule (20 mg total) by mouth once daily.    methIMAzole (TAPAZOLE) 5 MG Tab Take 1 tablet (5 mg total) by mouth 2 (two) times daily.    oxybutynin (DITROPAN XL) 15 MG TR24 Take 1 tablet (15 mg total) by mouth once daily.     No current facility-administered medications for this visit.        Review of patient's allergies indicates:   Allergen Reactions    Wellbutrin [bupropion hcl] Other (See Comments)     sven       Family History   Problem Relation Age of Onset    Hypertension Sister     Arthritis Sister     Heart failure Mother     Hypertension Mother     Heart failure Father     Hypertension Father     No Known Problems Brother     No Known Problems Son     Heart disease Brother     Arthritis Sister     Hypertension Sister     Cancer Sister     Asthma Son     Arthritis Son         psoriatic arthritis       Social History     Socioeconomic History    Marital status:      Spouse name: Not on file    Number of children: Not on file    Years of education: Not on file    Highest education level: Not on file   Occupational History    Not on file   Social Needs    Financial resource strain: Not on file    Food insecurity:     Worry: Not on file     Inability: Not on file    Transportation needs:     Medical: Not on file     Non-medical: Not on file   Tobacco Use    Smoking status: Former Smoker     Packs/day: 1.00     Years: 50.00     Pack years: 50.00     Types: Cigarettes     Start date: 1964     Last attempt to quit: 2012     Years since quittin.1    Smokeless tobacco: Never Used   Substance and Sexual  Activity    Alcohol use: Yes     Alcohol/week: 2.0 standard drinks     Types: 2 Shots of liquor per week     Comment: Social - vodka on wednesday    Drug use: No    Sexual activity: Yes     Partners: Male   Lifestyle    Physical activity:     Days per week: Not on file     Minutes per session: Not on file    Stress: Not on file   Relationships    Social connections:     Talks on phone: Not on file     Gets together: Not on file     Attends Confucianism service: Not on file     Active member of club or organization: Not on file     Attends meetings of clubs or organizations: Not on file     Relationship status: Not on file   Other Topics Concern    Not on file   Social History Narrative    Not on file       Chief Complaint:   Chief Complaint   Patient presents with    Right Knee - Pain       History of present illness:  This is a 68-year-old female with a history of meniscal tear in the right knee who re-injured her knee on August 4, 2019.  She twisted her knee while at a concert.  Pain is a 4/10.  Patient did not need surgery on it previously.  He got better with naproxen.  Pain getting up from sitting.  Symptoms are worsening and are moderate to severe.  No recent treatment    Answers for HPI/ROS submitted by the patient on 9/26/2019   Leg pain  unexpected weight change: No  appetite change : No  sleep disturbance: Yes  IMMUNOCOMPROMISED: No  nervous/ anxious: Yes  dysphoric mood: Yes  rash: No  visual disturbance: No  eye redness: No  eye pain: No  ear pain: No  tinnitus: No  hearing loss: No  sinus pressure : No  nosebleeds: No  enviro allergies: Yes  food allergies: No  cough: No  shortness of breath: Yes  sweating: Yes  dysuria: No  frequency: Yes  difficulty urinating: No  hematuria: Yes  painful intercourse: No  chest pain: No  palpitations: No  nausea: No  vomiting: No  diarrhea: No  blood in stool: No  constipation: No  headaches: No  dizziness: No  numbness: No  seizures: No  joint swelling:  Yes  myalgia: No  weakness: No  back pain: Yes  Pain Chronicity: recurrent  History of trauma: No  Onset: more than 1 month ago  Frequency: constantly  Progression since onset: gradually worsening  Injury mechanism: twisting  injury location: at home  pain- numeric: 6/10  pain location: right knee  pain quality: aching  Radiating Pain: No  Aggravating factors: bending  fever: No  inability to bear weight: No  itching: No  joint locking: No  limited range of motion: Yes  stiffness: Yes  tingling: No  Treatments tried: NSAIDs  physical therapy: not tried  Improvement on treatment: no relief        Physical Examination:    Vital Signs:    Vitals:    09/26/19 1523   BP: 137/62   Pulse: 91       Body mass index is 29.62 kg/m².    This a well-developed, well nourished patient in no acute distress.  They are alert and oriented and cooperative to examination.  Pt. walks without an antalgic gait.      Examination of the right knee shows no rashes or erythema. There are no masses ecchymosis or effusion. Patient has full range of motion from 0-130°. Patient is nontender to palpation over lateral joint line and moderately tender to palpation over the medial joint line. Patient has a - Lachman exam, - anterior drawer exam, and - posterior drawer exam. - Ely's exam. Knee is stable to varus and valgus stress. 5 out of 5 motor strength. Palpable distal pulses. Intact light touch sensation. Negative Patellofemoral crepitus    Examination of the left knee shows no rashes or erythema. There are no masses ecchymosis or effusion. Patient has full range of motion from 0-130°. Patient is nontender to palpation over lateral joint line and nontender to palpation over the medial joint line. Patient has a - Lachman exam, - anterior drawer exam, and - posterior drawer exam. - Ely's exam. Knee is stable to varus and valgus stress. 5 out of 5 motor strength. Palpable distal pulses. Intact light touch sensation. Negative Patellofemoral  crepitus        X-rays:  X-rays of the right knee are available for review which show some very mild arthritic changes and small effusion     Assessment::  Right medial meniscal tear    Plan:  I reviewed the findings with her today. I do not see anything overly concerning.  I think she definitely might have injured her medial meniscus again.  Offered her a cortisone injection but she declined.  She would like to try the naproxen again.  Follow-up as needed    This note was created using [employer] voice recognition software that occasionally misinterpreted phrases or words.    Consult note is delivered via Epic messaging service.

## 2019-10-09 ENCOUNTER — PATIENT MESSAGE (OUTPATIENT)
Dept: CARDIOLOGY | Facility: CLINIC | Age: 69
End: 2019-10-09

## 2019-10-09 RX ORDER — ATORVASTATIN CALCIUM 40 MG/1
TABLET, FILM COATED ORAL
Qty: 90 TABLET | Refills: 0 | OUTPATIENT
Start: 2019-10-09

## 2019-10-15 ENCOUNTER — OFFICE VISIT (OUTPATIENT)
Dept: CARDIOLOGY | Facility: CLINIC | Age: 69
End: 2019-10-15
Payer: MEDICARE

## 2019-10-15 VITALS
HEART RATE: 96 BPM | HEIGHT: 65 IN | SYSTOLIC BLOOD PRESSURE: 127 MMHG | WEIGHT: 177 LBS | BODY MASS INDEX: 29.49 KG/M2 | DIASTOLIC BLOOD PRESSURE: 69 MMHG | OXYGEN SATURATION: 93 %

## 2019-10-15 DIAGNOSIS — Z95.5 S/P CORONARY ARTERY STENT PLACEMENT: Primary | Chronic | ICD-10-CM

## 2019-10-15 DIAGNOSIS — E78.00 PURE HYPERCHOLESTEROLEMIA: Chronic | ICD-10-CM

## 2019-10-15 DIAGNOSIS — G47.33 OSA ON CPAP: ICD-10-CM

## 2019-10-15 DIAGNOSIS — I10 HYPERTENSION, UNSPECIFIED TYPE: ICD-10-CM

## 2019-10-15 DIAGNOSIS — Z99.81 ON HOME OXYGEN THERAPY: ICD-10-CM

## 2019-10-15 DIAGNOSIS — E65 ABDOMINAL OBESITY: ICD-10-CM

## 2019-10-15 DIAGNOSIS — Z87.891 HISTORY OF SMOKING GREATER THAN 50 PACK YEARS: ICD-10-CM

## 2019-10-15 DIAGNOSIS — Z79.1 NSAID LONG-TERM USE: ICD-10-CM

## 2019-10-15 DIAGNOSIS — E74.39 GLUCOSE INTOLERANCE: ICD-10-CM

## 2019-10-15 PROBLEM — Z91.89 SEDENTARY LIFESTYLE: Status: RESOLVED | Noted: 2018-05-31 | Resolved: 2019-10-15

## 2019-10-15 PROCEDURE — 93000 ELECTROCARDIOGRAM COMPLETE: CPT | Mod: HCNC,S$GLB,, | Performed by: INTERNAL MEDICINE

## 2019-10-15 PROCEDURE — 3078F PR MOST RECENT DIASTOLIC BLOOD PRESSURE < 80 MM HG: ICD-10-PCS | Mod: HCNC,CPTII,S$GLB, | Performed by: INTERNAL MEDICINE

## 2019-10-15 PROCEDURE — 99215 OFFICE O/P EST HI 40 MIN: CPT | Mod: HCNC,25,S$GLB, | Performed by: INTERNAL MEDICINE

## 2019-10-15 PROCEDURE — 93000 EKG 12-LEAD: ICD-10-PCS | Mod: HCNC,S$GLB,, | Performed by: INTERNAL MEDICINE

## 2019-10-15 PROCEDURE — 99999 PR PBB SHADOW E&M-EST. PATIENT-LVL III: CPT | Mod: PBBFAC,HCNC,, | Performed by: INTERNAL MEDICINE

## 2019-10-15 PROCEDURE — 3074F SYST BP LT 130 MM HG: CPT | Mod: HCNC,CPTII,S$GLB, | Performed by: INTERNAL MEDICINE

## 2019-10-15 PROCEDURE — 99499 RISK ADDL DX/OHS AUDIT: ICD-10-PCS | Mod: HCNC,S$GLB,, | Performed by: INTERNAL MEDICINE

## 2019-10-15 PROCEDURE — 99499 UNLISTED E&M SERVICE: CPT | Mod: HCNC,S$GLB,, | Performed by: INTERNAL MEDICINE

## 2019-10-15 PROCEDURE — 99215 PR OFFICE/OUTPT VISIT, EST, LEVL V, 40-54 MIN: ICD-10-PCS | Mod: HCNC,25,S$GLB, | Performed by: INTERNAL MEDICINE

## 2019-10-15 PROCEDURE — 3074F PR MOST RECENT SYSTOLIC BLOOD PRESSURE < 130 MM HG: ICD-10-PCS | Mod: HCNC,CPTII,S$GLB, | Performed by: INTERNAL MEDICINE

## 2019-10-15 PROCEDURE — 1101F PR PT FALLS ASSESS DOC 0-1 FALLS W/OUT INJ PAST YR: ICD-10-PCS | Mod: HCNC,CPTII,S$GLB, | Performed by: INTERNAL MEDICINE

## 2019-10-15 PROCEDURE — 3078F DIAST BP <80 MM HG: CPT | Mod: HCNC,CPTII,S$GLB, | Performed by: INTERNAL MEDICINE

## 2019-10-15 PROCEDURE — 99999 PR PBB SHADOW E&M-EST. PATIENT-LVL III: ICD-10-PCS | Mod: PBBFAC,HCNC,, | Performed by: INTERNAL MEDICINE

## 2019-10-15 PROCEDURE — 1101F PT FALLS ASSESS-DOCD LE1/YR: CPT | Mod: HCNC,CPTII,S$GLB, | Performed by: INTERNAL MEDICINE

## 2019-10-15 RX ORDER — ATORVASTATIN CALCIUM 80 MG/1
80 TABLET, FILM COATED ORAL DAILY
Qty: 90 TABLET | Refills: 3 | Status: SHIPPED | OUTPATIENT
Start: 2019-10-15 | End: 2020-03-12 | Stop reason: SDUPTHER

## 2019-10-15 NOTE — PROGRESS NOTES
Subjective:    Patient ID:  Kathy Seymour is a 68 y.o. female who presents for evaluation of Annual Exam  For CAD post LETTY in 2012 and 9/2013, HTN, dyslipidemia, RAMONA on CPAP, fatigue  PCP: Dr. Dawn Jr, see biannually  Cardiologist: Dr. Williamson, last seen 6/2015  Orthopedic: Dr. Bear, now Dr. Desouza  GI:  Vladimir  Pulmonary / sleep: Dr. Jordan  Lives with , Rafita, non-smoker  Part-time  for [employer] 23 hours week,   2 granddaughters, Stacey 17 yo, Kendal 20 yo    Health literacy: high  Activities: none, walk in [place], do housework, limited by MILLER (pulmonary)  Nicotine: former, quit 2012, 50+ py  Alcohol: 4 drinks per week, max 1 a day  Illicit drugs: no  Cardiac symptoms: none  Home BP: occasional, 130 SBP  Medication compliance: yes  Diet: regular  Caffeine: 1 cup daily   Labs: 3/2019 LDL 72, CMP (), normal TSH, CBC  Last Echo: 06/18/18, SE  Last stress test: 6/2018  Cardiovascular angiogram: 9/2013  ECG: normal rate 97  Fundoscopic exam: annually, no retinopathy    In 4/2016:  WF here for annual review of post LETTY. Had review by Dr. Grant in 3/14 for pre-op evaluation. Underwent bunion correction without complication. Denies any CP nor SOB. Previously active until operation in 1/2016. ECG is normal.   Last [facility] 9/2013 - HEMODYNAMIC RESULTS:    LVEDP (Pre): 16 mmHg  LVEDP (Post): 16 mmHg  Ejection Fraction: 60%    C. ANGIOGRAPHIC RESULTS:    DIAGNOSTIC:       Patient has a right dominant coronary artery.        - Left Main Coronary Artery:             The ostial LM has luminal irregularities. There is MARCUS 3 flow.       - Left Anterior Descending Artery:             The proximal LAD has a 99% stenosis. There is MARCUS 3 flow. The remaining portion of the vessel has luminal irregularities.       - Left Circumflex Artery:             The LCX is normal. There is MARCUS 3 flow.       - Right Coronary Artery:             The RCA has luminal irregularities. There is MARCUS 3  flow.    INTERVENTION:         Proximal LAD:              The lesion was successfully intervened. Post-stenosis of 0% and post-MARCUS 3 flow. The vessel was accessed natively.  The following items were used: Stent Xience 3.0x23 (LETTY).    D. SUMMARY:    1. Successful PCI/LETTY of the proximal LAD    E. RECOMMENDATIONS:    1. Routine post PCI care.  2. Routine post-cath care.  3. Maximize medical management.  4. Continue medical management.  5. Risk factor reductions.  6. ASA 81mg.    Previous PCI 9/2012 - INTERVENTION:         Proximal LAD:              The lesion was successfully intervened. Post-stenosis of 0% and post-MARCUS 3 flow. The following items were used: 3.5X15 Quantum Balloon and Stent Promus Eelent  2.75x20 (LETTY).       Mid LAD:              The lesion was successfully intervened. Post-stenosis of 0% and post-MARCUS 3 flow. The following items were used: Stent Pro Element  2.50x16 (LETTY).    D. SUMMARY:    1. Single vessel coronary artery disease.  2. Normal LVEF.    In 5/2017, post bunion surgery, no complication, noted some chronic fatigue over the past year, uses CPAP 100% and still some some morning fatigue, last sleep study years ago. No CP or SOB. Home /78. Compliant with medications, diet is a problem, working at [employer]. LDL in 2/2017 86, in 5/2016 was 66, baseline 134, goal is <67. Note some blood in stool for colonoscopy with Dr. Gilbert, request clearance. ECG NSR, rate 71, PRWP.     DSE 5/2016 - EKG Conclusions:    1. The EKG portion of this study is negative for ischemia at a peak heart rate of 139 bpm (95% of predicted).   2. Blood pressure remained stable throughout the protocol  (Presenting BP: 117/63 Peak BP: 160/61).   3. The following arrhythmias were present: couplets.   4. There were no symptoms of chest discomfort or significant dyspnea throughout the protocol.     ECHO CONCLUSIONS     1 - Concentric hypertrophy.     2 - Normal left ventricular systolic function (EF 60-65%).     3  - Mild to moderate mitral regurgitation.     4 - Normal left ventricular diastolic function.     5 - Normal right ventricular systolic function .     6 - The estimated PA systolic pressure is 26 mmHg.     7 - Increase evidence of MR and no ischemic response noted when compare to Echo in 8/2012.     No evidence of stress induced myocardial ischemia.     In 5/2018, concern of MILLER, progressive over this past year, associated with sweating. No CP. Compliant with medications. No regular exercise, sedentary with computer work. Willing to start the gym. ECG in NSR, rate 86, PRWP. LDL in 1/2018 68.4, gaol < 67.    HPI comments: in 10/2019, return for annual review. Report atypical CP, no inciting factor, help with quick drink of carbonated soda and then a big burp would relief the discomfort, over the past 2 years. No other heart worries.    Stress Echo 6/2018 -  The left ventricle cavity is normal.  · Left ventricle shows concentric remodeling.  · Left atrium is normal.  · Left ventricle ejection fraction is normal at 57%  · Grade I (mild) left ventricular diastolic dysfunction consistent with impaired relaxation.  · LA pressure is normal.  · RV systolic function is normal.  · RA cavity size is normal.  No evidence for ischemia with average exercise tolerance, Peak METS: 8.0      Difficult apical windows.      Review of Systems   Constitution: Positive for diaphoresis, malaise/fatigue and weight loss (3 lbs since 5/2018). Negative for fever, night sweats and weight gain.   HENT: Positive for hoarse voice. Negative for nosebleeds and tinnitus.    Eyes: Negative for visual disturbance.   Cardiovascular: Positive for dyspnea on exertion (don't feel limited). Negative for chest pain, claudication, cyanosis, irregular heartbeat, leg swelling, near-syncope, orthopnea, palpitations and paroxysmal nocturnal dyspnea.   Respiratory: Positive for shortness of breath, sleep disturbances due to breathing and snoring. Negative for cough  "and wheezing.         [place] score 2, use 100% CPAP   Endocrine: Positive for heat intolerance and polyuria. Negative for polydipsia.   Hematologic/Lymphatic: Positive for bleeding problem. Bruises/bleeds easily.   Skin: Negative for color change, flushing, nail changes, poor wound healing and suspicious lesions.   Musculoskeletal: Positive for arthritis, back pain, joint pain, muscle cramps and stiffness. Negative for falls, gout, joint swelling, muscle weakness and myalgias.   Gastrointestinal: Positive for bloating, change in bowel habit, diarrhea, heartburn and hemorrhoids. Negative for hematemesis, hematochezia, melena and nausea.   Genitourinary: Positive for bladder incontinence, frequency and nocturia (1-2).   Neurological: Positive for difficulty with concentration and paresthesias. Negative for disturbances in coordination, excessive daytime sleepiness, dizziness, focal weakness, headaches, light-headedness, loss of balance, numbness, vertigo and weakness.   Psychiatric/Behavioral: Negative for depression and substance abuse. The patient has insomnia and is nervous/anxious.      [place] score 2 now 6       Objective:    Physical Exam   Constitutional: She is oriented to person, place, and time. She appears well-developed and well-nourished.   HENT:   Head: Normocephalic.   Eyes: Pupils are equal, round, and reactive to light. Conjunctivae and EOM are normal.   Neck: Normal range of motion. Neck supple. No JVD present. No thyromegaly present.   Circumference 17 reduced to 15"   Cardiovascular: Normal rate, regular rhythm, normal heart sounds and intact distal pulses. Exam reveals no gallop and no friction rub.   No murmur heard.  Pulses:       Carotid pulses are 2+ on the right side, and 2+ on the left side.       Radial pulses are 2+ on the right side, and 2+ on the left side.        Femoral pulses are 2+ on the right side, and 2+ on the left side.       Popliteal pulses are 2+ on the right side, and 2+ " "on the left side.        Dorsalis pedis pulses are 1+ on the right side, and 1+ on the left side.        Posterior tibial pulses are 1+ on the right side, and 1+ on the left side.   Pulmonary/Chest: Effort normal and breath sounds normal. She has no rales. She exhibits no tenderness.   Abdominal: Soft. Bowel sounds are normal. There is no tenderness.   Waist 39.5" up to 40", hip 45"   Musculoskeletal: Normal range of motion. She exhibits no edema.   Lymphadenopathy:     She has no cervical adenopathy.   Neurological: She is alert and oriented to person, place, and time.   Skin: Skin is warm and dry. No rash noted.         Assessment:       1. S/P coronary artery stent placement, 2 LETTY 9/2012, 1 LETTY 9/2013    2. Hypertension, unspecified type    3. Abdominal obesity    4. BMI 29.0-29.9,adult    5. Pure hypercholesterolemia    6. History of smoking greater than 50 pack years, quit 2012    7. RAMONA on CPAP, 100% use    8. On home oxygen therapy    9. Glucose intolerance    10. NSAID long-term use         Plan:       Kathy was seen today for follow-up.    Diagnoses and all orders for this visit:    S/P coronary artery stent placement, 2 LETTY 9/2012, 1 LETTY 9/2013  -     atorvastatin (LIPITOR) 80 MG tablet; Take 1 tablet (80 mg total) by mouth once daily. Need office visit before next refill, last seen 5/2018. LAST Rx if visit is not made.  Dispense: 90 tablet; Refill: 3  -     Lipid panel; Future; Expected date: 01/15/2020  -     High sensitivity CRP (Cardiac CRP); Future; Expected date: 01/15/2020    Hypertension, unspecified type  -     EKG 12-lead    Abdominal obesity    BMI 29.0-29.9,adult    Pure hypercholesterolemia  -     atorvastatin (LIPITOR) 80 MG tablet; Take 1 tablet (80 mg total) by mouth once daily. Need office visit before next refill, last seen 5/2018. LAST Rx if visit is not made.  Dispense: 90 tablet; Refill: 3  -     Lipid panel; Future; Expected date: 01/15/2020  -     High sensitivity CRP (Cardiac " CRP); Future; Expected date: 01/15/2020    History of smoking greater than 50 pack years, quit 2012    RAMONA on CPAP, 100% use    On home oxygen therapy    Glucose intolerance    NSAID long-term use      - All medical issues reviewed, will increase atorvastatin to 80 mg daily  - CV status stable, all medications reviewed, patient acknowledge good understanding.  - Instruction for Mediterranean diet and heart healthy exercise given.  - Highly recommend 30 minutes of exercise daily, can have Sunday off, with 2-3 sessions of muscle strengthening weekly. A  would be very helpful.  - Weigh twice weekly, try to lose 1-2 lbs per week  - Check home blood pressure, 2 days weekly, do 2 readings within 5 minutes in AM and PM, keep log for review.  - Recommend at least annual cardiovascular evaluation in view of her significant risk factors.      Patient Active Problem List   Diagnosis    Anxiety    S/P coronary artery stent placement, 2 LETTY 9/2012, 1 LETTY 9/2013    Varicose veins of lower extremities with other complications    Hyperlipidemia, baseline     Lumbar spondylosis    Carpal tunnel syndrome    BMI 29.0-29.9,adult    Hypertension, onset before 1995    Abdominal obesity    RAMONA on CPAP, 100% use    Nodular thyroid disease    Prediabetes    Urinary incontinence    Obesity (BMI 30.0-34.9), today 30    Dysmetabolic syndrome    Subclinical hyperthyroidism    On home oxygen therapy    COPD mixed type    Memory difficulties    Excessive daytime sleepiness    Bilateral sciatica, R>L, onset 1/2018    Frequent urination    History of smoking greater than 50 pack years, quit 2012    Glucose intolerance    NSAID long-term use     Total face-to-face time with the patient was 40 minutes and greater than 50% was spent in counseling and coordination of care. The above assessment and plan have been discussed at length. Labs and procedure over the last 6 months reviewed. Problem List updated.  Asked to bring in all active medications / pills bottles with next visit.

## 2019-10-15 NOTE — PROGRESS NOTES
" Patient ID:  Kathy Seymour is a 68 y.o. female who presents for {Misc; evaluation/follow-up:31395::"follow-up"} of Annual Exam      Health literacy: {DESC; LOW/MEDIUM/HIGH:70079}high  Activities: none  Nicotine:former   Alcohol: 4 drinks per week  Illicit drugs: no  Cardiac symptoms: none  Home BP:occasional  Medication compliance: yes  Diet: regular, diabetic, Mediterraneanregular  Caffeine: 1 cup daily   Labs:   Last Echo: 18  Last stress test: 16  Cardiovascular angiogram:   EC18  Fundoscopic exam:     No flowsheet data found.      HPI    ROS     Objective:    Physical Exam      Assessment:       1. Hypertension, unspecified type         Plan:         Hypertension, unspecified type  -     EKG 12-lead                "

## 2019-11-13 ENCOUNTER — DOCUMENTATION ONLY (OUTPATIENT)
Dept: FAMILY MEDICINE | Facility: CLINIC | Age: 69
End: 2019-11-13

## 2019-11-13 NOTE — PROGRESS NOTES
Pre-Visit Chart Review  For Appointment Scheduled on 11/14/2019.       There are no preventive care reminders to display for this patient.

## 2019-11-14 ENCOUNTER — OFFICE VISIT (OUTPATIENT)
Dept: FAMILY MEDICINE | Facility: CLINIC | Age: 69
End: 2019-11-14
Payer: MEDICARE

## 2019-11-14 VITALS
DIASTOLIC BLOOD PRESSURE: 70 MMHG | SYSTOLIC BLOOD PRESSURE: 134 MMHG | TEMPERATURE: 98 F | HEART RATE: 79 BPM | BODY MASS INDEX: 29.75 KG/M2 | WEIGHT: 178.56 LBS | HEIGHT: 65 IN

## 2019-11-14 DIAGNOSIS — I25.83 CORONARY ARTERY DISEASE DUE TO LIPID RICH PLAQUE: ICD-10-CM

## 2019-11-14 DIAGNOSIS — I25.10 CORONARY ARTERY DISEASE DUE TO LIPID RICH PLAQUE: ICD-10-CM

## 2019-11-14 DIAGNOSIS — I10 ESSENTIAL HYPERTENSION: Primary | ICD-10-CM

## 2019-11-14 DIAGNOSIS — E78.2 MIXED HYPERLIPIDEMIA: Chronic | ICD-10-CM

## 2019-11-14 PROCEDURE — 3078F DIAST BP <80 MM HG: CPT | Mod: HCNC,CPTII,S$GLB, | Performed by: FAMILY MEDICINE

## 2019-11-14 PROCEDURE — 3075F SYST BP GE 130 - 139MM HG: CPT | Mod: HCNC,CPTII,S$GLB, | Performed by: FAMILY MEDICINE

## 2019-11-14 PROCEDURE — 3078F PR MOST RECENT DIASTOLIC BLOOD PRESSURE < 80 MM HG: ICD-10-PCS | Mod: HCNC,CPTII,S$GLB, | Performed by: FAMILY MEDICINE

## 2019-11-14 PROCEDURE — 3075F PR MOST RECENT SYSTOLIC BLOOD PRESS GE 130-139MM HG: ICD-10-PCS | Mod: HCNC,CPTII,S$GLB, | Performed by: FAMILY MEDICINE

## 2019-11-14 PROCEDURE — 1101F PR PT FALLS ASSESS DOC 0-1 FALLS W/OUT INJ PAST YR: ICD-10-PCS | Mod: HCNC,CPTII,S$GLB, | Performed by: FAMILY MEDICINE

## 2019-11-14 PROCEDURE — 99999 PR PBB SHADOW E&M-EST. PATIENT-LVL III: ICD-10-PCS | Mod: PBBFAC,HCNC,, | Performed by: FAMILY MEDICINE

## 2019-11-14 PROCEDURE — 99213 OFFICE O/P EST LOW 20 MIN: CPT | Mod: HCNC,S$GLB,, | Performed by: FAMILY MEDICINE

## 2019-11-14 PROCEDURE — 1101F PT FALLS ASSESS-DOCD LE1/YR: CPT | Mod: HCNC,CPTII,S$GLB, | Performed by: FAMILY MEDICINE

## 2019-11-14 PROCEDURE — 99213 PR OFFICE/OUTPT VISIT, EST, LEVL III, 20-29 MIN: ICD-10-PCS | Mod: HCNC,S$GLB,, | Performed by: FAMILY MEDICINE

## 2019-11-14 PROCEDURE — 99999 PR PBB SHADOW E&M-EST. PATIENT-LVL III: CPT | Mod: PBBFAC,HCNC,, | Performed by: FAMILY MEDICINE

## 2019-11-17 NOTE — PROGRESS NOTES
Subjective:       Patient ID: Kathy Seymour is a 68 y.o. female.    Chief Complaint: Hypertension; Hyperlipidemia; Hypothyroidism; and Coronary Artery Disease    Patient presents here for follow-up of hypertension, hyperlipidemia, CAD, and hypothyroidism.  Her hypertension is well controlled at the present time and she is enrolled in the digital hypertension program.  Her blood pressures in her chart were reviewed as were her medications.  She states that she is following her low-sodium diet.  As far as her hyperlipidemia, this is also well controlled on her present dose of atorvastatin and her low-fat low-cholesterol diet.  Her last lipid profile was March of 2019 and it was excellent.  Her hypothyroidism is also well controlled with her present dose of levothyroxine.  Her TSH is in the therapeutic range.  As far as her coronary artery disease, she is followed by Cardiology and has not had any recent chest pains or palpitations.  She is trying to lose weight and follow a healthy diet.  As far as health maintenance, she is up-to-date with all of her recommended screening exams and immunizations.  She has already had her flu vaccine.    Hypertension   This is a chronic problem. The problem is unchanged. The problem is controlled. Pertinent negatives include no chest pain, headaches, palpitations or shortness of breath. Risk factors for coronary artery disease include dyslipidemia and post-menopausal state. Past treatments include angiotensin blockers and diuretics. The current treatment provides moderate improvement. There are no compliance problems.  There is no history of kidney disease, CAD/MI, CVA or heart failure.   Hyperlipidemia   This is a chronic problem. The problem is controlled. Recent lipid tests were reviewed and are normal. Pertinent negatives include no chest pain or shortness of breath. Current antihyperlipidemic treatment includes statins. The current treatment provides moderate improvement of  lipids. There are no compliance problems.  Risk factors for coronary artery disease include dyslipidemia, hypertension and obesity.     Review of Systems   Constitutional: Negative for chills, fatigue, fever and unexpected weight change.   HENT: Negative for congestion, ear pain, postnasal drip and sore throat.    Respiratory: Negative for cough and shortness of breath.    Cardiovascular: Negative for chest pain and palpitations.   Gastrointestinal: Negative for abdominal pain, diarrhea, nausea and vomiting.   Genitourinary: Negative for difficulty urinating, dysuria, flank pain and pelvic pain.   Musculoskeletal: Positive for arthralgias. Negative for back pain.   Neurological: Negative for dizziness, light-headedness and headaches.   Hematological: Negative for adenopathy. Does not bruise/bleed easily.   Psychiatric/Behavioral: Negative for sleep disturbance. The patient is not nervous/anxious.        Objective:      Physical Exam   Constitutional: She is oriented to person, place, and time. She appears well-developed and well-nourished.   HENT:   Head: Normocephalic and atraumatic.   Right Ear: External ear normal.   Left Ear: External ear normal.   Nose: Nose normal.   Mouth/Throat: Oropharynx is clear and moist.   Neck: Normal range of motion. Neck supple. No thyromegaly present.   Cardiovascular: Normal rate, regular rhythm, normal heart sounds and intact distal pulses.   No murmur heard.  Pulmonary/Chest: Effort normal and breath sounds normal. She has no wheezes. She has no rales.   Musculoskeletal: Normal range of motion. She exhibits no edema or tenderness.   Lymphadenopathy:     She has no cervical adenopathy.   Neurological: She is alert and oriented to person, place, and time. She has normal reflexes. No cranial nerve deficit.   Psychiatric: She has a normal mood and affect. Her behavior is normal.   Vitals reviewed.      Assessment:       1. Essential hypertension    2. Mixed hyperlipidemia    3.  Coronary artery disease due to lipid rich plaque        Plan:       1.  Continue present medication as her hypertension, hyperlipidemia, hypothyroidism, and CAD are stable and controlled  2.  Continue low-sodium, low-fat low-cholesterol diet and exercise  3.  Follow up with me in 6 months or p.r.n.        Patient readiness: acceptance and barriers:none    During the course of the visit the patient was educated and counseled about the following:     Hypertension:   Dietary sodium restriction.  Regular aerobic exercise.  Follow up: 6 months and as needed.    Goals: Hypertension: Reduce Blood Pressure    Did patient meet goals/outcomes: Yes    The following self management tools provided: blood pressure log    Patient Instructions (the written plan) was given to the patient/family.     Time spent with patient: 30 minutes    Barriers to medications present (no )    Adverse reactions to current medications (no)    Over the counter medications reviewed (Yes)

## 2019-12-03 ENCOUNTER — OFFICE VISIT (OUTPATIENT)
Dept: PULMONOLOGY | Facility: CLINIC | Age: 69
End: 2019-12-03
Payer: MEDICARE

## 2019-12-03 VITALS
BODY MASS INDEX: 30.16 KG/M2 | WEIGHT: 181 LBS | HEART RATE: 77 BPM | SYSTOLIC BLOOD PRESSURE: 154 MMHG | HEIGHT: 65 IN | DIASTOLIC BLOOD PRESSURE: 73 MMHG | OXYGEN SATURATION: 95 %

## 2019-12-03 DIAGNOSIS — G47.33 OSA ON CPAP: ICD-10-CM

## 2019-12-03 DIAGNOSIS — R09.89 CHRONIC SINUS COMPLAINTS: ICD-10-CM

## 2019-12-03 DIAGNOSIS — F41.9 ANXIETY: ICD-10-CM

## 2019-12-03 DIAGNOSIS — J44.9 COPD MIXED TYPE: Primary | ICD-10-CM

## 2019-12-03 PROCEDURE — 99999 PR PBB SHADOW E&M-EST. PATIENT-LVL III: ICD-10-PCS | Mod: PBBFAC,HCNC,, | Performed by: INTERNAL MEDICINE

## 2019-12-03 PROCEDURE — 1159F MED LIST DOCD IN RCRD: CPT | Mod: HCNC,S$GLB,, | Performed by: INTERNAL MEDICINE

## 2019-12-03 PROCEDURE — 3078F DIAST BP <80 MM HG: CPT | Mod: HCNC,CPTII,S$GLB, | Performed by: INTERNAL MEDICINE

## 2019-12-03 PROCEDURE — 99214 PR OFFICE/OUTPT VISIT, EST, LEVL IV, 30-39 MIN: ICD-10-PCS | Mod: HCNC,S$GLB,, | Performed by: INTERNAL MEDICINE

## 2019-12-03 PROCEDURE — 99214 OFFICE O/P EST MOD 30 MIN: CPT | Mod: HCNC,S$GLB,, | Performed by: INTERNAL MEDICINE

## 2019-12-03 PROCEDURE — 3078F PR MOST RECENT DIASTOLIC BLOOD PRESSURE < 80 MM HG: ICD-10-PCS | Mod: HCNC,CPTII,S$GLB, | Performed by: INTERNAL MEDICINE

## 2019-12-03 PROCEDURE — 3077F PR MOST RECENT SYSTOLIC BLOOD PRESSURE >= 140 MM HG: ICD-10-PCS | Mod: HCNC,CPTII,S$GLB, | Performed by: INTERNAL MEDICINE

## 2019-12-03 PROCEDURE — 99999 PR PBB SHADOW E&M-EST. PATIENT-LVL III: CPT | Mod: PBBFAC,HCNC,, | Performed by: INTERNAL MEDICINE

## 2019-12-03 PROCEDURE — 1126F AMNT PAIN NOTED NONE PRSNT: CPT | Mod: HCNC,S$GLB,, | Performed by: INTERNAL MEDICINE

## 2019-12-03 PROCEDURE — 3077F SYST BP >= 140 MM HG: CPT | Mod: HCNC,CPTII,S$GLB, | Performed by: INTERNAL MEDICINE

## 2019-12-03 PROCEDURE — 1101F PR PT FALLS ASSESS DOC 0-1 FALLS W/OUT INJ PAST YR: ICD-10-PCS | Mod: HCNC,CPTII,S$GLB, | Performed by: INTERNAL MEDICINE

## 2019-12-03 PROCEDURE — 1126F PR PAIN SEVERITY QUANTIFIED, NO PAIN PRESENT: ICD-10-PCS | Mod: HCNC,S$GLB,, | Performed by: INTERNAL MEDICINE

## 2019-12-03 PROCEDURE — 1101F PT FALLS ASSESS-DOCD LE1/YR: CPT | Mod: HCNC,CPTII,S$GLB, | Performed by: INTERNAL MEDICINE

## 2019-12-03 PROCEDURE — 1159F PR MEDICATION LIST DOCUMENTED IN MEDICAL RECORD: ICD-10-PCS | Mod: HCNC,S$GLB,, | Performed by: INTERNAL MEDICINE

## 2019-12-03 RX ORDER — MONTELUKAST SODIUM 10 MG/1
TABLET ORAL
COMMUNITY
Start: 2019-12-01 | End: 2020-01-03 | Stop reason: SDUPTHER

## 2019-12-03 RX ORDER — ALPRAZOLAM 0.5 MG/1
0.5 TABLET ORAL 3 TIMES DAILY PRN
Qty: 60 TABLET | Refills: 5 | Status: SHIPPED | OUTPATIENT
Start: 2019-12-03 | End: 2020-06-02 | Stop reason: SDUPTHER

## 2019-12-03 NOTE — PATIENT INSTRUCTIONS
airfit f 30 mask - goes under nostrils - full face mask without bulkiness.      Check home mask, try to use full face type mask with current auto cpap.    Could do anther titration.  Auto cpap devices give similar results to cpap titrations in sleep lab.  Your titration in July 2017 was with poor sleep efficiency and , if you use your auto cpap, the auto device repeats titration nightly.      Use during day and note what might be acceptable pressures- you say you have no symptoms on cpap 6 (doubt you are optimally treated) could use minimal pressures??    Use symbicort, use flonase.

## 2019-12-03 NOTE — PROGRESS NOTES
12/3/2019    Kathy Seymour  Office Note    Chief Complaint   Patient presents with    Follow-up     6 month    Apnea       HPI:   Dec 3, 2019-  Nasal patency is now good, still uses old cpap device as auto pap ppt dry mouth and nocturnal arousal.   Machine brought in today.  July 25, 2017 cpap titration had 44% sleep efficiency,      June 4, 2019- freq nasal stuffy,uses saline.  Has cpap  6 , last 2017 cpap titration had no rem sleep so rec was 10-20 with study done 8-12.  Pt has nasal stuffy freq uses saline.      Has some bingham.  Patient Instructions   Your ahi was 48 in 2017- fairly severe.    Nasal patency needed- use saline (afrin if stuffy) then flonase once open.    If nasal obstructs will need to open mouth - mouth leaks will make machine cycles.  Would use auto pap 8-12- numbers found to be adequate in 2017    Would try nasal oral mask in future.  Work on nose.    Exercise would likely be good.      Try symbicort 2 twice daily  - in place anoro.       12/17/18- has older machine states not working correctly. Using nightly, has day time drowsiness. Insurance states having to wait for next machine. Sleep is poor. Has new nasal face mask. Has tried nasal pillars, nasal pillow, and now has full nose mask. Nervous over full face mask causing clausaphobia.   Sensation of throat closing, Not using Anoro daily.  Patient Instructions   Anoro is a daily medication intended to prevent breathing problems  Use Albuterol inhaler 1-2 puffs every 4 hours when needed for Shortness of breath      A full Face mask is needed to help with Obstructive sleep apnea due to sleeping with mouth open.  9/24/18- states had sleep titration study but never heard anything from it. Insurance denied automatic titration machine, [facility] in [place] was not told she had COPD. Request a different face mask. States SOB with activity every day for a few minutes, day time only, relieved with rest. States does not use albuterol inhaler feels it does not  help.  Has stopped smoking five years ago.     Previous HPI Dr. Jordan:  July 10, 2017-on cymbalta- helps neuropathy but having poor sleep  With nocturia ppt arousal.  Sleep study done with no rem sleep on cymbalta?  10-20 pressure rather than 6.          2/27/2017HPI: dx osas 10 yrs ago, ahi na.  On nasal pillars- cpap 6, new machine 5 yrs ago.  Gained 45 post stent around 2012.       Prior to cpap, severe snorer, fatigue, with no other prominent symptoms recalled.       Now no snoring noted with cpap. No nasal obstruction.     Mentation a little slow but vague.      The chief compliant  problem is stable  PFSH:  Past Medical History:   Diagnosis Date    Angina pectoris     Anticoagulant long-term use     Anxiety     Arthritis     Back pain     Cataract     Chronic bronchitis     Coronary artery disease     STENT X 2    CTS (carpal tunnel syndrome)     RIGHT    Depression     MILLER (dyspnea on exertion) 2/21/2017    Hallux valgus, acquired, bilateral 1/19/2017    Hematuria     Hyperlipidemia     Hypertension     Hypertensive left ventricular hypertrophy, without heart failure 5/22/2017    Hypothyroidism     Obesity     Polyneuropathy     S/P coronary artery stent placement, 2 LETTY 9/2012, 1 LETTY 9/2013 3/5/2013    Sleep apnea     USES CPAP    Thyroid disease     Tobacco dependence     Trouble in sleeping     Urinary incontinence     Wears glasses     ONE CONTAC         Past Surgical History:   Procedure Laterality Date    APPENDECTOMY      BILATERAL SALPINGOOPHORECTOMY      CARDIAC CATHETERIZATION      CORONARY ANGIOPLASTY      CORONARY STENT PLACEMENT      PCI  of the LAD with LETTY    EYE SURGERY      bilat cataract removal    HYSTERECTOMY      complete    OOPHORECTOMY      TONSILLECTOMY       Social History     Tobacco Use    Smoking status: Former Smoker     Packs/day: 1.00     Years: 50.00     Pack years: 50.00     Types: Cigarettes     Start date: 7/9/1964     Last attempt to quit:  "2012     Years since quittin.3    Smokeless tobacco: Never Used   Substance Use Topics    Alcohol use: Yes     Alcohol/week: 2.0 standard drinks     Types: 2 Shots of liquor per week     Comment: Social - vodka on wednesday    Drug use: No     Family History   Problem Relation Age of Onset    Hypertension Sister     Arthritis Sister     Heart failure Mother     Hypertension Mother     Heart failure Father     Hypertension Father     No Known Problems Brother     No Known Problems Son     Heart disease Brother     Arthritis Sister     Hypertension Sister     Cancer Sister     Asthma Son     Arthritis Son         psoriatic arthritis     Review of patient's allergies indicates:   Allergen Reactions    Wellbutrin [bupropion hcl] Other (See Comments)     sven     I have reviewed past medical, family, and social history. I have reviewed previous nurse notes.    Performance Status:The patient's activity level is no limits with regular activity.      Review of Systems:  a review of eleven systems covering constitutional, Eye, HEENT, Psych, Respiratory, Cardiac, GI, , Musculoskeletal, Endocrine, Dermatologic was negative except for pertinent findings as listed ABOVE and below: pertinent positive as above, rest is good       Exam:Comprehensive exam done. BP (!) 154/73 (BP Location: Right arm, Patient Position: Sitting)   Pulse 77   Ht 5' 5" (1.651 m)   Wt 82.1 kg (181 lb)   SpO2 95% Comment: on room air  BMI 30.12 kg/m²   Exam included Vitals as listed, and patient's appearance and affect and alertness and mood, oral exam for yeast and hygiene and pharynx lesions and Mallapatti (M) score, neck with inspection for jvd and masses and thyroid abnormalities and lymph nodes (supraclavicular and infraclavicular nodes and axillary also examined and noted if abn), chest exam included symmetry and effort and fremitus and percussion and auscultation, cardiac exam included rhythm and gallops and " murmur and rubs and jvd and edema, abdominal exam for mass and hepatosplenomegaly and tenderness and hernias and bowel sounds, Musculoskeletal exam with muscle tone and posture and mobility/gait and  strength, and skin for rashes and cyanosis and pallor and turgor, extremity for clubbing.  Findings were normal except for pertinent findings listed below:  M3, chest is symmetric, no distress, normal percussion, normal fremitus and good normal breath sounds  No edema.    12/03/2019  exam is updated and all stable        Radiographs (ct chest and cxr) reviewed: results reviewed   The mediastinal and cardiac size and contours are normal. The diaphragms and pleura are clear. There are no intra-pulmonary masses or infiltrates. There is no pneumothorax or pleural effusion.    Impression-Negative chest.      Electronically signed by:Hernán Mojica MD  Date: 03/26/14  Time:10:40     Labs reviewed  Lab Results   Component Value Date    WBC 7.63 03/12/2019    RBC 4.37 03/12/2019    HGB 12.6 03/12/2019    HCT 41.4 03/12/2019    MCV 95 03/12/2019    MCH 28.8 03/12/2019    MCHC 30.4 (L) 03/12/2019    RDW 14.3 03/12/2019     03/12/2019    MPV 11.4 03/12/2019    GRAN 4.9 03/12/2019    GRAN 63.7 03/12/2019    LYMPH 1.9 03/12/2019    LYMPH 25.2 03/12/2019    MONO 0.6 03/12/2019    MONO 8.1 03/12/2019    EOS 0.2 03/12/2019    BASO 0.02 03/12/2019    EOSINOPHIL 2.4 03/12/2019    BASOPHIL 0.3 03/12/2019       PFT results reviewed  Pulmonary Functions Testing Results:  Procedure Notes     Author Status Last  Updated Created   Tayo Jordan MD Signed Tayo Jordan MD 7/19/2017  2:38 PM 7/19/2017  2:35 PM          Assoc. Orders Procedures   None COMPLETE PFT WITH BRONCHODILATOR       Pre-Op Dx Post-Op Dx   Bronchitis None          Pulmonary Functions, including spirometry and bronchodilator response and lung volumes and diffusion, study was done July 28, 2017.  Spirometry shows loss of vital capacity and fev1 with no  obstruction and no bronchodilator response.   FEV1 is 71% or 1.65 liters.  Lung volumes show  normal TLC and elevated residual volume.  Diffusion shows reduced but falls within normal range when corrected for lung volumes.     Pulmonary functions show low vital capacity and low mvv with some air trapping,but rest is wnl. Clinical correlation recommended.     Tayo Jordan M.D.               Plan:  Clinical impression is apparently straight forward and impression with management as below.    Kathy was seen today for follow-up and apnea.    Diagnoses and all orders for this visit:    COPD mixed type    Anxiety  Comments:  Controlled, refill of Xanax given.  Orders:  -     ALPRAZolam (XANAX) 0.5 MG tablet; Take 1 tablet (0.5 mg total) by mouth 3 (three) times daily as needed.    RAMONA on CPAP    Chronic sinus complaints        Follow up in about 6 months (around 6/3/2020).    Discussed with patient above for education the following:      Patient Instructions   airfit f 30 mask - goes under nostrils - full face mask without bulkiness.      Check home mask, try to use full face type mask with current auto cpap.    Could do anther titration.  Auto cpap devices give similar results to cpap titrations in sleep lab.  Your titration in July 2017 was with poor sleep efficiency and , if you use your auto cpap, the auto device repeats titration nightly.      Use during day and note what might be acceptable pressures- you say you have no symptoms on cpap 6 (doubt you are optimally treated) could use minimal pressures??    Use symbicort, use flonase.

## 2020-01-03 RX ORDER — MONTELUKAST SODIUM 10 MG/1
10 TABLET ORAL DAILY
Qty: 30 TABLET | Refills: 5 | Status: SHIPPED | OUTPATIENT
Start: 2020-01-03 | End: 2022-04-11

## 2020-01-13 ENCOUNTER — PATIENT MESSAGE (OUTPATIENT)
Dept: ORTHOPEDICS | Facility: CLINIC | Age: 70
End: 2020-01-13

## 2020-01-15 ENCOUNTER — OFFICE VISIT (OUTPATIENT)
Dept: ORTHOPEDICS | Facility: CLINIC | Age: 70
End: 2020-01-15
Payer: MEDICARE

## 2020-01-15 VITALS
DIASTOLIC BLOOD PRESSURE: 63 MMHG | SYSTOLIC BLOOD PRESSURE: 135 MMHG | BODY MASS INDEX: 30.16 KG/M2 | HEIGHT: 65 IN | WEIGHT: 181 LBS | HEART RATE: 82 BPM

## 2020-01-15 DIAGNOSIS — S83.241D ACUTE MEDIAL MENISCAL TEAR, RIGHT, SUBSEQUENT ENCOUNTER: Primary | ICD-10-CM

## 2020-01-15 PROCEDURE — 1125F PR PAIN SEVERITY QUANTIFIED, PAIN PRESENT: ICD-10-PCS | Mod: HCNC,S$GLB,, | Performed by: ORTHOPAEDIC SURGERY

## 2020-01-15 PROCEDURE — 3075F SYST BP GE 130 - 139MM HG: CPT | Mod: HCNC,CPTII,S$GLB, | Performed by: ORTHOPAEDIC SURGERY

## 2020-01-15 PROCEDURE — 1101F PT FALLS ASSESS-DOCD LE1/YR: CPT | Mod: HCNC,CPTII,S$GLB, | Performed by: ORTHOPAEDIC SURGERY

## 2020-01-15 PROCEDURE — 20610 DRAIN/INJ JOINT/BURSA W/O US: CPT | Mod: HCNC,RT,S$GLB, | Performed by: ORTHOPAEDIC SURGERY

## 2020-01-15 PROCEDURE — 3075F PR MOST RECENT SYSTOLIC BLOOD PRESS GE 130-139MM HG: ICD-10-PCS | Mod: HCNC,CPTII,S$GLB, | Performed by: ORTHOPAEDIC SURGERY

## 2020-01-15 PROCEDURE — 3078F DIAST BP <80 MM HG: CPT | Mod: HCNC,CPTII,S$GLB, | Performed by: ORTHOPAEDIC SURGERY

## 2020-01-15 PROCEDURE — 99213 PR OFFICE/OUTPT VISIT, EST, LEVL III, 20-29 MIN: ICD-10-PCS | Mod: 25,HCNC,S$GLB, | Performed by: ORTHOPAEDIC SURGERY

## 2020-01-15 PROCEDURE — 20610 LARGE JOINT ASPIRATION/INJECTION: R KNEE: ICD-10-PCS | Mod: HCNC,RT,S$GLB, | Performed by: ORTHOPAEDIC SURGERY

## 2020-01-15 PROCEDURE — 3078F PR MOST RECENT DIASTOLIC BLOOD PRESSURE < 80 MM HG: ICD-10-PCS | Mod: HCNC,CPTII,S$GLB, | Performed by: ORTHOPAEDIC SURGERY

## 2020-01-15 PROCEDURE — 99213 OFFICE O/P EST LOW 20 MIN: CPT | Mod: 25,HCNC,S$GLB, | Performed by: ORTHOPAEDIC SURGERY

## 2020-01-15 PROCEDURE — 1159F MED LIST DOCD IN RCRD: CPT | Mod: HCNC,S$GLB,, | Performed by: ORTHOPAEDIC SURGERY

## 2020-01-15 PROCEDURE — 1159F PR MEDICATION LIST DOCUMENTED IN MEDICAL RECORD: ICD-10-PCS | Mod: HCNC,S$GLB,, | Performed by: ORTHOPAEDIC SURGERY

## 2020-01-15 PROCEDURE — 1125F AMNT PAIN NOTED PAIN PRSNT: CPT | Mod: HCNC,S$GLB,, | Performed by: ORTHOPAEDIC SURGERY

## 2020-01-15 PROCEDURE — 99999 PR PBB SHADOW E&M-EST. PATIENT-LVL III: CPT | Mod: PBBFAC,HCNC,, | Performed by: ORTHOPAEDIC SURGERY

## 2020-01-15 PROCEDURE — 99999 PR PBB SHADOW E&M-EST. PATIENT-LVL III: ICD-10-PCS | Mod: PBBFAC,HCNC,, | Performed by: ORTHOPAEDIC SURGERY

## 2020-01-15 PROCEDURE — 1101F PR PT FALLS ASSESS DOC 0-1 FALLS W/OUT INJ PAST YR: ICD-10-PCS | Mod: HCNC,CPTII,S$GLB, | Performed by: ORTHOPAEDIC SURGERY

## 2020-01-15 RX ORDER — TRIAMCINOLONE ACETONIDE 40 MG/ML
40 INJECTION, SUSPENSION INTRA-ARTICULAR; INTRAMUSCULAR
Status: DISCONTINUED | OUTPATIENT
Start: 2020-01-15 | End: 2020-01-15 | Stop reason: HOSPADM

## 2020-01-15 RX ADMIN — TRIAMCINOLONE ACETONIDE 40 MG: 40 INJECTION, SUSPENSION INTRA-ARTICULAR; INTRAMUSCULAR at 09:01

## 2020-01-15 NOTE — PROGRESS NOTES
Past Medical History:   Diagnosis Date    Angina pectoris     Anticoagulant long-term use     Anxiety     Arthritis     Back pain     Cataract     Chronic bronchitis     Coronary artery disease     STENT X 2    CTS (carpal tunnel syndrome)     RIGHT    Depression     MILLER (dyspnea on exertion) 2/21/2017    Hallux valgus, acquired, bilateral 1/19/2017    Hematuria     Hyperlipidemia     Hypertension     Hypertensive left ventricular hypertrophy, without heart failure 5/22/2017    Hypothyroidism     Obesity     Polyneuropathy     S/P coronary artery stent placement, 2 LETTY 9/2012, 1 LETTY 9/2013 3/5/2013    Sleep apnea     USES CPAP    Thyroid disease     Tobacco dependence     Trouble in sleeping     Urinary incontinence     Wears glasses     ONE CONTAC       Past Surgical History:   Procedure Laterality Date    APPENDECTOMY      BILATERAL SALPINGOOPHORECTOMY      CARDIAC CATHETERIZATION      CORONARY ANGIOPLASTY      CORONARY STENT PLACEMENT      PCI  of the LAD with LETTY    EYE SURGERY      bilat cataract removal    HYSTERECTOMY      complete    OOPHORECTOMY      TONSILLECTOMY         Current Outpatient Medications   Medication Sig    albuterol (PROVENTIL/VENTOLIN HFA) 90 mcg/actuation inhaler Inhale 2 puffs into the lungs every 6 (six) hours as needed for Wheezing or Shortness of Breath. Rescue    ALPRAZolam (XANAX) 0.5 MG tablet Take 1 tablet (0.5 mg total) by mouth 3 (three) times daily as needed.    aspirin 81 MG Chew Take 1 tablet (81 mg total) by mouth once daily.    atorvastatin (LIPITOR) 80 MG tablet Take 1 tablet (80 mg total) by mouth once daily. Need office visit before next refill, last seen 5/2018. LAST Rx if visit is not made.    budesonide-formoterol 160-4.5 mcg (SYMBICORT) 160-4.5 mcg/actuation HFAA Inhale 2 puffs into the lungs every 12 (twelve) hours. Controller    DULoxetine (CYMBALTA) 60 MG capsule Take 1 capsule (60 mg total) by mouth once daily.     fluticasone propionate (FLONASE) 50 mcg/actuation nasal spray 1 spray (50 mcg total) by Each Nare route once daily.    losartan-hydrochlorothiazide 100-25 mg (HYZAAR) 100-25 mg per tablet TAKE 1 TABLET EVERY DAY    montelukast (SINGULAIR) 10 mg tablet Take 1 tablet (10 mg total) by mouth once daily.    naproxen (NAPROSYN) 500 MG tablet Take 1 tablet (500 mg total) by mouth 2 (two) times daily with meals.    omeprazole (PRILOSEC) 20 MG capsule Take 1 capsule (20 mg total) by mouth once daily.    methIMAzole (TAPAZOLE) 5 MG Tab Take 1 tablet (5 mg total) by mouth 2 (two) times daily.    oxybutynin (DITROPAN XL) 15 MG TR24 Take 1 tablet (15 mg total) by mouth once daily.     No current facility-administered medications for this visit.        Review of patient's allergies indicates:   Allergen Reactions    Wellbutrin [bupropion hcl] Other (See Comments)     sven       Family History   Problem Relation Age of Onset    Hypertension Sister     Arthritis Sister     Heart failure Mother     Hypertension Mother     Heart failure Father     Hypertension Father     No Known Problems Brother     No Known Problems Son     Heart disease Brother     Arthritis Sister     Hypertension Sister     Cancer Sister     Asthma Son     Arthritis Son         psoriatic arthritis       Social History     Socioeconomic History    Marital status:      Spouse name: Not on file    Number of children: Not on file    Years of education: Not on file    Highest education level: Not on file   Occupational History    Not on file   Social Needs    Financial resource strain: Not on file    Food insecurity:     Worry: Not on file     Inability: Not on file    Transportation needs:     Medical: Not on file     Non-medical: Not on file   Tobacco Use    Smoking status: Former Smoker     Packs/day: 1.00     Years: 50.00     Pack years: 50.00     Types: Cigarettes     Start date: 7/9/1964     Last attempt to quit: 8/13/2012      Years since quittin.4    Smokeless tobacco: Never Used   Substance and Sexual Activity    Alcohol use: Yes     Alcohol/week: 2.0 standard drinks     Types: 2 Shots of liquor per week     Comment: Social - vodka on wednesday    Drug use: No    Sexual activity: Yes     Partners: Male   Lifestyle    Physical activity:     Days per week: Not on file     Minutes per session: Not on file    Stress: Not on file   Relationships    Social connections:     Talks on phone: Not on file     Gets together: Not on file     Attends Buddhism service: Not on file     Active member of club or organization: Not on file     Attends meetings of clubs or organizations: Not on file     Relationship status: Not on file   Other Topics Concern    Not on file   Social History Narrative    Not on file       Chief Complaint:   Chief Complaint   Patient presents with    Right Knee - Pain, Follow-up       History of present illness:  This is a 68-year-old female with a history of meniscal tear in the right knee who re-injured her knee on 2019.  She twisted her knee while at a concert.  Patient did not need surgery on it previously.  It got better with naproxen.  Pain getting up from sitting.  Symptoms are worsening and are moderate to severe.  I saw her about 4 months ago.  Offered her an injection which she declined.  She then re-injured it about 6 weeks ago when she reached was sedated.  Pain along the medial aspect of the knee.    Answers for HPI/ROS submitted by the patient on 2020   Leg pain  unexpected weight change: No  appetite change : No  sleep disturbance: Yes  IMMUNOCOMPROMISED: No  nervous/ anxious: Yes  dysphoric mood: No  rash: No  visual disturbance: No  eye redness: No  eye pain: No  ear pain: No  tinnitus: No  hearing loss: No  sinus pressure : No  nosebleeds: No  enviro allergies: No  food allergies: No  cough: No  shortness of breath: Yes  sweating: Yes  dysuria: No  frequency: Yes  difficulty  urinating: No  hematuria: No  painful intercourse: No  chest pain: No  palpitations: No  nausea: No  vomiting: No  diarrhea: No  blood in stool: No  constipation: No  headaches: No  dizziness: No  numbness: No  seizures: No  joint swelling: Yes  myalgia: Yes  weakness: No  back pain: Yes  Pain Chronicity: chronic  History of trauma: No  Onset: 1 to 4 weeks ago  Frequency: constantly  Progression since onset: gradually worsening  Injury mechanism: twisting  injury location: at home  pain- numeric: 6/10  pain location: right knee  pain quality: sharp  Radiating Pain: No  Aggravating factors: bearing weight  fever: No  inability to bear weight: Yes  itching: No  joint locking: No  limited range of motion: Yes  stiffness: Yes  tingling: No  Treatments tried: cold, NSAIDs  physical therapy: not tried  Improvement on treatment: no relief        Physical Examination:    Vital Signs:    Vitals:    01/15/20 0856   BP: 135/63   Pulse: 82       Body mass index is 30.12 kg/m².    This a well-developed, well nourished patient in no acute distress.  They are alert and oriented and cooperative to examination.  Pt. walks without an antalgic gait.      Examination of the right knee shows no rashes or erythema. There are no masses ecchymosis or effusion. Patient has full range of motion from 0-130°. Patient is nontender to palpation over lateral joint line and moderately tender to palpation over the medial joint line.Knee is stable to varus and valgus stress. 5 out of 5 motor strength. Palpable distal pulses. Intact light touch sensation. Negative Patellofemoral crepitus      X-rays:  X-rays of the right knee are available for review which show some very mild arthritic changes and small effusion     Assessment::  Right medial meniscal tear    Plan:  I reviewed the findings with her today. I do not see anything overly concerning.  I think she definitely might have injured her medial meniscus again.  Offered her a cortisone injection  and she agreed. Follow-up as needed    This note was created using [employer] voice recognition software that occasionally misinterpreted phrases or words.    Consult note is delivered via Epic messaging service.

## 2020-03-12 ENCOUNTER — TELEPHONE (OUTPATIENT)
Dept: CARDIOLOGY | Facility: CLINIC | Age: 70
End: 2020-03-12

## 2020-03-12 DIAGNOSIS — Z95.5 S/P CORONARY ARTERY STENT PLACEMENT: Chronic | ICD-10-CM

## 2020-03-12 DIAGNOSIS — E78.00 PURE HYPERCHOLESTEROLEMIA: Chronic | ICD-10-CM

## 2020-03-12 DIAGNOSIS — I10 ESSENTIAL HYPERTENSION: ICD-10-CM

## 2020-03-12 RX ORDER — ATORVASTATIN CALCIUM 80 MG/1
80 TABLET, FILM COATED ORAL DAILY
Qty: 90 TABLET | Refills: 2 | Status: SHIPPED | OUTPATIENT
Start: 2020-03-12 | End: 2020-06-08 | Stop reason: SDUPTHER

## 2020-03-12 RX ORDER — LOSARTAN POTASSIUM AND HYDROCHLOROTHIAZIDE 25; 100 MG/1; MG/1
1 TABLET ORAL DAILY
Qty: 90 TABLET | Refills: 3 | Status: SHIPPED | OUTPATIENT
Start: 2020-03-12 | End: 2021-02-24 | Stop reason: SDUPTHER

## 2020-03-12 NOTE — TELEPHONE ENCOUNTER
Pt last seen 11/14/2019, last lab work 3/12/2019, next appt scheduled 7/2/2020. Last refilled 1/17/2019.

## 2020-03-13 DIAGNOSIS — G62.9 NEUROPATHY: ICD-10-CM

## 2020-03-13 DIAGNOSIS — F41.9 ANXIETY: ICD-10-CM

## 2020-03-13 RX ORDER — DULOXETIN HYDROCHLORIDE 60 MG/1
60 CAPSULE, DELAYED RELEASE ORAL DAILY
Qty: 90 CAPSULE | Refills: 3 | Status: SHIPPED | OUTPATIENT
Start: 2020-03-13 | End: 2021-01-01

## 2020-04-09 ENCOUNTER — PATIENT MESSAGE (OUTPATIENT)
Dept: NEUROLOGY | Facility: CLINIC | Age: 70
End: 2020-04-09

## 2020-04-29 DIAGNOSIS — M79.604 PAIN IN BOTH LOWER EXTREMITIES: ICD-10-CM

## 2020-04-29 DIAGNOSIS — E05.90 HYPERTHYROIDISM: ICD-10-CM

## 2020-04-29 DIAGNOSIS — M79.605 PAIN IN BOTH LOWER EXTREMITIES: ICD-10-CM

## 2020-04-30 RX ORDER — METHIMAZOLE 5 MG/1
TABLET ORAL
Qty: 180 TABLET | Refills: 3 | Status: SHIPPED | OUTPATIENT
Start: 2020-04-30 | End: 2020-07-30 | Stop reason: SDUPTHER

## 2020-04-30 RX ORDER — NAPROXEN 500 MG/1
TABLET ORAL
Qty: 120 TABLET | Refills: 5 | Status: SHIPPED | OUTPATIENT
Start: 2020-04-30 | End: 2021-08-05 | Stop reason: ALTCHOICE

## 2020-05-05 ENCOUNTER — PATIENT MESSAGE (OUTPATIENT)
Dept: ADMINISTRATIVE | Facility: HOSPITAL | Age: 70
End: 2020-05-05

## 2020-06-02 ENCOUNTER — OFFICE VISIT (OUTPATIENT)
Dept: PULMONOLOGY | Facility: CLINIC | Age: 70
End: 2020-06-02
Payer: MEDICARE

## 2020-06-02 VITALS
BODY MASS INDEX: 30.89 KG/M2 | OXYGEN SATURATION: 97 % | WEIGHT: 185.63 LBS | SYSTOLIC BLOOD PRESSURE: 150 MMHG | DIASTOLIC BLOOD PRESSURE: 71 MMHG | HEART RATE: 83 BPM

## 2020-06-02 DIAGNOSIS — F41.9 ANXIETY: ICD-10-CM

## 2020-06-02 DIAGNOSIS — G47.33 OSA ON CPAP: Primary | ICD-10-CM

## 2020-06-02 PROCEDURE — 3077F SYST BP >= 140 MM HG: CPT | Mod: HCNC,CPTII,S$GLB, | Performed by: INTERNAL MEDICINE

## 2020-06-02 PROCEDURE — 3078F DIAST BP <80 MM HG: CPT | Mod: HCNC,CPTII,S$GLB, | Performed by: INTERNAL MEDICINE

## 2020-06-02 PROCEDURE — 1126F AMNT PAIN NOTED NONE PRSNT: CPT | Mod: HCNC,S$GLB,, | Performed by: INTERNAL MEDICINE

## 2020-06-02 PROCEDURE — 99213 OFFICE O/P EST LOW 20 MIN: CPT | Mod: HCNC,S$GLB,, | Performed by: INTERNAL MEDICINE

## 2020-06-02 PROCEDURE — 1101F PR PT FALLS ASSESS DOC 0-1 FALLS W/OUT INJ PAST YR: ICD-10-PCS | Mod: HCNC,CPTII,S$GLB, | Performed by: INTERNAL MEDICINE

## 2020-06-02 PROCEDURE — 3077F PR MOST RECENT SYSTOLIC BLOOD PRESSURE >= 140 MM HG: ICD-10-PCS | Mod: HCNC,CPTII,S$GLB, | Performed by: INTERNAL MEDICINE

## 2020-06-02 PROCEDURE — 99213 PR OFFICE/OUTPT VISIT, EST, LEVL III, 20-29 MIN: ICD-10-PCS | Mod: HCNC,S$GLB,, | Performed by: INTERNAL MEDICINE

## 2020-06-02 PROCEDURE — 99999 PR PBB SHADOW E&M-EST. PATIENT-LVL III: CPT | Mod: PBBFAC,HCNC,, | Performed by: INTERNAL MEDICINE

## 2020-06-02 PROCEDURE — 1159F MED LIST DOCD IN RCRD: CPT | Mod: HCNC,S$GLB,, | Performed by: INTERNAL MEDICINE

## 2020-06-02 PROCEDURE — 1101F PT FALLS ASSESS-DOCD LE1/YR: CPT | Mod: HCNC,CPTII,S$GLB, | Performed by: INTERNAL MEDICINE

## 2020-06-02 PROCEDURE — 99999 PR PBB SHADOW E&M-EST. PATIENT-LVL III: ICD-10-PCS | Mod: PBBFAC,HCNC,, | Performed by: INTERNAL MEDICINE

## 2020-06-02 PROCEDURE — 1126F PR PAIN SEVERITY QUANTIFIED, NO PAIN PRESENT: ICD-10-PCS | Mod: HCNC,S$GLB,, | Performed by: INTERNAL MEDICINE

## 2020-06-02 PROCEDURE — 1159F PR MEDICATION LIST DOCUMENTED IN MEDICAL RECORD: ICD-10-PCS | Mod: HCNC,S$GLB,, | Performed by: INTERNAL MEDICINE

## 2020-06-02 PROCEDURE — 3078F PR MOST RECENT DIASTOLIC BLOOD PRESSURE < 80 MM HG: ICD-10-PCS | Mod: HCNC,CPTII,S$GLB, | Performed by: INTERNAL MEDICINE

## 2020-06-02 RX ORDER — ALPRAZOLAM 0.5 MG/1
0.5 TABLET ORAL 3 TIMES DAILY PRN
Qty: 60 TABLET | Refills: 5 | Status: SHIPPED | OUTPATIENT
Start: 2020-06-02 | End: 2020-12-01 | Stop reason: SDUPTHER

## 2020-06-02 NOTE — PROGRESS NOTES
6/2/2020    Kathy Seymour  Office Note    Chief Complaint   Patient presents with    Follow-up       HPI:     6/2/2020 back on old cpap device.  Sinus and lungs ok- occ symptoms with pollen exposure..    Dec 3, 2019-  Nasal patency is now good, still uses old cpap device as auto pap ppt dry mouth and nocturnal arousal.   Machine brought in today.  July 25, 2017 cpap titration had 44% sleep efficiency,    airfit f 30 mask - goes under nostrils - full face mask without bulkiness.        Check home mask, try to use full face type mask with current auto cpap.     Could do anther titration.  Auto cpap devices give similar results to cpap titrations in sleep lab.  Your titration in July 2017 was with poor sleep efficiency and , if you use your auto cpap, the auto device repeats titration nightly.       Use during day and note what might be acceptable pressures- you say you have no symptoms on cpap 6 (doubt you are optimally treated) could use minimal pressures??     Use symbicort, use flonase.  June 4, 2019- freq nasal stuffy,uses saline.  Has cpap  6 , last 2017 cpap titration had no rem sleep so rec was 10-20 with study done 8-12.  Pt has nasal stuffy freq uses saline.      Has some bingham.  Patient Instructions   Your ahi was 48 in 2017- fairly severe.    Nasal patency needed- use saline (afrin if stuffy) then flonase once open.    If nasal obstructs will need to open mouth - mouth leaks will make machine cycles.  Would use auto pap 8-12- numbers found to be adequate in 2017    Would try nasal oral mask in future.  Work on nose.    Exercise would likely be good.      Try symbicort 2 twice daily  - in place anoro.       12/17/18- has older machine states not working correctly. Using nightly, has day time drowsiness. Insurance states having to wait for next machine. Sleep is poor. Has new nasal face mask. Has tried nasal pillars, nasal pillow, and now has full nose mask. Nervous over full face mask causing  clausaphobia.   Sensation of throat closing, Not using Anoro daily.  Patient Instructions   Anoro is a daily medication intended to prevent breathing problems  Use Albuterol inhaler 1-2 puffs every 4 hours when needed for Shortness of breath      A full Face mask is needed to help with Obstructive sleep apnea due to sleeping with mouth open.  9/24/18-  had sleep titration study but never heard anything from it. Insurance denied automatic titration machine,  was not told she had COPD. Request a different face mask. States SOB with activity every day for a few minutes, day time only, relieved with rest. States does not use albuterol inhaler feels it does not help.  Has stopped smoking five years ago.     Previous HPI Dr. Jordan:  July 10, 2017-on cymbalta- helps neuropathy but having poor sleep  With nocturia ppt arousal.  Sleep study done with no rem sleep on cymbalta?  10-20 pressure rather than 6.     2/27/2017HPI: dx osas 10 yrs ago, ahi na.  On nasal pillars- cpap 6, new machine 5 yrs ago.  Gained 45 post stent around 2012.     Prior to cpap, severe snorer, fatigue, with no other prominent symptoms recalled.       Now no snoring noted with cpap. No nasal obstruction.     Mentation a little slow but vague.      The chief compliant  problem is stable  PFSH:  Past Medical History:   Diagnosis Date    Angina pectoris     Anticoagulant long-term use     Anxiety     Arthritis     Back pain     Cataract     Chronic bronchitis     Coronary artery disease     STENT X 2    CTS (carpal tunnel syndrome)     RIGHT    Depression     MILLER (dyspnea on exertion) 2/21/2017    Hallux valgus, acquired, bilateral 1/19/2017    Hematuria     Hyperlipidemia     Hypertension     Hypertensive left ventricular hypertrophy, without heart failure 5/22/2017    Hypothyroidism     Obesity     Polyneuropathy     S/P coronary artery stent placement, 2 LETTY 9/2012, 1 LETTY 9/2013 3/5/2013    Sleep apnea     USES CPAP     Thyroid disease     Tobacco dependence     Trouble in sleeping     Urinary incontinence     Wears glasses     ONE CONTAC         Past Surgical History:   Procedure Laterality Date    APPENDECTOMY      BILATERAL SALPINGOOPHORECTOMY      CARDIAC CATHETERIZATION      CORONARY ANGIOPLASTY      CORONARY STENT PLACEMENT      PCI  of the LAD with LETTY    EYE SURGERY      bilat cataract removal    HYSTERECTOMY      complete    OOPHORECTOMY      TONSILLECTOMY       Social History     Tobacco Use    Smoking status: Former Smoker     Packs/day: 1.00     Years: 50.00     Pack years: 50.00     Types: Cigarettes     Start date: 1964     Last attempt to quit: 2012     Years since quittin.8    Smokeless tobacco: Never Used   Substance Use Topics    Alcohol use: Yes     Alcohol/week: 2.0 standard drinks     Types: 2 Shots of liquor per week     Comment: Social - vodka on wednesday    Drug use: No     Family History   Problem Relation Age of Onset    Hypertension Sister     Arthritis Sister     Heart failure Mother     Hypertension Mother     Heart failure Father     Hypertension Father     No Known Problems Brother     No Known Problems Son     Heart disease Brother     Arthritis Sister     Hypertension Sister     Cancer Sister     Asthma Son     Arthritis Son         psoriatic arthritis     Review of patient's allergies indicates:   Allergen Reactions    Wellbutrin [bupropion hcl] Other (See Comments)     sven     I have reviewed past medical, family, and social history. I have reviewed previous nurse notes.    Performance Status:The patient's activity level is no limits with regular activity.      Review of Systems:  a review of eleven systems covering constitutional, Eye, HEENT, Psych, Respiratory, Cardiac, GI, , Musculoskeletal, Endocrine, Dermatologic was negative except for pertinent findings as listed ABOVE and below: pertinent positive as above, rest is good    "    Exam:Comprehensive exam done. BP (!) 154/73 (BP Location: Right arm, Patient Position: Sitting)   Pulse 77   Ht 5' 5" (1.651 m)   Wt 82.1 kg (181 lb)   SpO2 95% Comment: on room air  BMI 30.12 kg/m²   Exam included Vitals as listed, and patient's appearance and affect and alertness and mood, oral exam for yeast and hygiene and pharynx lesions and Mallapatti (M) score, neck with inspection for jvd and masses and thyroid abnormalities and lymph nodes (supraclavicular and infraclavicular nodes and axillary also examined and noted if abn), chest exam included symmetry and effort and fremitus and percussion and auscultation, cardiac exam included rhythm and gallops and murmur and rubs and jvd and edema, abdominal exam for mass and hepatosplenomegaly and tenderness and hernias and bowel sounds, Musculoskeletal exam with muscle tone and posture and mobility/gait and  strength, and skin for rashes and cyanosis and pallor and turgor, extremity for clubbing.  Findings were normal except for pertinent findings listed below:  M3, chest is symmetric, no distress, normal percussion, normal fremitus and good normal breath sounds  No edema.    06/02/2020  exam is updated and all stable        Radiographs (ct chest and cxr) reviewed: results reviewed   The mediastinal and cardiac size and contours are normal. The diaphragms and pleura are clear. There are no intra-pulmonary masses or infiltrates. There is no pneumothorax or pleural effusion.    Impression-Negative chest.      Electronically signed by:Hernán Mojica MD  Date: 03/26/14  Time:10:40     Labs reviewed  Lab Results   Component Value Date    WBC 7.63 03/12/2019    RBC 4.37 03/12/2019    HGB 12.6 03/12/2019    HCT 41.4 03/12/2019    MCV 95 03/12/2019    MCH 28.8 03/12/2019    MCHC 30.4 (L) 03/12/2019    RDW 14.3 03/12/2019     03/12/2019    MPV 11.4 03/12/2019    GRAN 4.9 03/12/2019    GRAN 63.7 03/12/2019    LYMPH 1.9 03/12/2019    LYMPH 25.2 " 03/12/2019    MONO 0.6 03/12/2019    MONO 8.1 03/12/2019    EOS 0.2 03/12/2019    BASO 0.02 03/12/2019    EOSINOPHIL 2.4 03/12/2019    BASOPHIL 0.3 03/12/2019       PFT results reviewed  Pulmonary Functions Testing Results:  Procedure Notes     Author Status Last  Updated Created   Tayo Jordan MD Signed Tayo Jordan MD 7/19/2017  2:38 PM 7/19/2017  2:35 PM          Assoc. Orders Procedures   None COMPLETE PFT WITH BRONCHODILATOR       Pre-Op Dx Post-Op Dx   Bronchitis None          Pulmonary Functions, including spirometry and bronchodilator response and lung volumes and diffusion, study was done July 28, 2017.  Spirometry shows loss of vital capacity and fev1 with no obstruction and no bronchodilator response.   FEV1 is 71% or 1.65 liters.  Lung volumes show  normal TLC and elevated residual volume.  Diffusion shows reduced but falls within normal range when corrected for lung volumes.     Pulmonary functions show low vital capacity and low mvv with some air trapping,but rest is wnl. Clinical correlation recommended.     Tayo Jordan M.D.               Plan:  Clinical impression is apparently straight forward and impression with management as below.    Kathy was seen today for follow-up.    Diagnoses and all orders for this visit:    RAMONA on CPAP    Anxiety  Comments:  Controlled, refill of Xanax given.  Orders:  -     ALPRAZolam (XANAX) 0.5 MG tablet; Take 1 tablet (0.5 mg total) by mouth 3 (three) times daily as needed.        Follow up in about 6 months (around 12/2/2020).    Discussed with patient above for education the following:      Patient Instructions   Need to get auto cpap report for pressure readings when last used.  Your old level was 6.  Would use mid way pressure for compromise.  Could consider repeat cpap titration in sleep lab..  Call in monthly til reach auto pap level or cannot increase further then go to sleep lab.    You had 48 episodes an hour of sleep apnea.      uptodate on  "belching excessively  Education, treatment of associated disorders, and behavioral therapy - Management includes education to decrease air swallowing and reassurance that belching is a benign condition. Specific behavioral measures include discontinuation of gum chewing, smoking, drinking carbonated beverages, and gulping foods and liquids. In patients with underlying depression or anxiety, treatment should be initiated [23]. Patients with co-existing acid reflux may require acid suppressive therapy for management of GERD. (See "Medical management of gastroesophageal reflux disease in adults", section on 'Subsequent management'.)  Successful treatment of excessive belching by a therapist (eg, cognitive behavioral therapist or speech therapist) with special training in diaphragmatic breathing techniques has been associated with a reduction in symptoms in small observational studies (figure 1) [24,25]. Diaphragmatic breathing reduces postprandial intragastric pressure and increases esophagogastric junction zone pressure, restoring the gastroesophageal pressure gradient.  ?Reflux inhibitors for refractory symptoms - Baclofen (10 mg three times daily) by reducing transient lower esophageal sphincter relaxations and centrally suppressing the swallowing rate, may decrease both supragastric and gastric belching. However, given the side-effects of baclofen, we reserve its use for patients in whom other approaches have been unsuccessful [26].                 "

## 2020-06-02 NOTE — PATIENT INSTRUCTIONS
"Need to get auto cpap report for pressure readings when last used.  Your old level was 6.  Would use mid way pressure for compromise.  Could consider repeat cpap titration in sleep lab..  Call in monthly til reach auto pap level or cannot increase further then go to sleep lab.    You had 48 episodes an hour of sleep apnea.      uptodate on belching excessively  Education, treatment of associated disorders, and behavioral therapy - Management includes education to decrease air swallowing and reassurance that belching is a benign condition. Specific behavioral measures include discontinuation of gum chewing, smoking, drinking carbonated beverages, and gulping foods and liquids. In patients with underlying depression or anxiety, treatment should be initiated [23]. Patients with co-existing acid reflux may require acid suppressive therapy for management of GERD. (See "Medical management of gastroesophageal reflux disease in adults", section on 'Subsequent management'.)  Successful treatment of excessive belching by a therapist (eg, cognitive behavioral therapist or speech therapist) with special training in diaphragmatic breathing techniques has been associated with a reduction in symptoms in small observational studies (figure 1) [24,25]. Diaphragmatic breathing reduces postprandial intragastric pressure and increases esophagogastric junction zone pressure, restoring the gastroesophageal pressure gradient.  ?Reflux inhibitors for refractory symptoms - Baclofen (10 mg three times daily) by reducing transient lower esophageal sphincter relaxations and centrally suppressing the swallowing rate, may decrease both supragastric and gastric belching. However, given the side-effects of baclofen, we reserve its use for patients in whom other approaches have been unsuccessful [26].     "

## 2020-06-04 ENCOUNTER — TELEPHONE (OUTPATIENT)
Dept: PULMONOLOGY | Facility: CLINIC | Age: 70
End: 2020-06-04

## 2020-06-04 NOTE — TELEPHONE ENCOUNTER
Left message for the patient      ----- Message from Tayo Jordan MD sent at 6/4/2020  4:01 PM CDT -----  Notify cpap pressure typically 9 but got to near max of 12, recording period was brief.  Would give order to set machine at fixed level of 9.  Consider increase later or sleep study.  Likely needs pressure of 12 at times.

## 2020-06-08 ENCOUNTER — TELEPHONE (OUTPATIENT)
Dept: FAMILY MEDICINE | Facility: CLINIC | Age: 70
End: 2020-06-08

## 2020-06-08 DIAGNOSIS — Z95.5 S/P CORONARY ARTERY STENT PLACEMENT: Chronic | ICD-10-CM

## 2020-06-08 DIAGNOSIS — E78.00 PURE HYPERCHOLESTEROLEMIA: Chronic | ICD-10-CM

## 2020-06-08 RX ORDER — ATORVASTATIN CALCIUM 80 MG/1
80 TABLET, FILM COATED ORAL DAILY
Qty: 90 TABLET | Refills: 1 | Status: SHIPPED | OUTPATIENT
Start: 2020-06-08 | End: 2020-12-03

## 2020-06-08 NOTE — TELEPHONE ENCOUNTER
----- Message from Soraida Lieberman sent at 6/8/2020 10:25 AM CDT -----  Contact: [facility]  Type: Needs Medical Advice  Who Called:  Dandre  Pharmacy name and phone #:  [facility]  Best Call Back Number: [phone] [employer] phone number  Additional Information:Caller is requesting a Rx be sent for  Atorvastatin 80mg needs a new refill per pharmacist. Thanks

## 2020-06-29 ENCOUNTER — OFFICE VISIT (OUTPATIENT)
Dept: FAMILY MEDICINE | Facility: CLINIC | Age: 70
End: 2020-06-29
Payer: MEDICARE

## 2020-06-29 VITALS
OXYGEN SATURATION: 97 % | SYSTOLIC BLOOD PRESSURE: 128 MMHG | WEIGHT: 182.75 LBS | BODY MASS INDEX: 30.45 KG/M2 | TEMPERATURE: 98 F | HEIGHT: 65 IN | DIASTOLIC BLOOD PRESSURE: 74 MMHG | HEART RATE: 88 BPM

## 2020-06-29 DIAGNOSIS — Z87.891 PERSONAL HISTORY OF NICOTINE DEPENDENCE: ICD-10-CM

## 2020-06-29 DIAGNOSIS — E78.2 MIXED HYPERLIPIDEMIA: Chronic | ICD-10-CM

## 2020-06-29 DIAGNOSIS — E04.1 NODULAR THYROID DISEASE: ICD-10-CM

## 2020-06-29 DIAGNOSIS — E66.9 OBESITY (BMI 30.0-34.9): ICD-10-CM

## 2020-06-29 DIAGNOSIS — I10 ESSENTIAL HYPERTENSION: Primary | ICD-10-CM

## 2020-06-29 DIAGNOSIS — J44.9 COPD MIXED TYPE: ICD-10-CM

## 2020-06-29 DIAGNOSIS — I25.10 CORONARY ARTERY DISEASE INVOLVING NATIVE CORONARY ARTERY OF NATIVE HEART WITHOUT ANGINA PECTORIS: ICD-10-CM

## 2020-06-29 DIAGNOSIS — R91.1 SOLITARY PULMONARY NODULE: ICD-10-CM

## 2020-06-29 PROCEDURE — 99214 OFFICE O/P EST MOD 30 MIN: CPT | Mod: S$GLB,,, | Performed by: FAMILY MEDICINE

## 2020-06-29 PROCEDURE — 3008F PR BODY MASS INDEX (BMI) DOCUMENTED: ICD-10-PCS | Mod: CPTII,S$GLB,, | Performed by: FAMILY MEDICINE

## 2020-06-29 PROCEDURE — 99999 PR PBB SHADOW E&M-EST. PATIENT-LVL V: ICD-10-PCS | Mod: PBBFAC,,, | Performed by: FAMILY MEDICINE

## 2020-06-29 PROCEDURE — 3008F BODY MASS INDEX DOCD: CPT | Mod: CPTII,S$GLB,, | Performed by: FAMILY MEDICINE

## 2020-06-29 PROCEDURE — 1159F PR MEDICATION LIST DOCUMENTED IN MEDICAL RECORD: ICD-10-PCS | Mod: S$GLB,,, | Performed by: FAMILY MEDICINE

## 2020-06-29 PROCEDURE — 99499 RISK ADDL DX/OHS AUDIT: ICD-10-PCS | Mod: S$GLB,,, | Performed by: FAMILY MEDICINE

## 2020-06-29 PROCEDURE — 1101F PR PT FALLS ASSESS DOC 0-1 FALLS W/OUT INJ PAST YR: ICD-10-PCS | Mod: CPTII,S$GLB,, | Performed by: FAMILY MEDICINE

## 2020-06-29 PROCEDURE — 3074F SYST BP LT 130 MM HG: CPT | Mod: CPTII,S$GLB,, | Performed by: FAMILY MEDICINE

## 2020-06-29 PROCEDURE — 3074F PR MOST RECENT SYSTOLIC BLOOD PRESSURE < 130 MM HG: ICD-10-PCS | Mod: CPTII,S$GLB,, | Performed by: FAMILY MEDICINE

## 2020-06-29 PROCEDURE — 99214 PR OFFICE/OUTPT VISIT, EST, LEVL IV, 30-39 MIN: ICD-10-PCS | Mod: S$GLB,,, | Performed by: FAMILY MEDICINE

## 2020-06-29 PROCEDURE — 1159F MED LIST DOCD IN RCRD: CPT | Mod: S$GLB,,, | Performed by: FAMILY MEDICINE

## 2020-06-29 PROCEDURE — 3078F PR MOST RECENT DIASTOLIC BLOOD PRESSURE < 80 MM HG: ICD-10-PCS | Mod: CPTII,S$GLB,, | Performed by: FAMILY MEDICINE

## 2020-06-29 PROCEDURE — 3078F DIAST BP <80 MM HG: CPT | Mod: CPTII,S$GLB,, | Performed by: FAMILY MEDICINE

## 2020-06-29 PROCEDURE — 1101F PT FALLS ASSESS-DOCD LE1/YR: CPT | Mod: CPTII,S$GLB,, | Performed by: FAMILY MEDICINE

## 2020-06-29 PROCEDURE — 99499 UNLISTED E&M SERVICE: CPT | Mod: S$GLB,,, | Performed by: FAMILY MEDICINE

## 2020-06-29 PROCEDURE — 99999 PR PBB SHADOW E&M-EST. PATIENT-LVL V: CPT | Mod: PBBFAC,,, | Performed by: FAMILY MEDICINE

## 2020-07-05 PROBLEM — I25.10 CORONARY ARTERY DISEASE INVOLVING NATIVE CORONARY ARTERY OF NATIVE HEART WITHOUT ANGINA PECTORIS: Status: ACTIVE | Noted: 2020-07-05

## 2020-07-05 NOTE — PROGRESS NOTES
Subjective:       Patient ID: Kathy Seymour is a 69 y.o. female.    Chief Complaint: Hypertension, Hyperlipidemia, Hypothyroidism, and Coronary Artery Disease    Patient presents here for 6 month follow-up of hypertension, hyperlipidemia, hypothyroidism, and CAD.  Her hypertension is well controlled on her present medication as well as her low-sodium diet.  She does get some exercise but not routinely on a regular basis.  Her hyperlipidemia is very well controlled on her present dose of atorvastatin 80 mg daily.  She is also following her low-fat low-cholesterol diet fairly well.  Her weight has increased 4 lb over her last visit 6 months ago.  Her hypothyroidism is stable with her TSH in the therapeutic range.  She continues to be on Tapazole for thyroid suppression and has seen Endocrinology and has an appointment coming up soon.  We did discuss that this is not a long-term medication and this will be discussed with Endocrinology.  Her coronary artery disease is stable and she has no chest pains or palpitations.  She does complain of some feelings like food is sticking in her throat both in the upper part just below her larynx as well as in the lower chest.  It is not consistent with any specific foods or meals.  She denies any significant weight loss, no blood in the stools, no hematemesis.  As far as health maintenance, she is up-to-date with all of her recommended screening exams but she will be due for a mammogram at the end of July.    Hypertension  This is a chronic problem. The problem is unchanged. The problem is controlled. Pertinent negatives include no chest pain, headaches, palpitations or shortness of breath. Risk factors for coronary artery disease include dyslipidemia, post-menopausal state and smoking/tobacco exposure. Past treatments include angiotensin blockers and diuretics. The current treatment provides moderate improvement. There are no compliance problems.  Hypertensive end-organ damage  includes CAD/MI. There is no history of kidney disease, CVA or heart failure.   Hyperlipidemia  This is a chronic problem. The problem is controlled. Recent lipid tests were reviewed and are normal. Exacerbating diseases include hypothyroidism. Factors aggravating her hyperlipidemia include thiazides. Pertinent negatives include no chest pain or shortness of breath. Current antihyperlipidemic treatment includes statins. The current treatment provides significant improvement of lipids. Compliance problems include adherence to exercise.  Risk factors for coronary artery disease include dyslipidemia, hypertension and post-menopausal.   Coronary Artery Disease  Presents for follow-up visit. Pertinent negatives include no chest pain, chest tightness, dizziness, palpitations or shortness of breath. Risk factors include hyperlipidemia. The symptoms have been stable. Compliance with diet is good. Compliance with exercise is variable. Compliance with medications is good.     Review of Systems   Constitutional: Negative for chills, fatigue, fever and unexpected weight change.   HENT: Negative for nasal congestion, ear pain, postnasal drip and sore throat.    Respiratory: Negative for cough, chest tightness and shortness of breath.    Cardiovascular: Negative for chest pain and palpitations.   Gastrointestinal: Negative for abdominal pain, diarrhea, nausea and vomiting.   Genitourinary: Negative for difficulty urinating, dysuria, flank pain and pelvic pain.   Musculoskeletal: Negative for arthralgias and back pain.   Integumentary:  Negative for pallor and rash.   Neurological: Negative for dizziness, light-headedness and headaches.   Psychiatric/Behavioral: Negative for sleep disturbance. The patient is not nervous/anxious.          Objective:      Physical Exam  Vitals signs reviewed.   Constitutional:       General: She is not in acute distress.     Appearance: Normal appearance. She is well-developed. She is obese.   HENT:       Right Ear: Tympanic membrane and external ear normal.      Left Ear: Tympanic membrane and external ear normal.      Nose: Nose normal.      Mouth/Throat:      Pharynx: Oropharynx is clear. No oropharyngeal exudate.   Neck:      Musculoskeletal: Normal range of motion and neck supple.      Thyroid: No thyromegaly.   Cardiovascular:      Rate and Rhythm: Normal rate and regular rhythm.      Heart sounds: Normal heart sounds. No murmur.   Pulmonary:      Effort: Pulmonary effort is normal.      Breath sounds: Normal breath sounds. No wheezing or rales.   Musculoskeletal: Normal range of motion.         General: No tenderness.      Right lower leg: No edema.      Left lower leg: No edema.   Lymphadenopathy:      Cervical: No cervical adenopathy.   Neurological:      General: No focal deficit present.      Mental Status: She is alert and oriented to person, place, and time.      Cranial Nerves: No cranial nerve deficit.      Deep Tendon Reflexes: Reflexes are normal and symmetric.   Psychiatric:         Mood and Affect: Mood normal.         Behavior: Behavior normal.         Assessment:       1. Essential hypertension    2. Mixed hyperlipidemia    3. Coronary artery disease involving native coronary artery of native heart without angina pectoris    4. COPD mixed type    5. Nodular thyroid disease    6. Solitary pulmonary nodule    7. Personal history of nicotine dependence     8. Obesity (BMI 30.0-34.9), today 30        Plan:       1.  Continue present medication as her hypertension, hyperlipidemia, nodular thyroid disease, and CAD are stable and controlled  2.  Continue low-sodium, low-fat low-cholesterol diet but try to get exercise on a routine basis  3.  CMP, lipid profile, TSH  4.  Continue follow-up with Endocrinology  5.  Ultrasound of the thyroid to evaluate nodule  6.  Low-dose CT lung screening and is patient with a history of smoking greater than 30 pack years  7.  Follow up with me in 6 months or  p.r.n.        Patient readiness: acceptance and barriers:none    During the course of the visit the patient was educated and counseled about the following:     Hypertension:   Dietary sodium restriction.  Regular aerobic exercise.  Follow up: 6 months and as needed.    Goals: Hypertension: Reduce Blood Pressure    Did patient meet goals/outcomes: Yes    The following self management tools provided: blood pressure log    Patient Instructions (the written plan) was given to the patient/family.     Time spent with patient: 30 minutes    Barriers to medications present (no )    Adverse reactions to current medications (no)    Over the counter medications reviewed (Yes)

## 2020-07-09 ENCOUNTER — HOSPITAL ENCOUNTER (OUTPATIENT)
Dept: RADIOLOGY | Facility: HOSPITAL | Age: 70
Discharge: HOME | End: 2020-07-09
Attending: FAMILY MEDICINE
Payer: MEDICARE

## 2020-07-09 ENCOUNTER — HOSPITAL ENCOUNTER (OUTPATIENT)
Dept: RADIOLOGY | Facility: CLINIC | Age: 70
Discharge: HOME | End: 2020-07-09
Attending: FAMILY MEDICINE
Payer: MEDICARE

## 2020-07-09 DIAGNOSIS — Z87.891 PERSONAL HISTORY OF NICOTINE DEPENDENCE: ICD-10-CM

## 2020-07-09 DIAGNOSIS — R91.1 SOLITARY PULMONARY NODULE: ICD-10-CM

## 2020-07-09 DIAGNOSIS — E04.1 NODULAR THYROID DISEASE: ICD-10-CM

## 2020-07-09 PROCEDURE — 76536 US SOFT TISSUE HEAD NECK THYROID: ICD-10-PCS | Mod: 26,,, | Performed by: RADIOLOGY

## 2020-07-09 PROCEDURE — G0297 CT CHEST LUNG SCREENING LOW DOSE: ICD-10-PCS | Mod: 26,,, | Performed by: RADIOLOGY

## 2020-07-09 PROCEDURE — G0297 LDCT FOR LUNG CA SCREEN: HCPCS | Mod: 26,,, | Performed by: RADIOLOGY

## 2020-07-09 PROCEDURE — G0297 LDCT FOR LUNG CA SCREEN: HCPCS | Mod: TC

## 2020-07-09 PROCEDURE — 76536 US EXAM OF HEAD AND NECK: CPT | Mod: 26,,, | Performed by: RADIOLOGY

## 2020-07-09 PROCEDURE — 76536 US EXAM OF HEAD AND NECK: CPT | Mod: TC,PO

## 2020-07-28 ENCOUNTER — OFFICE VISIT (OUTPATIENT)
Dept: ENDOCRINOLOGY | Facility: CLINIC | Age: 70
End: 2020-07-28
Payer: MEDICARE

## 2020-07-28 VITALS
DIASTOLIC BLOOD PRESSURE: 70 MMHG | WEIGHT: 182 LBS | SYSTOLIC BLOOD PRESSURE: 142 MMHG | HEIGHT: 65 IN | HEART RATE: 92 BPM | TEMPERATURE: 97 F | BODY MASS INDEX: 30.32 KG/M2

## 2020-07-28 DIAGNOSIS — E05.90 SUBCLINICAL HYPERTHYROIDISM: Primary | ICD-10-CM

## 2020-07-28 DIAGNOSIS — E04.1 NODULAR THYROID DISEASE: ICD-10-CM

## 2020-07-28 DIAGNOSIS — E66.9 OBESITY (BMI 30.0-34.9): ICD-10-CM

## 2020-07-28 PROCEDURE — 99214 PR OFFICE/OUTPT VISIT, EST, LEVL IV, 30-39 MIN: ICD-10-PCS | Mod: S$GLB,,, | Performed by: INTERNAL MEDICINE

## 2020-07-28 PROCEDURE — 99214 OFFICE O/P EST MOD 30 MIN: CPT | Mod: S$GLB,,, | Performed by: INTERNAL MEDICINE

## 2020-07-28 PROCEDURE — 99499 UNLISTED E&M SERVICE: CPT | Mod: HCNC,S$GLB,, | Performed by: INTERNAL MEDICINE

## 2020-07-28 PROCEDURE — 99499 RISK ADDL DX/OHS AUDIT: ICD-10-PCS | Mod: HCNC,S$GLB,, | Performed by: INTERNAL MEDICINE

## 2020-07-28 PROCEDURE — 99999 PR PBB SHADOW E&M-EST. PATIENT-LVL IV: ICD-10-PCS | Mod: PBBFAC,,, | Performed by: INTERNAL MEDICINE

## 2020-07-28 PROCEDURE — 99999 PR PBB SHADOW E&M-EST. PATIENT-LVL IV: CPT | Mod: PBBFAC,,, | Performed by: INTERNAL MEDICINE

## 2020-07-28 NOTE — PROGRESS NOTES
Subjective:      Chief Complaint: Thyroid Nodule and Hyperthyroidism    HPI: Kathy Seymour is a 69 y.o. female who is here for a follow-up evaluation for thyroid. Last seen 4/2018, though this is her first visit with me.    Hx subclinical hyperthyroidism. TSH as low as 0.07   TSI, TRAB, Tg antibody negative. Thought to be 2/2 toxic nodule.    NM scan: 2013, normal overall uptake but heterogenous gland with multiple nodules, no clear cold nodules  NM 2012: slightly increased in area of a nodule    Medication: Methimazole 5 mg/day.  Lab Results   Component Value Date    TSH 2.060 07/09/2020    O8OBBKS 135 04/24/2018    R9AQSUS 7.3 08/02/2012    FREET4 1.07 04/24/2018     Also has hx multinodular goiter.   Last US 7/2020  Right side 1x0.9x0.8 cm  Left side: 1.2x0.9x1.0 cm, stable  Isthmus 2.5x1.4x2.6 cm, stable from prior    Prior US 4/2018:   2.5x1.4x2.6    Prior FNA: Yes.    2016: 2 nodules on the Left and isthmus     - left nodules: both benign     - isthmus, nondiagnostic    DXA 10/2017: normal.    Today, pt reports feeling okay overall. No trouble swallowing. Sometimes trouble breathing (hx COPD, also RAMONA on CPAP). No other acute complaints/concerns today.    Reviewed past medical, family, social history and updated as appropriate.    Review of Systems   Constitutional: Negative for unexpected weight change (gaining some weight).   HENT: Negative for trouble swallowing.    Eyes: Negative for visual disturbance.   Respiratory: Positive for shortness of breath (sometimes. COPD and RAMONA).    Cardiovascular: Negative for palpitations.   Gastrointestinal: Negative for constipation and diarrhea.   Endocrine: Negative for polydipsia and polyuria.   Genitourinary: Negative for frequency.   Neurological: Negative for light-headedness.   Psychiatric/Behavioral: Positive for sleep disturbance.     Objective:     Vitals:    07/28/20 0948   BP: (!) 142/70   Pulse: 92   Temp: 97 °F (36.1 °C)     BP Readings from Last 5  Encounters:   07/28/20 (!) 142/70   06/29/20 128/74   06/02/20 (!) 150/71   01/15/20 135/63   12/03/19 (!) 154/73     Physical Exam  Vitals signs reviewed.   Constitutional:       General: She is not in acute distress.     Appearance: She is obese.   Neck:      Thyroid: No thyromegaly.   Cardiovascular:      Heart sounds: Normal heart sounds.   Pulmonary:      Effort: Pulmonary effort is normal.       Wt Readings from Last 10 Encounters:   07/28/20 0948 82.6 kg (181 lb 15.8 oz)   06/29/20 1508 82.9 kg (182 lb 12.2 oz)   06/02/20 1354 84.2 kg (185 lb 10 oz)   01/15/20 0856 82.1 kg (181 lb)   12/03/19 1329 82.1 kg (181 lb)   11/14/19 1108 81 kg (178 lb 9.2 oz)   10/15/19 1435 80.3 kg (177 lb 0.5 oz)   09/26/19 1523 80.7 kg (178 lb)   09/10/19 1436 80.9 kg (178 lb 5.6 oz)   08/04/19 1651 79.4 kg (175 lb)     Lab Results   Component Value Date    HGBA1C 5.8 (H) 01/09/2018     Lab Results   Component Value Date    CHOL 137 07/09/2020    HDL 56 07/09/2020    LDLCALC 67.2 07/09/2020    TRIG 69 07/09/2020    CHOLHDL 40.9 07/09/2020     Lab Results   Component Value Date     07/09/2020    K 4.1 07/09/2020     07/09/2020    CO2 29 07/09/2020     07/09/2020    BUN 19 07/09/2020    CREATININE 1.0 07/09/2020    CALCIUM 9.3 07/09/2020    PROT 7.6 07/09/2020    ALBUMIN 3.9 07/09/2020    BILITOT 0.6 07/09/2020    ALKPHOS 93 07/09/2020    AST 18 07/09/2020    ALT 12 07/09/2020    ANIONGAP 8 07/09/2020    ESTGFRAFRICA >60.0 07/09/2020    EGFRNONAA 56.8 (A) 07/09/2020    TSH 2.060 07/09/2020      Assessment/Plan:     Subclinical hyperthyroidism  Last thyroid labs normal   - clinically, euthyroid   - on methimazole 5mg/day   - continue for now   - though did discuss with patient that in toxic nodule, generally prefer iodine if treatment needed    Nodular thyroid disease  Multiple nodules. S/p 3x FNA.   - most benign   - however, the largest nodule in the isthmus was nondiagnostic on last FNA   - discussed with  patient that although it appears stable on last ultrasound, it is still large enough to meet criteria for biopsy. As well, there are molecular tests now that can help determine risk of cancer even in nondiagnostic samples   - pt not excited about biopsy, but does report she would think about it and let me know if she'd like to have one   - otherwise, would continue to monitor      Obesity (BMI 30.0-34.9), today 30  bmi 30   - complicated by HTN, HLD   - euthyroid as above   - monitor weight        Follow up in about 1 year (around 7/28/2021) for lab review, further monitoring.      Jersey Esposito MD  Endocrinology

## 2020-07-28 NOTE — PATIENT INSTRUCTIONS
For the thyroid: Continue methimazole for now. Recheck labs around Jan 2020.    For the nodules, think about biopsy.      Diagnosing a Thyroid Problem     Fine needle aspiration.   Your healthcare provider thinks you may have a thyroid problem. In many cases, a thyroid problem can be easy to diagnose. Your healthcare provider is likely to ask about your medical history, give you a physical exam, and have you take blood tests. You may also need other tests. Based on the results, your healthcare provider may refer you to a hormone specialist (endocrinologist) or a surgeon.  Medical history  Your healthcare provider will ask about your medical history. Tell him or her if you:  · Have had changes in body temperature, heartbeat, weight, or energy level  · Are taking any medicines or supplements  · Have ever had thyroid surgery  · Have a family history of thyroid problems  · Are pregnant or plan to become pregnant  · Have ever been treated with radiation to the head or neck  Physical exam  After the medical history, your healthcare provider will examine you. He or she will feel your neck to check your thyroid gland for changes in size or shape. Your healthcare provider may also look for changes in heart rate, reflexes, muscle strength, or skin texture.  Blood tests  Your healthcare provider will order blood tests. They may include:  · TSH test. This shows how much thyroid stimulating hormone (TSH) is being made by the pituitary gland. This test can show if your thyroid is normal, overactive (hyperthyroidism), or underactive (hypothyroidism).  · T3 and T4 tests. These show the levels of T3 and T4 thyroid hormones in the blood. This test can help diagnose hyperthyroidism or hypothyroidism.  Other tests  Based on the results of your exam and blood tests, you may have other tests. They may include:  · Thyroid antibody tests. These are blood tests that look for problems with the immune system. These tests are most often to  look for underactive or overactive thyroid.  · Ultrasound. This test uses sound waves to create an image showing the size and shape of the thyroid gland. It is most often used to check for a lump in the thyroid (nodule) or enlarged thyroid(goiter).  · Radioactive iodine uptake test. This test measures how much iodine the thyroid gland takes in. It is most often to check for overactive thyroid.  · Thyroid scan. This is an imaging test that can show if part of the thyroid gland is making too much thyroid hormone. It is most often used to check for overactive thyroid.  Fine needle aspiration (FNA)  If you have a nodule, you may have a fine needle aspiration done. This is a type of biopsy. A biopsy is a procedure to remove a small sample of tissue. FNA is the best test to find out if thyroid cells are cancer. The healthcare provider uses a needle to take cells from the thyroid. The cells are then checked with a microscope. If cancer is suspected, other tests may also be done to help determine the type of cancer. Risks and complications of FNA are rare, but include mild discomfort, bleeding, and skin infection.  Date Last Reviewed: 11/1/2016  © 3693-3852 [employer]. [address] [place] [address], [place] [address]. All rights reserved. This information is not intended as a substitute for professional medical care. Always follow your healthcare professional's instructions.

## 2020-07-29 NOTE — ASSESSMENT & PLAN NOTE
Multiple nodules. S/p 3x FNA.   - most benign   - however, the largest nodule in the isthmus was nondiagnostic on last FNA   - discussed with patient that although it appears stable on last ultrasound, it is still large enough to meet criteria for biopsy. As well, there are molecular tests now that can help determine risk of cancer even in nondiagnostic samples   - pt not excited about biopsy, but does report she would think about it and let me know if she'd like to have one   - otherwise, would continue to monitor

## 2020-07-29 NOTE — ASSESSMENT & PLAN NOTE
Last thyroid labs normal   - clinically, euthyroid   - on methimazole 5mg/day   - continue for now   - though did discuss with patient that in toxic nodule, generally prefer iodine if treatment needed

## 2020-07-30 DIAGNOSIS — R09.89 CHRONIC SINUS COMPLAINTS: ICD-10-CM

## 2020-07-31 RX ORDER — FLUTICASONE PROPIONATE 50 MCG
1 SPRAY, SUSPENSION (ML) NASAL DAILY
Qty: 16 G | Refills: 3 | Status: SHIPPED | OUTPATIENT
Start: 2020-07-31 | End: 2021-02-12 | Stop reason: SDUPTHER

## 2020-08-26 RX ORDER — OMEPRAZOLE 20 MG/1
20 CAPSULE, DELAYED RELEASE ORAL DAILY
Qty: 90 CAPSULE | Refills: 3 | Status: SHIPPED | OUTPATIENT
Start: 2020-08-26 | End: 2021-06-29 | Stop reason: SDUPTHER

## 2020-08-28 ENCOUNTER — PATIENT OUTREACH (OUTPATIENT)
Dept: ADMINISTRATIVE | Facility: HOSPITAL | Age: 70
End: 2020-08-28

## 2020-08-28 DIAGNOSIS — Z12.31 SCREENING MAMMOGRAM, ENCOUNTER FOR: Primary | ICD-10-CM

## 2020-08-28 NOTE — PROGRESS NOTES
Chart review completed 2020.  Care Everywhere updates requested and reviewed.  Immunizations reconciled. Media reports reviewed.  Duplicate HM overrides and  orders removed.  Overdue HM topic chart audit and/or requested.  Overdue lab testing linked to upcoming lab appointments if applies.    DIS reviewed      Mammogram and DEXA  YES    WOG orders placed. MAMMOGRAM  HM updated with external COLONOSCOPY report.     SPOKE W/ PT. MAMMOGRAM SCHEDULED.     Health Maintenance Due   Topic Date Due    Mammogram  2020

## 2020-09-09 ENCOUNTER — PES CALL (OUTPATIENT)
Dept: ADMINISTRATIVE | Facility: CLINIC | Age: 70
End: 2020-09-09

## 2020-09-29 ENCOUNTER — PATIENT MESSAGE (OUTPATIENT)
Dept: OTHER | Facility: OTHER | Age: 70
End: 2020-09-29

## 2020-10-05 ENCOUNTER — PATIENT MESSAGE (OUTPATIENT)
Dept: ADMINISTRATIVE | Facility: HOSPITAL | Age: 70
End: 2020-10-05

## 2020-10-08 ENCOUNTER — PATIENT OUTREACH (OUTPATIENT)
Dept: ADMINISTRATIVE | Facility: OTHER | Age: 70
End: 2020-10-08

## 2020-10-08 NOTE — PROGRESS NOTES
Chart was reviewed for overdue Proactive [facility] Encounters (ODIN)  topics  Updates were requested from care everywhere  Health Maintenance has been updated  LINKS immunization registry triggered

## 2020-11-03 ENCOUNTER — TELEPHONE (OUTPATIENT)
Dept: CARDIOLOGY | Facility: CLINIC | Age: 70
End: 2020-11-03

## 2020-11-03 NOTE — TELEPHONE ENCOUNTER
"Karely Silverio is scheduled tomorrow (2/26/2020) for ultrasound; however, the Duplex question on the order was not answered:      Answering the question, either way, informs the sonographer if they may or may not proceed with additional image interrogation utilizing duplex imaging, based on the findings during the actual patient examination. Even if the answer is, ""No\"", that alleviates question. If the question is not answered, the assumed answer is, \""No\"", and regardless of findings, Duplex imaging may not be performed. If this is the only US Pelvis order in her chart prior to beginning the exam, we will assume that duplex imaging may not be performed, regardless of findings during image interrogation. This is a universal change through [facility]. Please feel free to contact our department ([phone]) with any questions. Thank you!   " ----- Message from Donya Ortiz sent at 11/3/2020  2:31 PM CST -----  Regarding: np  Pts  is Angel Luis Rowley 2/24/1946    She said they talked with dr hare and he said he would take her on as a new pt since he sees her        [phone]

## 2020-11-16 ENCOUNTER — PATIENT OUTREACH (OUTPATIENT)
Dept: ADMINISTRATIVE | Facility: HOSPITAL | Age: 70
End: 2020-11-16

## 2020-11-16 NOTE — PROGRESS NOTES
MAMMOGRAM gap report review. HM, chart, care everywhere, media reviewed.    Asked pt to please return call to reschedule mammogram

## 2020-11-23 DIAGNOSIS — R09.89 CHRONIC SINUS COMPLAINTS: ICD-10-CM

## 2020-11-23 RX ORDER — FLUTICASONE PROPIONATE 50 MCG
1 SPRAY, SUSPENSION (ML) NASAL DAILY
Qty: 16 G | Refills: 3 | Status: CANCELLED | OUTPATIENT
Start: 2020-11-23

## 2020-11-29 ENCOUNTER — PATIENT OUTREACH (OUTPATIENT)
Dept: ADMINISTRATIVE | Facility: OTHER | Age: 70
End: 2020-11-29

## 2020-12-01 ENCOUNTER — OFFICE VISIT (OUTPATIENT)
Dept: PULMONOLOGY | Facility: CLINIC | Age: 70
End: 2020-12-01
Payer: MEDICARE

## 2020-12-01 VITALS
OXYGEN SATURATION: 95 % | DIASTOLIC BLOOD PRESSURE: 67 MMHG | SYSTOLIC BLOOD PRESSURE: 135 MMHG | BODY MASS INDEX: 30.35 KG/M2 | HEIGHT: 65 IN | WEIGHT: 182.19 LBS | HEART RATE: 82 BPM

## 2020-12-01 DIAGNOSIS — R09.89 CHRONIC SINUS COMPLAINTS: Primary | ICD-10-CM

## 2020-12-01 DIAGNOSIS — F41.9 ANXIETY: ICD-10-CM

## 2020-12-01 PROCEDURE — 1101F PR PT FALLS ASSESS DOC 0-1 FALLS W/OUT INJ PAST YR: ICD-10-PCS | Mod: HCNC,CPTII,S$GLB, | Performed by: INTERNAL MEDICINE

## 2020-12-01 PROCEDURE — 1159F MED LIST DOCD IN RCRD: CPT | Mod: HCNC,S$GLB,, | Performed by: INTERNAL MEDICINE

## 2020-12-01 PROCEDURE — 3078F PR MOST RECENT DIASTOLIC BLOOD PRESSURE < 80 MM HG: ICD-10-PCS | Mod: HCNC,CPTII,S$GLB, | Performed by: INTERNAL MEDICINE

## 2020-12-01 PROCEDURE — 3075F SYST BP GE 130 - 139MM HG: CPT | Mod: HCNC,CPTII,S$GLB, | Performed by: INTERNAL MEDICINE

## 2020-12-01 PROCEDURE — 99213 PR OFFICE/OUTPT VISIT, EST, LEVL III, 20-29 MIN: ICD-10-PCS | Mod: HCNC,S$GLB,, | Performed by: INTERNAL MEDICINE

## 2020-12-01 PROCEDURE — 3288F PR FALLS RISK ASSESSMENT DOCUMENTED: ICD-10-PCS | Mod: HCNC,CPTII,S$GLB, | Performed by: INTERNAL MEDICINE

## 2020-12-01 PROCEDURE — 3008F BODY MASS INDEX DOCD: CPT | Mod: HCNC,CPTII,S$GLB, | Performed by: INTERNAL MEDICINE

## 2020-12-01 PROCEDURE — 3288F FALL RISK ASSESSMENT DOCD: CPT | Mod: HCNC,CPTII,S$GLB, | Performed by: INTERNAL MEDICINE

## 2020-12-01 PROCEDURE — 1125F AMNT PAIN NOTED PAIN PRSNT: CPT | Mod: HCNC,S$GLB,, | Performed by: INTERNAL MEDICINE

## 2020-12-01 PROCEDURE — 3078F DIAST BP <80 MM HG: CPT | Mod: HCNC,CPTII,S$GLB, | Performed by: INTERNAL MEDICINE

## 2020-12-01 PROCEDURE — 1125F PR PAIN SEVERITY QUANTIFIED, PAIN PRESENT: ICD-10-PCS | Mod: HCNC,S$GLB,, | Performed by: INTERNAL MEDICINE

## 2020-12-01 PROCEDURE — 1159F PR MEDICATION LIST DOCUMENTED IN MEDICAL RECORD: ICD-10-PCS | Mod: HCNC,S$GLB,, | Performed by: INTERNAL MEDICINE

## 2020-12-01 PROCEDURE — 99999 PR PBB SHADOW E&M-EST. PATIENT-LVL III: CPT | Mod: PBBFAC,HCNC,, | Performed by: INTERNAL MEDICINE

## 2020-12-01 PROCEDURE — 99213 OFFICE O/P EST LOW 20 MIN: CPT | Mod: HCNC,S$GLB,, | Performed by: INTERNAL MEDICINE

## 2020-12-01 PROCEDURE — 3075F PR MOST RECENT SYSTOLIC BLOOD PRESS GE 130-139MM HG: ICD-10-PCS | Mod: HCNC,CPTII,S$GLB, | Performed by: INTERNAL MEDICINE

## 2020-12-01 PROCEDURE — 99999 PR PBB SHADOW E&M-EST. PATIENT-LVL III: ICD-10-PCS | Mod: PBBFAC,HCNC,, | Performed by: INTERNAL MEDICINE

## 2020-12-01 PROCEDURE — 1101F PT FALLS ASSESS-DOCD LE1/YR: CPT | Mod: HCNC,CPTII,S$GLB, | Performed by: INTERNAL MEDICINE

## 2020-12-01 PROCEDURE — 3008F PR BODY MASS INDEX (BMI) DOCUMENTED: ICD-10-PCS | Mod: HCNC,CPTII,S$GLB, | Performed by: INTERNAL MEDICINE

## 2020-12-01 RX ORDER — ALPRAZOLAM 0.5 MG/1
0.5 TABLET ORAL 3 TIMES DAILY PRN
Qty: 60 TABLET | Refills: 5 | Status: SHIPPED | OUTPATIENT
Start: 2020-12-01 | End: 2021-12-14 | Stop reason: SDUPTHER

## 2020-12-01 RX ORDER — AMOXICILLIN 875 MG/1
875 TABLET, FILM COATED ORAL 2 TIMES DAILY
Qty: 28 TABLET | Refills: 3 | Status: SHIPPED | OUTPATIENT
Start: 2020-12-01 | End: 2021-10-07

## 2020-12-01 NOTE — PATIENT INSTRUCTIONS
Asthma stable - singulair will prevent sinus and asthma.  Could skip symbicort and use singulair only.      Use xanax as needed.    Amoxil for sinus infection may be needed.     Call when needs follow up/xanax.

## 2020-12-01 NOTE — PROGRESS NOTES
12/1/2020    Kathy Seymour  Office Note    Chief Complaint   Patient presents with    Follow-up     8 month    Apnea       HPI:   12/1/2020 cpap works better but has a lot of air in morning with severe dreams noted.  Use symbicort daily with no problems      6/2/2020 back on old cpap device.  Sinus and lungs ok- occ symptoms with pollen exposure..  Patient Instructions   Need to get auto cpap report for pressure readings when last used.  Your old level was 6.  Would use mid way pressure for compromise.  Could consider repeat cpap titration in sleep lab..  Call in monthly til reach auto pap level or cannot increase further then go to sleep lab.  You had 48 episodes an hour of sleep apnea.      uptodate on belching excessively- baclofen trial for  discussed.      Dec 3, 2019-  Nasal patency is now good, still uses old cpap device as auto pap ppt dry mouth and nocturnal arousal.   Machine brought in today.  July 25, 2017 cpap titration had 44% sleep efficiency,    airfit f 30 mask - goes under nostrils - full face mask without bulkiness.        Check home mask, try to use full face type mask with current auto cpap.     Could do anther titration.  Auto cpap devices give similar results to cpap titrations in sleep lab.  Your titration in July 2017 was with poor sleep efficiency and , if you use your auto cpap, the auto device repeats titration nightly.       Use during day and note what might be acceptable pressures- you say you have no symptoms on cpap 6 (doubt you are optimally treated) could use minimal pressures??     Use symbicort, use flonase.  June 4, 2019- freq nasal stuffy,uses saline.  Has cpap  6 , last 2017 cpap titration had no rem sleep so rec was 10-20 with study done 8-12.  Pt has nasal stuffy freq uses saline.      Has some bingham.  Patient Instructions   Your ahi was 48 in 2017- fairly severe.    Nasal patency needed- use saline (afrin if stuffy) then flonase once open.    If nasal obstructs  will need to open mouth - mouth leaks will make machine cycles.  Would use auto pap 8-12- numbers found to be adequate in 2017    Would try nasal oral mask in future.  Work on nose.    Exercise would likely be good.      Try symbicort 2 twice daily  - in place anoro.       12/17/18- has older machine states not working correctly. Using nightly, has day time drowsiness. Insurance states having to wait for next machine. Sleep is poor. Has new nasal face mask. Has tried nasal pillars, nasal pillow, and now has full nose mask. Nervous over full face mask causing clausaphobia.   Sensation of throat closing, Not using Anoro daily.  Patient Instructions   Anoro is a daily medication intended to prevent breathing problems  Use Albuterol inhaler 1-2 puffs every 4 hours when needed for Shortness of breath      A full Face mask is needed to help with Obstructive sleep apnea due to sleeping with mouth open.  9/24/18- states had sleep titration study but never heard anything from it. Insurance denied automatic titration machine, states was not told she had COPD. Request a different face mask. States SOB with activity every day for a few minutes, day time only, relieved with rest. States does not use albuterol inhaler feels it does not help.  Has stopped smoking five years ago.     Previous HPI Dr. Jordan:  July 10, 2017-on cymbalta- helps neuropathy but having poor sleep  With nocturia ppt arousal.  Sleep study done with no rem sleep on cymbalta?  10-20 pressure rather than 6.     2/27/2017HPI: dx osas 10 yrs ago, ahi na.  On nasal pillars- cpap 6, new machine 5 yrs ago.  Gained 45 post stent around 2012.     Prior to cpap, severe snorer, fatigue, with no other prominent symptoms recalled.       Now no snoring noted with cpap. No nasal obstruction.     Mentation a little slow but vague.      The chief compliant  problem is stable  PFSH:  Past Medical History:   Diagnosis Date    Angina pectoris     Anticoagulant long-term use      Anxiety     Arthritis     Back pain     Cataract     Chronic bronchitis     Coronary artery disease     STENT X 2    CTS (carpal tunnel syndrome)     RIGHT    Depression     MILLER (dyspnea on exertion) 2017    Hallux valgus, acquired, bilateral 2017    Hematuria     Hyperlipidemia     Hypertension     Hypertensive left ventricular hypertrophy, without heart failure 2017    Hypothyroidism     Obesity     Polyneuropathy     S/P coronary artery stent placement, 2 LETTY 2012, 1 LETTY 2013 3/5/2013    Sleep apnea     USES CPAP    Thyroid disease     Tobacco dependence     Trouble in sleeping     Urinary incontinence     Wears glasses     ONE CONTAC         Past Surgical History:   Procedure Laterality Date    APPENDECTOMY      BILATERAL SALPINGOOPHORECTOMY      CARDIAC CATHETERIZATION      CORONARY ANGIOPLASTY      CORONARY STENT PLACEMENT      PCI  of the LAD with LETTY    EYE SURGERY      bilat cataract removal    HYSTERECTOMY      complete    OOPHORECTOMY      TONSILLECTOMY       Social History     Tobacco Use    Smoking status: Former Smoker     Packs/day: 1.00     Years: 50.00     Pack years: 50.00     Types: Cigarettes     Start date: 1964     Quit date: 2012     Years since quittin.3    Smokeless tobacco: Never Used   Substance Use Topics    Alcohol use: Yes     Alcohol/week: 2.0 standard drinks     Types: 2 Shots of liquor per week     Comment: Social - vodka on wednesday    Drug use: No     Family History   Problem Relation Age of Onset    Hypertension Sister     Arthritis Sister     Heart failure Mother     Hypertension Mother     Heart failure Father     Hypertension Father     No Known Problems Brother     No Known Problems Son     Heart disease Brother     Arthritis Sister     Hypertension Sister     Cancer Sister     Asthma Son     Arthritis Son         psoriatic arthritis     Review of patient's allergies indicates:   Allergen  "Reactions    Wellbutrin [bupropion hcl] Other (See Comments)     sven     I have reviewed past medical, family, and social history. I have reviewed previous nurse notes.    Performance Status:The patient's activity level is no limits with regular activity.      Review of Systems:  a review of eleven systems covering constitutional, Eye, HEENT, Psych, Respiratory, Cardiac, GI, , Musculoskeletal, Endocrine, Dermatologic was negative except for pertinent findings as listed ABOVE and below: pertinent positive as above, rest is good       Exam:Comprehensive exam done. BP (!) 154/73 (BP Location: Right arm, Patient Position: Sitting)   Pulse 77   Ht 5' 5" (1.651 m)   Wt 82.1 kg (181 lb)   SpO2 95% Comment: on room air  BMI 30.12 kg/m²   Exam included Vitals as listed, and patient's appearance and affect and alertness and mood, oral exam for yeast and hygiene and pharynx lesions and Mallapatti (M) score, neck with inspection for jvd and masses and thyroid abnormalities and lymph nodes (supraclavicular and infraclavicular nodes and axillary also examined and noted if abn), chest exam included symmetry and effort and fremitus and percussion and auscultation, cardiac exam included rhythm and gallops and murmur and rubs and jvd and edema, abdominal exam for mass and hepatosplenomegaly and tenderness and hernias and bowel sounds, Musculoskeletal exam with muscle tone and posture and mobility/gait and  strength, and skin for rashes and cyanosis and pallor and turgor, extremity for clubbing.  Findings were normal except for pertinent findings listed below:  M3, chest is symmetric, no distress, normal percussion, normal fremitus and good normal breath sounds  No edema.    12/01/2020  exam is updated and all stable        Radiographs (ct chest and cxr) reviewed: results reviewed   The mediastinal and cardiac size and contours are normal. The diaphragms and pleura are clear. There are no intra-pulmonary masses or " infiltrates. There is no pneumothorax or pleural effusion.    Impression-Negative chest.      Electronically signed by:Hernán Mojica MD  Date: 03/26/14  Time:10:40     Labs reviewed  Lab Results   Component Value Date    WBC 7.63 03/12/2019    RBC 4.37 03/12/2019    HGB 12.6 03/12/2019    HCT 41.4 03/12/2019    MCV 95 03/12/2019    MCH 28.8 03/12/2019    MCHC 30.4 (L) 03/12/2019    RDW 14.3 03/12/2019     03/12/2019    MPV 11.4 03/12/2019    GRAN 4.9 03/12/2019    GRAN 63.7 03/12/2019    LYMPH 1.9 03/12/2019    LYMPH 25.2 03/12/2019    MONO 0.6 03/12/2019    MONO 8.1 03/12/2019    EOS 0.2 03/12/2019    BASO 0.02 03/12/2019    EOSINOPHIL 2.4 03/12/2019    BASOPHIL 0.3 03/12/2019       PFT results reviewed  Pulmonary Functions Testing Results:  Procedure Notes     Author Status Last  Updated Created   Tayo Jordan MD Signed Tayo Jordan MD 7/19/2017  2:38 PM 7/19/2017  2:35 PM          Assoc. Orders Procedures   None COMPLETE PFT WITH BRONCHODILATOR       Pre-Op Dx Post-Op Dx   Bronchitis None          Pulmonary Functions, including spirometry and bronchodilator response and lung volumes and diffusion, study was done July 28, 2017.  Spirometry shows loss of vital capacity and fev1 with no obstruction and no bronchodilator response.   FEV1 is 71% or 1.65 liters.  Lung volumes show  normal TLC and elevated residual volume.  Diffusion shows reduced but falls within normal range when corrected for lung volumes.     Pulmonary functions show low vital capacity and low mvv with some air trapping,but rest is wnl. Clinical correlation recommended.     Tayo Jordan M.D.               Plan:  Clinical impression is apparently straight forward and impression with management as below.    Kathy was seen today for follow-up and apnea.    Diagnoses and all orders for this visit:    Chronic sinus complaints  -     amoxicillin (AMOXIL) 875 MG tablet; Take 1 tablet (875 mg total) by mouth 2 (two) times  daily.    Anxiety  Comments:  Controlled, refill of Xanax given.  Orders:  -     ALPRAZolam (XANAX) 0.5 MG tablet; Take 1 tablet (0.5 mg total) by mouth 3 (three) times daily as needed.        Follow up in about 1 year (around 12/1/2021), or if symptoms worsen or fail to improve.    Discussed with patient above for education the following:      Patient Instructions   Asthma stable - singulair will prevent sinus and asthma.  Could skip symbicort and use singulair only.      Use xanax as needed.    Amoxil for sinus infection may be needed.     Call when needs follow up/xanax.

## 2020-12-02 ENCOUNTER — PATIENT OUTREACH (OUTPATIENT)
Dept: ADMINISTRATIVE | Facility: HOSPITAL | Age: 70
End: 2020-12-02

## 2020-12-08 ENCOUNTER — HOSPITAL ENCOUNTER (OUTPATIENT)
Dept: RADIOLOGY | Facility: HOSPITAL | Age: 70
Discharge: HOME | End: 2020-12-08
Attending: FAMILY MEDICINE
Payer: MEDICARE

## 2020-12-08 DIAGNOSIS — Z12.31 SCREENING MAMMOGRAM, ENCOUNTER FOR: ICD-10-CM

## 2020-12-08 PROCEDURE — 77067 SCR MAMMO BI INCL CAD: CPT | Mod: TC,HCNC

## 2020-12-08 PROCEDURE — 77067 MAMMO DIGITAL SCREENING BILAT WITH TOMOSYNTHESIS_CAD: ICD-10-PCS | Mod: 26,HCNC,, | Performed by: RADIOLOGY

## 2020-12-08 PROCEDURE — 77063 BREAST TOMOSYNTHESIS BI: CPT | Mod: 26,HCNC,, | Performed by: RADIOLOGY

## 2020-12-08 PROCEDURE — 77063 MAMMO DIGITAL SCREENING BILAT WITH TOMOSYNTHESIS_CAD: ICD-10-PCS | Mod: 26,HCNC,, | Performed by: RADIOLOGY

## 2020-12-08 PROCEDURE — 77067 SCR MAMMO BI INCL CAD: CPT | Mod: 26,HCNC,, | Performed by: RADIOLOGY

## 2020-12-10 ENCOUNTER — PES CALL (OUTPATIENT)
Dept: ADMINISTRATIVE | Facility: CLINIC | Age: 70
End: 2020-12-10

## 2020-12-11 ENCOUNTER — PATIENT MESSAGE (OUTPATIENT)
Dept: OTHER | Facility: OTHER | Age: 70
End: 2020-12-11

## 2020-12-14 ENCOUNTER — OFFICE VISIT (OUTPATIENT)
Dept: CARDIOLOGY | Facility: CLINIC | Age: 70
End: 2020-12-14
Payer: MEDICARE

## 2020-12-14 VITALS
BODY MASS INDEX: 30.66 KG/M2 | HEART RATE: 85 BPM | DIASTOLIC BLOOD PRESSURE: 70 MMHG | SYSTOLIC BLOOD PRESSURE: 122 MMHG | OXYGEN SATURATION: 96 % | HEIGHT: 65 IN | WEIGHT: 184 LBS

## 2020-12-14 DIAGNOSIS — E78.00 PURE HYPERCHOLESTEROLEMIA: Primary | ICD-10-CM

## 2020-12-14 DIAGNOSIS — I10 HYPERTENSION, UNSPECIFIED TYPE: ICD-10-CM

## 2020-12-14 DIAGNOSIS — E78.2 MIXED HYPERLIPIDEMIA: ICD-10-CM

## 2020-12-14 DIAGNOSIS — I11.9 HYPERTENSIVE LEFT VENTRICULAR HYPERTROPHY, WITHOUT HEART FAILURE: ICD-10-CM

## 2020-12-14 DIAGNOSIS — Z95.5 S/P CORONARY ARTERY STENT PLACEMENT: ICD-10-CM

## 2020-12-14 PROCEDURE — 3078F DIAST BP <80 MM HG: CPT | Mod: CPTII,S$GLB,, | Performed by: INTERNAL MEDICINE

## 2020-12-14 PROCEDURE — 3288F FALL RISK ASSESSMENT DOCD: CPT | Mod: CPTII,S$GLB,, | Performed by: INTERNAL MEDICINE

## 2020-12-14 PROCEDURE — 1101F PT FALLS ASSESS-DOCD LE1/YR: CPT | Mod: CPTII,S$GLB,, | Performed by: INTERNAL MEDICINE

## 2020-12-14 PROCEDURE — 1101F PR PT FALLS ASSESS DOC 0-1 FALLS W/OUT INJ PAST YR: ICD-10-PCS | Mod: CPTII,S$GLB,, | Performed by: INTERNAL MEDICINE

## 2020-12-14 PROCEDURE — 3288F PR FALLS RISK ASSESSMENT DOCUMENTED: ICD-10-PCS | Mod: CPTII,S$GLB,, | Performed by: INTERNAL MEDICINE

## 2020-12-14 PROCEDURE — 3078F PR MOST RECENT DIASTOLIC BLOOD PRESSURE < 80 MM HG: ICD-10-PCS | Mod: CPTII,S$GLB,, | Performed by: INTERNAL MEDICINE

## 2020-12-14 PROCEDURE — 3074F PR MOST RECENT SYSTOLIC BLOOD PRESSURE < 130 MM HG: ICD-10-PCS | Mod: CPTII,S$GLB,, | Performed by: INTERNAL MEDICINE

## 2020-12-14 PROCEDURE — 99214 PR OFFICE/OUTPT VISIT, EST, LEVL IV, 30-39 MIN: ICD-10-PCS | Mod: S$GLB,,, | Performed by: INTERNAL MEDICINE

## 2020-12-14 PROCEDURE — 1159F MED LIST DOCD IN RCRD: CPT | Mod: S$GLB,,, | Performed by: INTERNAL MEDICINE

## 2020-12-14 PROCEDURE — 3074F SYST BP LT 130 MM HG: CPT | Mod: CPTII,S$GLB,, | Performed by: INTERNAL MEDICINE

## 2020-12-14 PROCEDURE — 1159F PR MEDICATION LIST DOCUMENTED IN MEDICAL RECORD: ICD-10-PCS | Mod: S$GLB,,, | Performed by: INTERNAL MEDICINE

## 2020-12-14 PROCEDURE — 99214 OFFICE O/P EST MOD 30 MIN: CPT | Mod: S$GLB,,, | Performed by: INTERNAL MEDICINE

## 2020-12-14 PROCEDURE — 3008F BODY MASS INDEX DOCD: CPT | Mod: CPTII,S$GLB,, | Performed by: INTERNAL MEDICINE

## 2020-12-14 PROCEDURE — 3008F PR BODY MASS INDEX (BMI) DOCUMENTED: ICD-10-PCS | Mod: CPTII,S$GLB,, | Performed by: INTERNAL MEDICINE

## 2021-01-01 DIAGNOSIS — G62.9 NEUROPATHY: ICD-10-CM

## 2021-01-01 DIAGNOSIS — F41.9 ANXIETY: ICD-10-CM

## 2021-01-01 RX ORDER — DULOXETIN HYDROCHLORIDE 60 MG/1
CAPSULE, DELAYED RELEASE ORAL
Qty: 90 CAPSULE | Refills: 3 | Status: SHIPPED | OUTPATIENT
Start: 2021-01-01 | End: 2021-06-29 | Stop reason: SDUPTHER

## 2021-01-07 ENCOUNTER — PATIENT MESSAGE (OUTPATIENT)
Dept: ADMINISTRATIVE | Facility: OTHER | Age: 71
End: 2021-01-07

## 2021-01-07 ENCOUNTER — IMMUNIZATION (OUTPATIENT)
Dept: PRIMARY CARE CLINIC | Facility: CLINIC | Age: 71
End: 2021-01-07
Payer: MEDICARE

## 2021-01-07 DIAGNOSIS — Z23 NEED FOR VACCINATION: ICD-10-CM

## 2021-01-07 PROCEDURE — 91300 COVID-19, MRNA, LNP-S, PF, 30 MCG/0.3 ML DOSE VACCINE: ICD-10-PCS | Mod: S$GLB,,, | Performed by: FAMILY MEDICINE

## 2021-01-07 PROCEDURE — 91300 COVID-19, MRNA, LNP-S, PF, 30 MCG/0.3 ML DOSE VACCINE: CPT | Mod: S$GLB,,, | Performed by: FAMILY MEDICINE

## 2021-01-07 PROCEDURE — 0001A COVID-19, MRNA, LNP-S, PF, 30 MCG/0.3 ML DOSE VACCINE: CPT | Mod: S$GLB,,, | Performed by: FAMILY MEDICINE

## 2021-01-07 PROCEDURE — 0001A COVID-19, MRNA, LNP-S, PF, 30 MCG/0.3 ML DOSE VACCINE: ICD-10-PCS | Mod: S$GLB,,, | Performed by: FAMILY MEDICINE

## 2021-01-11 ENCOUNTER — HOSPITAL ENCOUNTER (OUTPATIENT)
Dept: RADIOLOGY | Facility: CLINIC | Age: 71
Discharge: HOME | End: 2021-01-11
Attending: INTERNAL MEDICINE
Payer: MEDICARE

## 2021-01-11 ENCOUNTER — HOSPITAL ENCOUNTER (OUTPATIENT)
Dept: CARDIOLOGY | Facility: CLINIC | Age: 71
Discharge: HOME | End: 2021-01-11
Attending: INTERNAL MEDICINE
Payer: MEDICARE

## 2021-01-11 VITALS — BODY MASS INDEX: 30.66 KG/M2 | HEIGHT: 65 IN | WEIGHT: 184 LBS

## 2021-01-11 DIAGNOSIS — E78.00 PURE HYPERCHOLESTEROLEMIA: ICD-10-CM

## 2021-01-11 DIAGNOSIS — I10 HYPERTENSION, UNSPECIFIED TYPE: ICD-10-CM

## 2021-01-11 DIAGNOSIS — Z95.5 S/P CORONARY ARTERY STENT PLACEMENT: ICD-10-CM

## 2021-01-11 PROCEDURE — 93015 STRESS TEST WITH MYOCARDIAL PERFUSION (CUPID ONLY): ICD-10-PCS | Mod: S$GLB,,, | Performed by: INTERNAL MEDICINE

## 2021-01-11 PROCEDURE — A9502 TC99M TETROFOSMIN: HCPCS | Mod: S$GLB,,, | Performed by: INTERNAL MEDICINE

## 2021-01-11 PROCEDURE — 78452 HT MUSCLE IMAGE SPECT MULT: CPT | Mod: S$GLB,,, | Performed by: INTERNAL MEDICINE

## 2021-01-11 PROCEDURE — A9502 STRESS TEST WITH MYOCARDIAL PERFUSION (CUPID ONLY): ICD-10-PCS | Mod: S$GLB,,, | Performed by: INTERNAL MEDICINE

## 2021-01-11 PROCEDURE — 78452 STRESS TEST WITH MYOCARDIAL PERFUSION (CUPID ONLY): ICD-10-PCS | Mod: S$GLB,,, | Performed by: INTERNAL MEDICINE

## 2021-01-11 PROCEDURE — 93015 CV STRESS TEST SUPVJ I&R: CPT | Mod: S$GLB,,, | Performed by: INTERNAL MEDICINE

## 2021-01-11 PROCEDURE — 93306 ECHO (CUPID ONLY): ICD-10-PCS | Mod: S$GLB,,, | Performed by: INTERNAL MEDICINE

## 2021-01-11 PROCEDURE — 93306 TTE W/DOPPLER COMPLETE: CPT | Mod: S$GLB,,, | Performed by: INTERNAL MEDICINE

## 2021-01-11 RX ORDER — REGADENOSON 0.08 MG/ML
0.4 INJECTION, SOLUTION INTRAVENOUS ONCE
Status: COMPLETED | OUTPATIENT
Start: 2021-01-11 | End: 2021-01-11

## 2021-01-11 RX ADMIN — REGADENOSON 0.4 MG: 0.08 INJECTION, SOLUTION INTRAVENOUS at 08:01

## 2021-01-18 LAB
AORTIC ROOT ANNULUS: 2.6 CM
AORTIC VALVE CUSP SEPERATION: 1.7 CM
AV INDEX (PROSTH): 0.64
AV MEAN GRADIENT: 4 MMHG
AV PEAK GRADIENT: 7 MMHG
AV VALVE AREA: 2.01 CM2
AV VELOCITY RATIO: 0.62
BSA FOR ECHO PROCEDURE: 1.96 M2
CV ECHO LV RWT: 0.5 CM
CV PHARM DOSE: 0.4 MG
CV STRESS BASE HR: 72 BPM
DIASTOLIC BLOOD PRESSURE: 70 MMHG
DOP CALC AO PEAK VEL: 1.35 M/S
DOP CALC AO VTI: 34.5 CM
DOP CALC LVOT AREA: 3.1 CM2
DOP CALC LVOT DIAMETER: 2 CM
DOP CALC LVOT PEAK VEL: 0.84 M/S
DOP CALC LVOT STROKE VOLUME: 69.39 CM3
DOP CALCLVOT PEAK VEL VTI: 22.1 CM
E WAVE DECELERATION TIME: 253 MS
E/A RATIO: 0.83
E/E' RATIO: 13.17 M/S
ECHO LV POSTERIOR WALL: 1.01 CM (ref 0.6–1.1)
EJECTION FRACTION- HIGH: 65 %
END DIASTOLIC INDEX-HIGH: 158 ML/M2
END DIASTOLIC INDEX-LOW: 94 ML/M2
END SYSTOLIC INDEX-HIGH: 71 ML/M2
END SYSTOLIC INDEX-LOW: 33 ML/M2
FRACTIONAL SHORTENING: 40 % (ref 28–44)
INTERVENTRICULAR SEPTUM: 1.02 CM (ref 0.6–1.1)
IVRT: 90 MS
LEFT ATRIUM SIZE: 3.5 CM
LEFT INTERNAL DIMENSION IN SYSTOLE: 2.44 CM (ref 2.1–4)
LEFT VENTRICLE MASS INDEX: 69 G/M2
LEFT VENTRICULAR INTERNAL DIMENSION IN DIASTOLE: 4.04 CM (ref 3.5–6)
LEFT VENTRICULAR MASS: 131.81 G
LV LATERAL E/E' RATIO: 11.29 M/S
LV SEPTAL E/E' RATIO: 15.8 M/S
MV PEAK A VEL: 0.95 M/S
MV PEAK E VEL: 0.79 M/S
NUC STRESS DIASTOLIC VOLUME INDEX: 47
NUC STRESS EJECTION FRACTION: 93 %
NUC STRESS SYSTOLIC VOLUME INDEX: 3
OHS CV CPX 1 MINUTE RECOVERY HEART RATE: 93 BPM
OHS CV CPX 85 PERCENT MAX PREDICTED HEART RATE MALE: 121
OHS CV CPX MAX PREDICTED HEART RATE: 143
OHS CV CPX PATIENT IS FEMALE: 1
OHS CV CPX PATIENT IS MALE: 0
OHS CV CPX PEAK DIASTOLIC BLOOD PRESSURE: 68 MMHG
OHS CV CPX PEAK HEAR RATE: 93 BPM
OHS CV CPX PEAK RATE PRESSURE PRODUCT: NORMAL
OHS CV CPX PEAK SYSTOLIC BLOOD PRESSURE: 130 MMHG
OHS CV CPX PERCENT MAX PREDICTED HEART RATE ACHIEVED: 65
OHS CV CPX RATE PRESSURE PRODUCT PRESENTING: 9360
PISA TR MAX VEL: 2.3 M/S
RA PRESSURE: 3 MMHG
RETIRED EF AND QEF - SEE NOTES: 53 %
RIGHT VENTRICULAR END-DIASTOLIC DIMENSION: 1.86 CM
SYSTOLIC BLOOD PRESSURE: 130 MMHG
TDI LATERAL: 0.07 M/S
TDI SEPTAL: 0.05 M/S
TDI: 0.06 M/S
TR MAX PG: 21 MMHG
TV REST PULMONARY ARTERY PRESSURE: 24 MMHG

## 2021-01-25 ENCOUNTER — OFFICE VISIT (OUTPATIENT)
Dept: FAMILY MEDICINE | Facility: CLINIC | Age: 71
End: 2021-01-25
Payer: MEDICARE

## 2021-01-25 VITALS
RESPIRATION RATE: 19 BRPM | BODY MASS INDEX: 30.62 KG/M2 | DIASTOLIC BLOOD PRESSURE: 66 MMHG | HEART RATE: 89 BPM | HEIGHT: 65 IN | SYSTOLIC BLOOD PRESSURE: 122 MMHG | TEMPERATURE: 98 F | OXYGEN SATURATION: 95 %

## 2021-01-25 DIAGNOSIS — E78.00 PURE HYPERCHOLESTEROLEMIA: Chronic | ICD-10-CM

## 2021-01-25 DIAGNOSIS — F41.9 ANXIETY: ICD-10-CM

## 2021-01-25 DIAGNOSIS — I25.10 CORONARY ARTERY DISEASE INVOLVING NATIVE CORONARY ARTERY OF NATIVE HEART WITHOUT ANGINA PECTORIS: ICD-10-CM

## 2021-01-25 DIAGNOSIS — J44.9 COPD MIXED TYPE: ICD-10-CM

## 2021-01-25 DIAGNOSIS — E05.90 HYPERTHYROIDISM: ICD-10-CM

## 2021-01-25 DIAGNOSIS — I10 ESSENTIAL HYPERTENSION: Primary | ICD-10-CM

## 2021-01-25 PROCEDURE — 1159F MED LIST DOCD IN RCRD: CPT | Mod: S$GLB,,, | Performed by: FAMILY MEDICINE

## 2021-01-25 PROCEDURE — 3008F BODY MASS INDEX DOCD: CPT | Mod: CPTII,S$GLB,, | Performed by: FAMILY MEDICINE

## 2021-01-25 PROCEDURE — 1159F PR MEDICATION LIST DOCUMENTED IN MEDICAL RECORD: ICD-10-PCS | Mod: S$GLB,,, | Performed by: FAMILY MEDICINE

## 2021-01-25 PROCEDURE — 1101F PR PT FALLS ASSESS DOC 0-1 FALLS W/OUT INJ PAST YR: ICD-10-PCS | Mod: CPTII,S$GLB,, | Performed by: FAMILY MEDICINE

## 2021-01-25 PROCEDURE — 3078F DIAST BP <80 MM HG: CPT | Mod: CPTII,S$GLB,, | Performed by: FAMILY MEDICINE

## 2021-01-25 PROCEDURE — 99214 PR OFFICE/OUTPT VISIT, EST, LEVL IV, 30-39 MIN: ICD-10-PCS | Mod: S$GLB,,, | Performed by: FAMILY MEDICINE

## 2021-01-25 PROCEDURE — 3288F PR FALLS RISK ASSESSMENT DOCUMENTED: ICD-10-PCS | Mod: CPTII,S$GLB,, | Performed by: FAMILY MEDICINE

## 2021-01-25 PROCEDURE — 99499 UNLISTED E&M SERVICE: CPT | Mod: HCNC,S$GLB,, | Performed by: FAMILY MEDICINE

## 2021-01-25 PROCEDURE — 1126F PR PAIN SEVERITY QUANTIFIED, NO PAIN PRESENT: ICD-10-PCS | Mod: S$GLB,,, | Performed by: FAMILY MEDICINE

## 2021-01-25 PROCEDURE — 3074F SYST BP LT 130 MM HG: CPT | Mod: CPTII,S$GLB,, | Performed by: FAMILY MEDICINE

## 2021-01-25 PROCEDURE — 3008F PR BODY MASS INDEX (BMI) DOCUMENTED: ICD-10-PCS | Mod: CPTII,S$GLB,, | Performed by: FAMILY MEDICINE

## 2021-01-25 PROCEDURE — 99499 RISK ADDL DX/OHS AUDIT: ICD-10-PCS | Mod: HCNC,S$GLB,, | Performed by: FAMILY MEDICINE

## 2021-01-25 PROCEDURE — 1126F AMNT PAIN NOTED NONE PRSNT: CPT | Mod: S$GLB,,, | Performed by: FAMILY MEDICINE

## 2021-01-25 PROCEDURE — 99999 PR PBB SHADOW E&M-EST. PATIENT-LVL IV: ICD-10-PCS | Mod: PBBFAC,,, | Performed by: FAMILY MEDICINE

## 2021-01-25 PROCEDURE — 3288F FALL RISK ASSESSMENT DOCD: CPT | Mod: CPTII,S$GLB,, | Performed by: FAMILY MEDICINE

## 2021-01-25 PROCEDURE — 1101F PT FALLS ASSESS-DOCD LE1/YR: CPT | Mod: CPTII,S$GLB,, | Performed by: FAMILY MEDICINE

## 2021-01-25 PROCEDURE — 99214 OFFICE O/P EST MOD 30 MIN: CPT | Mod: S$GLB,,, | Performed by: FAMILY MEDICINE

## 2021-01-25 PROCEDURE — 3074F PR MOST RECENT SYSTOLIC BLOOD PRESSURE < 130 MM HG: ICD-10-PCS | Mod: CPTII,S$GLB,, | Performed by: FAMILY MEDICINE

## 2021-01-25 PROCEDURE — 99999 PR PBB SHADOW E&M-EST. PATIENT-LVL IV: CPT | Mod: PBBFAC,,, | Performed by: FAMILY MEDICINE

## 2021-01-25 PROCEDURE — 3078F PR MOST RECENT DIASTOLIC BLOOD PRESSURE < 80 MM HG: ICD-10-PCS | Mod: CPTII,S$GLB,, | Performed by: FAMILY MEDICINE

## 2021-01-25 RX ORDER — BUDESONIDE AND FORMOTEROL FUMARATE DIHYDRATE 160; 4.5 UG/1; UG/1
2 AEROSOL RESPIRATORY (INHALATION) DAILY
Qty: 10.2 G | Refills: 0
Start: 2021-01-25 | End: 2021-10-04 | Stop reason: SDUPTHER

## 2021-01-25 RX ORDER — ATORVASTATIN CALCIUM 80 MG/1
TABLET, FILM COATED ORAL
Qty: 90 TABLET | Refills: 3 | Status: SHIPPED | OUTPATIENT
Start: 2021-01-25 | End: 2022-02-14 | Stop reason: SDUPTHER

## 2021-01-25 RX ORDER — METHIMAZOLE 5 MG/1
5 TABLET ORAL DAILY
Qty: 90 TABLET | Refills: 3
Start: 2021-01-25 | End: 2022-01-11 | Stop reason: SDUPTHER

## 2021-01-27 PROBLEM — R09.89 CHRONIC SINUS COMPLAINTS: Status: RESOLVED | Noted: 2019-12-03 | Resolved: 2021-01-27

## 2021-01-28 ENCOUNTER — IMMUNIZATION (OUTPATIENT)
Dept: PRIMARY CARE CLINIC | Facility: CLINIC | Age: 71
End: 2021-01-28
Payer: MEDICARE

## 2021-01-28 DIAGNOSIS — Z23 NEED FOR VACCINATION: Primary | ICD-10-CM

## 2021-01-28 PROCEDURE — 0002A COVID-19, MRNA, LNP-S, PF, 30 MCG/0.3 ML DOSE VACCINE: CPT | Mod: S$GLB,,, | Performed by: FAMILY MEDICINE

## 2021-01-28 PROCEDURE — 91300 COVID-19, MRNA, LNP-S, PF, 30 MCG/0.3 ML DOSE VACCINE: CPT | Mod: S$GLB,,, | Performed by: FAMILY MEDICINE

## 2021-01-28 PROCEDURE — 0002A COVID-19, MRNA, LNP-S, PF, 30 MCG/0.3 ML DOSE VACCINE: ICD-10-PCS | Mod: S$GLB,,, | Performed by: FAMILY MEDICINE

## 2021-01-28 PROCEDURE — 91300 COVID-19, MRNA, LNP-S, PF, 30 MCG/0.3 ML DOSE VACCINE: ICD-10-PCS | Mod: S$GLB,,, | Performed by: FAMILY MEDICINE

## 2021-02-11 ENCOUNTER — PES CALL (OUTPATIENT)
Dept: ADMINISTRATIVE | Facility: CLINIC | Age: 71
End: 2021-02-11

## 2021-02-12 DIAGNOSIS — R09.89 CHRONIC SINUS COMPLAINTS: ICD-10-CM

## 2021-02-13 RX ORDER — FLUTICASONE PROPIONATE 50 MCG
1 SPRAY, SUSPENSION (ML) NASAL DAILY
Qty: 16 G | Refills: 3 | Status: SHIPPED | OUTPATIENT
Start: 2021-02-13 | End: 2022-05-04 | Stop reason: SDUPTHER

## 2021-02-24 DIAGNOSIS — I10 ESSENTIAL HYPERTENSION: ICD-10-CM

## 2021-02-24 RX ORDER — LOSARTAN POTASSIUM AND HYDROCHLOROTHIAZIDE 25; 100 MG/1; MG/1
1 TABLET ORAL DAILY
Qty: 90 TABLET | Refills: 3 | Status: SHIPPED | OUTPATIENT
Start: 2021-02-24 | End: 2021-06-29 | Stop reason: SDUPTHER

## 2021-03-03 ENCOUNTER — PES CALL (OUTPATIENT)
Dept: ADMINISTRATIVE | Facility: CLINIC | Age: 71
End: 2021-03-03

## 2021-03-03 ENCOUNTER — OFFICE VISIT (OUTPATIENT)
Dept: CARDIOLOGY | Facility: CLINIC | Age: 71
End: 2021-03-03
Payer: MEDICARE

## 2021-03-03 VITALS
SYSTOLIC BLOOD PRESSURE: 139 MMHG | OXYGEN SATURATION: 95 % | WEIGHT: 184 LBS | BODY MASS INDEX: 30.66 KG/M2 | HEART RATE: 95 BPM | HEIGHT: 65 IN | DIASTOLIC BLOOD PRESSURE: 75 MMHG

## 2021-03-03 DIAGNOSIS — Z95.5 S/P CORONARY ARTERY STENT PLACEMENT: Chronic | ICD-10-CM

## 2021-03-03 DIAGNOSIS — E66.9 OBESITY (BMI 30.0-34.9): Primary | ICD-10-CM

## 2021-03-03 DIAGNOSIS — G47.33 OSA ON CPAP: ICD-10-CM

## 2021-03-03 DIAGNOSIS — E78.2 MIXED HYPERLIPIDEMIA: Chronic | ICD-10-CM

## 2021-03-03 DIAGNOSIS — I10 ESSENTIAL HYPERTENSION: ICD-10-CM

## 2021-03-03 PROCEDURE — 3078F PR MOST RECENT DIASTOLIC BLOOD PRESSURE < 80 MM HG: ICD-10-PCS | Mod: CPTII,S$GLB,, | Performed by: INTERNAL MEDICINE

## 2021-03-03 PROCEDURE — 3288F PR FALLS RISK ASSESSMENT DOCUMENTED: ICD-10-PCS | Mod: CPTII,S$GLB,, | Performed by: INTERNAL MEDICINE

## 2021-03-03 PROCEDURE — 1101F PT FALLS ASSESS-DOCD LE1/YR: CPT | Mod: CPTII,S$GLB,, | Performed by: INTERNAL MEDICINE

## 2021-03-03 PROCEDURE — 99214 PR OFFICE/OUTPT VISIT, EST, LEVL IV, 30-39 MIN: ICD-10-PCS | Mod: S$GLB,,, | Performed by: INTERNAL MEDICINE

## 2021-03-03 PROCEDURE — 3075F SYST BP GE 130 - 139MM HG: CPT | Mod: CPTII,S$GLB,, | Performed by: INTERNAL MEDICINE

## 2021-03-03 PROCEDURE — 3008F PR BODY MASS INDEX (BMI) DOCUMENTED: ICD-10-PCS | Mod: CPTII,S$GLB,, | Performed by: INTERNAL MEDICINE

## 2021-03-03 PROCEDURE — 3008F BODY MASS INDEX DOCD: CPT | Mod: CPTII,S$GLB,, | Performed by: INTERNAL MEDICINE

## 2021-03-03 PROCEDURE — 3288F FALL RISK ASSESSMENT DOCD: CPT | Mod: CPTII,S$GLB,, | Performed by: INTERNAL MEDICINE

## 2021-03-03 PROCEDURE — 1101F PR PT FALLS ASSESS DOC 0-1 FALLS W/OUT INJ PAST YR: ICD-10-PCS | Mod: CPTII,S$GLB,, | Performed by: INTERNAL MEDICINE

## 2021-03-03 PROCEDURE — 1159F PR MEDICATION LIST DOCUMENTED IN MEDICAL RECORD: ICD-10-PCS | Mod: S$GLB,,, | Performed by: INTERNAL MEDICINE

## 2021-03-03 PROCEDURE — 3078F DIAST BP <80 MM HG: CPT | Mod: CPTII,S$GLB,, | Performed by: INTERNAL MEDICINE

## 2021-03-03 PROCEDURE — 1159F MED LIST DOCD IN RCRD: CPT | Mod: S$GLB,,, | Performed by: INTERNAL MEDICINE

## 2021-03-03 PROCEDURE — 3075F PR MOST RECENT SYSTOLIC BLOOD PRESS GE 130-139MM HG: ICD-10-PCS | Mod: CPTII,S$GLB,, | Performed by: INTERNAL MEDICINE

## 2021-03-03 PROCEDURE — 99214 OFFICE O/P EST MOD 30 MIN: CPT | Mod: S$GLB,,, | Performed by: INTERNAL MEDICINE

## 2021-03-22 ENCOUNTER — PES CALL (OUTPATIENT)
Dept: ADMINISTRATIVE | Facility: CLINIC | Age: 71
End: 2021-03-22

## 2021-04-26 ENCOUNTER — TELEPHONE (OUTPATIENT)
Dept: FAMILY MEDICINE | Facility: CLINIC | Age: 71
End: 2021-04-26

## 2021-04-26 ENCOUNTER — OFFICE VISIT (OUTPATIENT)
Dept: FAMILY MEDICINE | Facility: CLINIC | Age: 71
End: 2021-04-26
Payer: MEDICARE

## 2021-04-26 VITALS
WEIGHT: 182.13 LBS | TEMPERATURE: 98 F | HEIGHT: 65 IN | SYSTOLIC BLOOD PRESSURE: 138 MMHG | HEART RATE: 83 BPM | BODY MASS INDEX: 30.34 KG/M2 | DIASTOLIC BLOOD PRESSURE: 60 MMHG

## 2021-04-26 DIAGNOSIS — K12.1 MOUTH ULCERS: Primary | ICD-10-CM

## 2021-04-26 PROCEDURE — 3288F PR FALLS RISK ASSESSMENT DOCUMENTED: ICD-10-PCS | Mod: CPTII,S$GLB,, | Performed by: PHYSICIAN ASSISTANT

## 2021-04-26 PROCEDURE — 3288F FALL RISK ASSESSMENT DOCD: CPT | Mod: CPTII,S$GLB,, | Performed by: PHYSICIAN ASSISTANT

## 2021-04-26 PROCEDURE — 99999 PR PBB SHADOW E&M-EST. PATIENT-LVL IV: ICD-10-PCS | Mod: PBBFAC,,, | Performed by: PHYSICIAN ASSISTANT

## 2021-04-26 PROCEDURE — 99213 OFFICE O/P EST LOW 20 MIN: CPT | Mod: S$GLB,,, | Performed by: PHYSICIAN ASSISTANT

## 2021-04-26 PROCEDURE — 1101F PT FALLS ASSESS-DOCD LE1/YR: CPT | Mod: CPTII,S$GLB,, | Performed by: PHYSICIAN ASSISTANT

## 2021-04-26 PROCEDURE — 1159F MED LIST DOCD IN RCRD: CPT | Mod: S$GLB,,, | Performed by: PHYSICIAN ASSISTANT

## 2021-04-26 PROCEDURE — 99213 PR OFFICE/OUTPT VISIT, EST, LEVL III, 20-29 MIN: ICD-10-PCS | Mod: S$GLB,,, | Performed by: PHYSICIAN ASSISTANT

## 2021-04-26 PROCEDURE — 1125F PR PAIN SEVERITY QUANTIFIED, PAIN PRESENT: ICD-10-PCS | Mod: S$GLB,,, | Performed by: PHYSICIAN ASSISTANT

## 2021-04-26 PROCEDURE — 1125F AMNT PAIN NOTED PAIN PRSNT: CPT | Mod: S$GLB,,, | Performed by: PHYSICIAN ASSISTANT

## 2021-04-26 PROCEDURE — 1101F PR PT FALLS ASSESS DOC 0-1 FALLS W/OUT INJ PAST YR: ICD-10-PCS | Mod: CPTII,S$GLB,, | Performed by: PHYSICIAN ASSISTANT

## 2021-04-26 PROCEDURE — 1159F PR MEDICATION LIST DOCUMENTED IN MEDICAL RECORD: ICD-10-PCS | Mod: S$GLB,,, | Performed by: PHYSICIAN ASSISTANT

## 2021-04-26 PROCEDURE — 99999 PR PBB SHADOW E&M-EST. PATIENT-LVL IV: CPT | Mod: PBBFAC,,, | Performed by: PHYSICIAN ASSISTANT

## 2021-04-26 PROCEDURE — 3008F PR BODY MASS INDEX (BMI) DOCUMENTED: ICD-10-PCS | Mod: CPTII,S$GLB,, | Performed by: PHYSICIAN ASSISTANT

## 2021-04-26 PROCEDURE — 3008F BODY MASS INDEX DOCD: CPT | Mod: CPTII,S$GLB,, | Performed by: PHYSICIAN ASSISTANT

## 2021-05-27 ENCOUNTER — PATIENT MESSAGE (OUTPATIENT)
Dept: FAMILY MEDICINE | Facility: CLINIC | Age: 71
End: 2021-05-27

## 2021-05-28 ENCOUNTER — OFFICE VISIT (OUTPATIENT)
Dept: FAMILY MEDICINE | Facility: CLINIC | Age: 71
End: 2021-05-28
Payer: MEDICARE

## 2021-05-28 VITALS
TEMPERATURE: 98 F | HEIGHT: 65 IN | BODY MASS INDEX: 31.14 KG/M2 | WEIGHT: 186.94 LBS | DIASTOLIC BLOOD PRESSURE: 72 MMHG | HEART RATE: 98 BPM | SYSTOLIC BLOOD PRESSURE: 136 MMHG | OXYGEN SATURATION: 96 %

## 2021-05-28 DIAGNOSIS — E66.9 OBESITY (BMI 30.0-34.9): ICD-10-CM

## 2021-05-28 DIAGNOSIS — M62.830 SPASM OF MUSCLE OF LOWER BACK: Primary | ICD-10-CM

## 2021-05-28 PROCEDURE — 1101F PR PT FALLS ASSESS DOC 0-1 FALLS W/OUT INJ PAST YR: ICD-10-PCS | Mod: CPTII,S$GLB,, | Performed by: NURSE PRACTITIONER

## 2021-05-28 PROCEDURE — 99213 OFFICE O/P EST LOW 20 MIN: CPT | Mod: S$GLB,,, | Performed by: NURSE PRACTITIONER

## 2021-05-28 PROCEDURE — 99999 PR PBB SHADOW E&M-EST. PATIENT-LVL IV: CPT | Mod: PBBFAC,,, | Performed by: NURSE PRACTITIONER

## 2021-05-28 PROCEDURE — 1159F MED LIST DOCD IN RCRD: CPT | Mod: S$GLB,,, | Performed by: NURSE PRACTITIONER

## 2021-05-28 PROCEDURE — 3008F PR BODY MASS INDEX (BMI) DOCUMENTED: ICD-10-PCS | Mod: CPTII,S$GLB,, | Performed by: NURSE PRACTITIONER

## 2021-05-28 PROCEDURE — 3288F PR FALLS RISK ASSESSMENT DOCUMENTED: ICD-10-PCS | Mod: CPTII,S$GLB,, | Performed by: NURSE PRACTITIONER

## 2021-05-28 PROCEDURE — 99213 PR OFFICE/OUTPT VISIT, EST, LEVL III, 20-29 MIN: ICD-10-PCS | Mod: S$GLB,,, | Performed by: NURSE PRACTITIONER

## 2021-05-28 PROCEDURE — 3288F FALL RISK ASSESSMENT DOCD: CPT | Mod: CPTII,S$GLB,, | Performed by: NURSE PRACTITIONER

## 2021-05-28 PROCEDURE — 1101F PT FALLS ASSESS-DOCD LE1/YR: CPT | Mod: CPTII,S$GLB,, | Performed by: NURSE PRACTITIONER

## 2021-05-28 PROCEDURE — 3008F BODY MASS INDEX DOCD: CPT | Mod: CPTII,S$GLB,, | Performed by: NURSE PRACTITIONER

## 2021-05-28 PROCEDURE — 1159F PR MEDICATION LIST DOCUMENTED IN MEDICAL RECORD: ICD-10-PCS | Mod: S$GLB,,, | Performed by: NURSE PRACTITIONER

## 2021-05-28 PROCEDURE — 99999 PR PBB SHADOW E&M-EST. PATIENT-LVL IV: ICD-10-PCS | Mod: PBBFAC,,, | Performed by: NURSE PRACTITIONER

## 2021-05-28 RX ORDER — TIZANIDINE 4 MG/1
4 TABLET ORAL EVERY 6 HOURS PRN
Qty: 30 TABLET | Refills: 1 | Status: SHIPPED | OUTPATIENT
Start: 2021-05-28 | End: 2021-06-07

## 2021-05-28 RX ORDER — REGADENOSON 0.08 MG/ML
INJECTION, SOLUTION INTRAVENOUS
COMMUNITY
Start: 2021-01-11 | End: 2021-08-05 | Stop reason: ALTCHOICE

## 2021-06-01 ENCOUNTER — PATIENT MESSAGE (OUTPATIENT)
Dept: FAMILY MEDICINE | Facility: CLINIC | Age: 71
End: 2021-06-01

## 2021-06-03 ENCOUNTER — TELEPHONE (OUTPATIENT)
Dept: FAMILY MEDICINE | Facility: CLINIC | Age: 71
End: 2021-06-03

## 2021-06-03 ENCOUNTER — PATIENT MESSAGE (OUTPATIENT)
Dept: FAMILY MEDICINE | Facility: CLINIC | Age: 71
End: 2021-06-03

## 2021-06-03 RX ORDER — PREDNISONE 20 MG/1
TABLET ORAL
Qty: 12 TABLET | Refills: 0 | Status: SHIPPED | OUTPATIENT
Start: 2021-06-03 | End: 2021-08-05 | Stop reason: ALTCHOICE

## 2021-06-07 ENCOUNTER — OFFICE VISIT (OUTPATIENT)
Dept: FAMILY MEDICINE | Facility: CLINIC | Age: 71
End: 2021-06-07
Payer: MEDICARE

## 2021-06-07 ENCOUNTER — HOSPITAL ENCOUNTER (OUTPATIENT)
Dept: RADIOLOGY | Facility: CLINIC | Age: 71
Discharge: HOME | End: 2021-06-07
Attending: NURSE PRACTITIONER
Payer: MEDICARE

## 2021-06-07 ENCOUNTER — PATIENT MESSAGE (OUTPATIENT)
Dept: FAMILY MEDICINE | Facility: CLINIC | Age: 71
End: 2021-06-07

## 2021-06-07 VITALS
TEMPERATURE: 98 F | HEIGHT: 65 IN | BODY MASS INDEX: 31.04 KG/M2 | OXYGEN SATURATION: 96 % | WEIGHT: 186.31 LBS | SYSTOLIC BLOOD PRESSURE: 128 MMHG | HEART RATE: 96 BPM | DIASTOLIC BLOOD PRESSURE: 64 MMHG

## 2021-06-07 DIAGNOSIS — E66.9 OBESITY (BMI 30.0-34.9): ICD-10-CM

## 2021-06-07 DIAGNOSIS — M25.551 RIGHT HIP PAIN: Primary | ICD-10-CM

## 2021-06-07 DIAGNOSIS — M25.551 RIGHT HIP PAIN: ICD-10-CM

## 2021-06-07 PROCEDURE — 3008F BODY MASS INDEX DOCD: CPT | Mod: CPTII,S$GLB,, | Performed by: NURSE PRACTITIONER

## 2021-06-07 PROCEDURE — 3288F PR FALLS RISK ASSESSMENT DOCUMENTED: ICD-10-PCS | Mod: CPTII,S$GLB,, | Performed by: NURSE PRACTITIONER

## 2021-06-07 PROCEDURE — 1159F MED LIST DOCD IN RCRD: CPT | Mod: S$GLB,,, | Performed by: NURSE PRACTITIONER

## 2021-06-07 PROCEDURE — 1126F AMNT PAIN NOTED NONE PRSNT: CPT | Mod: S$GLB,,, | Performed by: NURSE PRACTITIONER

## 2021-06-07 PROCEDURE — 99213 PR OFFICE/OUTPT VISIT, EST, LEVL III, 20-29 MIN: ICD-10-PCS | Mod: S$GLB,,, | Performed by: NURSE PRACTITIONER

## 2021-06-07 PROCEDURE — 3008F PR BODY MASS INDEX (BMI) DOCUMENTED: ICD-10-PCS | Mod: CPTII,S$GLB,, | Performed by: NURSE PRACTITIONER

## 2021-06-07 PROCEDURE — 3288F FALL RISK ASSESSMENT DOCD: CPT | Mod: CPTII,S$GLB,, | Performed by: NURSE PRACTITIONER

## 2021-06-07 PROCEDURE — 73502 X-RAY EXAM HIP UNI 2-3 VIEWS: CPT | Mod: 26,RT,S$GLB, | Performed by: RADIOLOGY

## 2021-06-07 PROCEDURE — 1101F PT FALLS ASSESS-DOCD LE1/YR: CPT | Mod: CPTII,S$GLB,, | Performed by: NURSE PRACTITIONER

## 2021-06-07 PROCEDURE — 99213 OFFICE O/P EST LOW 20 MIN: CPT | Mod: S$GLB,,, | Performed by: NURSE PRACTITIONER

## 2021-06-07 PROCEDURE — 1126F PR PAIN SEVERITY QUANTIFIED, NO PAIN PRESENT: ICD-10-PCS | Mod: S$GLB,,, | Performed by: NURSE PRACTITIONER

## 2021-06-07 PROCEDURE — 1159F PR MEDICATION LIST DOCUMENTED IN MEDICAL RECORD: ICD-10-PCS | Mod: S$GLB,,, | Performed by: NURSE PRACTITIONER

## 2021-06-07 PROCEDURE — 73502 X-RAY EXAM HIP UNI 2-3 VIEWS: CPT | Mod: TC,FY,PO,RT

## 2021-06-07 PROCEDURE — 99999 PR PBB SHADOW E&M-EST. PATIENT-LVL V: ICD-10-PCS | Mod: PBBFAC,,, | Performed by: NURSE PRACTITIONER

## 2021-06-07 PROCEDURE — 99999 PR PBB SHADOW E&M-EST. PATIENT-LVL V: CPT | Mod: PBBFAC,,, | Performed by: NURSE PRACTITIONER

## 2021-06-07 PROCEDURE — 1101F PR PT FALLS ASSESS DOC 0-1 FALLS W/OUT INJ PAST YR: ICD-10-PCS | Mod: CPTII,S$GLB,, | Performed by: NURSE PRACTITIONER

## 2021-06-07 PROCEDURE — 73502 XR HIP 2 VIEW RIGHT: ICD-10-PCS | Mod: 26,RT,S$GLB, | Performed by: RADIOLOGY

## 2021-06-07 RX ORDER — HYDROCODONE BITARTRATE AND ACETAMINOPHEN 5; 325 MG/1; MG/1
1 TABLET ORAL EVERY 6 HOURS PRN
Qty: 28 TABLET | Refills: 0 | Status: SHIPPED | OUTPATIENT
Start: 2021-06-07 | End: 2021-06-14

## 2021-06-29 DIAGNOSIS — F41.9 ANXIETY: ICD-10-CM

## 2021-06-29 DIAGNOSIS — I10 ESSENTIAL HYPERTENSION: ICD-10-CM

## 2021-06-29 DIAGNOSIS — G62.9 NEUROPATHY: ICD-10-CM

## 2021-06-29 RX ORDER — LOSARTAN POTASSIUM AND HYDROCHLOROTHIAZIDE 25; 100 MG/1; MG/1
1 TABLET ORAL DAILY
Qty: 90 TABLET | Refills: 3 | Status: SHIPPED | OUTPATIENT
Start: 2021-06-29 | End: 2022-04-20

## 2021-06-29 RX ORDER — OMEPRAZOLE 20 MG/1
20 CAPSULE, DELAYED RELEASE ORAL DAILY
Qty: 90 CAPSULE | Refills: 3 | Status: SHIPPED | OUTPATIENT
Start: 2021-06-29 | End: 2022-02-14 | Stop reason: ALTCHOICE

## 2021-06-29 RX ORDER — DULOXETIN HYDROCHLORIDE 60 MG/1
60 CAPSULE, DELAYED RELEASE ORAL DAILY
Qty: 90 CAPSULE | Refills: 3 | Status: SHIPPED | OUTPATIENT
Start: 2021-06-29 | End: 2022-01-10 | Stop reason: SDUPTHER

## 2021-06-29 RX ORDER — OXYBUTYNIN CHLORIDE 15 MG/1
15 TABLET, EXTENDED RELEASE ORAL DAILY
Qty: 30 TABLET | Refills: 12 | Status: SHIPPED | OUTPATIENT
Start: 2021-06-29 | End: 2022-02-14 | Stop reason: SINTOL

## 2021-07-01 ENCOUNTER — PATIENT MESSAGE (OUTPATIENT)
Dept: FAMILY MEDICINE | Facility: CLINIC | Age: 71
End: 2021-07-01

## 2021-08-04 ENCOUNTER — PES CALL (OUTPATIENT)
Dept: ADMINISTRATIVE | Facility: CLINIC | Age: 71
End: 2021-08-04

## 2021-08-05 ENCOUNTER — OFFICE VISIT (OUTPATIENT)
Dept: FAMILY MEDICINE | Facility: CLINIC | Age: 71
End: 2021-08-05
Payer: MEDICARE

## 2021-08-05 VITALS
SYSTOLIC BLOOD PRESSURE: 134 MMHG | TEMPERATURE: 99 F | OXYGEN SATURATION: 94 % | BODY MASS INDEX: 30.89 KG/M2 | DIASTOLIC BLOOD PRESSURE: 68 MMHG | HEART RATE: 81 BPM | WEIGHT: 185.63 LBS

## 2021-08-05 DIAGNOSIS — Z87.891 PERSONAL HISTORY OF NICOTINE DEPENDENCE: ICD-10-CM

## 2021-08-05 DIAGNOSIS — I25.10 CORONARY ARTERY DISEASE INVOLVING NATIVE CORONARY ARTERY OF NATIVE HEART WITHOUT ANGINA PECTORIS: ICD-10-CM

## 2021-08-05 DIAGNOSIS — E05.90 SUBCLINICAL HYPERTHYROIDISM: ICD-10-CM

## 2021-08-05 DIAGNOSIS — E78.2 MIXED HYPERLIPIDEMIA: Chronic | ICD-10-CM

## 2021-08-05 DIAGNOSIS — I10 ESSENTIAL HYPERTENSION: Primary | ICD-10-CM

## 2021-08-05 DIAGNOSIS — J44.9 COPD MIXED TYPE: ICD-10-CM

## 2021-08-05 PROCEDURE — 1160F RVW MEDS BY RX/DR IN RCRD: CPT | Mod: CPTII,S$GLB,, | Performed by: FAMILY MEDICINE

## 2021-08-05 PROCEDURE — 99499 RISK ADDL DX/OHS AUDIT: ICD-10-PCS | Mod: HCNC,S$GLB,, | Performed by: FAMILY MEDICINE

## 2021-08-05 PROCEDURE — 1160F PR REVIEW ALL MEDS BY PRESCRIBER/CLIN PHARMACIST DOCUMENTED: ICD-10-PCS | Mod: CPTII,S$GLB,, | Performed by: FAMILY MEDICINE

## 2021-08-05 PROCEDURE — 1101F PT FALLS ASSESS-DOCD LE1/YR: CPT | Mod: CPTII,S$GLB,, | Performed by: FAMILY MEDICINE

## 2021-08-05 PROCEDURE — 1159F MED LIST DOCD IN RCRD: CPT | Mod: CPTII,S$GLB,, | Performed by: FAMILY MEDICINE

## 2021-08-05 PROCEDURE — 3008F BODY MASS INDEX DOCD: CPT | Mod: CPTII,S$GLB,, | Performed by: FAMILY MEDICINE

## 2021-08-05 PROCEDURE — 99214 PR OFFICE/OUTPT VISIT, EST, LEVL IV, 30-39 MIN: ICD-10-PCS | Mod: S$GLB,,, | Performed by: FAMILY MEDICINE

## 2021-08-05 PROCEDURE — 99999 PR PBB SHADOW E&M-EST. PATIENT-LVL III: CPT | Mod: PBBFAC,,, | Performed by: FAMILY MEDICINE

## 2021-08-05 PROCEDURE — 3078F DIAST BP <80 MM HG: CPT | Mod: CPTII,S$GLB,, | Performed by: FAMILY MEDICINE

## 2021-08-05 PROCEDURE — 3075F PR MOST RECENT SYSTOLIC BLOOD PRESS GE 130-139MM HG: ICD-10-PCS | Mod: CPTII,S$GLB,, | Performed by: FAMILY MEDICINE

## 2021-08-05 PROCEDURE — 3288F PR FALLS RISK ASSESSMENT DOCUMENTED: ICD-10-PCS | Mod: CPTII,S$GLB,, | Performed by: FAMILY MEDICINE

## 2021-08-05 PROCEDURE — 3288F FALL RISK ASSESSMENT DOCD: CPT | Mod: CPTII,S$GLB,, | Performed by: FAMILY MEDICINE

## 2021-08-05 PROCEDURE — 99214 OFFICE O/P EST MOD 30 MIN: CPT | Mod: S$GLB,,, | Performed by: FAMILY MEDICINE

## 2021-08-05 PROCEDURE — 99499 UNLISTED E&M SERVICE: CPT | Mod: HCNC,S$GLB,, | Performed by: FAMILY MEDICINE

## 2021-08-05 PROCEDURE — 3078F PR MOST RECENT DIASTOLIC BLOOD PRESSURE < 80 MM HG: ICD-10-PCS | Mod: CPTII,S$GLB,, | Performed by: FAMILY MEDICINE

## 2021-08-05 PROCEDURE — 1125F AMNT PAIN NOTED PAIN PRSNT: CPT | Mod: CPTII,S$GLB,, | Performed by: FAMILY MEDICINE

## 2021-08-05 PROCEDURE — 1125F PR PAIN SEVERITY QUANTIFIED, PAIN PRESENT: ICD-10-PCS | Mod: CPTII,S$GLB,, | Performed by: FAMILY MEDICINE

## 2021-08-05 PROCEDURE — 3075F SYST BP GE 130 - 139MM HG: CPT | Mod: CPTII,S$GLB,, | Performed by: FAMILY MEDICINE

## 2021-08-05 PROCEDURE — 1101F PR PT FALLS ASSESS DOC 0-1 FALLS W/OUT INJ PAST YR: ICD-10-PCS | Mod: CPTII,S$GLB,, | Performed by: FAMILY MEDICINE

## 2021-08-05 PROCEDURE — 3008F PR BODY MASS INDEX (BMI) DOCUMENTED: ICD-10-PCS | Mod: CPTII,S$GLB,, | Performed by: FAMILY MEDICINE

## 2021-08-05 PROCEDURE — 99999 PR PBB SHADOW E&M-EST. PATIENT-LVL III: ICD-10-PCS | Mod: PBBFAC,,, | Performed by: FAMILY MEDICINE

## 2021-08-05 PROCEDURE — 1159F PR MEDICATION LIST DOCUMENTED IN MEDICAL RECORD: ICD-10-PCS | Mod: CPTII,S$GLB,, | Performed by: FAMILY MEDICINE

## 2021-08-05 RX ORDER — MELOXICAM 7.5 MG/1
7.5 TABLET ORAL DAILY
Qty: 30 TABLET | Refills: 6 | Status: SHIPPED | OUTPATIENT
Start: 2021-08-05 | End: 2022-01-10 | Stop reason: SDUPTHER

## 2021-08-11 ENCOUNTER — HOSPITAL ENCOUNTER (OUTPATIENT)
Dept: RADIOLOGY | Facility: HOSPITAL | Age: 71
Discharge: HOME | End: 2021-08-11
Attending: FAMILY MEDICINE
Payer: MEDICARE

## 2021-08-11 DIAGNOSIS — Z87.891 PERSONAL HISTORY OF NICOTINE DEPENDENCE: ICD-10-CM

## 2021-08-11 PROCEDURE — 71271 CT THORAX LUNG CANCER SCR C-: CPT | Mod: TC

## 2021-08-11 PROCEDURE — 71271 CT THORAX LUNG CANCER SCR C-: CPT | Mod: 26,,, | Performed by: RADIOLOGY

## 2021-08-11 PROCEDURE — 71271 CT CHEST LUNG SCREENING LOW DOSE: ICD-10-PCS | Mod: 26,,, | Performed by: RADIOLOGY

## 2021-08-18 ENCOUNTER — IMMUNIZATION (OUTPATIENT)
Dept: PRIMARY CARE CLINIC | Facility: CLINIC | Age: 71
End: 2021-08-18
Payer: MEDICARE

## 2021-08-18 DIAGNOSIS — Z23 NEED FOR VACCINATION: Primary | ICD-10-CM

## 2021-08-18 PROCEDURE — 0003A COVID-19, MRNA, LNP-S, PF, 30 MCG/0.3 ML DOSE VACCINE: ICD-10-PCS | Mod: CV19,S$GLB,, | Performed by: FAMILY MEDICINE

## 2021-08-18 PROCEDURE — 91300 COVID-19, MRNA, LNP-S, PF, 30 MCG/0.3 ML DOSE VACCINE: CPT | Mod: S$GLB,,, | Performed by: FAMILY MEDICINE

## 2021-08-18 PROCEDURE — 91300 COVID-19, MRNA, LNP-S, PF, 30 MCG/0.3 ML DOSE VACCINE: ICD-10-PCS | Mod: S$GLB,,, | Performed by: FAMILY MEDICINE

## 2021-08-18 PROCEDURE — 0003A COVID-19, MRNA, LNP-S, PF, 30 MCG/0.3 ML DOSE VACCINE: CPT | Mod: CV19,S$GLB,, | Performed by: FAMILY MEDICINE

## 2021-08-19 ENCOUNTER — PATIENT MESSAGE (OUTPATIENT)
Dept: FAMILY MEDICINE | Facility: CLINIC | Age: 71
End: 2021-08-19

## 2021-08-24 ENCOUNTER — TELEPHONE (OUTPATIENT)
Dept: FAMILY MEDICINE | Facility: CLINIC | Age: 71
End: 2021-08-24

## 2021-08-25 ENCOUNTER — LAB VISIT (OUTPATIENT)
Dept: LAB | Facility: HOSPITAL | Age: 71
End: 2021-08-25
Attending: INTERNAL MEDICINE
Payer: MEDICARE

## 2021-08-25 DIAGNOSIS — E05.90 SUBCLINICAL HYPERTHYROIDISM: ICD-10-CM

## 2021-08-25 DIAGNOSIS — G47.33 OSA ON CPAP: ICD-10-CM

## 2021-08-25 DIAGNOSIS — I10 ESSENTIAL HYPERTENSION: ICD-10-CM

## 2021-08-25 DIAGNOSIS — E78.2 MIXED HYPERLIPIDEMIA: Chronic | ICD-10-CM

## 2021-08-25 DIAGNOSIS — Z95.5 S/P CORONARY ARTERY STENT PLACEMENT: Chronic | ICD-10-CM

## 2021-08-25 DIAGNOSIS — E66.9 OBESITY (BMI 30.0-34.9): ICD-10-CM

## 2021-08-25 LAB
ALBUMIN SERPL BCP-MCNC: 3.9 G/DL (ref 3.5–5.2)
ALP SERPL-CCNC: 92 U/L (ref 55–135)
ALT SERPL W/O P-5'-P-CCNC: 20 U/L (ref 10–44)
ANION GAP SERPL CALC-SCNC: 13 MMOL/L (ref 8–16)
AST SERPL-CCNC: 18 U/L (ref 10–40)
BILIRUB SERPL-MCNC: 0.5 MG/DL (ref 0.1–1)
BUN SERPL-MCNC: 19 MG/DL (ref 8–23)
CALCIUM SERPL-MCNC: 9.4 MG/DL (ref 8.7–10.5)
CHLORIDE SERPL-SCNC: 101 MMOL/L (ref 95–110)
CHOLEST SERPL-MCNC: 128 MG/DL (ref 120–199)
CHOLEST/HDLC SERPL: 2.6 {RATIO} (ref 2–5)
CO2 SERPL-SCNC: 28 MMOL/L (ref 23–29)
CREAT SERPL-MCNC: 1 MG/DL (ref 0.5–1.4)
ERYTHROCYTE [DISTWIDTH] IN BLOOD BY AUTOMATED COUNT: 13.7 % (ref 11.5–14.5)
EST. GFR  (AFRICAN AMERICAN): >60 ML/MIN/1.73 M^2
EST. GFR  (NON AFRICAN AMERICAN): 56.4 ML/MIN/1.73 M^2
GLUCOSE SERPL-MCNC: 121 MG/DL (ref 70–110)
HCT VFR BLD AUTO: 38.5 % (ref 37–48.5)
HDLC SERPL-MCNC: 49 MG/DL (ref 40–75)
HDLC SERPL: 38.3 % (ref 20–50)
HGB BLD-MCNC: 12.3 G/DL (ref 12–16)
LDLC SERPL CALC-MCNC: 61.8 MG/DL (ref 63–159)
MCH RBC QN AUTO: 28.6 PG (ref 27–31)
MCHC RBC AUTO-ENTMCNC: 31.9 G/DL (ref 32–36)
MCV RBC AUTO: 90 FL (ref 82–98)
NONHDLC SERPL-MCNC: 79 MG/DL
PLATELET # BLD AUTO: 204 K/UL (ref 150–450)
PMV BLD AUTO: 11.6 FL (ref 9.2–12.9)
POTASSIUM SERPL-SCNC: 3.5 MMOL/L (ref 3.5–5.1)
PROT SERPL-MCNC: 7.3 G/DL (ref 6–8.4)
RBC # BLD AUTO: 4.3 M/UL (ref 4–5.4)
SODIUM SERPL-SCNC: 142 MMOL/L (ref 136–145)
TRIGL SERPL-MCNC: 86 MG/DL (ref 30–150)
TSH SERPL DL<=0.005 MIU/L-ACNC: 3.33 UIU/ML (ref 0.4–4)
WBC # BLD AUTO: 5.8 K/UL (ref 3.9–12.7)

## 2021-08-25 PROCEDURE — 85027 COMPLETE CBC AUTOMATED: CPT | Performed by: INTERNAL MEDICINE

## 2021-08-25 PROCEDURE — 36415 COLL VENOUS BLD VENIPUNCTURE: CPT | Mod: PO | Performed by: INTERNAL MEDICINE

## 2021-08-25 PROCEDURE — 80061 LIPID PANEL: CPT | Performed by: INTERNAL MEDICINE

## 2021-08-25 PROCEDURE — 84443 ASSAY THYROID STIM HORMONE: CPT | Performed by: FAMILY MEDICINE

## 2021-08-25 PROCEDURE — 80053 COMPREHEN METABOLIC PANEL: CPT | Performed by: INTERNAL MEDICINE

## 2021-08-27 DIAGNOSIS — R73.9 HYPERGLYCEMIA: Primary | ICD-10-CM

## 2021-09-17 ENCOUNTER — HOSPITAL ENCOUNTER (OUTPATIENT)
Dept: RADIOLOGY | Facility: HOSPITAL | Age: 71
Discharge: HOME | End: 2021-09-17
Attending: INTERNAL MEDICINE
Payer: MEDICARE

## 2021-09-17 DIAGNOSIS — R10.13 EPIGASTRIC PAIN: ICD-10-CM

## 2021-09-17 PROCEDURE — 76700 US EXAM ABDOM COMPLETE: CPT | Mod: TC,HCNC

## 2021-09-17 PROCEDURE — 76700 US EXAM ABDOM COMPLETE: CPT | Mod: 26,HCNC,, | Performed by: RADIOLOGY

## 2021-09-17 PROCEDURE — 76700 US ABDOMEN COMPLETE: ICD-10-PCS | Mod: 26,HCNC,, | Performed by: RADIOLOGY

## 2021-10-04 DIAGNOSIS — J44.9 COPD MIXED TYPE: ICD-10-CM

## 2021-10-04 RX ORDER — BUDESONIDE AND FORMOTEROL FUMARATE DIHYDRATE 160; 4.5 UG/1; UG/1
2 AEROSOL RESPIRATORY (INHALATION) DAILY
Qty: 10.2 G | Refills: 0 | Status: CANCELLED
Start: 2021-10-04 | End: 2022-10-04

## 2021-10-07 ENCOUNTER — OFFICE VISIT (OUTPATIENT)
Dept: CARDIOLOGY | Facility: CLINIC | Age: 71
End: 2021-10-07
Payer: MEDICARE

## 2021-10-07 ENCOUNTER — LAB VISIT (OUTPATIENT)
Dept: LAB | Facility: HOSPITAL | Age: 71
End: 2021-10-07
Attending: FAMILY MEDICINE
Payer: MEDICARE

## 2021-10-07 VITALS
SYSTOLIC BLOOD PRESSURE: 120 MMHG | DIASTOLIC BLOOD PRESSURE: 70 MMHG | WEIGHT: 187 LBS | OXYGEN SATURATION: 95 % | BODY MASS INDEX: 31.16 KG/M2 | HEART RATE: 96 BPM | HEIGHT: 65 IN

## 2021-10-07 DIAGNOSIS — Z95.5 S/P CORONARY ARTERY STENT PLACEMENT: Chronic | ICD-10-CM

## 2021-10-07 DIAGNOSIS — I83.893 VARICOSE VEINS OF BILATERAL LOWER EXTREMITIES WITH OTHER COMPLICATIONS: ICD-10-CM

## 2021-10-07 DIAGNOSIS — E78.2 MIXED HYPERLIPIDEMIA: Chronic | ICD-10-CM

## 2021-10-07 DIAGNOSIS — R73.9 HYPERGLYCEMIA: ICD-10-CM

## 2021-10-07 PROCEDURE — 3008F PR BODY MASS INDEX (BMI) DOCUMENTED: ICD-10-PCS | Mod: CPTII,S$GLB,, | Performed by: INTERNAL MEDICINE

## 2021-10-07 PROCEDURE — 36415 COLL VENOUS BLD VENIPUNCTURE: CPT | Mod: HCNC,PO | Performed by: FAMILY MEDICINE

## 2021-10-07 PROCEDURE — 1101F PT FALLS ASSESS-DOCD LE1/YR: CPT | Mod: CPTII,S$GLB,, | Performed by: INTERNAL MEDICINE

## 2021-10-07 PROCEDURE — 1101F PR PT FALLS ASSESS DOC 0-1 FALLS W/OUT INJ PAST YR: ICD-10-PCS | Mod: CPTII,S$GLB,, | Performed by: INTERNAL MEDICINE

## 2021-10-07 PROCEDURE — 1159F PR MEDICATION LIST DOCUMENTED IN MEDICAL RECORD: ICD-10-PCS | Mod: CPTII,S$GLB,, | Performed by: INTERNAL MEDICINE

## 2021-10-07 PROCEDURE — 1159F MED LIST DOCD IN RCRD: CPT | Mod: CPTII,S$GLB,, | Performed by: INTERNAL MEDICINE

## 2021-10-07 PROCEDURE — 99499 UNLISTED E&M SERVICE: CPT | Mod: S$GLB,,, | Performed by: INTERNAL MEDICINE

## 2021-10-07 PROCEDURE — 1160F PR REVIEW ALL MEDS BY PRESCRIBER/CLIN PHARMACIST DOCUMENTED: ICD-10-PCS | Mod: CPTII,S$GLB,, | Performed by: INTERNAL MEDICINE

## 2021-10-07 PROCEDURE — 99214 PR OFFICE/OUTPT VISIT, EST, LEVL IV, 30-39 MIN: ICD-10-PCS | Mod: S$GLB,,, | Performed by: INTERNAL MEDICINE

## 2021-10-07 PROCEDURE — 3288F PR FALLS RISK ASSESSMENT DOCUMENTED: ICD-10-PCS | Mod: CPTII,S$GLB,, | Performed by: INTERNAL MEDICINE

## 2021-10-07 PROCEDURE — 83036 HEMOGLOBIN GLYCOSYLATED A1C: CPT | Mod: HCNC | Performed by: FAMILY MEDICINE

## 2021-10-07 PROCEDURE — 3078F DIAST BP <80 MM HG: CPT | Mod: CPTII,S$GLB,, | Performed by: INTERNAL MEDICINE

## 2021-10-07 PROCEDURE — 1160F RVW MEDS BY RX/DR IN RCRD: CPT | Mod: CPTII,S$GLB,, | Performed by: INTERNAL MEDICINE

## 2021-10-07 PROCEDURE — 3074F SYST BP LT 130 MM HG: CPT | Mod: CPTII,S$GLB,, | Performed by: INTERNAL MEDICINE

## 2021-10-07 PROCEDURE — 1126F PR PAIN SEVERITY QUANTIFIED, NO PAIN PRESENT: ICD-10-PCS | Mod: CPTII,S$GLB,, | Performed by: INTERNAL MEDICINE

## 2021-10-07 PROCEDURE — 1126F AMNT PAIN NOTED NONE PRSNT: CPT | Mod: CPTII,S$GLB,, | Performed by: INTERNAL MEDICINE

## 2021-10-07 PROCEDURE — 3078F PR MOST RECENT DIASTOLIC BLOOD PRESSURE < 80 MM HG: ICD-10-PCS | Mod: CPTII,S$GLB,, | Performed by: INTERNAL MEDICINE

## 2021-10-07 PROCEDURE — 3074F PR MOST RECENT SYSTOLIC BLOOD PRESSURE < 130 MM HG: ICD-10-PCS | Mod: CPTII,S$GLB,, | Performed by: INTERNAL MEDICINE

## 2021-10-07 PROCEDURE — 3008F BODY MASS INDEX DOCD: CPT | Mod: CPTII,S$GLB,, | Performed by: INTERNAL MEDICINE

## 2021-10-07 PROCEDURE — 99214 OFFICE O/P EST MOD 30 MIN: CPT | Mod: S$GLB,,, | Performed by: INTERNAL MEDICINE

## 2021-10-07 PROCEDURE — 99499 RISK ADDL DX/OHS AUDIT: ICD-10-PCS | Mod: S$GLB,,, | Performed by: INTERNAL MEDICINE

## 2021-10-07 PROCEDURE — 3288F FALL RISK ASSESSMENT DOCD: CPT | Mod: CPTII,S$GLB,, | Performed by: INTERNAL MEDICINE

## 2021-10-07 RX ORDER — POTASSIUM CHLORIDE 750 MG/1
10 CAPSULE, EXTENDED RELEASE ORAL DAILY
Qty: 90 CAPSULE | Refills: 3 | Status: SHIPPED | OUTPATIENT
Start: 2021-10-07 | End: 2022-01-06 | Stop reason: SDUPTHER

## 2021-10-07 RX ORDER — TIZANIDINE 4 MG/1
TABLET ORAL
COMMUNITY
Start: 2021-08-19 | End: 2022-02-07 | Stop reason: SDUPTHER

## 2021-10-08 LAB
ESTIMATED AVG GLUCOSE: 126 MG/DL (ref 68–131)
HBA1C MFR BLD: 6 % (ref 4–5.6)

## 2021-10-11 RX ORDER — BUDESONIDE AND FORMOTEROL FUMARATE DIHYDRATE 160; 4.5 UG/1; UG/1
2 AEROSOL RESPIRATORY (INHALATION) DAILY
Qty: 10.2 G | Refills: 11 | Status: SHIPPED | OUTPATIENT
Start: 2021-10-11 | End: 2022-12-05 | Stop reason: SDUPTHER

## 2021-11-27 ENCOUNTER — OFFICE VISIT (OUTPATIENT)
Dept: URGENT CARE | Facility: CLINIC | Age: 71
End: 2021-11-27
Payer: MEDICARE

## 2021-11-27 VITALS
SYSTOLIC BLOOD PRESSURE: 137 MMHG | WEIGHT: 187 LBS | DIASTOLIC BLOOD PRESSURE: 71 MMHG | OXYGEN SATURATION: 97 % | HEART RATE: 93 BPM | RESPIRATION RATE: 16 BRPM | HEIGHT: 65 IN | TEMPERATURE: 99 F | BODY MASS INDEX: 31.16 KG/M2

## 2021-11-27 DIAGNOSIS — N39.0 URINARY TRACT INFECTION WITHOUT HEMATURIA, SITE UNSPECIFIED: ICD-10-CM

## 2021-11-27 DIAGNOSIS — R50.9 FEVER, UNSPECIFIED FEVER CAUSE: Primary | ICD-10-CM

## 2021-11-27 LAB
BILIRUB UR QL STRIP: NEGATIVE
CTP QC/QA: YES
CTP QC/QA: YES
FLUAV AG NPH QL: NEGATIVE
FLUBV AG NPH QL: NEGATIVE
GLUCOSE UR QL STRIP: NEGATIVE
KETONES UR QL STRIP: NEGATIVE
LEUKOCYTE ESTERASE UR QL STRIP: NEGATIVE
PH, POC UA: 7
POC BLOOD, URINE: POSITIVE
POC NITRATES, URINE: POSITIVE
PROT UR QL STRIP: NEGATIVE
SARS-COV-2 RDRP RESP QL NAA+PROBE: NEGATIVE
SP GR UR STRIP: 1.01 (ref 1–1.03)
UROBILINOGEN UR STRIP-ACNC: NORMAL (ref 0.1–1.1)

## 2021-11-27 PROCEDURE — 81003 URINALYSIS AUTO W/O SCOPE: CPT | Mod: QW,S$GLB,, | Performed by: NURSE PRACTITIONER

## 2021-11-27 PROCEDURE — 87804 POCT INFLUENZA A/B: ICD-10-PCS | Mod: QW,,, | Performed by: NURSE PRACTITIONER

## 2021-11-27 PROCEDURE — 87804 INFLUENZA ASSAY W/OPTIC: CPT | Mod: QW,,, | Performed by: NURSE PRACTITIONER

## 2021-11-27 PROCEDURE — U0002 COVID-19 LAB TEST NON-CDC: HCPCS | Mod: QW,S$GLB,, | Performed by: NURSE PRACTITIONER

## 2021-11-27 PROCEDURE — 81003 POCT URINALYSIS, DIPSTICK, AUTOMATED, W/O SCOPE: ICD-10-PCS | Mod: QW,S$GLB,, | Performed by: NURSE PRACTITIONER

## 2021-11-27 PROCEDURE — 99214 OFFICE O/P EST MOD 30 MIN: CPT | Mod: S$GLB,,, | Performed by: NURSE PRACTITIONER

## 2021-11-27 PROCEDURE — 99214 PR OFFICE/OUTPT VISIT, EST, LEVL IV, 30-39 MIN: ICD-10-PCS | Mod: S$GLB,,, | Performed by: NURSE PRACTITIONER

## 2021-11-27 PROCEDURE — U0002: ICD-10-PCS | Mod: QW,S$GLB,, | Performed by: NURSE PRACTITIONER

## 2021-11-27 RX ORDER — DOXYCYCLINE 100 MG/1
100 CAPSULE ORAL 2 TIMES DAILY
Qty: 10 CAPSULE | Refills: 0 | Status: SHIPPED | OUTPATIENT
Start: 2021-11-27 | End: 2022-02-14 | Stop reason: ALTCHOICE

## 2021-12-13 ENCOUNTER — PATIENT OUTREACH (OUTPATIENT)
Dept: ADMINISTRATIVE | Facility: OTHER | Age: 71
End: 2021-12-13
Payer: MEDICARE

## 2021-12-14 ENCOUNTER — OFFICE VISIT (OUTPATIENT)
Dept: PULMONOLOGY | Facility: CLINIC | Age: 71
End: 2021-12-14
Payer: MEDICARE

## 2021-12-14 VITALS
BODY MASS INDEX: 30.88 KG/M2 | HEIGHT: 65 IN | RESPIRATION RATE: 16 BRPM | HEART RATE: 83 BPM | SYSTOLIC BLOOD PRESSURE: 161 MMHG | OXYGEN SATURATION: 94 % | DIASTOLIC BLOOD PRESSURE: 73 MMHG | WEIGHT: 185.31 LBS

## 2021-12-14 DIAGNOSIS — F41.9 ANXIETY: ICD-10-CM

## 2021-12-14 DIAGNOSIS — J44.9 COPD MIXED TYPE: ICD-10-CM

## 2021-12-14 DIAGNOSIS — G47.33 OSA ON CPAP: Primary | ICD-10-CM

## 2021-12-14 PROCEDURE — 99999 PR PBB SHADOW E&M-EST. PATIENT-LVL IV: CPT | Mod: PBBFAC,HCNC,, | Performed by: INTERNAL MEDICINE

## 2021-12-14 PROCEDURE — 99213 PR OFFICE/OUTPT VISIT, EST, LEVL III, 20-29 MIN: ICD-10-PCS | Mod: HCNC,S$GLB,, | Performed by: INTERNAL MEDICINE

## 2021-12-14 PROCEDURE — 99213 OFFICE O/P EST LOW 20 MIN: CPT | Mod: HCNC,S$GLB,, | Performed by: INTERNAL MEDICINE

## 2021-12-14 PROCEDURE — 99999 PR PBB SHADOW E&M-EST. PATIENT-LVL IV: ICD-10-PCS | Mod: PBBFAC,HCNC,, | Performed by: INTERNAL MEDICINE

## 2021-12-14 RX ORDER — ALPRAZOLAM 0.5 MG/1
0.5 TABLET ORAL 3 TIMES DAILY PRN
Qty: 60 TABLET | Refills: 5 | Status: SHIPPED | OUTPATIENT
Start: 2021-12-14 | End: 2022-12-05 | Stop reason: SDUPTHER

## 2021-12-14 RX ORDER — AMOXICILLIN AND CLAVULANATE POTASSIUM 500; 125 MG/1; MG/1
1 TABLET, FILM COATED ORAL 2 TIMES DAILY
Qty: 14 TABLET | Refills: 2 | Status: SHIPPED | OUTPATIENT
Start: 2021-12-14 | End: 2021-12-21

## 2021-12-14 RX ORDER — PANTOPRAZOLE SODIUM 40 MG/1
40 TABLET, DELAYED RELEASE ORAL DAILY
COMMUNITY
Start: 2021-10-19

## 2021-12-17 ENCOUNTER — OFFICE VISIT (OUTPATIENT)
Dept: URGENT CARE | Facility: CLINIC | Age: 71
End: 2021-12-17
Payer: MEDICARE

## 2021-12-17 VITALS
WEIGHT: 187 LBS | HEIGHT: 65 IN | SYSTOLIC BLOOD PRESSURE: 144 MMHG | OXYGEN SATURATION: 98 % | HEART RATE: 94 BPM | DIASTOLIC BLOOD PRESSURE: 74 MMHG | BODY MASS INDEX: 31.16 KG/M2 | TEMPERATURE: 97 F

## 2021-12-17 DIAGNOSIS — Z20.822 CLOSE EXPOSURE TO COVID-19 VIRUS: Primary | ICD-10-CM

## 2021-12-17 DIAGNOSIS — Z20.822 COVID-19 VIRUS NOT DETECTED: ICD-10-CM

## 2021-12-17 LAB
CTP QC/QA: YES
SARS-COV-2 RDRP RESP QL NAA+PROBE: NEGATIVE

## 2021-12-17 PROCEDURE — 99203 OFFICE O/P NEW LOW 30 MIN: CPT | Mod: S$GLB,,, | Performed by: NURSE PRACTITIONER

## 2021-12-17 PROCEDURE — 99203 PR OFFICE/OUTPT VISIT, NEW, LEVL III, 30-44 MIN: ICD-10-PCS | Mod: S$GLB,,, | Performed by: NURSE PRACTITIONER

## 2021-12-17 PROCEDURE — U0002: ICD-10-PCS | Mod: QW,S$GLB,, | Performed by: NURSE PRACTITIONER

## 2021-12-17 PROCEDURE — U0002 COVID-19 LAB TEST NON-CDC: HCPCS | Mod: QW,S$GLB,, | Performed by: NURSE PRACTITIONER

## 2022-01-07 RX ORDER — POTASSIUM CHLORIDE 750 MG/1
10 CAPSULE, EXTENDED RELEASE ORAL DAILY
Qty: 90 CAPSULE | Refills: 3 | Status: SHIPPED | OUTPATIENT
Start: 2022-01-07 | End: 2022-05-16 | Stop reason: SDUPTHER

## 2022-01-10 DIAGNOSIS — G62.9 NEUROPATHY: ICD-10-CM

## 2022-01-10 DIAGNOSIS — F41.9 ANXIETY: ICD-10-CM

## 2022-01-10 RX ORDER — MELOXICAM 7.5 MG/1
7.5 TABLET ORAL DAILY
Qty: 90 TABLET | Refills: 3 | Status: SHIPPED | OUTPATIENT
Start: 2022-01-10

## 2022-01-10 RX ORDER — DULOXETIN HYDROCHLORIDE 60 MG/1
60 CAPSULE, DELAYED RELEASE ORAL DAILY
Qty: 90 CAPSULE | Refills: 3 | Status: SHIPPED | OUTPATIENT
Start: 2022-01-10 | End: 2022-09-12

## 2022-01-10 NOTE — TELEPHONE ENCOUNTER
No new care gaps identified.  Powered by [employer]. Reference number: [id number].   1/10/2022 8:12:31 AM CST

## 2022-01-11 ENCOUNTER — OFFICE VISIT (OUTPATIENT)
Dept: ENDOCRINOLOGY | Facility: CLINIC | Age: 72
End: 2022-01-11
Payer: MEDICARE

## 2022-01-11 VITALS
HEIGHT: 65 IN | WEIGHT: 185.19 LBS | OXYGEN SATURATION: 97 % | HEART RATE: 76 BPM | DIASTOLIC BLOOD PRESSURE: 70 MMHG | SYSTOLIC BLOOD PRESSURE: 140 MMHG | TEMPERATURE: 99 F | BODY MASS INDEX: 30.85 KG/M2

## 2022-01-11 DIAGNOSIS — E05.90 SUBCLINICAL HYPERTHYROIDISM: ICD-10-CM

## 2022-01-11 DIAGNOSIS — E66.9 OBESITY (BMI 30.0-34.9): ICD-10-CM

## 2022-01-11 DIAGNOSIS — E04.2 MULTIPLE THYROID NODULES: Primary | ICD-10-CM

## 2022-01-11 DIAGNOSIS — E05.90 HYPERTHYROIDISM: ICD-10-CM

## 2022-01-11 DIAGNOSIS — E04.1 NODULAR THYROID DISEASE: ICD-10-CM

## 2022-01-11 PROCEDURE — 99214 PR OFFICE/OUTPT VISIT, EST, LEVL IV, 30-39 MIN: ICD-10-PCS | Mod: HCNC,S$GLB,, | Performed by: INTERNAL MEDICINE

## 2022-01-11 PROCEDURE — 1160F RVW MEDS BY RX/DR IN RCRD: CPT | Mod: HCNC,CPTII,S$GLB, | Performed by: INTERNAL MEDICINE

## 2022-01-11 PROCEDURE — 1159F PR MEDICATION LIST DOCUMENTED IN MEDICAL RECORD: ICD-10-PCS | Mod: HCNC,CPTII,S$GLB, | Performed by: INTERNAL MEDICINE

## 2022-01-11 PROCEDURE — 3288F FALL RISK ASSESSMENT DOCD: CPT | Mod: HCNC,CPTII,S$GLB, | Performed by: INTERNAL MEDICINE

## 2022-01-11 PROCEDURE — 3008F PR BODY MASS INDEX (BMI) DOCUMENTED: ICD-10-PCS | Mod: HCNC,CPTII,S$GLB, | Performed by: INTERNAL MEDICINE

## 2022-01-11 PROCEDURE — 99999 PR PBB SHADOW E&M-EST. PATIENT-LVL V: CPT | Mod: PBBFAC,HCNC,, | Performed by: INTERNAL MEDICINE

## 2022-01-11 PROCEDURE — 3077F SYST BP >= 140 MM HG: CPT | Mod: HCNC,CPTII,S$GLB, | Performed by: INTERNAL MEDICINE

## 2022-01-11 PROCEDURE — 3077F PR MOST RECENT SYSTOLIC BLOOD PRESSURE >= 140 MM HG: ICD-10-PCS | Mod: HCNC,CPTII,S$GLB, | Performed by: INTERNAL MEDICINE

## 2022-01-11 PROCEDURE — 1101F PR PT FALLS ASSESS DOC 0-1 FALLS W/OUT INJ PAST YR: ICD-10-PCS | Mod: HCNC,CPTII,S$GLB, | Performed by: INTERNAL MEDICINE

## 2022-01-11 PROCEDURE — 1159F MED LIST DOCD IN RCRD: CPT | Mod: HCNC,CPTII,S$GLB, | Performed by: INTERNAL MEDICINE

## 2022-01-11 PROCEDURE — 99999 PR PBB SHADOW E&M-EST. PATIENT-LVL V: ICD-10-PCS | Mod: PBBFAC,HCNC,, | Performed by: INTERNAL MEDICINE

## 2022-01-11 PROCEDURE — 3008F BODY MASS INDEX DOCD: CPT | Mod: HCNC,CPTII,S$GLB, | Performed by: INTERNAL MEDICINE

## 2022-01-11 PROCEDURE — 3288F PR FALLS RISK ASSESSMENT DOCUMENTED: ICD-10-PCS | Mod: HCNC,CPTII,S$GLB, | Performed by: INTERNAL MEDICINE

## 2022-01-11 PROCEDURE — 1126F PR PAIN SEVERITY QUANTIFIED, NO PAIN PRESENT: ICD-10-PCS | Mod: HCNC,CPTII,S$GLB, | Performed by: INTERNAL MEDICINE

## 2022-01-11 PROCEDURE — 1126F AMNT PAIN NOTED NONE PRSNT: CPT | Mod: HCNC,CPTII,S$GLB, | Performed by: INTERNAL MEDICINE

## 2022-01-11 PROCEDURE — 99214 OFFICE O/P EST MOD 30 MIN: CPT | Mod: HCNC,S$GLB,, | Performed by: INTERNAL MEDICINE

## 2022-01-11 PROCEDURE — 3078F DIAST BP <80 MM HG: CPT | Mod: HCNC,CPTII,S$GLB, | Performed by: INTERNAL MEDICINE

## 2022-01-11 PROCEDURE — 1101F PT FALLS ASSESS-DOCD LE1/YR: CPT | Mod: HCNC,CPTII,S$GLB, | Performed by: INTERNAL MEDICINE

## 2022-01-11 PROCEDURE — 1160F PR REVIEW ALL MEDS BY PRESCRIBER/CLIN PHARMACIST DOCUMENTED: ICD-10-PCS | Mod: HCNC,CPTII,S$GLB, | Performed by: INTERNAL MEDICINE

## 2022-01-11 PROCEDURE — 3078F PR MOST RECENT DIASTOLIC BLOOD PRESSURE < 80 MM HG: ICD-10-PCS | Mod: HCNC,CPTII,S$GLB, | Performed by: INTERNAL MEDICINE

## 2022-01-11 RX ORDER — METHIMAZOLE 5 MG/1
5 TABLET ORAL DAILY
Qty: 90 TABLET | Refills: 3 | Status: SHIPPED | OUTPATIENT
Start: 2022-01-11 | End: 2023-05-25 | Stop reason: SDUPTHER

## 2022-01-11 NOTE — ASSESSMENT & PLAN NOTE
Body mass index is 30.82 kg/m².   - complicated by HTN, HLD   - ensure euthyroid as above   - monitor weight

## 2022-01-11 NOTE — ASSESSMENT & PLAN NOTE
Multiple nodules. S/p 3x FNA.   - most benign   - however, the largest nodule in the isthmus was nondiagnostic on last FNA   had recommended FNA last time, pt not interested.   again discussed today can't be sure it's benign without FNA/surgery, recommend FNA, pt not interested. Pt is open to US monitoring, repeat US order placed   reconsider depending on results.

## 2022-01-11 NOTE — ASSESSMENT & PLAN NOTE
Last thyroid labs normal but it's been a while   - clinically, euthyroid   - on methimazole 5mg/day   - continue for now. Repeat blood test when able

## 2022-01-11 NOTE — PROGRESS NOTES
Subjective:      Chief Complaint: Follow-up, Hyperthyroidism, and Thyroid Nodule    HPI: Kathy Seymour is a 71 y.o. female who is here for a follow-up evaluation for thyroid. Last seen 7/28/2020.    Hx subclinical hyperthyroidism. TSH as low as 0.07   TSI, TRAB, Tg antibody negative. Thought to be 2/2 toxic nodule.    NM scan: 2013, normal overall uptake but heterogenous gland with multiple nodules, no clear cold nodules  NM 2012: slightly increased in area of a nodule    Medication: Methimazole 5 mg/day.    Lab Results   Component Value Date    TSH 3.335 08/25/2021    P0YLHOJ 135 04/24/2018    M4HLAKK 7.3 08/02/2012    FREET4 1.07 04/24/2018     Also has hx multinodular goiter.   Last US 7/2020  Right side 1x0.9x0.8 cm  Left side: 1.2x0.9x1.0 cm, stable  Isthmus 2.5x1.4x2.6 cm, stable from prior    Prior US 4/2018:   2.5x1.4x2.6    Prior FNA: Yes.   2016: 2 nodules on the Left and isthmus     - left nodules: both benign     - isthmus, nondiagnostic    Had recommended repeat FNA for isthmus nodule in 7/2020, pt declined    DXA 10/2017: normal.    Today, pt reports feeling okay overall.    Gained some weight    Feels hot a lot. Chronic   No trouble swallowing lately (had EGD with dilation in the past). No palpitations  No diarrhea/constipation  Sometimes trouble breathing (hx COPD, also RAMONA on CPAP). No other acute complaints/concerns today.    Reviewed past medical, family, social history and updated as appropriate.    Review of Systems   As above    Objective:     Vitals:    01/11/22 1129   BP: (!) 140/70   Pulse: 76   Temp: 99 °F (37.2 °C)     BP Readings from Last 5 Encounters:   01/11/22 (!) 140/70   12/17/21 (!) 144/74   12/14/21 (!) 161/73   11/27/21 137/71   10/07/21 120/70     Physical Exam  Vitals reviewed.   Constitutional:       General: She is not in acute distress.     Appearance: She is obese.   Neck:      Comments: +nodule, nontender  Cardiovascular:      Heart sounds: Normal heart sounds.    Pulmonary:      Effort: Pulmonary effort is normal.       Wt Readings from Last 10 Encounters:   01/11/22 1129 84 kg (185 lb 3 oz)   12/17/21 1703 84.8 kg (187 lb)   12/14/21 1411 84.1 kg (185 lb 4.8 oz)   11/27/21 1046 84.8 kg (187 lb)   10/07/21 1511 84.8 kg (187 lb)   08/05/21 1054 84.2 kg (185 lb 10 oz)   06/07/21 1538 84.5 kg (186 lb 4.6 oz)   05/28/21 1310 84.8 kg (186 lb 15.2 oz)   04/26/21 1006 82.6 kg (182 lb 1.6 oz)   03/03/21 1450 83.5 kg (184 lb)     Lab Results   Component Value Date    HGBA1C 6.0 (H) 10/07/2021     Lab Results   Component Value Date    CHOL 128 08/25/2021    HDL 49 08/25/2021    LDLCALC 61.8 (L) 08/25/2021    TRIG 86 08/25/2021    CHOLHDL 38.3 08/25/2021     Lab Results   Component Value Date     08/25/2021    K 3.5 08/25/2021     08/25/2021    CO2 28 08/25/2021     (H) 08/25/2021    BUN 19 08/25/2021    CREATININE 1.0 08/25/2021    CALCIUM 9.4 08/25/2021    PROT 7.3 08/25/2021    ALBUMIN 3.9 08/25/2021    BILITOT 0.5 08/25/2021    ALKPHOS 92 08/25/2021    AST 18 08/25/2021    ALT 20 08/25/2021    ANIONGAP 13 08/25/2021    ESTGFRAFRICA >60.0 08/25/2021    EGFRNONAA 56.4 (A) 08/25/2021    TSH 3.335 08/25/2021      Assessment/Plan:     Subclinical hyperthyroidism  Last thyroid labs normal but it's been a while   - clinically, euthyroid   - on methimazole 5mg/day   - continue for now. Repeat blood test when able      Nodular thyroid disease  Multiple nodules. S/p 3x FNA.   - most benign   - however, the largest nodule in the isthmus was nondiagnostic on last FNA   had recommended FNA last time, pt not interested.   again discussed today can't be sure it's benign without FNA/surgery, recommend FNA, pt not interested. Pt is open to US monitoring, repeat US order placed   reconsider depending on results.    Obesity (BMI 30.0-34.9), today 30  Body mass index is 30.82 kg/m².   - complicated by HTN, HLD   - ensure euthyroid as above   - monitor weight        Follow up in  about 1 year (around 1/11/2023) for further monitoring, lab review.      Jersey Esposito MD  Endocrinology

## 2022-01-18 ENCOUNTER — HOSPITAL ENCOUNTER (OUTPATIENT)
Dept: RADIOLOGY | Facility: HOSPITAL | Age: 72
Discharge: HOME | End: 2022-01-18
Attending: INTERNAL MEDICINE
Payer: MEDICARE

## 2022-01-18 DIAGNOSIS — E04.2 MULTIPLE THYROID NODULES: ICD-10-CM

## 2022-01-18 PROCEDURE — 76536 US EXAM OF HEAD AND NECK: CPT | Mod: TC,HCNC

## 2022-01-18 PROCEDURE — 76536 US SOFT TISSUE HEAD NECK THYROID: ICD-10-PCS | Mod: 26,HCNC,, | Performed by: RADIOLOGY

## 2022-01-18 PROCEDURE — 76536 US EXAM OF HEAD AND NECK: CPT | Mod: 26,HCNC,, | Performed by: RADIOLOGY

## 2022-02-07 DIAGNOSIS — M62.830 SPASM OF MUSCLE OF LOWER BACK: Primary | ICD-10-CM

## 2022-02-07 RX ORDER — TIZANIDINE 4 MG/1
TABLET ORAL
Qty: 20 TABLET | Refills: 1 | Status: SHIPPED | OUTPATIENT
Start: 2022-02-07

## 2022-02-07 NOTE — TELEPHONE ENCOUNTER
No new care gaps identified.  Powered by [employer]. Reference number: [id number].   2/07/2022 3:30:27 PM CST

## 2022-02-14 ENCOUNTER — LAB VISIT (OUTPATIENT)
Dept: LAB | Facility: HOSPITAL | Age: 72
End: 2022-02-14
Attending: FAMILY MEDICINE
Payer: MEDICARE

## 2022-02-14 ENCOUNTER — OFFICE VISIT (OUTPATIENT)
Dept: FAMILY MEDICINE | Facility: CLINIC | Age: 72
End: 2022-02-14
Payer: MEDICARE

## 2022-02-14 VITALS
BODY MASS INDEX: 31.14 KG/M2 | HEART RATE: 87 BPM | TEMPERATURE: 98 F | HEIGHT: 65 IN | OXYGEN SATURATION: 94 % | WEIGHT: 186.94 LBS | DIASTOLIC BLOOD PRESSURE: 70 MMHG | SYSTOLIC BLOOD PRESSURE: 130 MMHG

## 2022-02-14 DIAGNOSIS — J44.9 COPD MIXED TYPE: ICD-10-CM

## 2022-02-14 DIAGNOSIS — N18.31 CHRONIC KIDNEY DISEASE, STAGE 3A: ICD-10-CM

## 2022-02-14 DIAGNOSIS — I10 PRIMARY HYPERTENSION: Primary | ICD-10-CM

## 2022-02-14 DIAGNOSIS — R30.0 DYSURIA: ICD-10-CM

## 2022-02-14 DIAGNOSIS — E66.9 OBESITY (BMI 30.0-34.9): ICD-10-CM

## 2022-02-14 DIAGNOSIS — E78.00 PURE HYPERCHOLESTEROLEMIA: Chronic | ICD-10-CM

## 2022-02-14 DIAGNOSIS — E05.90 SUBCLINICAL HYPERTHYROIDISM: ICD-10-CM

## 2022-02-14 DIAGNOSIS — I25.10 CORONARY ARTERY DISEASE INVOLVING NATIVE CORONARY ARTERY OF NATIVE HEART WITHOUT ANGINA PECTORIS: ICD-10-CM

## 2022-02-14 DIAGNOSIS — Z12.31 ENCOUNTER FOR SCREENING MAMMOGRAM FOR BREAST CANCER: ICD-10-CM

## 2022-02-14 LAB
BACTERIA #/AREA URNS HPF: ABNORMAL /HPF
BILIRUB UR QL STRIP: NEGATIVE
CLARITY UR: ABNORMAL
COLOR UR: YELLOW
GLUCOSE UR QL STRIP: NEGATIVE
HGB UR QL STRIP: ABNORMAL
KETONES UR QL STRIP: NEGATIVE
LEUKOCYTE ESTERASE UR QL STRIP: ABNORMAL
MICROSCOPIC COMMENT: ABNORMAL
NITRITE UR QL STRIP: NEGATIVE
PH UR STRIP: 7 [PH] (ref 5–8)
PROT UR QL STRIP: NEGATIVE
RBC #/AREA URNS HPF: 5 /HPF (ref 0–4)
SP GR UR STRIP: 1.01 (ref 1–1.03)
SQUAMOUS #/AREA URNS HPF: 1 /HPF
URN SPEC COLLECT METH UR: ABNORMAL
UROBILINOGEN UR STRIP-ACNC: NEGATIVE EU/DL
WBC #/AREA URNS HPF: 7 /HPF (ref 0–5)

## 2022-02-14 PROCEDURE — 99499 UNLISTED E&M SERVICE: CPT | Mod: HCNC,S$GLB,, | Performed by: FAMILY MEDICINE

## 2022-02-14 PROCEDURE — 1101F PR PT FALLS ASSESS DOC 0-1 FALLS W/OUT INJ PAST YR: ICD-10-PCS | Mod: HCNC,CPTII,S$GLB, | Performed by: FAMILY MEDICINE

## 2022-02-14 PROCEDURE — 99999 PR PBB SHADOW E&M-EST. PATIENT-LVL III: CPT | Mod: PBBFAC,HCNC,, | Performed by: FAMILY MEDICINE

## 2022-02-14 PROCEDURE — 3288F FALL RISK ASSESSMENT DOCD: CPT | Mod: HCNC,CPTII,S$GLB, | Performed by: FAMILY MEDICINE

## 2022-02-14 PROCEDURE — 3008F PR BODY MASS INDEX (BMI) DOCUMENTED: ICD-10-PCS | Mod: HCNC,CPTII,S$GLB, | Performed by: FAMILY MEDICINE

## 2022-02-14 PROCEDURE — 3075F PR MOST RECENT SYSTOLIC BLOOD PRESS GE 130-139MM HG: ICD-10-PCS | Mod: HCNC,CPTII,S$GLB, | Performed by: FAMILY MEDICINE

## 2022-02-14 PROCEDURE — 1159F PR MEDICATION LIST DOCUMENTED IN MEDICAL RECORD: ICD-10-PCS | Mod: HCNC,CPTII,S$GLB, | Performed by: FAMILY MEDICINE

## 2022-02-14 PROCEDURE — 1159F MED LIST DOCD IN RCRD: CPT | Mod: HCNC,CPTII,S$GLB, | Performed by: FAMILY MEDICINE

## 2022-02-14 PROCEDURE — 99214 OFFICE O/P EST MOD 30 MIN: CPT | Mod: HCNC,S$GLB,, | Performed by: FAMILY MEDICINE

## 2022-02-14 PROCEDURE — 3008F BODY MASS INDEX DOCD: CPT | Mod: HCNC,CPTII,S$GLB, | Performed by: FAMILY MEDICINE

## 2022-02-14 PROCEDURE — 3288F PR FALLS RISK ASSESSMENT DOCUMENTED: ICD-10-PCS | Mod: HCNC,CPTII,S$GLB, | Performed by: FAMILY MEDICINE

## 2022-02-14 PROCEDURE — 81000 URINALYSIS NONAUTO W/SCOPE: CPT | Mod: HCNC | Performed by: FAMILY MEDICINE

## 2022-02-14 PROCEDURE — 1126F AMNT PAIN NOTED NONE PRSNT: CPT | Mod: HCNC,CPTII,S$GLB, | Performed by: FAMILY MEDICINE

## 2022-02-14 PROCEDURE — 1101F PT FALLS ASSESS-DOCD LE1/YR: CPT | Mod: HCNC,CPTII,S$GLB, | Performed by: FAMILY MEDICINE

## 2022-02-14 PROCEDURE — 1126F PR PAIN SEVERITY QUANTIFIED, NO PAIN PRESENT: ICD-10-PCS | Mod: HCNC,CPTII,S$GLB, | Performed by: FAMILY MEDICINE

## 2022-02-14 PROCEDURE — 3075F SYST BP GE 130 - 139MM HG: CPT | Mod: HCNC,CPTII,S$GLB, | Performed by: FAMILY MEDICINE

## 2022-02-14 PROCEDURE — 99999 PR PBB SHADOW E&M-EST. PATIENT-LVL III: ICD-10-PCS | Mod: PBBFAC,HCNC,, | Performed by: FAMILY MEDICINE

## 2022-02-14 PROCEDURE — 3078F DIAST BP <80 MM HG: CPT | Mod: HCNC,CPTII,S$GLB, | Performed by: FAMILY MEDICINE

## 2022-02-14 PROCEDURE — 3078F PR MOST RECENT DIASTOLIC BLOOD PRESSURE < 80 MM HG: ICD-10-PCS | Mod: HCNC,CPTII,S$GLB, | Performed by: FAMILY MEDICINE

## 2022-02-14 PROCEDURE — 99499 RISK ADDL DX/OHS AUDIT: ICD-10-PCS | Mod: HCNC,S$GLB,, | Performed by: FAMILY MEDICINE

## 2022-02-14 PROCEDURE — 99214 PR OFFICE/OUTPT VISIT, EST, LEVL IV, 30-39 MIN: ICD-10-PCS | Mod: HCNC,S$GLB,, | Performed by: FAMILY MEDICINE

## 2022-02-14 RX ORDER — TOLTERODINE 4 MG/1
4 CAPSULE, EXTENDED RELEASE ORAL DAILY
Qty: 30 CAPSULE | Refills: 11 | Status: SHIPPED | OUTPATIENT
Start: 2022-02-14 | End: 2022-02-17

## 2022-02-14 RX ORDER — ATORVASTATIN CALCIUM 80 MG/1
TABLET, FILM COATED ORAL
Qty: 90 TABLET | Refills: 3 | Status: SHIPPED | OUTPATIENT
Start: 2022-02-14 | End: 2023-01-06

## 2022-02-15 DIAGNOSIS — N30.00 ACUTE CYSTITIS WITHOUT HEMATURIA: Primary | ICD-10-CM

## 2022-02-15 RX ORDER — CIPROFLOXACIN 250 MG/1
250 TABLET, FILM COATED ORAL 2 TIMES DAILY
Qty: 6 TABLET | Refills: 0 | Status: SHIPPED | OUTPATIENT
Start: 2022-02-15 | End: 2022-02-18

## 2022-02-17 ENCOUNTER — PATIENT MESSAGE (OUTPATIENT)
Dept: FAMILY MEDICINE | Facility: CLINIC | Age: 72
End: 2022-02-17
Payer: MEDICARE

## 2022-02-17 PROBLEM — N18.31 CHRONIC KIDNEY DISEASE, STAGE 3A: Status: ACTIVE | Noted: 2022-02-17

## 2022-02-17 RX ORDER — SOLIFENACIN SUCCINATE 5 MG/1
5 TABLET, FILM COATED ORAL DAILY
Qty: 30 TABLET | Refills: 11 | Status: SHIPPED | OUTPATIENT
Start: 2022-02-17 | End: 2022-05-04 | Stop reason: SDUPTHER

## 2022-02-17 NOTE — PROGRESS NOTES
Subjective:       Patient ID: Kathy Seymour is a 71 y.o. female.    Chief Complaint: Hyperlipidemia, Hypertension, Coronary Artery Disease, and COPD    Patient presents here for 6 month follow-up of hypertension, hyperlipidemia, subclinical hyperthyroidism, CAD, and COPD.  Her hypertension is well controlled with her present medication and she is tolerating her medication well.  She states she is following her low-fat low-cholesterol low-sodium diet.  Her weight is stable since her last visit.  Her hyperlipidemia is extremely well controlled with her present use of Lipitor 80 mg daily.  She continues on methimazole for her hyperthyroidism and she has seen Endocrinology and that note was reviewed.  She was to continue on methimazole and have regular TSH to check thyroid function.  In addition, she does have a thyroid nodule that was biopsied with fine-needle aspiration in the past but it was a nondiagnostic finding.  Endocrine suggested another fine-needle aspiration and the patient declined.  She will follow with regular ultrasounds.  Her coronary artery disease is stable without chest pains or palpitations.  He does have COPD and this is also stable on her present use of Symbicort and albuterol as needed.  She does complain today of some foul-smelling urine although she does not have any discomfort and denies any fever or chills.  She did have a urinary tract infection several months ago and she states this is the same way it started back then.  As far as health maintenance, she is up-to-date with all of her recommended screening exams and immunizations with exception of flu vaccine and mammogram.  She declines flu vaccine today.    Hypertension  This is a chronic problem. The problem is unchanged. The problem is controlled. Associated symptoms include shortness of breath (Occasional). Pertinent negatives include no chest pain, headaches or palpitations. Risk factors for coronary artery disease include  dyslipidemia, obesity and post-menopausal state. Past treatments include angiotensin blockers and diuretics. The current treatment provides moderate improvement. There are no compliance problems.  Hypertensive end-organ damage includes kidney disease and CAD/MI. There is no history of CVA or heart failure. Identifiable causes of hypertension include chronic renal disease.   Hyperlipidemia  This is a chronic problem. The problem is controlled. Recent lipid tests were reviewed and are normal. Exacerbating diseases include chronic renal disease and obesity. Factors aggravating her hyperlipidemia include thiazides. Associated symptoms include shortness of breath (Occasional). Pertinent negatives include no chest pain. Current antihyperlipidemic treatment includes statins. The current treatment provides significant improvement of lipids. There are no compliance problems.  Risk factors for coronary artery disease include dyslipidemia, hypertension, obesity and post-menopausal.     Review of Systems   Constitutional: Negative for chills, fatigue, fever and unexpected weight change.   HENT: Negative for nasal congestion, ear pain, postnasal drip and sore throat.    Respiratory: Positive for shortness of breath (Occasional). Negative for cough.    Cardiovascular: Negative for chest pain and palpitations.   Gastrointestinal: Negative for abdominal pain, diarrhea, nausea and vomiting.   Genitourinary: Negative for difficulty urinating, dysuria and flank pain.   Musculoskeletal: Positive for arthralgias. Negative for back pain.   Neurological: Negative for dizziness, light-headedness and headaches.   Psychiatric/Behavioral: Negative for sleep disturbance. The patient is not nervous/anxious.          Objective:      Physical Exam  Vitals reviewed.   Constitutional:       General: She is not in acute distress.     Appearance: Normal appearance. She is well-developed and well-nourished. She is obese.   HENT:      Right Ear:  Tympanic membrane and external ear normal.      Left Ear: Tympanic membrane and external ear normal.      Mouth/Throat:      Mouth: Oropharynx is clear and moist.      Pharynx: Oropharynx is clear. No posterior oropharyngeal erythema.   Eyes:      Extraocular Movements: EOM normal.   Neck:      Thyroid: No thyromegaly.   Cardiovascular:      Rate and Rhythm: Normal rate and regular rhythm.      Pulses: Normal pulses and intact distal pulses.      Heart sounds: Normal heart sounds. No murmur heard.      Pulmonary:      Effort: Pulmonary effort is normal.      Breath sounds: Normal breath sounds. No wheezing or rales.   Musculoskeletal:         General: No tenderness or edema. Normal range of motion.      Cervical back: Normal range of motion and neck supple.      Right lower leg: No edema.      Left lower leg: No edema.   Lymphadenopathy:      Cervical: No cervical adenopathy.   Neurological:      General: No focal deficit present.      Mental Status: She is alert and oriented to person, place, and time.      Cranial Nerves: No cranial nerve deficit.      Deep Tendon Reflexes: Reflexes are normal and symmetric.   Psychiatric:         Mood and Affect: Mood and affect normal.         Assessment:       Problem List Items Addressed This Visit     Hyperlipidemia, baseline  (Chronic)    Relevant Medications    atorvastatin (LIPITOR) 80 MG tablet    Hypertension, onset before 1995 - Primary    Obesity (BMI 30.0-34.9), today 30    Subclinical hyperthyroidism    COPD mixed type    Coronary artery disease involving native coronary artery of native heart without angina pectoris    Chronic kidney disease, stage 3a      Other Visit Diagnoses     Dysuria        Relevant Orders    Urinalysis (Completed)    Encounter for screening mammogram for breast cancer        Relevant Orders    Mammo Digital Screening Bilat          Plan:       1. Continue present medication as her hypertension, hyperlipidemia, subclinical  hyperthyroidism, CAD, and COPD are stable and controlled  2. Continue low-sodium, low-fat low-cholesterol diet but increase exercise.  BMI today is 31.11  3. Schedule mammogram  4. Follow up with me in 6 months or p.r.n.

## 2022-04-11 ENCOUNTER — PATIENT MESSAGE (OUTPATIENT)
Dept: PULMONOLOGY | Facility: CLINIC | Age: 72
End: 2022-04-11
Payer: MEDICARE

## 2022-04-11 ENCOUNTER — OFFICE VISIT (OUTPATIENT)
Dept: CARDIOLOGY | Facility: CLINIC | Age: 72
End: 2022-04-11
Payer: MEDICARE

## 2022-04-11 VITALS
SYSTOLIC BLOOD PRESSURE: 122 MMHG | BODY MASS INDEX: 31.16 KG/M2 | WEIGHT: 187 LBS | HEIGHT: 65 IN | DIASTOLIC BLOOD PRESSURE: 70 MMHG | OXYGEN SATURATION: 95 % | HEART RATE: 83 BPM

## 2022-04-11 DIAGNOSIS — E04.1 NODULAR THYROID DISEASE: ICD-10-CM

## 2022-04-11 DIAGNOSIS — E78.2 MIXED HYPERLIPIDEMIA: ICD-10-CM

## 2022-04-11 DIAGNOSIS — Z95.5 S/P CORONARY ARTERY STENT PLACEMENT: Primary | ICD-10-CM

## 2022-04-11 DIAGNOSIS — G47.33 OSA ON CPAP: ICD-10-CM

## 2022-04-11 DIAGNOSIS — I10 HYPERTENSION, UNSPECIFIED TYPE: ICD-10-CM

## 2022-04-11 DIAGNOSIS — R09.89 CHRONIC SINUS COMPLAINTS: Primary | ICD-10-CM

## 2022-04-11 PROCEDURE — 3078F DIAST BP <80 MM HG: CPT | Mod: CPTII,S$GLB,, | Performed by: INTERNAL MEDICINE

## 2022-04-11 PROCEDURE — 1159F MED LIST DOCD IN RCRD: CPT | Mod: CPTII,S$GLB,, | Performed by: INTERNAL MEDICINE

## 2022-04-11 PROCEDURE — 3008F BODY MASS INDEX DOCD: CPT | Mod: CPTII,S$GLB,, | Performed by: INTERNAL MEDICINE

## 2022-04-11 PROCEDURE — 99214 PR OFFICE/OUTPT VISIT, EST, LEVL IV, 30-39 MIN: ICD-10-PCS | Mod: S$GLB,,, | Performed by: INTERNAL MEDICINE

## 2022-04-11 PROCEDURE — 1160F PR REVIEW ALL MEDS BY PRESCRIBER/CLIN PHARMACIST DOCUMENTED: ICD-10-PCS | Mod: CPTII,S$GLB,, | Performed by: INTERNAL MEDICINE

## 2022-04-11 PROCEDURE — 3074F PR MOST RECENT SYSTOLIC BLOOD PRESSURE < 130 MM HG: ICD-10-PCS | Mod: CPTII,S$GLB,, | Performed by: INTERNAL MEDICINE

## 2022-04-11 PROCEDURE — 3288F PR FALLS RISK ASSESSMENT DOCUMENTED: ICD-10-PCS | Mod: CPTII,S$GLB,, | Performed by: INTERNAL MEDICINE

## 2022-04-11 PROCEDURE — 3288F FALL RISK ASSESSMENT DOCD: CPT | Mod: CPTII,S$GLB,, | Performed by: INTERNAL MEDICINE

## 2022-04-11 PROCEDURE — 1159F PR MEDICATION LIST DOCUMENTED IN MEDICAL RECORD: ICD-10-PCS | Mod: CPTII,S$GLB,, | Performed by: INTERNAL MEDICINE

## 2022-04-11 PROCEDURE — 1126F AMNT PAIN NOTED NONE PRSNT: CPT | Mod: CPTII,S$GLB,, | Performed by: INTERNAL MEDICINE

## 2022-04-11 PROCEDURE — 3078F PR MOST RECENT DIASTOLIC BLOOD PRESSURE < 80 MM HG: ICD-10-PCS | Mod: CPTII,S$GLB,, | Performed by: INTERNAL MEDICINE

## 2022-04-11 PROCEDURE — 3008F PR BODY MASS INDEX (BMI) DOCUMENTED: ICD-10-PCS | Mod: CPTII,S$GLB,, | Performed by: INTERNAL MEDICINE

## 2022-04-11 PROCEDURE — 1101F PT FALLS ASSESS-DOCD LE1/YR: CPT | Mod: CPTII,S$GLB,, | Performed by: INTERNAL MEDICINE

## 2022-04-11 PROCEDURE — 1101F PR PT FALLS ASSESS DOC 0-1 FALLS W/OUT INJ PAST YR: ICD-10-PCS | Mod: CPTII,S$GLB,, | Performed by: INTERNAL MEDICINE

## 2022-04-11 PROCEDURE — 1126F PR PAIN SEVERITY QUANTIFIED, NO PAIN PRESENT: ICD-10-PCS | Mod: CPTII,S$GLB,, | Performed by: INTERNAL MEDICINE

## 2022-04-11 PROCEDURE — 99214 OFFICE O/P EST MOD 30 MIN: CPT | Mod: S$GLB,,, | Performed by: INTERNAL MEDICINE

## 2022-04-11 PROCEDURE — 1160F RVW MEDS BY RX/DR IN RCRD: CPT | Mod: CPTII,S$GLB,, | Performed by: INTERNAL MEDICINE

## 2022-04-11 PROCEDURE — 3074F SYST BP LT 130 MM HG: CPT | Mod: CPTII,S$GLB,, | Performed by: INTERNAL MEDICINE

## 2022-04-11 RX ORDER — AMOXICILLIN 875 MG/1
875 TABLET, FILM COATED ORAL 2 TIMES DAILY
Qty: 28 TABLET | Refills: 3 | Status: SHIPPED | OUTPATIENT
Start: 2022-04-11 | End: 2022-07-25

## 2022-04-11 NOTE — TELEPHONE ENCOUNTER
Patient has yearly follow up (12/22)  She states she thinks she has a sinus infection.  Medication pended

## 2022-04-11 NOTE — PROGRESS NOTES
Patient ID:  Kathy Seymour is a 71 y.o. female who presents for follow-up of Coronary Artery Disease, Hyperlipidemia, and Hypertension      She was having a chest tightness.  She will would drink a carbonated drink and burp afterwards and had resolution of the symptoms.  She was evaluated by her gastroenterologist Dr. Paredes that perform an EGD and esophageal dilatation ever since then she has been doing much better.  She has 2 drug-eluting stents placed in 2012 and 1 in 2013 at that time she was totally asymptomatic she had a stress test that was positive.  The procedure was performed by Dr. Williamson through the wrist.  She denies any dyspnea on exertion chest pains her anything I that other than her burping issue that is doing better.  She had a stress test done in January of 2021 that was normal for ischemia      Past Medical History:   Diagnosis Date    Angina pectoris     Anticoagulant long-term use     Anxiety     Arthritis     Back pain     Cataract     Chronic bronchitis     Coronary artery disease     STENT X 2    CTS (carpal tunnel syndrome)     RIGHT    Depression     MILLER (dyspnea on exertion) 2/21/2017    Hallux valgus, acquired, bilateral 1/19/2017    Hematuria     Hyperlipidemia     Hypertension     Hypertensive left ventricular hypertrophy, without heart failure 5/22/2017    Hypothyroidism     Obesity     Polyneuropathy     S/P coronary artery stent placement, 2 LETTY 9/2012, 1 LETTY 9/2013 3/5/2013    Sleep apnea     USES CPAP    Thyroid disease     Tobacco dependence     Trouble in sleeping     Urinary incontinence     Wears glasses     ONE CONTAC        Past Surgical History:   Procedure Laterality Date    APPENDECTOMY      BILATERAL SALPINGOOPHORECTOMY      CARDIAC CATHETERIZATION      CORONARY ANGIOPLASTY      CORONARY STENT PLACEMENT      PCI  of the LAD with LETTY    EYE SURGERY      bilat cataract removal    HYSTERECTOMY      complete    OOPHORECTOMY       "TONSILLECTOMY            Current Outpatient Medications   Medication Instructions    albuterol (PROVENTIL/VENTOLIN HFA) 90 mcg/actuation inhaler 2 puffs, Inhalation, Every 6 hours PRN, Rescue    ALPRAZolam (XANAX) 0.5 mg, Oral, 3 times daily PRN    aspirin 81 mg, Oral, Daily    atorvastatin (LIPITOR) 80 MG tablet TAKE 1 TABLET EVERY DAY NEED MD APPOINTMENT FOR REFILLS    budesonide-formoterol 160-4.5 mcg (SYMBICORT) 160-4.5 mcg/actuation HFAA 2 puffs, Inhalation, Daily, Controller    DULoxetine (CYMBALTA) 60 mg, Oral, Daily    fluticasone propionate (FLONASE) 50 mcg, Each Nostril, Daily    losartan-hydrochlorothiazide 100-25 mg (HYZAAR) 100-25 mg per tablet 1 tablet, Oral, Daily    meloxicam (MOBIC) 7.5 mg, Oral, Daily    methIMAzole (TAPAZOLE) 5 mg, Oral, Daily    pantoprazole (PROTONIX) 40 mg, Oral, Daily    potassium chloride (MICRO-K) 10 MEQ CpSR 10 mEq, Oral, Daily    solifenacin (VESICARE) 5 mg, Oral, Daily    tiZANidine (ZANAFLEX) 4 MG tablet TAKE 1 TABLET (4 MG TOTAL) BY MOUTH EVERY 6 (SIX) HOURS AS NEEDED (SPASM).        Review of patient's allergies indicates:  No Known Allergies     Review of Systems   Cardiovascular: Positive for chest pain. Negative for dyspnea on exertion, leg swelling and palpitations.   Respiratory: Negative for cough and shortness of breath.    Gastrointestinal: Positive for dysphagia and heartburn.        Objective:     Vitals:    04/11/22 0922   BP: 122/70   BP Location: Left arm   Patient Position: Sitting   BP Method: Medium (Manual)   Pulse: 83   SpO2: 95%   Weight: 84.8 kg (187 lb)   Height: 5' 5" (1.651 m)       Physical Exam  Vitals and nursing note reviewed.   Constitutional:       Appearance: She is well-developed.      Comments: Over wt.   HENT:      Head: Normocephalic and atraumatic.   Eyes:      Conjunctiva/sclera: Conjunctivae normal.   Cardiovascular:      Rate and Rhythm: Normal rate and regular rhythm.      Heart sounds: Normal heart sounds. "   Pulmonary:      Effort: Pulmonary effort is normal.      Breath sounds: Normal breath sounds.   Abdominal:      General: Bowel sounds are normal.      Palpations: Abdomen is soft.   Musculoskeletal:         General: Normal range of motion.   Skin:     General: Skin is warm and dry.   Neurological:      Mental Status: She is alert and oriented to person, place, and time.   Psychiatric:         Behavior: Behavior normal.         Thought Content: Thought content normal.         Judgment: Judgment normal.       CMP  Sodium   Date Value Ref Range Status   08/25/2021 142 136 - 145 mmol/L Final     Potassium   Date Value Ref Range Status   08/25/2021 3.5 3.5 - 5.1 mmol/L Final     Chloride   Date Value Ref Range Status   08/25/2021 101 95 - 110 mmol/L Final     CO2   Date Value Ref Range Status   08/25/2021 28 23 - 29 mmol/L Final     Glucose   Date Value Ref Range Status   08/25/2021 121 (H) 70 - 110 mg/dL Final     BUN   Date Value Ref Range Status   08/25/2021 19 8 - 23 mg/dL Final     Creatinine   Date Value Ref Range Status   08/25/2021 1.0 0.5 - 1.4 mg/dL Final   09/14/2012 0.7 0.2 - 1.4 mg/dl Final     Calcium   Date Value Ref Range Status   08/25/2021 9.4 8.7 - 10.5 mg/dL Final   09/14/2012 8.8 8.6 - 10.2 mg/dl Final     Total Protein   Date Value Ref Range Status   08/25/2021 7.3 6.0 - 8.4 g/dL Final     Albumin   Date Value Ref Range Status   08/25/2021 3.9 3.5 - 5.2 g/dL Final     Total Bilirubin   Date Value Ref Range Status   08/25/2021 0.5 0.1 - 1.0 mg/dL Final     Comment:     For infants and newborns, interpretation of results should be based  on gestational age, weight and in agreement with clinical  observations.    Premature Infant recommended reference ranges:  Up to 24 hours.............<8.0 mg/dL  Up to 48 hours............<12.0 mg/dL  3-5 days..................<15.0 mg/dL  6-29 days.................<15.0 mg/dL       Alkaline Phosphatase   Date Value Ref Range Status   08/25/2021 92 55 - 135 U/L  Final   09/14/2012 68 23 - 119 UNIT/L Final     AST   Date Value Ref Range Status   08/25/2021 18 10 - 40 U/L Final   09/14/2012 16 10 - 30 UNIT/L Final     ALT   Date Value Ref Range Status   08/25/2021 20 10 - 44 U/L Final     Anion Gap   Date Value Ref Range Status   08/25/2021 13 8 - 16 mmol/L Final   09/14/2012 7 5 - 15 meq/L Final     eGFR if    Date Value Ref Range Status   08/25/2021 >60.0 >60 mL/min/1.73 m^2 Final     eGFR if non    Date Value Ref Range Status   08/25/2021 56.4 (A) >60 mL/min/1.73 m^2 Final     Comment:     Calculation used to obtain the estimated glomerular filtration  rate (eGFR) is the CKD-EPI equation.         BMP  Lab Results   Component Value Date     08/25/2021    K 3.5 08/25/2021     08/25/2021    CO2 28 08/25/2021    BUN 19 08/25/2021    CREATININE 1.0 08/25/2021    CALCIUM 9.4 08/25/2021    ANIONGAP 13 08/25/2021    ESTGFRAFRICA >60.0 08/25/2021    EGFRNONAA 56.4 (A) 08/25/2021      BNP  @LABRCNTIP(BNP,BNPTRIAGEBLO)@   Lab Results   Component Value Date    CHOL 128 08/25/2021    CHOL 137 07/09/2020    CHOL 145 03/12/2019     Lab Results   Component Value Date    HDL 49 08/25/2021    HDL 56 07/09/2020    HDL 56 03/12/2019     Lab Results   Component Value Date    LDLCALC 61.8 (L) 08/25/2021    LDLCALC 67.2 07/09/2020    LDLCALC 72.0 03/12/2019     Lab Results   Component Value Date    TRIG 86 08/25/2021    TRIG 69 07/09/2020    TRIG 85 03/12/2019     Lab Results   Component Value Date    CHOLHDL 38.3 08/25/2021    CHOLHDL 40.9 07/09/2020    CHOLHDL 38.6 03/12/2019      Lab Results   Component Value Date    TSH 3.335 08/25/2021    Y4DHKIJ 135 04/24/2018    I8DCAVL 7.3 08/02/2012    FREET4 1.07 04/24/2018     Lab Results   Component Value Date    HGBA1C 6.0 (H) 10/07/2021     Lab Results   Component Value Date    WBC 5.80 08/25/2021    HGB 12.3 08/25/2021    HCT 38.5 08/25/2021    MCV 90 08/25/2021     08/25/2021         Results for  orders placed during the hospital encounter of 01/11/21    Echo Color Flow Doppler? Yes    Interpretation Summary  · The left ventricle is normal in size with concentric remodeling and normal systolic function. The estimated ejection fraction is 65%  · Grade I left ventricular diastolic dysfunction.  · Normal right ventricular size with normal right ventricular systolic function.  · Mild tricuspid regurgitation.  · Normal central venous pressure (3 mmHg).  · The estimated PA systolic pressure is 24 mmHg.  · Overall the study quality was technically difficult     No results found for this or any previous visit.         Assessment:       Nodular thyroid disease  Followed by Endo.  She has had multiple biopsies done of the nodules    RAMONA on CPAP  Compliant with the CPAP machine    S/P coronary artery stent placement, 2 LETTY 9/2012, 1 LETTY 9/2013  Stable no symptoms of angina last stress test was negative for ischemia    Hyperlipidemia, baseline   Well controlled on high doses of atorvastatin.  Her last LDL cholesterol was 61.8       Plan:       Continue the current medical therapy return to the office in 6 months with a lipid CMP and a TSH.  The thyroid is been followed by Endo.

## 2022-04-13 ENCOUNTER — TELEPHONE (OUTPATIENT)
Dept: PULMONOLOGY | Facility: CLINIC | Age: 72
End: 2022-04-13
Payer: MEDICARE

## 2022-04-20 DIAGNOSIS — I10 ESSENTIAL HYPERTENSION: ICD-10-CM

## 2022-04-20 RX ORDER — LOSARTAN POTASSIUM AND HYDROCHLOROTHIAZIDE 25; 100 MG/1; MG/1
TABLET ORAL
Qty: 90 TABLET | Refills: 1 | Status: SHIPPED | OUTPATIENT
Start: 2022-04-20 | End: 2023-01-05

## 2022-04-20 NOTE — TELEPHONE ENCOUNTER
Refill Authorization Note   Kathy Seymour  is requesting a refill authorization.  Brief Assessment and Rationale for Refill:  Approve     Medication Therapy Plan:       Medication Reconciliation Completed: No   Comments:     No Care Gaps recommended.     Note composed:5:11 PM 04/20/2022

## 2022-04-20 NOTE — TELEPHONE ENCOUNTER
No new care gaps identified.  Powered by [employer]. Reference number: [id number].   4/20/2022 4:18:44 PM CDT

## 2022-05-04 DIAGNOSIS — R32 URINARY INCONTINENCE, UNSPECIFIED TYPE: Primary | ICD-10-CM

## 2022-05-04 RX ORDER — SOLIFENACIN SUCCINATE 5 MG/1
5 TABLET, FILM COATED ORAL DAILY
Qty: 90 TABLET | Refills: 3 | Status: SHIPPED | OUTPATIENT
Start: 2022-05-04 | End: 2023-04-22

## 2022-05-05 ENCOUNTER — TELEPHONE (OUTPATIENT)
Dept: FAMILY MEDICINE | Facility: CLINIC | Age: 72
End: 2022-05-05
Payer: MEDICARE

## 2022-05-11 DIAGNOSIS — J44.9 COPD MIXED TYPE: ICD-10-CM

## 2022-05-11 RX ORDER — BUDESONIDE AND FORMOTEROL FUMARATE DIHYDRATE 160; 4.5 UG/1; UG/1
2 AEROSOL RESPIRATORY (INHALATION) DAILY
Qty: 10.2 G | Refills: 11 | Status: CANCELLED | OUTPATIENT
Start: 2022-05-11 | End: 2023-05-11

## 2022-05-11 NOTE — TELEPHONE ENCOUNTER
No new care gaps identified.  [facility] Embedded Care Gaps. Reference number: [id number]. 5/11/2022   12:49:06 PM CDT

## 2022-05-17 RX ORDER — POTASSIUM CHLORIDE 750 MG/1
10 CAPSULE, EXTENDED RELEASE ORAL DAILY
Qty: 90 CAPSULE | Refills: 3 | Status: SHIPPED | OUTPATIENT
Start: 2022-05-17 | End: 2023-10-12 | Stop reason: SDUPTHER

## 2022-05-20 ENCOUNTER — HOSPITAL ENCOUNTER (OUTPATIENT)
Dept: RADIOLOGY | Facility: CLINIC | Age: 72
Discharge: HOME | End: 2022-05-20
Attending: FAMILY MEDICINE
Payer: MEDICARE

## 2022-05-20 ENCOUNTER — OFFICE VISIT (OUTPATIENT)
Dept: URGENT CARE | Facility: CLINIC | Age: 72
End: 2022-05-20
Payer: MEDICARE

## 2022-05-20 VITALS
OXYGEN SATURATION: 96 % | HEIGHT: 65 IN | SYSTOLIC BLOOD PRESSURE: 159 MMHG | DIASTOLIC BLOOD PRESSURE: 71 MMHG | BODY MASS INDEX: 31.49 KG/M2 | TEMPERATURE: 98 F | RESPIRATION RATE: 16 BRPM | HEART RATE: 79 BPM | WEIGHT: 189 LBS

## 2022-05-20 DIAGNOSIS — L98.9 CHANGING SKIN LESION: Primary | ICD-10-CM

## 2022-05-20 DIAGNOSIS — Z12.31 ENCOUNTER FOR SCREENING MAMMOGRAM FOR BREAST CANCER: ICD-10-CM

## 2022-05-20 PROCEDURE — 3078F PR MOST RECENT DIASTOLIC BLOOD PRESSURE < 80 MM HG: ICD-10-PCS | Mod: CPTII,S$GLB,, | Performed by: NURSE PRACTITIONER

## 2022-05-20 PROCEDURE — 99213 PR OFFICE/OUTPT VISIT, EST, LEVL III, 20-29 MIN: ICD-10-PCS | Mod: S$GLB,,, | Performed by: NURSE PRACTITIONER

## 2022-05-20 PROCEDURE — 3008F BODY MASS INDEX DOCD: CPT | Mod: CPTII,S$GLB,, | Performed by: NURSE PRACTITIONER

## 2022-05-20 PROCEDURE — 99213 OFFICE O/P EST LOW 20 MIN: CPT | Mod: S$GLB,,, | Performed by: NURSE PRACTITIONER

## 2022-05-20 PROCEDURE — 77067 MAMMO DIGITAL SCREENING BILAT WITH TOMO: ICD-10-PCS | Mod: 26,,, | Performed by: RADIOLOGY

## 2022-05-20 PROCEDURE — 3077F SYST BP >= 140 MM HG: CPT | Mod: CPTII,S$GLB,, | Performed by: NURSE PRACTITIONER

## 2022-05-20 PROCEDURE — 3008F PR BODY MASS INDEX (BMI) DOCUMENTED: ICD-10-PCS | Mod: CPTII,S$GLB,, | Performed by: NURSE PRACTITIONER

## 2022-05-20 PROCEDURE — 77063 MAMMO DIGITAL SCREENING BILAT WITH TOMO: ICD-10-PCS | Mod: 26,,, | Performed by: RADIOLOGY

## 2022-05-20 PROCEDURE — 77063 BREAST TOMOSYNTHESIS BI: CPT | Mod: TC,PO

## 2022-05-20 PROCEDURE — 3077F PR MOST RECENT SYSTOLIC BLOOD PRESSURE >= 140 MM HG: ICD-10-PCS | Mod: CPTII,S$GLB,, | Performed by: NURSE PRACTITIONER

## 2022-05-20 PROCEDURE — 1159F PR MEDICATION LIST DOCUMENTED IN MEDICAL RECORD: ICD-10-PCS | Mod: CPTII,S$GLB,, | Performed by: NURSE PRACTITIONER

## 2022-05-20 PROCEDURE — 3078F DIAST BP <80 MM HG: CPT | Mod: CPTII,S$GLB,, | Performed by: NURSE PRACTITIONER

## 2022-05-20 PROCEDURE — 1160F PR REVIEW ALL MEDS BY PRESCRIBER/CLIN PHARMACIST DOCUMENTED: ICD-10-PCS | Mod: CPTII,S$GLB,, | Performed by: NURSE PRACTITIONER

## 2022-05-20 PROCEDURE — 1159F MED LIST DOCD IN RCRD: CPT | Mod: CPTII,S$GLB,, | Performed by: NURSE PRACTITIONER

## 2022-05-20 PROCEDURE — 1160F RVW MEDS BY RX/DR IN RCRD: CPT | Mod: CPTII,S$GLB,, | Performed by: NURSE PRACTITIONER

## 2022-05-20 PROCEDURE — 77067 SCR MAMMO BI INCL CAD: CPT | Mod: 26,,, | Performed by: RADIOLOGY

## 2022-05-20 PROCEDURE — 77063 BREAST TOMOSYNTHESIS BI: CPT | Mod: 26,,, | Performed by: RADIOLOGY

## 2022-05-20 NOTE — PROGRESS NOTES
"Subjective:       Patient ID: Kathy Seymour is a 71 y.o. female.    Vitals:  height is 5' 5" (1.651 m) and weight is 85.7 kg (189 lb). Her temperature is 98.1 °F (36.7 °C). Her blood pressure is 159/71 (abnormal) and her pulse is 79. Her respiration is 16 and oxygen saturation is 96%.     Chief Complaint: Rash    Pt. States there's a small "raymon" on her back that began to itch and when she scratched it she wasn't sure what it felt like. Pt. Took a picture and saw that there's a black bump on her back that's a little smaller than a dime. When asked when did she notice the bump pt. States she just noticed it yesterday but she's always had the itch in the same spot on her back for years and is unsure if it's the same bump. Pt. Does not report to have pain from bump but does report to have itching, and slight redness surrounding bump, no reported discharge, no reported burning or tingling. No treatments tried.       Constitution: Negative.   HENT: Negative.    Respiratory: Negative.    Gastrointestinal: Negative.    Musculoskeletal: Negative for pain.   Skin: Positive for color change.   Neurological: Negative.        Objective:      Physical Exam   Constitutional: She is oriented to person, place, and time.   HENT:   Head: Normocephalic and atraumatic.   Cardiovascular: Normal rate.   Pulmonary/Chest: Effort normal.   Abdominal: Normal appearance.   Neurological: She is alert and oriented to person, place, and time.   Skin:        Psychiatric: Her behavior is normal. Mood normal.         Assessment:       1. Changing skin lesion          Plan:     Refer to dermatology for evaluation.    Changing skin lesion  -     Ambulatory referral/consult to Dermatology                   "

## 2022-05-23 ENCOUNTER — TELEPHONE (OUTPATIENT)
Dept: DERMATOLOGY | Facility: CLINIC | Age: 72
End: 2022-05-23

## 2022-05-23 NOTE — TELEPHONE ENCOUNTER
----- Message from Brandy Olguin MA sent at 5/23/2022 10:21 AM CDT -----  Patient is being referred to dermatology for Changing skin lesion [L98.9]. Please contact patient and make an appointment.      Thanks,    KARSTEN Nava

## 2022-05-26 ENCOUNTER — PATIENT MESSAGE (OUTPATIENT)
Dept: FAMILY MEDICINE | Facility: CLINIC | Age: 72
End: 2022-05-26
Payer: MEDICARE

## 2022-05-26 DIAGNOSIS — R21 RASH: Primary | ICD-10-CM

## 2022-05-26 NOTE — TELEPHONE ENCOUNTER
Referral faxed. Patient notified via e-mail due to this being initial point of contact from patient regarding this matter.

## 2022-06-10 ENCOUNTER — OFFICE VISIT (OUTPATIENT)
Dept: URGENT CARE | Facility: CLINIC | Age: 72
End: 2022-06-10
Payer: MEDICARE

## 2022-06-10 ENCOUNTER — TELEPHONE (OUTPATIENT)
Dept: FAMILY MEDICINE | Facility: CLINIC | Age: 72
End: 2022-06-10
Payer: MEDICARE

## 2022-06-10 VITALS
HEART RATE: 83 BPM | TEMPERATURE: 98 F | SYSTOLIC BLOOD PRESSURE: 131 MMHG | HEIGHT: 65 IN | DIASTOLIC BLOOD PRESSURE: 73 MMHG | OXYGEN SATURATION: 98 % | RESPIRATION RATE: 16 BRPM | BODY MASS INDEX: 30.99 KG/M2 | WEIGHT: 186 LBS

## 2022-06-10 DIAGNOSIS — R50.9 FEVER, UNSPECIFIED FEVER CAUSE: Primary | ICD-10-CM

## 2022-06-10 DIAGNOSIS — U07.1 COVID-19: ICD-10-CM

## 2022-06-10 LAB
CTP QC/QA: YES
SARS-COV-2 AG RESP QL IA.RAPID: POSITIVE

## 2022-06-10 PROCEDURE — 87811 SARS-COV-2 COVID19 W/OPTIC: CPT | Mod: QW,S$GLB,, | Performed by: NURSE PRACTITIONER

## 2022-06-10 PROCEDURE — 1160F PR REVIEW ALL MEDS BY PRESCRIBER/CLIN PHARMACIST DOCUMENTED: ICD-10-PCS | Mod: CPTII,S$GLB,, | Performed by: NURSE PRACTITIONER

## 2022-06-10 PROCEDURE — 99214 OFFICE O/P EST MOD 30 MIN: CPT | Mod: S$GLB,CS,, | Performed by: NURSE PRACTITIONER

## 2022-06-10 PROCEDURE — 99214 PR OFFICE/OUTPT VISIT, EST, LEVL IV, 30-39 MIN: ICD-10-PCS | Mod: S$GLB,CS,, | Performed by: NURSE PRACTITIONER

## 2022-06-10 PROCEDURE — 3078F DIAST BP <80 MM HG: CPT | Mod: CPTII,S$GLB,, | Performed by: NURSE PRACTITIONER

## 2022-06-10 PROCEDURE — 3008F BODY MASS INDEX DOCD: CPT | Mod: CPTII,S$GLB,, | Performed by: NURSE PRACTITIONER

## 2022-06-10 PROCEDURE — 1160F RVW MEDS BY RX/DR IN RCRD: CPT | Mod: CPTII,S$GLB,, | Performed by: NURSE PRACTITIONER

## 2022-06-10 PROCEDURE — 3075F PR MOST RECENT SYSTOLIC BLOOD PRESS GE 130-139MM HG: ICD-10-PCS | Mod: CPTII,S$GLB,, | Performed by: NURSE PRACTITIONER

## 2022-06-10 PROCEDURE — 3008F PR BODY MASS INDEX (BMI) DOCUMENTED: ICD-10-PCS | Mod: CPTII,S$GLB,, | Performed by: NURSE PRACTITIONER

## 2022-06-10 PROCEDURE — 87811 SARS CORONAVIRUS 2 ANTIGEN POCT, MANUAL READ: ICD-10-PCS | Mod: QW,S$GLB,, | Performed by: NURSE PRACTITIONER

## 2022-06-10 PROCEDURE — 3078F PR MOST RECENT DIASTOLIC BLOOD PRESSURE < 80 MM HG: ICD-10-PCS | Mod: CPTII,S$GLB,, | Performed by: NURSE PRACTITIONER

## 2022-06-10 PROCEDURE — 3075F SYST BP GE 130 - 139MM HG: CPT | Mod: CPTII,S$GLB,, | Performed by: NURSE PRACTITIONER

## 2022-06-10 PROCEDURE — 1159F PR MEDICATION LIST DOCUMENTED IN MEDICAL RECORD: ICD-10-PCS | Mod: CPTII,S$GLB,, | Performed by: NURSE PRACTITIONER

## 2022-06-10 PROCEDURE — 1159F MED LIST DOCD IN RCRD: CPT | Mod: CPTII,S$GLB,, | Performed by: NURSE PRACTITIONER

## 2022-06-10 RX ORDER — GUAIFENESIN 1200 MG/1
1200 TABLET, EXTENDED RELEASE ORAL 2 TIMES DAILY
Qty: 20 TABLET | Refills: 0 | Status: SHIPPED | OUTPATIENT
Start: 2022-06-10 | End: 2022-06-20

## 2022-06-10 RX ORDER — ALBUTEROL SULFATE 90 UG/1
2 AEROSOL, METERED RESPIRATORY (INHALATION) EVERY 6 HOURS PRN
Qty: 8 G | Refills: 0 | Status: SHIPPED | OUTPATIENT
Start: 2022-06-10

## 2022-06-10 RX ORDER — FLUTICASONE PROPIONATE 50 MCG
1 SPRAY, SUSPENSION (ML) NASAL DAILY
Qty: 16 G | Refills: 0 | Status: SHIPPED | OUTPATIENT
Start: 2022-06-10 | End: 2023-09-20 | Stop reason: SDUPTHER

## 2022-06-10 RX ORDER — PROMETHAZINE HYDROCHLORIDE AND DEXTROMETHORPHAN HYDROBROMIDE 6.25; 15 MG/5ML; MG/5ML
5 SYRUP ORAL 3 TIMES DAILY PRN
Qty: 240 ML | Refills: 0 | Status: SHIPPED | OUTPATIENT
Start: 2022-06-10 | End: 2022-06-20

## 2022-06-10 NOTE — PATIENT INSTRUCTIONS
Vit C 2000 mg daily, Vit D 1000 iu daily, Zinc 50 mg daily  ED for Fever > 102 not resolving with medication, significant shortness of breath or chest pain, Oxygen level less than 93%, or other worsening symptoms.    Instructions for Patients with Confirmed or Suspected COVID-19    If you are awaiting your test result, you will either be called or it will be released to the patient portal.  If you have any questions about your test, please visit [email or web address] or call our COVID-19 information line at [phone].      Please isolate yourself at home.  You may leave home and/or return to work once the following conditions are met:    If you have symptoms and tested positive:  More than 5 days since symptoms first appeared AND  More than 24 hours fever free without medications AND       symptoms have improved   For five days after ending isolation, masks are required.    If you had no symptoms but tested positive:  More than 5 days since the date of the first positive test. If you develop symptoms, then use the guidelines above  For five days after ending isolation, masks are required.      Testing is not recommended if you are symptom free after completing isolation.

## 2022-06-10 NOTE — PROGRESS NOTES
"Subjective:       Patient ID: Kathy Seymour is a 71 y.o. female.    Vitals:  height is 5' 5" (1.651 m) and weight is 84.4 kg (186 lb). Her temperature is 98.2 °F (36.8 °C). Her blood pressure is 131/73 and her pulse is 83. Her respiration is 16 and oxygen saturation is 98%.     Chief Complaint: COVID-19 Concerns    Wednesday night she had some nasal symptoms. They progressively got worse. She began running a temp of about 100 last night.   Did an at home covid test today and it was positive. Took tylenol this morning when she woke up.   6 COVID risk score      Constitution: Positive for chills, fatigue and fever.   HENT: Positive for congestion, postnasal drip and sinus pressure.    Neck: neck negative.   Cardiovascular: Negative.    Respiratory: Positive for cough. Negative for shortness of breath and wheezing.    Gastrointestinal: Negative.    Neurological: Positive for headaches. Negative for dizziness and light-headedness.       Objective:      Physical Exam   Constitutional: She is oriented to person, place, and time.  Non-toxic appearance. She appears ill. No distress.   HENT:   Head: Normocephalic and atraumatic.   Nose: Congestion present.   Eyes: Pupils are equal, round, and reactive to light.   Cardiovascular: Normal rate.   Pulmonary/Chest: Effort normal. No respiratory distress.   Abdominal: Normal appearance.   Neurological: no focal deficit. She is alert and oriented to person, place, and time.   Psychiatric: Her behavior is normal. Mood normal.         Assessment:       1. Fever, unspecified fever cause    2. COVID-19          Plan:     COVID 19 risk score 6, on multiple medications with potential interactions to covid 19, Monoclonal Antibody infusion ordered. Advised if she has not heard from infusion team by Monday (day 5 of symptoms) she should reach out to her PCP to see if they have other recommendations.   Rapid COVID 19 test positive, results dicussed with patient, questions answered. " Symptomatic treatment as discussed, Self Quarantine guidelines as discussed, and ED precuaitons reviewed. Patient states understanding. COVID 19 patient education handout given.    Fever, unspecified fever cause  -     SARS Coronavirus 2 Antigen, POCT Manual Read  -     Ambulatory referral/consult to [facility]    COVID-19  -     Ambulatory referral/consult to [facility]  -     fluticasone propionate (FLONASE) 50 mcg/actuation nasal spray; 1 spray (50 mcg total) by Each Nostril route once daily.  Dispense: 16 g; Refill: 0  -     guaiFENesin (MUCINEX) 1,200 mg Ta12; Take 1,200 mg by mouth 2 (two) times a day. for 10 days  Dispense: 20 tablet; Refill: 0  -     albuterol (VENTOLIN HFA) 90 mcg/actuation inhaler; Inhale 2 puffs into the lungs every 6 (six) hours as needed for Wheezing. Rescue  Dispense: 8 g; Refill: 0  -     promethazine-dextromethorphan (PROMETHAZINE-DM) 6.25-15 mg/5 mL Syrp; Take 5 mLs by mouth 3 (three) times daily as needed.  Dispense: 240 mL; Refill: 0

## 2022-06-10 NOTE — TELEPHONE ENCOUNTER
----- Message from Rossanabertha LaurentKaiser sent at 6/10/2022  8:07 AM CDT -----  Contact: patient  Type: Needs Medical Advice  Who Called:  patient  Symptoms (please be specific):  fever, sore throat, sinus drip, achy  How long has patient had these symptoms:  Wednesday night  Pharmacy name and phone #:   [id number] - [place], [place] - [address] [facility]  [address] [place] [address]  Phone: [phone] Fax: [phone]  Best Call Back Number: [phone] (home)   Additional Information: patient tested positive on an at home test- would like to be tested by the dr. Also would like to know if she can get something for it. Please advise.

## 2022-06-13 ENCOUNTER — TELEPHONE (OUTPATIENT)
Dept: FAMILY MEDICINE | Facility: CLINIC | Age: 72
End: 2022-06-13
Payer: MEDICARE

## 2022-06-13 NOTE — TELEPHONE ENCOUNTER
----- Message from Catherine Owen sent at 6/13/2022 10:23 AM CDT -----  Contact: Patient  Type:  Needs Medical Advice    Who Called:  Patient       Would the patient rather a call back or a response via [facility]?  Call    Best Call Back Number:  [phone] (home)     Additional Information:  patient states she has covid and was seen and no once from infusion has called her to schedule

## 2022-06-24 ENCOUNTER — PATIENT OUTREACH (OUTPATIENT)
Dept: ADMINISTRATIVE | Facility: HOSPITAL | Age: 72
End: 2022-06-24
Payer: MEDICARE

## 2022-06-24 NOTE — PROGRESS NOTES
Non-compliant report chart audits for HYPERTENSION MANAGEMENT    Outreach to patient in reference to hypertension management    RE:  Patient hypertension management    Pt compliant    Outreach:  Hypertension Management

## 2022-06-28 RX ORDER — MELOXICAM 7.5 MG/1
7.5 TABLET ORAL DAILY
Qty: 90 TABLET | Refills: 3 | Status: CANCELLED | OUTPATIENT
Start: 2022-06-28

## 2022-07-20 RX ORDER — MELOXICAM 7.5 MG/1
7.5 TABLET ORAL DAILY
Qty: 90 TABLET | Refills: 3 | Status: CANCELLED | OUTPATIENT
Start: 2022-07-20

## 2022-07-25 ENCOUNTER — OFFICE VISIT (OUTPATIENT)
Dept: URGENT CARE | Facility: CLINIC | Age: 72
End: 2022-07-25
Payer: MEDICARE

## 2022-07-25 VITALS
TEMPERATURE: 97 F | OXYGEN SATURATION: 98 % | SYSTOLIC BLOOD PRESSURE: 149 MMHG | HEART RATE: 78 BPM | DIASTOLIC BLOOD PRESSURE: 75 MMHG | HEIGHT: 65 IN | RESPIRATION RATE: 18 BRPM | BODY MASS INDEX: 31.19 KG/M2 | WEIGHT: 187.19 LBS

## 2022-07-25 DIAGNOSIS — R30.0 BURNING WITH URINATION: Primary | ICD-10-CM

## 2022-07-25 DIAGNOSIS — R31.29 MICROSCOPIC HEMATURIA: ICD-10-CM

## 2022-07-25 LAB
BILIRUB UR QL STRIP: NEGATIVE
GLUCOSE UR QL STRIP: POSITIVE
KETONES UR QL STRIP: NEGATIVE
LEUKOCYTE ESTERASE UR QL STRIP: NEGATIVE
PH, POC UA: 5.5
POC BLOOD, URINE: POSITIVE
POC NITRATES, URINE: NEGATIVE
PROT UR QL STRIP: NEGATIVE
SP GR UR STRIP: 1.01 (ref 1–1.03)
UROBILINOGEN UR STRIP-ACNC: NORMAL (ref 0.1–1.1)

## 2022-07-25 PROCEDURE — 1160F PR REVIEW ALL MEDS BY PRESCRIBER/CLIN PHARMACIST DOCUMENTED: ICD-10-PCS | Mod: CPTII,S$GLB,, | Performed by: NURSE PRACTITIONER

## 2022-07-25 PROCEDURE — 3078F PR MOST RECENT DIASTOLIC BLOOD PRESSURE < 80 MM HG: ICD-10-PCS | Mod: CPTII,S$GLB,, | Performed by: NURSE PRACTITIONER

## 2022-07-25 PROCEDURE — 3077F PR MOST RECENT SYSTOLIC BLOOD PRESSURE >= 140 MM HG: ICD-10-PCS | Mod: CPTII,S$GLB,, | Performed by: NURSE PRACTITIONER

## 2022-07-25 PROCEDURE — 1159F PR MEDICATION LIST DOCUMENTED IN MEDICAL RECORD: ICD-10-PCS | Mod: CPTII,S$GLB,, | Performed by: NURSE PRACTITIONER

## 2022-07-25 PROCEDURE — 99214 OFFICE O/P EST MOD 30 MIN: CPT | Mod: S$GLB,,, | Performed by: NURSE PRACTITIONER

## 2022-07-25 PROCEDURE — 1159F MED LIST DOCD IN RCRD: CPT | Mod: CPTII,S$GLB,, | Performed by: NURSE PRACTITIONER

## 2022-07-25 PROCEDURE — 3008F PR BODY MASS INDEX (BMI) DOCUMENTED: ICD-10-PCS | Mod: CPTII,S$GLB,, | Performed by: NURSE PRACTITIONER

## 2022-07-25 PROCEDURE — 99214 PR OFFICE/OUTPT VISIT, EST, LEVL IV, 30-39 MIN: ICD-10-PCS | Mod: S$GLB,,, | Performed by: NURSE PRACTITIONER

## 2022-07-25 PROCEDURE — 3008F BODY MASS INDEX DOCD: CPT | Mod: CPTII,S$GLB,, | Performed by: NURSE PRACTITIONER

## 2022-07-25 PROCEDURE — 81003 URINALYSIS AUTO W/O SCOPE: CPT | Mod: QW,S$GLB,, | Performed by: NURSE PRACTITIONER

## 2022-07-25 PROCEDURE — 3078F DIAST BP <80 MM HG: CPT | Mod: CPTII,S$GLB,, | Performed by: NURSE PRACTITIONER

## 2022-07-25 PROCEDURE — 1160F RVW MEDS BY RX/DR IN RCRD: CPT | Mod: CPTII,S$GLB,, | Performed by: NURSE PRACTITIONER

## 2022-07-25 PROCEDURE — 81003 POCT URINALYSIS, DIPSTICK, AUTOMATED, W/O SCOPE: ICD-10-PCS | Mod: QW,S$GLB,, | Performed by: NURSE PRACTITIONER

## 2022-07-25 PROCEDURE — 3077F SYST BP >= 140 MM HG: CPT | Mod: CPTII,S$GLB,, | Performed by: NURSE PRACTITIONER

## 2022-07-25 RX ORDER — CEPHALEXIN 500 MG/1
500 CAPSULE ORAL EVERY 12 HOURS
Qty: 14 CAPSULE | Refills: 0 | Status: SHIPPED | OUTPATIENT
Start: 2022-07-25 | End: 2022-08-01

## 2022-07-25 NOTE — PATIENT INSTRUCTIONS
Keflex twice a day for 7 days.  Increase fluid intake significantly to help flush urinary systems.  Urine culture was sent off the lab expect results in 3-5 days.  Return to clinic or seek medical re-evaluation if symptoms have not improved after 48-72 hours on antibiotics.  Go to emergency room for onset of fever, chills, nausea, vomiting, diarrhea, abdominal pain or back pain associated with the s for kidney infectionymptoms would indicate a possible concern

## 2022-07-25 NOTE — PROGRESS NOTES
"Subjective:       Patient ID: Kathy Seymour is a 71 y.o. female.    Vitals:  height is 5' 5" (1.651 m) and weight is 84.9 kg (187 lb 3.2 oz). Her oral temperature is 97.3 °F (36.3 °C). Her blood pressure is 149/75 (abnormal) and her pulse is 78. Her respiration is 18 and oxygen saturation is 98%.     Chief Complaint: Urinary Tract Infection    Urinary Tract Infection   This is a new problem. The current episode started yesterday. The quality of the pain is described as burning and aching. The pain is severe. There has been no fever. Associated symptoms include frequency and urgency. Pertinent negatives include no chills, flank pain, hematuria, nausea or vomiting. Treatments tried: azo.       Constitution: Negative for chills, fatigue and fever.   Gastrointestinal: Negative for abdominal pain, nausea, vomiting and diarrhea.   Genitourinary: Positive for dysuria, frequency, urgency, bladder incontinence (chronic) and pelvic pain (with urination). Negative for flank pain and hematuria.   Musculoskeletal: Negative for back pain.       Objective:      Physical Exam   Constitutional: She is oriented to person, place, and time. She appears well-developed.  Non-toxic appearance. She does not appear ill. No distress.   HENT:   Head: Normocephalic and atraumatic.   Nose: Nose normal.   Mouth/Throat: Oropharynx is clear and moist. Mucous membranes are moist.   Eyes: Conjunctivae and EOM are normal. Right eye exhibits no discharge. Left eye exhibits no discharge.   Cardiovascular: Normal rate.   Pulmonary/Chest: Effort normal. No respiratory distress.   Abdominal: Normal appearance. Soft. flat abdomen There is no abdominal tenderness. There is no rebound, no guarding, no left CVA tenderness and no right CVA tenderness.   Neurological: no focal deficit. She is alert and oriented to person, place, and time.   Skin: Skin is warm, dry and not diaphoretic. Capillary refill takes 2 to 3 seconds.   Psychiatric: Her behavior is " Dr. Sarah Zuniga is present. Patient of Dr. Bc Olivera.     SUBJECTIVE  Andres Craig is a 69 year old year old male who comes in today for trial of triple agent due to erectile dysfunction.  Patient reports erectile dysfunction for the past 2 years and has tried oral agents such as Cialis and Viagra.  This has only obtained a partial erection insufficient for penetration.  Patient has history of BPH.       OBJECTIVE  Blood pressure 124/70, height 5' 10\" (1.778 m), weight 82.7 kg..  Gen - Patient is a 69 year old year old male.    He is well developed well nourished. Nontoxic appearing.  Neuro - AOx3, answers questions appropriately.  Skin - Warm, dry.  Exposed areas without rash.  Abd - Soft, nontender, nondistended.   Back - No CVA tenderness bilaterally. No spinal tenderness.  :  Normal circumcised male. Penis and scrotum without lesions. Glans and meatus are normal. Testicles descended bilaterally without masses, nodules or tenderness to palpation.      Assessment:   1. Erectile dysfunction  2. benign prostatic hyperplasia (BPH)      Plan:   Patient brings in his own medication.  Injection technique and administration was reviewed with the patient and spouse. Paperwork describing administration was provided to the patient.  I reviewed in detail the \"Patient consent for penile self-injection therapy for erectile dysfunction\", all of the patient's questions were answered and he signed the consent.  The risk of prolonged erections was stressed. Patient was advised to take Sudafed if erection lasted greater than 2 hours. He was advised to call our office if erection last 4 or more hours.    Patient will undergo a trial of triple agent today with a trial dose of 0.1 cc.     PROCEDURE:  The patient provided triple agent was used. A 0.1 cc dose was administered per protocol.  The patient tolerated the injection without problems. No bleeding was noted.  Patient was reassessed after 15 minutes and was  normal. Mood normal.   Nursing note and vitals reviewed.        Assessment:       1. Burning with urination    2. Microscopic hematuria          Plan:         Burning with urination  -     POCT Urinalysis, Dipstick, Automated, W/O Scope  -     Urine culture  -     cephALEXin (KEFLEX) 500 MG capsule; Take 1 capsule (500 mg total) by mouth every 12 (twelve) hours. for 7 days  Dispense: 14 capsule; Refill: 0    Microscopic hematuria  -     cephALEXin (KEFLEX) 500 MG capsule; Take 1 capsule (500 mg total) by mouth every 12 (twelve) hours. for 7 days  Dispense: 14 capsule; Refill: 0    Keflex twice a day for 7 days.  Increase fluid intake significantly to help flush urinary systems.  Urine culture was sent off the lab expect results in 3-5 days.  Return to clinic or seek medical re-evaluation if symptoms have not improved after 48-72 hours on antibiotics.  Go to emergency room for onset of fever, chills, nausea, vomiting, diarrhea, abdominal pain or back pain associated with the s for kidney infectionymptoms would indicate a possible concern                noted to have a partial erection. Patient is directed to begin with a home dose of 0.1 cc, increase by a dose of 0.05cc, with a maximum dose of 0.3cc. Treatment plan was reviewed in detail, patient voices understanding and agrees with plan.   Patient instructed to call the office if symptoms worsen, fevers/chills develop or in unable to urinate.      Recommend patient follow up in 1 year with Dr. Olivera, or sooner if problems or questions.    Antonietta BARNES

## 2022-07-28 LAB
BACTERIA UR CULT: NORMAL
BACTERIA UR CULT: NORMAL

## 2022-07-29 ENCOUNTER — TELEPHONE (OUTPATIENT)
Dept: URGENT CARE | Facility: CLINIC | Age: 72
End: 2022-07-29
Payer: MEDICARE

## 2022-07-29 NOTE — TELEPHONE ENCOUNTER
----- Message from Taina Doll NP sent at 7/28/2022  7:04 PM CDT -----  Notify patient that urine culture grew a type of bacteria that is present normally in the vagina and so it can be assumed that the urinalysis and urine culture were both  contaminated clean catch specimens.  If symptoms persist, please f/u with PCP or return to clinic for further evaluation.

## 2022-08-22 DIAGNOSIS — M62.830 SPASM OF MUSCLE OF LOWER BACK: ICD-10-CM

## 2022-08-22 DIAGNOSIS — R32 URINARY INCONTINENCE, UNSPECIFIED TYPE: ICD-10-CM

## 2022-08-22 RX ORDER — MELOXICAM 7.5 MG/1
7.5 TABLET ORAL DAILY
Qty: 90 TABLET | Refills: 3 | Status: CANCELLED | OUTPATIENT
Start: 2022-08-22

## 2022-08-22 RX ORDER — SOLIFENACIN SUCCINATE 5 MG/1
5 TABLET, FILM COATED ORAL DAILY
Qty: 90 TABLET | Refills: 3 | Status: CANCELLED | OUTPATIENT
Start: 2022-08-22 | End: 2023-08-22

## 2022-08-23 RX ORDER — TIZANIDINE 4 MG/1
TABLET ORAL
Qty: 20 TABLET | Refills: 1 | OUTPATIENT
Start: 2022-08-23

## 2022-08-23 NOTE — TELEPHONE ENCOUNTER
The medication was prescribe more than a year ago for lower back muscle spasm-she needs an office visit

## 2022-09-12 ENCOUNTER — OFFICE VISIT (OUTPATIENT)
Dept: FAMILY MEDICINE | Facility: CLINIC | Age: 72
End: 2022-09-12
Payer: MEDICARE

## 2022-09-12 ENCOUNTER — LAB VISIT (OUTPATIENT)
Dept: LAB | Facility: HOSPITAL | Age: 72
End: 2022-09-12
Attending: FAMILY MEDICINE
Payer: MEDICARE

## 2022-09-12 VITALS
OXYGEN SATURATION: 97 % | WEIGHT: 183.56 LBS | DIASTOLIC BLOOD PRESSURE: 72 MMHG | TEMPERATURE: 98 F | BODY MASS INDEX: 30.58 KG/M2 | HEART RATE: 87 BPM | HEIGHT: 65 IN | SYSTOLIC BLOOD PRESSURE: 130 MMHG

## 2022-09-12 DIAGNOSIS — E04.1 NODULAR THYROID DISEASE: ICD-10-CM

## 2022-09-12 DIAGNOSIS — G62.9 NEUROPATHY: ICD-10-CM

## 2022-09-12 DIAGNOSIS — Z87.891 PERSONAL HISTORY OF NICOTINE DEPENDENCE: ICD-10-CM

## 2022-09-12 DIAGNOSIS — Z12.2 SCREENING FOR LUNG CANCER: ICD-10-CM

## 2022-09-12 DIAGNOSIS — E05.90 SUBCLINICAL HYPERTHYROIDISM: ICD-10-CM

## 2022-09-12 DIAGNOSIS — R41.3 MEMORY DIFFICULTIES: ICD-10-CM

## 2022-09-12 DIAGNOSIS — N18.31 CHRONIC KIDNEY DISEASE, STAGE 3A: ICD-10-CM

## 2022-09-12 DIAGNOSIS — R30.0 DYSURIA: ICD-10-CM

## 2022-09-12 DIAGNOSIS — R73.03 PREDIABETES: ICD-10-CM

## 2022-09-12 DIAGNOSIS — I25.10 CORONARY ARTERY DISEASE INVOLVING NATIVE CORONARY ARTERY OF NATIVE HEART WITHOUT ANGINA PECTORIS: ICD-10-CM

## 2022-09-12 DIAGNOSIS — I10 PRIMARY HYPERTENSION: Primary | ICD-10-CM

## 2022-09-12 DIAGNOSIS — J44.9 COPD MIXED TYPE: ICD-10-CM

## 2022-09-12 DIAGNOSIS — E78.2 MIXED HYPERLIPIDEMIA: Chronic | ICD-10-CM

## 2022-09-12 DIAGNOSIS — F41.9 ANXIETY: ICD-10-CM

## 2022-09-12 LAB
BACTERIA #/AREA URNS HPF: ABNORMAL /HPF
BILIRUB UR QL STRIP: NEGATIVE
CLARITY UR: ABNORMAL
COLOR UR: YELLOW
GLUCOSE UR QL STRIP: NEGATIVE
HGB UR QL STRIP: ABNORMAL
KETONES UR QL STRIP: NEGATIVE
LEUKOCYTE ESTERASE UR QL STRIP: ABNORMAL
MICROSCOPIC COMMENT: ABNORMAL
NITRITE UR QL STRIP: POSITIVE
PH UR STRIP: 7 [PH] (ref 5–8)
PROT UR QL STRIP: NEGATIVE
RBC #/AREA URNS HPF: 2 /HPF (ref 0–4)
SP GR UR STRIP: 1.01 (ref 1–1.03)
SQUAMOUS #/AREA URNS HPF: 7 /HPF
URN SPEC COLLECT METH UR: ABNORMAL
UROBILINOGEN UR STRIP-ACNC: 1 EU/DL
WBC #/AREA URNS HPF: 52 /HPF (ref 0–5)

## 2022-09-12 PROCEDURE — 1159F MED LIST DOCD IN RCRD: CPT | Mod: CPTII,S$GLB,, | Performed by: FAMILY MEDICINE

## 2022-09-12 PROCEDURE — 99214 PR OFFICE/OUTPT VISIT, EST, LEVL IV, 30-39 MIN: ICD-10-PCS | Mod: S$GLB,,, | Performed by: FAMILY MEDICINE

## 2022-09-12 PROCEDURE — 99999 PR PBB SHADOW E&M-EST. PATIENT-LVL III: CPT | Mod: PBBFAC,,, | Performed by: FAMILY MEDICINE

## 2022-09-12 PROCEDURE — 99999 PR PBB SHADOW E&M-EST. PATIENT-LVL III: ICD-10-PCS | Mod: PBBFAC,,, | Performed by: FAMILY MEDICINE

## 2022-09-12 PROCEDURE — 81000 URINALYSIS NONAUTO W/SCOPE: CPT | Performed by: FAMILY MEDICINE

## 2022-09-12 PROCEDURE — 99214 OFFICE O/P EST MOD 30 MIN: CPT | Mod: S$GLB,,, | Performed by: FAMILY MEDICINE

## 2022-09-12 PROCEDURE — 1126F AMNT PAIN NOTED NONE PRSNT: CPT | Mod: CPTII,S$GLB,, | Performed by: FAMILY MEDICINE

## 2022-09-12 PROCEDURE — 1160F RVW MEDS BY RX/DR IN RCRD: CPT | Mod: CPTII,S$GLB,, | Performed by: FAMILY MEDICINE

## 2022-09-12 PROCEDURE — 1159F PR MEDICATION LIST DOCUMENTED IN MEDICAL RECORD: ICD-10-PCS | Mod: CPTII,S$GLB,, | Performed by: FAMILY MEDICINE

## 2022-09-12 PROCEDURE — 1126F PR PAIN SEVERITY QUANTIFIED, NO PAIN PRESENT: ICD-10-PCS | Mod: CPTII,S$GLB,, | Performed by: FAMILY MEDICINE

## 2022-09-12 PROCEDURE — 1160F PR REVIEW ALL MEDS BY PRESCRIBER/CLIN PHARMACIST DOCUMENTED: ICD-10-PCS | Mod: CPTII,S$GLB,, | Performed by: FAMILY MEDICINE

## 2022-09-12 PROCEDURE — 3008F BODY MASS INDEX DOCD: CPT | Mod: CPTII,S$GLB,, | Performed by: FAMILY MEDICINE

## 2022-09-12 PROCEDURE — 1101F PR PT FALLS ASSESS DOC 0-1 FALLS W/OUT INJ PAST YR: ICD-10-PCS | Mod: CPTII,S$GLB,, | Performed by: FAMILY MEDICINE

## 2022-09-12 PROCEDURE — 3075F PR MOST RECENT SYSTOLIC BLOOD PRESS GE 130-139MM HG: ICD-10-PCS | Mod: CPTII,S$GLB,, | Performed by: FAMILY MEDICINE

## 2022-09-12 PROCEDURE — 1101F PT FALLS ASSESS-DOCD LE1/YR: CPT | Mod: CPTII,S$GLB,, | Performed by: FAMILY MEDICINE

## 2022-09-12 PROCEDURE — 3078F DIAST BP <80 MM HG: CPT | Mod: CPTII,S$GLB,, | Performed by: FAMILY MEDICINE

## 2022-09-12 PROCEDURE — 99499 UNLISTED E&M SERVICE: CPT | Mod: HCNC,S$GLB,, | Performed by: FAMILY MEDICINE

## 2022-09-12 PROCEDURE — 3078F PR MOST RECENT DIASTOLIC BLOOD PRESSURE < 80 MM HG: ICD-10-PCS | Mod: CPTII,S$GLB,, | Performed by: FAMILY MEDICINE

## 2022-09-12 PROCEDURE — 3288F FALL RISK ASSESSMENT DOCD: CPT | Mod: CPTII,S$GLB,, | Performed by: FAMILY MEDICINE

## 2022-09-12 PROCEDURE — 3075F SYST BP GE 130 - 139MM HG: CPT | Mod: CPTII,S$GLB,, | Performed by: FAMILY MEDICINE

## 2022-09-12 PROCEDURE — 3008F PR BODY MASS INDEX (BMI) DOCUMENTED: ICD-10-PCS | Mod: CPTII,S$GLB,, | Performed by: FAMILY MEDICINE

## 2022-09-12 PROCEDURE — 3288F PR FALLS RISK ASSESSMENT DOCUMENTED: ICD-10-PCS | Mod: CPTII,S$GLB,, | Performed by: FAMILY MEDICINE

## 2022-09-12 RX ORDER — DULOXETIN HYDROCHLORIDE 60 MG/1
60 CAPSULE, DELAYED RELEASE ORAL EVERY OTHER DAY
Qty: 90 CAPSULE | Refills: 3
Start: 2022-09-12 | End: 2023-02-08

## 2022-09-14 ENCOUNTER — PATIENT MESSAGE (OUTPATIENT)
Dept: FAMILY MEDICINE | Facility: CLINIC | Age: 72
End: 2022-09-14
Payer: MEDICARE

## 2022-09-14 DIAGNOSIS — N30.00 ACUTE CYSTITIS WITHOUT HEMATURIA: Primary | ICD-10-CM

## 2022-09-14 RX ORDER — CIPROFLOXACIN 250 MG/1
250 TABLET, FILM COATED ORAL 2 TIMES DAILY
Qty: 6 TABLET | Refills: 0 | Status: SHIPPED | OUTPATIENT
Start: 2022-09-14 | End: 2022-09-17

## 2022-09-14 NOTE — TELEPHONE ENCOUNTER
Progress Note      Bin Osorio, APRN  [address] [address]  Phone: [phone]  Fax: [phone]      PATIENT NAME: Devora Grant                                                                          YOB: 1936            DATE OF SERVICE: 07/14/2022  FACILITY: [facility]     CHIEF COMPLAINT:  Regular nursing facility visit      HISTORY OF PRESENT ILLNESS:   This 86 y.o. female was seen at the nursing facility today for routine rounding review of acute and chronic problems.      She is seen today for follow-up after admitting to the nursing facility on 7/7/2022.  She is DNR, comfort care only.  She reports to me that she is wanting to stay at the facility long-term due to continued decrease in mobility and ability to care for self/ADLs.  Reports that her left knee is still having quite a bit of pain and no better since the steroid pack given.  Chronic conditions reviewed-she is still anticoagulated, has hypertension, hyperlipidemia.  She is agreeable to get updated lab including uric acid level related to the knee pain.    PAST MEDICAL & SURGICAL HISTORY:   Past Medical History:   Diagnosis Date   • Atrial fibrillation (HCC)    • Cancer (HCC)    • Cholelithiasis    • Colon polyp    • Hyperlipidemia    • Hypertension       Past Surgical History:   Procedure Laterality Date   • APPENDECTOMY     • BREAST SURGERY     • COLONOSCOPY  10/2015    Polyps, fair prep, recall 1 year   • COLONOSCOPY      10 yr ago, Dr. Valiente   • HYSTERECTOMY     • MASTECTOMY      Left        MEDICATIONS:  Full medication list from facility reviewed this visit.    ALLERGIES:  Allergies   Allergen Reactions   • Penicillins Hives   • Sulfa Antibiotics Hives   • Sulfasalazine Unknown (See Comments)       SOCIAL HISTORY:  Social History     Socioeconomic History   • Marital status:    Tobacco Use   • Smoking status: Never Smoker   • Smokeless tobacco: Never Used   Substance and  UA does show evidence of UTI. Cipro 250 mg BID x 3 days sent to pharmacy.   Sexual Activity   • Alcohol use: No   • Drug use: No   • Sexual activity: Never     FAMILY HISTORY:  Family History   Problem Relation Age of Onset   • No Known Problems Mother    • No Known Problems Father      REVIEW OF SYSTEMS:  Review of Systems   Constitutional: Negative for appetite change, chills and fever.   Respiratory: Negative for cough and shortness of breath.    Cardiovascular: Negative for chest pain, palpitations and leg swelling.   Musculoskeletal: Positive for arthralgias and gait problem. Negative for myalgias.   Skin: Negative for pallor and rash.   Neurological: Positive for weakness. Negative for tremors and speech difficulty.     PHYSICAL EXAMINATION:   VITAL SIGNS: Temperature 97.8 pulse 68 respiratory rate 18 blood pressure 124/68 O2 saturation 97%  Physical Exam  Vitals reviewed.   Constitutional:       General: She is not in acute distress.     Appearance: Normal appearance.   Cardiovascular:      Rate and Rhythm: Normal rate and regular rhythm.   Pulmonary:      Effort: Pulmonary effort is normal. No respiratory distress.      Breath sounds: Normal breath sounds.   Musculoskeletal:         General: Tenderness (*Warmth and tenderness left knee) present. No signs of injury.      Right lower leg: No edema.      Left lower leg: No edema.   Skin:     General: Skin is warm and dry.      Findings: No rash.   Neurological:      Mental Status: She is oriented to person, place, and time.      Motor: Weakness present.       RECORDS REVIEW:   I have reviewed and interpreted any labs, xrays, and diagnostic tests available today.    ASSESSMENT   Diagnoses and all orders for this visit:    1. Nursing home resident (Primary)    2. Chronic pain of left knee    3. Deficit in activities of daily living (ADL)    4. Debility    5. Mixed hyperlipidemia    6. Essential hypertension    7. Anticoagulated    PLAN  1.  Discussed Patient in detail with nursing/staff, addressed all needs today.     2.  Plan of Care  Reviewed   3.  PT/OT/SLP Reviewed  4.  Order Changes  -Okay to stay long-term if patient desires  -6-month labs CBC, CMP, TSH, lipids, uric acid level  -Proceed with orthopedic referral-sent 7/7/2022 from office visit  5.  Discharge Plans Reviewed -planning to stay long-term.    6.  Advance Directive on file with nursing facility.   7.  POA on file with nursing facility.   8.  Code Status on facility chart reviewed during encounter.  9.  Review labs, xrays, diagnostics in realtime as office receives results.    Note e-Signed by IVAN Joshua on 07/14/2022 at 07:55 CDT

## 2022-09-17 NOTE — PROGRESS NOTES
Subjective:       Patient ID: Kathy Seymour is a 71 y.o. female.    Chief Complaint: Hypertension, Hyperlipidemia, Coronary Artery Disease, and COPD    Patient presents here for 6 month follow-up of hypertension, hyperlipidemia, CAD, and COPD.  Her hypertension is well controlled with her present medication and she is tolerating her medications as well as following a low-sodium diet.  Her hyperlipidemia is also well controlled with her diet and her present dose of Lipitor 80 mg daily.  She does have a history of coronary artery disease and had stents placed.  She is followed by Cardiology on a regular basis and she denies any recent chest pains or palpitations.  Her last stress test was December of 2020.  She also has a history of COPD due to history of smoking but this is stable and she does not require oxygen.  She quit smoking approximately 7 years ago.  It is time for her low-dose CT of her lungs as it has been a year since her last 1.  She does complain of some mild dysuria and she would like to check a urinalysis as she will be leaving to go on an extended weekend in several days.  She denies any fever.  She is also complaining of issues with her neuropathy.  She has not been on medications but would like to try something as it is really causing her an issue now with pain.  Finally, she is also complaining some decreased memory.  After discussion with the patient, most of her issues seem to be more age related and no red flags as far as memory issues.  She has started taking Prevagen about a week ago for this.  I did tell her that I am not sure that this will do her any good but I do not think it will do her any harm also.  There are no validated scientific studies that show this does help with memory but everyone is different.  As far as health maintenance, she is up-to-date with all of her recommended screening exams and immunizations with exception of 2nd COVID booster, flu vaccine, and low-dose lung  CT.  She will get her flu vaccine next month.  She is not interested in getting another COVID vaccine at this time.    Review of Systems   Constitutional:  Negative for chills, fatigue, fever and unexpected weight change.   HENT:  Negative for nasal congestion, postnasal drip and sore throat.    Respiratory:  Negative for cough, shortness of breath and wheezing.    Cardiovascular:  Negative for chest pain and palpitations.   Gastrointestinal:  Negative for abdominal pain, diarrhea and nausea.   Genitourinary:  Positive for dysuria and frequency. Negative for difficulty urinating and flank pain.   Musculoskeletal:  Negative for arthralgias and back pain.   Neurological:  Positive for memory loss. Negative for dizziness, light-headedness and headaches.        Positive for neuropathic pain in her legs   Psychiatric/Behavioral:  Negative for sleep disturbance. The patient is not nervous/anxious.        Objective:      Physical Exam  Vitals reviewed.   Constitutional:       General: She is not in acute distress.     Appearance: Normal appearance. She is well-developed. She is obese.   HENT:      Right Ear: Tympanic membrane and external ear normal.      Left Ear: Tympanic membrane and external ear normal.      Mouth/Throat:      Pharynx: Oropharynx is clear. No posterior oropharyngeal erythema.   Neck:      Thyroid: No thyromegaly.   Cardiovascular:      Rate and Rhythm: Normal rate and regular rhythm.      Pulses: Normal pulses.      Heart sounds: Normal heart sounds. No murmur heard.  Pulmonary:      Effort: Pulmonary effort is normal.      Breath sounds: Normal breath sounds. No wheezing or rales.   Musculoskeletal:         General: No tenderness. Normal range of motion.      Cervical back: Normal range of motion and neck supple.      Right lower leg: No edema.      Left lower leg: No edema.   Lymphadenopathy:      Cervical: No cervical adenopathy.   Neurological:      General: No focal deficit present.      Mental  Status: She is alert and oriented to person, place, and time.      Cranial Nerves: No cranial nerve deficit.      Deep Tendon Reflexes: Reflexes are normal and symmetric.       Assessment:       Problem List Items Addressed This Visit       Hyperlipidemia, baseline  (Chronic)    Anxiety    Relevant Medications    DULoxetine (CYMBALTA) 60 MG capsule    Hypertension, onset before 1995 - Primary    Relevant Orders    Comprehensive Metabolic Panel    Nodular thyroid disease    Prediabetes    Relevant Orders    Hemoglobin A1C    Subclinical hyperthyroidism    Relevant Orders    TSH    COPD mixed type    Relevant Orders    CBC Auto Differential    Memory difficulties    Coronary artery disease involving native coronary artery of native heart without angina pectoris    Relevant Orders    Lipid Panel    Chronic kidney disease, stage 3a     Other Visit Diagnoses       Neuropathy        Relevant Medications    gabapentin enacarbil (HORIZANT) 300 mg TbSR    DULoxetine (CYMBALTA) 60 MG capsule    Dysuria        Relevant Orders    Urinalysis    Urinalysis (Completed)    Screening for lung cancer        Relevant Orders    CT Chest Lung Screening Low Dose    Personal history of nicotine dependence        Relevant Orders    CT Chest Lung Screening Low Dose              Plan:       1. Continue present medication as her hypertension, hyperlipidemia, CAD, and COPD are stable and controlled  2.  Continue low-sodium, low-fat low-cholesterol diet but increase exercise for weight loss.  BMI today is 30.54   3. Urinalysis today  4. Schedule CBC, CMP, lipid profile, TSH, and hemoglobin A1c.  Hemoglobin A1c is being done due to prediabetes.  She has a history of mildly elevated glucoses and a hemoglobin A1c in October 2021 was 6.0%  5. Schedule low-dose lung CT for screening due to her history of smoking  6. Follow up with me in 6 months or p.r.n.

## 2022-09-21 ENCOUNTER — HOSPITAL ENCOUNTER (OUTPATIENT)
Dept: RADIOLOGY | Facility: HOSPITAL | Age: 72
Discharge: HOME | End: 2022-09-21
Attending: FAMILY MEDICINE
Payer: MEDICARE

## 2022-09-21 ENCOUNTER — LAB VISIT (OUTPATIENT)
Dept: LAB | Facility: HOSPITAL | Age: 72
End: 2022-09-21
Attending: FAMILY MEDICINE
Payer: MEDICARE

## 2022-09-21 DIAGNOSIS — I25.10 CORONARY ARTERY DISEASE INVOLVING NATIVE CORONARY ARTERY OF NATIVE HEART WITHOUT ANGINA PECTORIS: ICD-10-CM

## 2022-09-21 DIAGNOSIS — E05.90 SUBCLINICAL HYPERTHYROIDISM: ICD-10-CM

## 2022-09-21 DIAGNOSIS — R73.03 PREDIABETES: ICD-10-CM

## 2022-09-21 DIAGNOSIS — Z12.2 SCREENING FOR LUNG CANCER: ICD-10-CM

## 2022-09-21 DIAGNOSIS — Z87.891 PERSONAL HISTORY OF NICOTINE DEPENDENCE: ICD-10-CM

## 2022-09-21 DIAGNOSIS — J44.9 COPD MIXED TYPE: ICD-10-CM

## 2022-09-21 DIAGNOSIS — I10 PRIMARY HYPERTENSION: ICD-10-CM

## 2022-09-21 LAB
ALBUMIN SERPL BCP-MCNC: 3.9 G/DL (ref 3.5–5.2)
ALP SERPL-CCNC: 108 U/L (ref 55–135)
ALT SERPL W/O P-5'-P-CCNC: 17 U/L (ref 10–44)
ANION GAP SERPL CALC-SCNC: 7 MMOL/L (ref 8–16)
AST SERPL-CCNC: 19 U/L (ref 10–40)
BASOPHILS # BLD AUTO: 0.04 K/UL (ref 0–0.2)
BASOPHILS NFR BLD: 0.6 % (ref 0–1.9)
BILIRUB SERPL-MCNC: 0.7 MG/DL (ref 0.1–1)
BUN SERPL-MCNC: 15 MG/DL (ref 8–23)
CALCIUM SERPL-MCNC: 8.9 MG/DL (ref 8.7–10.5)
CHLORIDE SERPL-SCNC: 103 MMOL/L (ref 95–110)
CHOLEST SERPL-MCNC: 132 MG/DL (ref 120–199)
CHOLEST/HDLC SERPL: 2.5 {RATIO} (ref 2–5)
CO2 SERPL-SCNC: 32 MMOL/L (ref 23–29)
CREAT SERPL-MCNC: 0.9 MG/DL (ref 0.5–1.4)
DIFFERENTIAL METHOD: ABNORMAL
EOSINOPHIL # BLD AUTO: 0.2 K/UL (ref 0–0.5)
EOSINOPHIL NFR BLD: 3 % (ref 0–8)
ERYTHROCYTE [DISTWIDTH] IN BLOOD BY AUTOMATED COUNT: 14.1 % (ref 11.5–14.5)
EST. GFR  (NO RACE VARIABLE): >60 ML/MIN/1.73 M^2
ESTIMATED AVG GLUCOSE: 128 MG/DL (ref 68–131)
GLUCOSE SERPL-MCNC: 118 MG/DL (ref 70–110)
HBA1C MFR BLD: 6.1 % (ref 4–5.6)
HCT VFR BLD AUTO: 35.8 % (ref 37–48.5)
HDLC SERPL-MCNC: 52 MG/DL (ref 40–75)
HDLC SERPL: 39.4 % (ref 20–50)
HGB BLD-MCNC: 11.5 G/DL (ref 12–16)
IMM GRANULOCYTES # BLD AUTO: 0.02 K/UL (ref 0–0.04)
IMM GRANULOCYTES NFR BLD AUTO: 0.3 % (ref 0–0.5)
LDLC SERPL CALC-MCNC: 63 MG/DL (ref 63–159)
LYMPHOCYTES # BLD AUTO: 1.6 K/UL (ref 1–4.8)
LYMPHOCYTES NFR BLD: 22.2 % (ref 18–48)
MCH RBC QN AUTO: 28.9 PG (ref 27–31)
MCHC RBC AUTO-ENTMCNC: 32.1 G/DL (ref 32–36)
MCV RBC AUTO: 90 FL (ref 82–98)
MONOCYTES # BLD AUTO: 0.5 K/UL (ref 0.3–1)
MONOCYTES NFR BLD: 7.7 % (ref 4–15)
NEUTROPHILS # BLD AUTO: 4.6 K/UL (ref 1.8–7.7)
NEUTROPHILS NFR BLD: 66.2 % (ref 38–73)
NONHDLC SERPL-MCNC: 80 MG/DL
NRBC BLD-RTO: 0 /100 WBC
PLATELET # BLD AUTO: 195 K/UL (ref 150–450)
PMV BLD AUTO: 12 FL (ref 9.2–12.9)
POTASSIUM SERPL-SCNC: 3.9 MMOL/L (ref 3.5–5.1)
PROT SERPL-MCNC: 6.6 G/DL (ref 6–8.4)
RBC # BLD AUTO: 3.98 M/UL (ref 4–5.4)
SODIUM SERPL-SCNC: 142 MMOL/L (ref 136–145)
TRIGL SERPL-MCNC: 85 MG/DL (ref 30–150)
TSH SERPL DL<=0.005 MIU/L-ACNC: 2.6 UIU/ML (ref 0.4–4)
WBC # BLD AUTO: 6.99 K/UL (ref 3.9–12.7)

## 2022-09-21 PROCEDURE — 85025 COMPLETE CBC W/AUTO DIFF WBC: CPT | Performed by: FAMILY MEDICINE

## 2022-09-21 PROCEDURE — 83036 HEMOGLOBIN GLYCOSYLATED A1C: CPT | Performed by: FAMILY MEDICINE

## 2022-09-21 PROCEDURE — 80061 LIPID PANEL: CPT | Performed by: FAMILY MEDICINE

## 2022-09-21 PROCEDURE — 71271 CT THORAX LUNG CANCER SCR C-: CPT | Mod: TC

## 2022-09-21 PROCEDURE — 36415 COLL VENOUS BLD VENIPUNCTURE: CPT | Mod: PO | Performed by: FAMILY MEDICINE

## 2022-09-21 PROCEDURE — 80053 COMPREHEN METABOLIC PANEL: CPT | Performed by: FAMILY MEDICINE

## 2022-09-21 PROCEDURE — 84443 ASSAY THYROID STIM HORMONE: CPT | Performed by: FAMILY MEDICINE

## 2022-09-21 PROCEDURE — 71271 CT THORAX LUNG CANCER SCR C-: CPT | Mod: 26,,, | Performed by: RADIOLOGY

## 2022-09-21 PROCEDURE — 71271 CT CHEST LUNG SCREENING LOW DOSE: ICD-10-PCS | Mod: 26,,, | Performed by: RADIOLOGY

## 2022-09-26 ENCOUNTER — PATIENT MESSAGE (OUTPATIENT)
Dept: FAMILY MEDICINE | Facility: CLINIC | Age: 72
End: 2022-09-26
Payer: MEDICARE

## 2022-09-26 NOTE — TELEPHONE ENCOUNTER
The diagnosis of chronic kidney disease is based on the level of the GFR, or glomerular filtration rate.  Normal is above 60.  He had values which were just below 60 on 3 separate occasions.  It was 57 on 06/06/2019, 56.8 on 07/09/2020, and 56.4 on 08/25/2021.  These levels meet the criteria for chronic kidney disease stage IIIA, which is very mild.  At this point the only real treatment is to control the factors that could potentially worsen the kidney function.  These mainly are controlling blood pressure and controlling blood sugar.  The diagnosis of chronic kidney disease stage IIIA was also on the after visit summary of your visit in February of this year.  Please let me know if you have any further questions.

## 2022-10-06 ENCOUNTER — OFFICE VISIT (OUTPATIENT)
Dept: CARDIOLOGY | Facility: CLINIC | Age: 72
End: 2022-10-06
Payer: MEDICARE

## 2022-10-06 VITALS
HEIGHT: 65 IN | OXYGEN SATURATION: 97 % | SYSTOLIC BLOOD PRESSURE: 130 MMHG | WEIGHT: 184.88 LBS | DIASTOLIC BLOOD PRESSURE: 70 MMHG | BODY MASS INDEX: 30.8 KG/M2 | HEART RATE: 85 BPM

## 2022-10-06 DIAGNOSIS — I25.10 CORONARY ARTERY DISEASE INVOLVING NATIVE CORONARY ARTERY OF NATIVE HEART WITHOUT ANGINA PECTORIS: ICD-10-CM

## 2022-10-06 PROCEDURE — 3078F PR MOST RECENT DIASTOLIC BLOOD PRESSURE < 80 MM HG: ICD-10-PCS | Mod: CPTII,S$GLB,, | Performed by: INTERNAL MEDICINE

## 2022-10-06 PROCEDURE — 1160F PR REVIEW ALL MEDS BY PRESCRIBER/CLIN PHARMACIST DOCUMENTED: ICD-10-PCS | Mod: CPTII,S$GLB,, | Performed by: INTERNAL MEDICINE

## 2022-10-06 PROCEDURE — 3075F SYST BP GE 130 - 139MM HG: CPT | Mod: CPTII,S$GLB,, | Performed by: INTERNAL MEDICINE

## 2022-10-06 PROCEDURE — 1126F PR PAIN SEVERITY QUANTIFIED, NO PAIN PRESENT: ICD-10-PCS | Mod: CPTII,S$GLB,, | Performed by: INTERNAL MEDICINE

## 2022-10-06 PROCEDURE — 3044F PR MOST RECENT HEMOGLOBIN A1C LEVEL <7.0%: ICD-10-PCS | Mod: CPTII,S$GLB,, | Performed by: INTERNAL MEDICINE

## 2022-10-06 PROCEDURE — 1101F PT FALLS ASSESS-DOCD LE1/YR: CPT | Mod: CPTII,S$GLB,, | Performed by: INTERNAL MEDICINE

## 2022-10-06 PROCEDURE — 99212 OFFICE O/P EST SF 10 MIN: CPT | Mod: S$GLB,,, | Performed by: INTERNAL MEDICINE

## 2022-10-06 PROCEDURE — 1159F PR MEDICATION LIST DOCUMENTED IN MEDICAL RECORD: ICD-10-PCS | Mod: CPTII,S$GLB,, | Performed by: INTERNAL MEDICINE

## 2022-10-06 PROCEDURE — 1126F AMNT PAIN NOTED NONE PRSNT: CPT | Mod: CPTII,S$GLB,, | Performed by: INTERNAL MEDICINE

## 2022-10-06 PROCEDURE — 3078F DIAST BP <80 MM HG: CPT | Mod: CPTII,S$GLB,, | Performed by: INTERNAL MEDICINE

## 2022-10-06 PROCEDURE — 3075F PR MOST RECENT SYSTOLIC BLOOD PRESS GE 130-139MM HG: ICD-10-PCS | Mod: CPTII,S$GLB,, | Performed by: INTERNAL MEDICINE

## 2022-10-06 PROCEDURE — 99212 PR OFFICE/OUTPT VISIT, EST, LEVL II, 10-19 MIN: ICD-10-PCS | Mod: S$GLB,,, | Performed by: INTERNAL MEDICINE

## 2022-10-06 PROCEDURE — 1159F MED LIST DOCD IN RCRD: CPT | Mod: CPTII,S$GLB,, | Performed by: INTERNAL MEDICINE

## 2022-10-06 PROCEDURE — 1160F RVW MEDS BY RX/DR IN RCRD: CPT | Mod: CPTII,S$GLB,, | Performed by: INTERNAL MEDICINE

## 2022-10-06 PROCEDURE — 3044F HG A1C LEVEL LT 7.0%: CPT | Mod: CPTII,S$GLB,, | Performed by: INTERNAL MEDICINE

## 2022-10-06 PROCEDURE — 3288F FALL RISK ASSESSMENT DOCD: CPT | Mod: CPTII,S$GLB,, | Performed by: INTERNAL MEDICINE

## 2022-10-06 PROCEDURE — 3288F PR FALLS RISK ASSESSMENT DOCUMENTED: ICD-10-PCS | Mod: CPTII,S$GLB,, | Performed by: INTERNAL MEDICINE

## 2022-10-06 PROCEDURE — 1101F PR PT FALLS ASSESS DOC 0-1 FALLS W/OUT INJ PAST YR: ICD-10-PCS | Mod: CPTII,S$GLB,, | Performed by: INTERNAL MEDICINE

## 2022-10-06 RX ORDER — METOPROLOL SUCCINATE 25 MG/1
25 TABLET, EXTENDED RELEASE ORAL DAILY
Qty: 30 TABLET | Refills: 11 | Status: SHIPPED | OUTPATIENT
Start: 2022-10-06 | End: 2022-11-04 | Stop reason: SDUPTHER

## 2022-10-06 NOTE — PROGRESS NOTES
Patient ID:  Kathy Seymour is a 71 y.o. female who presents for follow-up of Coronary Artery Disease, Hypertension, Hyperlipidemia, and Results (labs)      She has had a lot of neuropathy.  She has pain on Cymbalta.  Dr. Seymour wants to wean her off.  She denies any chest pain to any shortness of breath.  Her pulse was mildly elevated will initiate very with Toprol XL      Past Medical History:   Diagnosis Date    Angina pectoris     Anticoagulant long-term use     Anxiety     Arthritis     Back pain     Cataract     Chronic bronchitis     Coronary artery disease     STENT X 2    CTS (carpal tunnel syndrome)     RIGHT    Depression     MILLER (dyspnea on exertion) 2/21/2017    Hallux valgus, acquired, bilateral 1/19/2017    Hematuria     Hyperlipidemia     Hypertension     Hypertensive left ventricular hypertrophy, without heart failure 5/22/2017    Hypothyroidism     Obesity     Polyneuropathy     S/P coronary artery stent placement, 2 LETTY 9/2012, 1 LETTY 9/2013 3/5/2013    Sleep apnea     USES CPAP    Thyroid disease     Tobacco dependence     Trouble in sleeping     Urinary incontinence     Wears glasses     ONE CONTAC        Past Surgical History:   Procedure Laterality Date    APPENDECTOMY      BILATERAL SALPINGOOPHORECTOMY      CARDIAC CATHETERIZATION      CORONARY ANGIOPLASTY      CORONARY STENT PLACEMENT      PCI  of the LAD with LETTY    EYE SURGERY      bilat cataract removal    HYSTERECTOMY      complete    OOPHORECTOMY      TONSILLECTOMY            Current Outpatient Medications   Medication Instructions    albuterol (VENTOLIN HFA) 90 mcg/actuation inhaler 2 puffs, Inhalation, Every 6 hours PRN, Rescue    ALPRAZolam (XANAX) 0.5 mg, Oral, 3 times daily PRN    aspirin 81 mg, Oral, Daily    atorvastatin (LIPITOR) 80 MG tablet TAKE 1 TABLET EVERY DAY NEED MD APPOINTMENT FOR REFILLS    budesonide-formoterol 160-4.5 mcg (SYMBICORT) 160-4.5 mcg/actuation HFAA 2 puffs, Inhalation, Daily, Controller     "DULoxetine (CYMBALTA) 60 mg, Oral, Every other day    fluticasone propionate (FLONASE) 50 mcg, Each Nostril, Daily    gabapentin enacarbil (HORIZANT) 300 mg, Oral, Nightly    losartan-hydrochlorothiazide 100-25 mg (HYZAAR) 100-25 mg per tablet TAKE 1 TABLET EVERY DAY    meloxicam (MOBIC) 7.5 mg, Oral, Daily    methIMAzole (TAPAZOLE) 5 mg, Oral, Daily    metoprolol succinate (TOPROL-XL) 25 mg, Oral, Daily    pantoprazole (PROTONIX) 40 mg, Oral, Daily    potassium chloride (MICRO-K) 10 MEQ CpSR 10 mEq, Oral, Daily    solifenacin (VESICARE) 5 mg, Oral, Daily    tiZANidine (ZANAFLEX) 4 MG tablet TAKE 1 TABLET (4 MG TOTAL) BY MOUTH EVERY 6 (SIX) HOURS AS NEEDED (SPASM).        Review of patient's allergies indicates:  No Known Allergies     Review of Systems   Cardiovascular:  Positive for dyspnea on exertion. Negative for chest pain.   Respiratory:  Negative for cough and shortness of breath.    Hematologic/Lymphatic: Negative for bleeding problem. Does not bruise/bleed easily.   Gastrointestinal:  Hemorrhoids: toprol.      Objective:     Vitals:    10/06/22 0949   BP: 130/70   BP Location: Left arm   Patient Position: Sitting   BP Method: Medium (Manual)   Pulse: 85   SpO2: 97%   Weight: 83.8 kg (184 lb 13.7 oz)   Height: 5' 5" (1.651 m)       Physical Exam  Vitals and nursing note reviewed.   Constitutional:       Appearance: She is well-developed.   HENT:      Head: Normocephalic and atraumatic.   Eyes:      Conjunctiva/sclera: Conjunctivae normal.   Cardiovascular:      Rate and Rhythm: Normal rate and regular rhythm.      Heart sounds: Normal heart sounds.   Pulmonary:      Effort: Pulmonary effort is normal.      Breath sounds: Normal breath sounds.   Abdominal:      General: Bowel sounds are normal.      Palpations: Abdomen is soft.   Musculoskeletal:         General: Normal range of motion.   Skin:     General: Skin is warm and dry.   Neurological:      Mental Status: She is alert and oriented to person, place, " and time.   Psychiatric:         Behavior: Behavior normal.         Thought Content: Thought content normal.         Judgment: Judgment normal.     CMP  Sodium   Date Value Ref Range Status   09/21/2022 142 136 - 145 mmol/L Final     Potassium   Date Value Ref Range Status   09/21/2022 3.9 3.5 - 5.1 mmol/L Final     Chloride   Date Value Ref Range Status   09/21/2022 103 95 - 110 mmol/L Final     CO2   Date Value Ref Range Status   09/21/2022 32 (H) 23 - 29 mmol/L Final     Glucose   Date Value Ref Range Status   09/21/2022 118 (H) 70 - 110 mg/dL Final     BUN   Date Value Ref Range Status   09/21/2022 15 8 - 23 mg/dL Final     Creatinine   Date Value Ref Range Status   09/21/2022 0.9 0.5 - 1.4 mg/dL Final   09/14/2012 0.7 0.2 - 1.4 mg/dl Final     Calcium   Date Value Ref Range Status   09/21/2022 8.9 8.7 - 10.5 mg/dL Final   09/14/2012 8.8 8.6 - 10.2 mg/dl Final     Total Protein   Date Value Ref Range Status   09/21/2022 6.6 6.0 - 8.4 g/dL Final     Albumin   Date Value Ref Range Status   09/21/2022 3.9 3.5 - 5.2 g/dL Final     Total Bilirubin   Date Value Ref Range Status   09/21/2022 0.7 0.1 - 1.0 mg/dL Final     Comment:     For infants and newborns, interpretation of results should be based  on gestational age, weight and in agreement with clinical  observations.    Premature Infant recommended reference ranges:  Up to 24 hours.............<8.0 mg/dL  Up to 48 hours............<12.0 mg/dL  3-5 days..................<15.0 mg/dL  6-29 days.................<15.0 mg/dL       Alkaline Phosphatase   Date Value Ref Range Status   09/21/2022 108 55 - 135 U/L Final   09/14/2012 68 23 - 119 UNIT/L Final     AST   Date Value Ref Range Status   09/21/2022 19 10 - 40 U/L Final   09/14/2012 16 10 - 30 UNIT/L Final     ALT   Date Value Ref Range Status   09/21/2022 17 10 - 44 U/L Final     Anion Gap   Date Value Ref Range Status   09/21/2022 7 (L) 8 - 16 mmol/L Final   09/14/2012 7 5 - 15 meq/L Final     eGFR if   American   Date Value Ref Range Status   08/25/2021 >60.0 >60 mL/min/1.73 m^2 Final     eGFR if non    Date Value Ref Range Status   08/25/2021 56.4 (A) >60 mL/min/1.73 m^2 Final     Comment:     Calculation used to obtain the estimated glomerular filtration  rate (eGFR) is the CKD-EPI equation.         BMP  Lab Results   Component Value Date     09/21/2022    K 3.9 09/21/2022     09/21/2022    CO2 32 (H) 09/21/2022    BUN 15 09/21/2022    CREATININE 0.9 09/21/2022    CALCIUM 8.9 09/21/2022    ANIONGAP 7 (L) 09/21/2022    ESTGFRAFRICA >60.0 08/25/2021    EGFRNONAA 56.4 (A) 08/25/2021      BNP  @LABRCNTIP(BNP,BNPTRIAGEBLO)@   Lab Results   Component Value Date    CHOL 132 09/21/2022    CHOL 128 08/25/2021    CHOL 137 07/09/2020     Lab Results   Component Value Date    HDL 52 09/21/2022    HDL 49 08/25/2021    HDL 56 07/09/2020     Lab Results   Component Value Date    LDLCALC 63.0 09/21/2022    LDLCALC 61.8 (L) 08/25/2021    LDLCALC 67.2 07/09/2020     Lab Results   Component Value Date    TRIG 85 09/21/2022    TRIG 86 08/25/2021    TRIG 69 07/09/2020     Lab Results   Component Value Date    CHOLHDL 39.4 09/21/2022    CHOLHDL 38.3 08/25/2021    CHOLHDL 40.9 07/09/2020      Lab Results   Component Value Date    TSH 2.595 09/21/2022    F4MUMJO 135 04/24/2018    F9FWNJP 7.3 08/02/2012    FREET4 1.07 04/24/2018     Lab Results   Component Value Date    HGBA1C 6.1 (H) 09/21/2022     Lab Results   Component Value Date    WBC 6.99 09/21/2022    HGB 11.5 (L) 09/21/2022    HCT 35.8 (L) 09/21/2022    MCV 90 09/21/2022     09/21/2022         Results for orders placed during the hospital encounter of 01/11/21    Echo Color Flow Doppler? Yes    Interpretation Summary  · The left ventricle is normal in size with concentric remodeling and normal systolic function. The estimated ejection fraction is 65%  · Grade I left ventricular diastolic dysfunction.  · Normal right ventricular size with  normal right ventricular systolic function.  · Mild tricuspid regurgitation.  · Normal central venous pressure (3 mmHg).  · The estimated PA systolic pressure is 24 mmHg.  · Overall the study quality was technically difficult     No results found for this or any previous visit.     Normal myocardial perfusion scan. There is no evidence of myocardial ischemia or infarction.    The gated perfusion images showed an ejection fraction of 93% post stress. Normal ejection fraction is greater than 53%.    There is normal wall motion post stress.    LV cavity size is  and normal at stress.    The EKG portion of this study is negative for ischemia.    The patient reported no chest pain during the stress test.    There were no arrhythmias during stress.      Assessment:       Hyperlipidemia, baseline   Control on 80 mg of atorvastatin    Hypertension, onset before 1995  Control on 25 mg of metoprolol    Coronary artery disease involving native coronary artery of native heart without angina pectoris  No symptoms of angina       Plan:           Toprol XL 25 mg daily.  Return to the office in 6 months.  Obtain the blood work from her primary care physician Dr. Seymour

## 2022-11-04 DIAGNOSIS — G62.9 NEUROPATHY: ICD-10-CM

## 2022-11-04 NOTE — TELEPHONE ENCOUNTER
No new care gaps identified.  [facility] Embedded Care Gaps. Reference number: [id number]. 11/04/2022   2:28:04 PM CDT

## 2022-11-16 ENCOUNTER — TELEPHONE (OUTPATIENT)
Dept: FAMILY MEDICINE | Facility: CLINIC | Age: 72
End: 2022-11-16
Payer: MEDICARE

## 2022-11-16 NOTE — TELEPHONE ENCOUNTER
----- Message from Jackie Anders sent at 11/16/2022 11:11 AM CST -----  Contact: Patient  Type:  Sooner Appointment Request    Caller is requesting a sooner appointment.  Caller declined first available appointment listed below.  Caller will not accept being placed on the waitlist and is requesting a message be sent to doctor.    Name of Caller: Patient  When is the first available appointment?n/a  Symptoms: UTI  Best Call Back Number: [phone]    Additional Information: Please contact Patient to schedule a sooner appointment

## 2022-12-05 ENCOUNTER — OFFICE VISIT (OUTPATIENT)
Dept: PULMONOLOGY | Facility: CLINIC | Age: 72
End: 2022-12-05
Payer: MEDICARE

## 2022-12-05 VITALS
WEIGHT: 180.75 LBS | HEART RATE: 82 BPM | SYSTOLIC BLOOD PRESSURE: 132 MMHG | OXYGEN SATURATION: 95 % | DIASTOLIC BLOOD PRESSURE: 78 MMHG | BODY MASS INDEX: 30.12 KG/M2 | HEIGHT: 65 IN

## 2022-12-05 DIAGNOSIS — F41.9 ANXIETY: ICD-10-CM

## 2022-12-05 DIAGNOSIS — J44.9 COPD MIXED TYPE: ICD-10-CM

## 2022-12-05 DIAGNOSIS — R68.2 DRY MOUTH: Primary | ICD-10-CM

## 2022-12-05 PROCEDURE — 3078F PR MOST RECENT DIASTOLIC BLOOD PRESSURE < 80 MM HG: ICD-10-PCS | Mod: CPTII,S$GLB,, | Performed by: INTERNAL MEDICINE

## 2022-12-05 PROCEDURE — 3078F DIAST BP <80 MM HG: CPT | Mod: CPTII,S$GLB,, | Performed by: INTERNAL MEDICINE

## 2022-12-05 PROCEDURE — 3044F HG A1C LEVEL LT 7.0%: CPT | Mod: CPTII,S$GLB,, | Performed by: INTERNAL MEDICINE

## 2022-12-05 PROCEDURE — 99213 PR OFFICE/OUTPT VISIT, EST, LEVL III, 20-29 MIN: ICD-10-PCS | Mod: S$GLB,,, | Performed by: INTERNAL MEDICINE

## 2022-12-05 PROCEDURE — 3288F PR FALLS RISK ASSESSMENT DOCUMENTED: ICD-10-PCS | Mod: CPTII,S$GLB,, | Performed by: INTERNAL MEDICINE

## 2022-12-05 PROCEDURE — 1159F PR MEDICATION LIST DOCUMENTED IN MEDICAL RECORD: ICD-10-PCS | Mod: CPTII,S$GLB,, | Performed by: INTERNAL MEDICINE

## 2022-12-05 PROCEDURE — 1126F PR PAIN SEVERITY QUANTIFIED, NO PAIN PRESENT: ICD-10-PCS | Mod: CPTII,S$GLB,, | Performed by: INTERNAL MEDICINE

## 2022-12-05 PROCEDURE — 3075F SYST BP GE 130 - 139MM HG: CPT | Mod: CPTII,S$GLB,, | Performed by: INTERNAL MEDICINE

## 2022-12-05 PROCEDURE — 3008F PR BODY MASS INDEX (BMI) DOCUMENTED: ICD-10-PCS | Mod: CPTII,S$GLB,, | Performed by: INTERNAL MEDICINE

## 2022-12-05 PROCEDURE — 1101F PR PT FALLS ASSESS DOC 0-1 FALLS W/OUT INJ PAST YR: ICD-10-PCS | Mod: CPTII,S$GLB,, | Performed by: INTERNAL MEDICINE

## 2022-12-05 PROCEDURE — 3288F FALL RISK ASSESSMENT DOCD: CPT | Mod: CPTII,S$GLB,, | Performed by: INTERNAL MEDICINE

## 2022-12-05 PROCEDURE — 99213 OFFICE O/P EST LOW 20 MIN: CPT | Mod: S$GLB,,, | Performed by: INTERNAL MEDICINE

## 2022-12-05 PROCEDURE — 1101F PT FALLS ASSESS-DOCD LE1/YR: CPT | Mod: CPTII,S$GLB,, | Performed by: INTERNAL MEDICINE

## 2022-12-05 PROCEDURE — 3044F PR MOST RECENT HEMOGLOBIN A1C LEVEL <7.0%: ICD-10-PCS | Mod: CPTII,S$GLB,, | Performed by: INTERNAL MEDICINE

## 2022-12-05 PROCEDURE — 1159F MED LIST DOCD IN RCRD: CPT | Mod: CPTII,S$GLB,, | Performed by: INTERNAL MEDICINE

## 2022-12-05 PROCEDURE — 3075F PR MOST RECENT SYSTOLIC BLOOD PRESS GE 130-139MM HG: ICD-10-PCS | Mod: CPTII,S$GLB,, | Performed by: INTERNAL MEDICINE

## 2022-12-05 PROCEDURE — 99999 PR PBB SHADOW E&M-EST. PATIENT-LVL IV: ICD-10-PCS | Mod: PBBFAC,,, | Performed by: INTERNAL MEDICINE

## 2022-12-05 PROCEDURE — 3008F BODY MASS INDEX DOCD: CPT | Mod: CPTII,S$GLB,, | Performed by: INTERNAL MEDICINE

## 2022-12-05 PROCEDURE — 1126F AMNT PAIN NOTED NONE PRSNT: CPT | Mod: CPTII,S$GLB,, | Performed by: INTERNAL MEDICINE

## 2022-12-05 PROCEDURE — 99999 PR PBB SHADOW E&M-EST. PATIENT-LVL IV: CPT | Mod: PBBFAC,,, | Performed by: INTERNAL MEDICINE

## 2022-12-05 RX ORDER — PILOCARPINE HYDROCHLORIDE 5 MG/1
5 TABLET, FILM COATED ORAL 3 TIMES DAILY
Qty: 90 TABLET | Refills: 0 | Status: SHIPPED | OUTPATIENT
Start: 2022-12-05 | End: 2023-12-05

## 2022-12-05 RX ORDER — BUDESONIDE AND FORMOTEROL FUMARATE DIHYDRATE 160; 4.5 UG/1; UG/1
2 AEROSOL RESPIRATORY (INHALATION) EVERY 12 HOURS
Qty: 30.6 G | Refills: 3 | Status: SHIPPED | OUTPATIENT
Start: 2022-12-05 | End: 2024-01-05

## 2022-12-05 RX ORDER — ALPRAZOLAM 0.5 MG/1
0.5 TABLET ORAL 3 TIMES DAILY PRN
Qty: 90 TABLET | Refills: 5 | Status: SHIPPED | OUTPATIENT
Start: 2022-12-05 | End: 2024-01-24 | Stop reason: SDUPTHER

## 2022-12-05 NOTE — PATIENT INSTRUCTIONS
Would be good view compliance report from cpap.    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    From up to date reference:  Pilocarpine oral rinse has been shown to increase salivary flow and relieve dry mouth symptoms in a small study of 19 older adult patients [54]. Systemic side effects were less prevalent than in a clinical trial of oral pilocarpine tablets (5 mg three times daily). It is compounded by dissolving three 5 mg tablets in 150 mL of water. Patients held the rinse in their mouth for two minutes before spitting it out and were allowed to use up to 150 mL of the rinse per day.

## 2022-12-05 NOTE — PROGRESS NOTES
12/5/2022    Kathy Seymour  Office Note    Chief Complaint   Patient presents with    Follow-up     annual         HPI:   12/5/2022 -- memory felt to be good.  Cpap ppt dry mouth -- nasal mask.  Ahi 48 in past.  Uses biotene as on vesicare with dry mouth.      12/14/2021 cpap problems --  Tried airtouch n20 mask  Concerned about memory.  No breathing issues.   Patient Instructions   minimental status was 28 out of 30, 23 is bad sort of.    Xanax as needed.    New cpap mask airtouch n 20 ordered.       12/1/2020 cpap works better but has a lot of air in morning with severe dreams noted.  Use symbicort daily with no problems  Patient Instructions   Asthma stable - singulair will prevent sinus and asthma.  Could skip symbicort and use singulair only.  Use xanax as needed.  Amoxil for sinus infection may be needed.   Call when needs follow up/xanax.      6/2/2020 back on old cpap device.  Sinus and lungs ok- occ symptoms with pollen exposure..  Patient Instructions   Need to get auto cpap report for pressure readings when last used.  Your old level was 6.  Would use mid way pressure for compromise.  Could consider repeat cpap titration in sleep lab..  Call in monthly til reach auto pap level or cannot increase further then go to sleep lab.  You had 48 episodes an hour of sleep apnea.      uptodate on belching excessively- baclofen trial for  discussed.      Dec 3, 2019-  Nasal patency is now good, still uses old cpap device as auto pap ppt dry mouth and nocturnal arousal.   Machine brought in today.  July 25, 2017 cpap titration had 44% sleep efficiency,    airfit f 30 mask - goes under nostrils - full face mask without bulkiness.        Check home mask, try to use full face type mask with current auto cpap.     Could do anther titration.  Auto cpap devices give similar results to cpap titrations in sleep lab.  Your titration in July 2017 was with poor sleep efficiency and , if you use your auto cpap, the  auto device repeats titration nightly.       Use during day and note what might be acceptable pressures- you say you have no symptoms on cpap 6 (doubt you are optimally treated) could use minimal pressures??     Use symbicort, use flonase.  June 4, 2019- freq nasal stuffy,uses saline.  Has cpap  6 , last 2017 cpap titration had no rem sleep so rec was 10-20 with study done 8-12.  Pt has nasal stuffy freq uses saline.      Has some bingham.  Patient Instructions   Your ahi was 48 in 2017- fairly severe.    Nasal patency needed- use saline (afrin if stuffy) then flonase once open.    If nasal obstructs will need to open mouth - mouth leaks will make machine cycles.  Would use auto pap 8-12- numbers found to be adequate in 2017    Would try nasal oral mask in future.  Work on nose.    Exercise would likely be good.      Try symbicort 2 twice daily  - in place anoro.       12/17/18- has older machine states not working correctly. Using nightly, has day time drowsiness. Insurance states having to wait for next machine. Sleep is poor. Has new nasal face mask. Has tried nasal pillars, nasal pillow, and now has full nose mask. Nervous over full face mask causing clausaphobia.   Sensation of throat closing, Not using Anoro daily.  Patient Instructions   Anoro is a daily medication intended to prevent breathing problems  Use Albuterol inhaler 1-2 puffs every 4 hours when needed for Shortness of breath      A full Face mask is needed to help with Obstructive sleep apnea due to sleeping with mouth open.  9/24/18- states had sleep titration study but never heard anything from it. Insurance denied automatic titration machine, [facility] was not told she had COPD. Request a different face mask. States SOB with activity every day for a few minutes, day time only, relieved with rest. States does not use albuterol inhaler feels it does not help.  Has stopped smoking five years ago.     Previous HPI Dr. Jordan:  July 10, 2017-on cymbalta- helps  neuropathy but having poor sleep  With nocturia ppt arousal.  Sleep study done with no rem sleep on cymbalta?  10-20 pressure rather than 6.     2/27/2017HPI: dx osas 10 yrs ago, ahi na.  On nasal pillars- cpap 6, new machine 5 yrs ago.  Gained 45 post stent around 2012.     Prior to cpap, severe snorer, fatigue, with no other prominent symptoms recalled.       Now no snoring noted with cpap. No nasal obstruction.     Mentation a little slow but vague.      The chief compliant  problem is stable  PFSH:  Past Medical History:   Diagnosis Date    Angina pectoris     Anticoagulant long-term use     Anxiety     Arthritis     Back pain     Cataract     Chronic bronchitis     Coronary artery disease     STENT X 2    CTS (carpal tunnel syndrome)     RIGHT    Depression     MILLER (dyspnea on exertion) 2/21/2017    Hallux valgus, acquired, bilateral 1/19/2017    Hematuria     Hyperlipidemia     Hypertension     Hypertensive left ventricular hypertrophy, without heart failure 5/22/2017    Hypothyroidism     Obesity     Polyneuropathy     S/P coronary artery stent placement, 2 LETTY 9/2012, 1 LETTY 9/2013 3/5/2013    Sleep apnea     USES CPAP    Thyroid disease     Tobacco dependence     Trouble in sleeping     Urinary incontinence     Wears glasses     ONE CONTAC         Past Surgical History:   Procedure Laterality Date    APPENDECTOMY      BILATERAL SALPINGOOPHORECTOMY      CARDIAC CATHETERIZATION      CORONARY ANGIOPLASTY      CORONARY STENT PLACEMENT      PCI  of the LAD with LETTY    EYE SURGERY      bilat cataract removal    HYSTERECTOMY      complete    OOPHORECTOMY      TONSILLECTOMY       Social History     Tobacco Use    Smoking status: Former     Packs/day: 1.00     Years: 50.00     Pack years: 50.00     Types: Cigarettes     Start date: 7/9/1964     Quit date: 8/13/2012     Years since quitting: 10.3    Smokeless tobacco: Never   Substance Use Topics    Alcohol use: Yes     Alcohol/week: 2.0 standard drinks     Types: 2  "Shots of liquor per week     Comment: Social - vodka on wednesday    Drug use: No     Family History   Problem Relation Age of Onset    Hypertension Sister     Arthritis Sister     Heart failure Mother     Hypertension Mother     Heart failure Father     Hypertension Father     No Known Problems Brother     No Known Problems Son     Heart disease Brother     Arthritis Sister     Hypertension Sister     Cancer Sister     Asthma Son     Arthritis Son         psoriatic arthritis     Review of patient's allergies indicates:   Allergen Reactions    Wellbutrin [bupropion hcl] Other (See Comments)     sven     I have reviewed past medical, family, and social history. I have reviewed previous nurse notes.    Performance Status:The patient's activity level is no limits with regular activity.      Review of Systems:  a review of eleven systems covering constitutional, Eye, HEENT, Psych, Respiratory, Cardiac, GI, , Musculoskeletal, Endocrine, Dermatologic was negative except for pertinent findings as listed ABOVE and below: pertinent positive as above, rest is good       Exam:Comprehensive exam done. BP (!) 154/73 (BP Location: Right arm, Patient Position: Sitting)   Pulse 77   Ht 5' 5" (1.651 m)   Wt 82.1 kg (181 lb)   SpO2 95% Comment: on room air  BMI 30.12 kg/m²   Exam included Vitals as listed, and patient's appearance and affect and alertness and mood, oral exam for yeast and hygiene and pharynx lesions and Mallapatti (M) score, neck with inspection for jvd and masses and thyroid abnormalities and lymph nodes (supraclavicular and infraclavicular nodes and axillary also examined and noted if abn), chest exam included symmetry and effort and fremitus and percussion and auscultation, cardiac exam included rhythm and gallops and murmur and rubs and jvd and edema, abdominal exam for mass and hepatosplenomegaly and tenderness and hernias and bowel sounds, Musculoskeletal exam with muscle tone and posture and " mobility/gait and  strength, and skin for rashes and cyanosis and pallor and turgor, extremity for clubbing.  Findings were normal except for pertinent findings listed below:  M3, chest is symmetric, no distress, normal percussion, normal fremitus and good normal breath sounds  No edema.             Radiographs (ct chest and cxr) reviewed: results reviewed   The mediastinal and cardiac size and contours are normal. The diaphragms and pleura are clear. There are no intra-pulmonary masses or infiltrates. There is no pneumothorax or pleural effusion.    Impression-Negative chest.      Electronically signed by:Hernán Mojica MD  Date: 03/26/14  Time:10:40     Labs reviewed  Lab Results   Component Value Date    WBC 6.99 09/21/2022    RBC 3.98 (L) 09/21/2022    HGB 11.5 (L) 09/21/2022    HCT 35.8 (L) 09/21/2022    MCV 90 09/21/2022    MCH 28.9 09/21/2022    MCHC 32.1 09/21/2022    RDW 14.1 09/21/2022     09/21/2022    MPV 12.0 09/21/2022    GRAN 4.6 09/21/2022    GRAN 66.2 09/21/2022    LYMPH 1.6 09/21/2022    LYMPH 22.2 09/21/2022    MONO 0.5 09/21/2022    MONO 7.7 09/21/2022    EOS 0.2 09/21/2022    BASO 0.04 09/21/2022    EOSINOPHIL 3.0 09/21/2022    BASOPHIL 0.6 09/21/2022       PFT results reviewed  Pulmonary Functions Testing Results:  Procedure Notes     Author Status Last  Updated Created   Tayo Jordan MD Signed Tayo Jordan MD 7/19/2017  2:38 PM 7/19/2017  2:35 PM          Assoc. Orders Procedures   None COMPLETE PFT WITH BRONCHODILATOR       Pre-Op Dx Post-Op Dx   Bronchitis None          Pulmonary Functions, including spirometry and bronchodilator response and lung volumes and diffusion, study was done July 28, 2017.  Spirometry shows loss of vital capacity and fev1 with no obstruction and no bronchodilator response.   FEV1 is 71% or 1.65 liters.  Lung volumes show  normal TLC and elevated residual volume.  Diffusion shows reduced but falls within normal range when corrected for lung  volumes.     Pulmonary functions show low vital capacity and low mvv with some air trapping,but rest is wnl. Clinical correlation recommended.     Tayo Jordan M.D.               Plan:  Clinical impression is apparently straight forward and impression with management as below.    Kathy was seen today for follow-up.    Diagnoses and all orders for this visit:    Dry mouth  -     pilocarpine (SALAGEN) 5 MG Tab; Take 1 tablet (5 mg total) by mouth 3 (three) times daily. Use as directed.    Anxiety  Comments:  Controlled, refill of Xanax given.  Orders:  -     ALPRAZolam (XANAX) 0.5 MG tablet; Take 1 tablet (0.5 mg total) by mouth 3 (three) times daily as needed for Anxiety.    COPD mixed type  -     budesonide-formoterol 160-4.5 mcg (SYMBICORT) 160-4.5 mcg/actuation HFAA; Inhale 2 puffs into the lungs every 12 (twelve) hours. Controller        Follow up in about 1 year (around 12/5/2023), or if symptoms worsen or fail to improve.    Discussed with patient above for education the following:      Patient Instructions   Would be good view compliance report from cpap.    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    From up to date reference:  Pilocarpine oral rinse has been shown to increase salivary flow and relieve dry mouth symptoms in a small study of 19 older adult patients [54]. Systemic side effects were less prevalent than in a clinical trial of oral pilocarpine tablets (5 mg three times daily). It is compounded by dissolving three 5 mg tablets in 150 mL of water. Patients held the rinse in their mouth for two minutes before spitting it out and were allowed to use up to 150 mL of the rinse per day.

## 2023-01-04 DIAGNOSIS — Z78.0 MENOPAUSE: ICD-10-CM

## 2023-01-05 DIAGNOSIS — E78.00 PURE HYPERCHOLESTEROLEMIA: Chronic | ICD-10-CM

## 2023-01-05 DIAGNOSIS — I10 ESSENTIAL HYPERTENSION: ICD-10-CM

## 2023-01-05 RX ORDER — LOSARTAN POTASSIUM AND HYDROCHLOROTHIAZIDE 25; 100 MG/1; MG/1
TABLET ORAL
Qty: 90 TABLET | Refills: 2 | Status: SHIPPED | OUTPATIENT
Start: 2023-01-05 | End: 2023-11-22

## 2023-01-05 NOTE — TELEPHONE ENCOUNTER
No new care gaps identified.  [facility] Embedded Care Gaps. Reference number: [id number]. 1/05/2023   1:19:36 PM CST

## 2023-01-05 NOTE — TELEPHONE ENCOUNTER
Refill Routing Note   Medication(s) are not appropriate for processing by [facility] for the following reason(s):      - Drug-Disease Interaction (Dysmetabolic syndrome)    [facility] action(s):  Approve  Defer Medication-related problems identified: Drug-disease interaction        Medication reconciliation completed: No     Appointments  past 12m or future 3m with PCP    Date Provider   Last Visit   9/12/2022 Jersey Seymour Jr., MD   Next Visit   Visit date not found Jersey Seymour Jr., MD   ED visits in past 90 days: 0        Note composed:2:04 PM 01/05/2023

## 2023-01-06 RX ORDER — ATORVASTATIN CALCIUM 80 MG/1
TABLET, FILM COATED ORAL
Qty: 90 TABLET | Refills: 3 | Status: SHIPPED | OUTPATIENT
Start: 2023-01-06 | End: 2024-02-05

## 2023-02-07 DIAGNOSIS — Z00.00 ENCOUNTER FOR MEDICARE ANNUAL WELLNESS EXAM: ICD-10-CM

## 2023-02-09 DIAGNOSIS — Z00.00 ENCOUNTER FOR MEDICARE ANNUAL WELLNESS EXAM: ICD-10-CM

## 2023-03-14 ENCOUNTER — LAB VISIT (OUTPATIENT)
Dept: LAB | Facility: HOSPITAL | Age: 73
End: 2023-03-14
Attending: PHYSICIAN ASSISTANT
Payer: MEDICARE

## 2023-03-14 ENCOUNTER — OFFICE VISIT (OUTPATIENT)
Dept: FAMILY MEDICINE | Facility: CLINIC | Age: 73
End: 2023-03-14
Payer: MEDICARE

## 2023-03-14 VITALS
HEIGHT: 65 IN | OXYGEN SATURATION: 98 % | HEART RATE: 68 BPM | TEMPERATURE: 99 F | DIASTOLIC BLOOD PRESSURE: 60 MMHG | SYSTOLIC BLOOD PRESSURE: 120 MMHG | WEIGHT: 179.88 LBS | BODY MASS INDEX: 29.97 KG/M2

## 2023-03-14 DIAGNOSIS — N18.31 CHRONIC KIDNEY DISEASE, STAGE 3A: ICD-10-CM

## 2023-03-14 DIAGNOSIS — R82.90 MALODOROUS URINE: ICD-10-CM

## 2023-03-14 DIAGNOSIS — J44.9 COPD MIXED TYPE: ICD-10-CM

## 2023-03-14 DIAGNOSIS — R73.03 PREDIABETES: ICD-10-CM

## 2023-03-14 DIAGNOSIS — G62.9 NEUROPATHY: ICD-10-CM

## 2023-03-14 DIAGNOSIS — N39.0 URINARY TRACT INFECTION WITH HEMATURIA, SITE UNSPECIFIED: ICD-10-CM

## 2023-03-14 DIAGNOSIS — R31.9 URINARY TRACT INFECTION WITH HEMATURIA, SITE UNSPECIFIED: ICD-10-CM

## 2023-03-14 DIAGNOSIS — I70.0 AORTIC ATHEROSCLEROSIS: ICD-10-CM

## 2023-03-14 DIAGNOSIS — E78.2 MIXED HYPERLIPIDEMIA: Chronic | ICD-10-CM

## 2023-03-14 DIAGNOSIS — E66.3 OVERWEIGHT (BMI 25.0-29.9): ICD-10-CM

## 2023-03-14 DIAGNOSIS — E05.90 SUBCLINICAL HYPERTHYROIDISM: ICD-10-CM

## 2023-03-14 DIAGNOSIS — Z12.31 ENCOUNTER FOR SCREENING MAMMOGRAM FOR MALIGNANT NEOPLASM OF BREAST: ICD-10-CM

## 2023-03-14 DIAGNOSIS — F32.0 MILD MAJOR DEPRESSION: ICD-10-CM

## 2023-03-14 DIAGNOSIS — I10 PRIMARY HYPERTENSION: Primary | ICD-10-CM

## 2023-03-14 PROBLEM — R35.0 FREQUENT URINATION: Status: RESOLVED | Noted: 2019-07-01 | Resolved: 2023-03-14

## 2023-03-14 PROBLEM — E74.39 GLUCOSE INTOLERANCE: Status: RESOLVED | Noted: 2019-10-15 | Resolved: 2023-03-14

## 2023-03-14 PROBLEM — E66.811 OBESITY (BMI 30.0-34.9): Status: RESOLVED | Noted: 2017-01-20 | Resolved: 2023-03-14

## 2023-03-14 PROBLEM — E66.9 OBESITY (BMI 30.0-34.9): Status: RESOLVED | Noted: 2017-01-20 | Resolved: 2023-03-14

## 2023-03-14 PROBLEM — U07.1 COVID: Status: RESOLVED | Noted: 2022-06-10 | Resolved: 2023-03-14

## 2023-03-14 LAB
BILIRUBIN, UA POC OHS: NEGATIVE
BLOOD, UA POC OHS: ABNORMAL
CLARITY, UA POC OHS: CLEAR
COLOR, UA POC OHS: YELLOW
GLUCOSE, UA POC OHS: NEGATIVE
KETONES, UA POC OHS: NEGATIVE
LEUKOCYTES, UA POC OHS: ABNORMAL
NITRITE, UA POC OHS: POSITIVE
PH, UA POC OHS: 5.5
PROTEIN, UA POC OHS: NEGATIVE
SPECIFIC GRAVITY, UA POC OHS: 1.02
UROBILINOGEN, UA POC OHS: 0.2

## 2023-03-14 PROCEDURE — 1126F PR PAIN SEVERITY QUANTIFIED, NO PAIN PRESENT: ICD-10-PCS | Mod: HCNC,CPTII,S$GLB, | Performed by: PHYSICIAN ASSISTANT

## 2023-03-14 PROCEDURE — 81003 URINALYSIS AUTO W/O SCOPE: CPT | Mod: QW,HCNC,S$GLB, | Performed by: PHYSICIAN ASSISTANT

## 2023-03-14 PROCEDURE — 3008F BODY MASS INDEX DOCD: CPT | Mod: HCNC,CPTII,S$GLB, | Performed by: PHYSICIAN ASSISTANT

## 2023-03-14 PROCEDURE — 87088 URINE BACTERIA CULTURE: CPT | Mod: HCNC | Performed by: PHYSICIAN ASSISTANT

## 2023-03-14 PROCEDURE — 3074F PR MOST RECENT SYSTOLIC BLOOD PRESSURE < 130 MM HG: ICD-10-PCS | Mod: HCNC,CPTII,S$GLB, | Performed by: PHYSICIAN ASSISTANT

## 2023-03-14 PROCEDURE — 1101F PR PT FALLS ASSESS DOC 0-1 FALLS W/OUT INJ PAST YR: ICD-10-PCS | Mod: HCNC,CPTII,S$GLB, | Performed by: PHYSICIAN ASSISTANT

## 2023-03-14 PROCEDURE — 1101F PT FALLS ASSESS-DOCD LE1/YR: CPT | Mod: HCNC,CPTII,S$GLB, | Performed by: PHYSICIAN ASSISTANT

## 2023-03-14 PROCEDURE — 3288F PR FALLS RISK ASSESSMENT DOCUMENTED: ICD-10-PCS | Mod: HCNC,CPTII,S$GLB, | Performed by: PHYSICIAN ASSISTANT

## 2023-03-14 PROCEDURE — 3008F PR BODY MASS INDEX (BMI) DOCUMENTED: ICD-10-PCS | Mod: HCNC,CPTII,S$GLB, | Performed by: PHYSICIAN ASSISTANT

## 2023-03-14 PROCEDURE — 3288F FALL RISK ASSESSMENT DOCD: CPT | Mod: HCNC,CPTII,S$GLB, | Performed by: PHYSICIAN ASSISTANT

## 2023-03-14 PROCEDURE — 87184 SC STD DISK METHOD PER PLATE: CPT | Mod: HCNC | Performed by: PHYSICIAN ASSISTANT

## 2023-03-14 PROCEDURE — 1126F AMNT PAIN NOTED NONE PRSNT: CPT | Mod: HCNC,CPTII,S$GLB, | Performed by: PHYSICIAN ASSISTANT

## 2023-03-14 PROCEDURE — 87077 CULTURE AEROBIC IDENTIFY: CPT | Mod: 59,HCNC | Performed by: PHYSICIAN ASSISTANT

## 2023-03-14 PROCEDURE — 3074F SYST BP LT 130 MM HG: CPT | Mod: HCNC,CPTII,S$GLB, | Performed by: PHYSICIAN ASSISTANT

## 2023-03-14 PROCEDURE — 99214 OFFICE O/P EST MOD 30 MIN: CPT | Mod: HCNC,S$GLB,, | Performed by: PHYSICIAN ASSISTANT

## 2023-03-14 PROCEDURE — 87186 SC STD MICRODIL/AGAR DIL: CPT | Mod: HCNC | Performed by: PHYSICIAN ASSISTANT

## 2023-03-14 PROCEDURE — 99214 PR OFFICE/OUTPT VISIT, EST, LEVL IV, 30-39 MIN: ICD-10-PCS | Mod: HCNC,S$GLB,, | Performed by: PHYSICIAN ASSISTANT

## 2023-03-14 PROCEDURE — 99999 PR PBB SHADOW E&M-EST. PATIENT-LVL III: CPT | Mod: PBBFAC,HCNC,, | Performed by: PHYSICIAN ASSISTANT

## 2023-03-14 PROCEDURE — 87086 URINE CULTURE/COLONY COUNT: CPT | Mod: HCNC | Performed by: PHYSICIAN ASSISTANT

## 2023-03-14 PROCEDURE — 1159F MED LIST DOCD IN RCRD: CPT | Mod: HCNC,CPTII,S$GLB, | Performed by: PHYSICIAN ASSISTANT

## 2023-03-14 PROCEDURE — 1159F PR MEDICATION LIST DOCUMENTED IN MEDICAL RECORD: ICD-10-PCS | Mod: HCNC,CPTII,S$GLB, | Performed by: PHYSICIAN ASSISTANT

## 2023-03-14 PROCEDURE — 99999 PR PBB SHADOW E&M-EST. PATIENT-LVL III: ICD-10-PCS | Mod: PBBFAC,HCNC,, | Performed by: PHYSICIAN ASSISTANT

## 2023-03-14 PROCEDURE — 81003 POCT URINALYSIS(INSTRUMENT): ICD-10-PCS | Mod: QW,HCNC,S$GLB, | Performed by: PHYSICIAN ASSISTANT

## 2023-03-14 PROCEDURE — 3078F PR MOST RECENT DIASTOLIC BLOOD PRESSURE < 80 MM HG: ICD-10-PCS | Mod: HCNC,CPTII,S$GLB, | Performed by: PHYSICIAN ASSISTANT

## 2023-03-14 PROCEDURE — 3078F DIAST BP <80 MM HG: CPT | Mod: HCNC,CPTII,S$GLB, | Performed by: PHYSICIAN ASSISTANT

## 2023-03-14 RX ORDER — NITROFURANTOIN 25; 75 MG/1; MG/1
100 CAPSULE ORAL 2 TIMES DAILY
Qty: 14 CAPSULE | Refills: 0 | Status: SHIPPED | OUTPATIENT
Start: 2023-03-14 | End: 2023-03-16 | Stop reason: ALTCHOICE

## 2023-03-14 RX ORDER — DULOXETIN HYDROCHLORIDE 30 MG/1
30 CAPSULE, DELAYED RELEASE ORAL DAILY
Qty: 90 CAPSULE | Refills: 3 | Status: SHIPPED | OUTPATIENT
Start: 2023-03-14 | End: 2023-06-03 | Stop reason: SDUPTHER

## 2023-03-14 NOTE — PROGRESS NOTES
Subjective:       Patient ID: Kathy Seymour is a 72 y.o. female.    Chief Complaint: Hypertension and malodorous    Patient presents for six-month follow-up.  She states that her last visit with Dr. Seymour she was still having some uncontrolled neuropathy so he wanted to switch her to an extended-release gabapentin and wean her off Cymbalta.  She states she was never able to get the medication has no pharmacy carried it.  She also states she feels like she needs the Cymbalta for her depression and feels like it is helping her for this and somewhat for the neuropathy.  She is agreeable going up to 90 milligrams for trial and if this is not tolerated well then trying something else.  She denies any suicidal or homicidal ideations.  Patient also thinks that she may have a urinary tract infection.  She states she has a horrible odor to her urine for the last several days.  She denies any back pain, fever, blood in the urine.      Current Outpatient Medications:     albuterol (VENTOLIN HFA) 90 mcg/actuation inhaler, Inhale 2 puffs into the lungs every 6 (six) hours as needed for Wheezing. Rescue, Disp: 8 g, Rfl: 0    ALPRAZolam (XANAX) 0.5 MG tablet, Take 1 tablet (0.5 mg total) by mouth 3 (three) times daily as needed for Anxiety., Disp: 90 tablet, Rfl: 5    aspirin 81 MG Chew, Take 1 tablet (81 mg total) by mouth once daily., Disp: 50 tablet, Rfl: 3    atorvastatin (LIPITOR) 80 MG tablet, TAKE 1 TABLET EVERY DAY., Disp: 90 tablet, Rfl: 3    budesonide-formoterol 160-4.5 mcg (SYMBICORT) 160-4.5 mcg/actuation HFAA, Inhale 2 puffs into the lungs every 12 (twelve) hours. Controller, Disp: 30.6 g, Rfl: 3    DULoxetine (CYMBALTA) 60 MG capsule, TAKE 1 CAPSULE EVERY DAY, Disp: 90 capsule, Rfl: 3    fluticasone propionate (FLONASE) 50 mcg/actuation nasal spray, 1 spray (50 mcg total) by Each Nostril route once daily., Disp: 16 g, Rfl: 0    losartan-hydrochlorothiazide 100-25 mg (HYZAAR) 100-25 mg per tablet, TAKE 1  TABLET EVERY DAY, Disp: 90 tablet, Rfl: 2    meloxicam (MOBIC) 7.5 MG tablet, Take 1 tablet (7.5 mg total) by mouth once daily., Disp: 90 tablet, Rfl: 3    methIMAzole (TAPAZOLE) 5 MG Tab, Take 1 tablet (5 mg total) by mouth once daily., Disp: 90 tablet, Rfl: 3    metoprolol succinate (TOPROL-XL) 25 MG 24 hr tablet, Take 1 tablet (25 mg total) by mouth once daily., Disp: 30 tablet, Rfl: 11    pantoprazole (PROTONIX) 40 MG tablet, Take 40 mg by mouth once daily., Disp: , Rfl:     pilocarpine (SALAGEN) 5 MG Tab, Take 1 tablet (5 mg total) by mouth 3 (three) times daily. Use as directed., Disp: 90 tablet, Rfl: 0    potassium chloride (MICRO-K) 10 MEQ CpSR, Take 1 capsule (10 mEq total) by mouth once daily., Disp: 90 capsule, Rfl: 3    solifenacin (VESICARE) 5 MG tablet, Take 1 tablet (5 mg total) by mouth once daily., Disp: 90 tablet, Rfl: 3    tiZANidine (ZANAFLEX) 4 MG tablet, TAKE 1 TABLET (4 MG TOTAL) BY MOUTH EVERY 6 (SIX) HOURS AS NEEDED (SPASM)., Disp: 20 tablet, Rfl: 1    DULoxetine (CYMBALTA) 30 MG capsule, Take 1 capsule (30 mg total) by mouth once daily. In addition to 60mg.  90mg qday, Disp: 90 capsule, Rfl: 3    nitrofurantoin, macrocrystal-monohydrate, (MACROBID) 100 MG capsule, Take 1 capsule (100 mg total) by mouth 2 (two) times daily. for 7 days, Disp: 14 capsule, Rfl: 0     Patient Active Problem List   Diagnosis    Anxiety    S/P coronary artery stent placement, 2 LETTY 9/2012, 1 LETTY 9/2013    Varicose veins of lower extremities with other complications    Hyperlipidemia, baseline     Lumbar spondylosis    Carpal tunnel syndrome    Hypertension, onset before 1995    Abdominal obesity    RAMONA on CPAP    Nodular thyroid disease    Prediabetes    Urinary incontinence    Dysmetabolic syndrome    Subclinical hyperthyroidism    On home oxygen therapy    COPD mixed type    Memory difficulties    Excessive daytime sleepiness    Bilateral sciatica, R>L, onset 1/2018    History of smoking greater than 50  pack years, quit 2012    NSAID long-term use    Coronary artery disease involving native coronary artery of native heart without angina pectoris    Chronic kidney disease, stage 3a    Aortic atherosclerosis    Overweight (BMI 25.0-29.9)      Lab Results   Component Value Date    WBC 6.99 09/21/2022    HGB 11.5 (L) 09/21/2022    HCT 35.8 (L) 09/21/2022     09/21/2022    CHOL 132 09/21/2022    TRIG 85 09/21/2022    HDL 52 09/21/2022    ALT 17 09/21/2022    AST 19 09/21/2022     09/21/2022    K 3.9 09/21/2022     09/21/2022    CREATININE 0.9 09/21/2022    BUN 15 09/21/2022    CO2 32 (H) 09/21/2022    TSH 2.595 09/21/2022    INR 1.0 09/27/2013    HGBA1C 6.1 (H) 09/21/2022        Review of Systems   Constitutional:  Negative for activity change, appetite change, chills, fatigue, fever and unexpected weight change.   HENT:  Negative for nasal congestion, dental problem, hearing loss, postnasal drip, rhinorrhea, sinus pressure/congestion and trouble swallowing.    Eyes:  Negative for photophobia, discharge and visual disturbance.   Respiratory:  Negative for cough, chest tightness, shortness of breath and wheezing.    Cardiovascular:  Negative for chest pain, palpitations and leg swelling.   Gastrointestinal:  Negative for abdominal pain, blood in stool, constipation, diarrhea, nausea and vomiting.   Genitourinary:  Positive for dysuria, frequency and urgency. Negative for decreased urine volume, difficulty urinating, dyspareunia, flank pain, hematuria, menstrual problem, pelvic pain, vaginal bleeding, vaginal discharge and vaginal pain.   Musculoskeletal:  Negative for arthralgias, back pain, joint swelling and neck pain.   Integumentary:  Negative for color change and rash.   Neurological:  Negative for dizziness, seizures, weakness, light-headedness, numbness and headaches.   Hematological:  Does not bruise/bleed easily.   Psychiatric/Behavioral:  Negative for sleep disturbance and suicidal ideas. The  "patient is not nervous/anxious.        Objective:      Vitals:    03/14/23 0900   BP: 120/60   BP Location: Left arm   Patient Position: Sitting   BP Method: Large (Manual)   Pulse: 68   Temp: 98.9 °F (37.2 °C)   TempSrc: Oral   SpO2: 98%   Weight: 81.6 kg (179 lb 14.3 oz)   Height: 5' 5" (1.651 m)      Physical Exam  Constitutional:       Appearance: She is well-developed.   HENT:      Head: Normocephalic and atraumatic.   Eyes:      Conjunctiva/sclera: Conjunctivae normal.      Pupils: Pupils are equal, round, and reactive to light.   Neck:      Vascular: No JVD.   Cardiovascular:      Rate and Rhythm: Normal rate and regular rhythm.      Heart sounds: No murmur heard.    No friction rub. No gallop.   Pulmonary:      Effort: Pulmonary effort is normal. No respiratory distress.      Breath sounds: Normal breath sounds. No wheezing or rales.   Musculoskeletal:      Cervical back: Normal range of motion and neck supple.   Skin:     General: Skin is warm and dry.   Neurological:      Mental Status: She is alert and oriented to person, place, and time.   Psychiatric:         Mood and Affect: Mood normal.         Behavior: Behavior normal.         Thought Content: Thought content normal.         Judgment: Judgment normal.       Assessment:       Problem List Items Addressed This Visit       Hyperlipidemia, baseline  (Chronic)    Relevant Orders    Lipid Panel    Aortic atherosclerosis    Chronic kidney disease, stage 3a    COPD mixed type    Hypertension, onset before 1995 - Primary    Overweight (BMI 25.0-29.9)    Prediabetes    Relevant Orders    Hemoglobin A1C    CBC Auto Differential    Comprehensive Metabolic Panel    Subclinical hyperthyroidism     Other Visit Diagnoses       Encounter for screening mammogram for malignant neoplasm of breast        Relevant Orders    Mammo Digital Screening Bilat w/ [facility]    Malodorous urine        UTI, start Macrobid until urine culture results known    Relevant Orders    " POCT Urinalysis(Instrument) (Completed)    Neuropathy        Increase Cymbalta from 60 milligrams to 90 milligrams    Relevant Medications    DULoxetine (CYMBALTA) 30 MG capsule    Mild major depression        Uncontrolled, increase Cymbalta to 90 milligrams    Relevant Medications    DULoxetine (CYMBALTA) 30 MG capsule    Urinary tract infection with hematuria, site unspecified        Relevant Medications    nitrofurantoin, macrocrystal-monohydrate, (MACROBID) 100 MG capsule    Other Relevant Orders    Urine culture              Plan:       Kathy was seen today for hypertension and malodorous.    Diagnoses and all orders for this visit:    Primary hypertension    Encounter for screening mammogram for malignant neoplasm of breast  -     Mammo Digital Screening Bilat w/ [facility]anner; Future    Aortic atherosclerosis  Comments:  Stable, continue regimen    COPD mixed type  Comments:  Controlled, continue current regimen    Chronic kidney disease, stage 3a  Comments:  Stable, continue regimen    Subclinical hyperthyroidism  Comments:  Stable, labs ordered    Prediabetes  Comments:  Stable, labs ordered  Orders:  -     Hemoglobin A1C; Future  -     CBC Auto Differential; Future  -     Comprehensive Metabolic Panel; Future    Overweight (BMI 25.0-29.9)  Comments:  Improving, continue to monitor    Mixed hyperlipidemia  -     Lipid Panel; Future    Malodorous urine  Comments:  UTI, start Macrobid until urine culture results known  Orders:  -     POCT Urinalysis(Instrument)    Neuropathy  Comments:  Increase Cymbalta from 60 milligrams to 90 milligrams  Orders:  -     DULoxetine (CYMBALTA) 30 MG capsule; Take 1 capsule (30 mg total) by mouth once daily. In addition to 60mg.  90mg qday    Mild major depression  Comments:  Uncontrolled, increase Cymbalta to 90 milligrams  Orders:  -     DULoxetine (CYMBALTA) 30 MG capsule; Take 1 capsule (30 mg total) by mouth once daily. In addition to 60mg.  90mg qday    Urinary tract infection  with hematuria, site unspecified  -     nitrofurantoin, macrocrystal-monohydrate, (MACROBID) 100 MG capsule; Take 1 capsule (100 mg total) by mouth 2 (two) times daily. for 7 days  -     Urine culture; Future           Will call patient with results and further recommendations.  I asked the patient to send us a [employer] message in the next 2-3 weeks to let us know how she is tolerating the Cymbalta

## 2023-03-15 ENCOUNTER — PES CALL (OUTPATIENT)
Dept: ADMINISTRATIVE | Facility: CLINIC | Age: 73
End: 2023-03-15
Payer: MEDICARE

## 2023-03-16 ENCOUNTER — TELEPHONE (OUTPATIENT)
Dept: FAMILY MEDICINE | Facility: CLINIC | Age: 73
End: 2023-03-16
Payer: MEDICARE

## 2023-03-16 DIAGNOSIS — N30.00 ACUTE CYSTITIS WITHOUT HEMATURIA: Primary | ICD-10-CM

## 2023-03-16 RX ORDER — CIPROFLOXACIN 250 MG/1
250 TABLET, FILM COATED ORAL 2 TIMES DAILY
Qty: 6 TABLET | Refills: 0 | Status: SHIPPED | OUTPATIENT
Start: 2023-03-16 | End: 2023-03-19

## 2023-03-16 NOTE — TELEPHONE ENCOUNTER
"Sent in rx for Cipro 250 mg BID x 3 days in place of Macrobid. Stop Macrobid and place in allergies as "intolerance"; not a true allergy.  "

## 2023-03-16 NOTE — TELEPHONE ENCOUNTER
Macrobid prescribed on 3-14-23 by JENNIFER Jimenez related to UTI. Patient states since starting this medication she has been experiencing low grade fever, increased urination, and body aches when fever is present. Patient states last night she voided 5 times. States temperature yesterday evening was 100.6, states took Tylenol and fever resolved. States in the middle of the night fever returned. States body aches only present when temperature is elevated, and she believes this is all related to Macrobid. Message will be forwarded to Dr. Seymour as Mrs. Jimenez is out of the office until next week. Please advise. Thank you.

## 2023-03-16 NOTE — TELEPHONE ENCOUNTER
Wali LANDIN Staff  Caller: self (Today, 10:04 AM)      ----- Message from Wali Edward sent at 3/16/2023 10:04 AM CDT -----  Contact: self  Type: Needs Medical Advice  Who Called: Patient   Best Call Back Number: [phone]  Additional Information: Pt states her antibiotic is causing her to run fever, and feeling flu like. Pt doesn't know whether to take medicine thinks he might be having a  side effect. Plz call and advise. Thanks

## 2023-03-18 LAB
BACTERIA UR CULT: ABNORMAL
BACTERIA UR CULT: ABNORMAL

## 2023-03-23 ENCOUNTER — HOSPITAL ENCOUNTER (OUTPATIENT)
Dept: RADIOLOGY | Facility: CLINIC | Age: 73
Discharge: HOME | End: 2023-03-23
Attending: FAMILY MEDICINE
Payer: MEDICARE

## 2023-03-23 DIAGNOSIS — Z78.0 MENOPAUSE: ICD-10-CM

## 2023-03-23 PROCEDURE — 77080 DEXA BONE DENSITY SPINE HIP: ICD-10-PCS | Mod: 26,HCNC,, | Performed by: RADIOLOGY

## 2023-03-23 PROCEDURE — 77080 DXA BONE DENSITY AXIAL: CPT | Mod: 26,HCNC,, | Performed by: RADIOLOGY

## 2023-03-23 PROCEDURE — 77080 DXA BONE DENSITY AXIAL: CPT | Mod: TC,HCNC,PO

## 2023-04-10 ENCOUNTER — OFFICE VISIT (OUTPATIENT)
Dept: CARDIOLOGY | Facility: CLINIC | Age: 73
End: 2023-04-10
Payer: MEDICARE

## 2023-04-10 VITALS
HEART RATE: 71 BPM | BODY MASS INDEX: 30.18 KG/M2 | DIASTOLIC BLOOD PRESSURE: 60 MMHG | OXYGEN SATURATION: 95 % | WEIGHT: 181.13 LBS | HEIGHT: 65 IN | SYSTOLIC BLOOD PRESSURE: 122 MMHG

## 2023-04-10 DIAGNOSIS — I25.10 CORONARY ARTERY DISEASE INVOLVING NATIVE CORONARY ARTERY OF NATIVE HEART WITHOUT ANGINA PECTORIS: ICD-10-CM

## 2023-04-10 DIAGNOSIS — E78.2 MIXED HYPERLIPIDEMIA: ICD-10-CM

## 2023-04-10 DIAGNOSIS — Z95.5 S/P CORONARY ARTERY STENT PLACEMENT: Primary | ICD-10-CM

## 2023-04-10 DIAGNOSIS — I10 HYPERTENSION, UNSPECIFIED TYPE: ICD-10-CM

## 2023-04-10 PROCEDURE — 3288F FALL RISK ASSESSMENT DOCD: CPT | Mod: HCNC,CPTII,S$GLB, | Performed by: INTERNAL MEDICINE

## 2023-04-10 PROCEDURE — 1126F AMNT PAIN NOTED NONE PRSNT: CPT | Mod: HCNC,CPTII,S$GLB, | Performed by: INTERNAL MEDICINE

## 2023-04-10 PROCEDURE — 3074F PR MOST RECENT SYSTOLIC BLOOD PRESSURE < 130 MM HG: ICD-10-PCS | Mod: HCNC,CPTII,S$GLB, | Performed by: INTERNAL MEDICINE

## 2023-04-10 PROCEDURE — 1126F PR PAIN SEVERITY QUANTIFIED, NO PAIN PRESENT: ICD-10-PCS | Mod: HCNC,CPTII,S$GLB, | Performed by: INTERNAL MEDICINE

## 2023-04-10 PROCEDURE — 3078F PR MOST RECENT DIASTOLIC BLOOD PRESSURE < 80 MM HG: ICD-10-PCS | Mod: HCNC,CPTII,S$GLB, | Performed by: INTERNAL MEDICINE

## 2023-04-10 PROCEDURE — 3008F BODY MASS INDEX DOCD: CPT | Mod: HCNC,CPTII,S$GLB, | Performed by: INTERNAL MEDICINE

## 2023-04-10 PROCEDURE — 3074F SYST BP LT 130 MM HG: CPT | Mod: HCNC,CPTII,S$GLB, | Performed by: INTERNAL MEDICINE

## 2023-04-10 PROCEDURE — 3288F PR FALLS RISK ASSESSMENT DOCUMENTED: ICD-10-PCS | Mod: HCNC,CPTII,S$GLB, | Performed by: INTERNAL MEDICINE

## 2023-04-10 PROCEDURE — 3008F PR BODY MASS INDEX (BMI) DOCUMENTED: ICD-10-PCS | Mod: HCNC,CPTII,S$GLB, | Performed by: INTERNAL MEDICINE

## 2023-04-10 PROCEDURE — 99999 PR PBB SHADOW E&M-EST. PATIENT-LVL IV: ICD-10-PCS | Mod: PBBFAC,HCNC,, | Performed by: INTERNAL MEDICINE

## 2023-04-10 PROCEDURE — 99213 PR OFFICE/OUTPT VISIT, EST, LEVL III, 20-29 MIN: ICD-10-PCS | Mod: HCNC,S$GLB,, | Performed by: INTERNAL MEDICINE

## 2023-04-10 PROCEDURE — 3044F HG A1C LEVEL LT 7.0%: CPT | Mod: HCNC,CPTII,S$GLB, | Performed by: INTERNAL MEDICINE

## 2023-04-10 PROCEDURE — 1160F RVW MEDS BY RX/DR IN RCRD: CPT | Mod: HCNC,CPTII,S$GLB, | Performed by: INTERNAL MEDICINE

## 2023-04-10 PROCEDURE — 1101F PT FALLS ASSESS-DOCD LE1/YR: CPT | Mod: HCNC,CPTII,S$GLB, | Performed by: INTERNAL MEDICINE

## 2023-04-10 PROCEDURE — 1159F PR MEDICATION LIST DOCUMENTED IN MEDICAL RECORD: ICD-10-PCS | Mod: HCNC,CPTII,S$GLB, | Performed by: INTERNAL MEDICINE

## 2023-04-10 PROCEDURE — 3044F PR MOST RECENT HEMOGLOBIN A1C LEVEL <7.0%: ICD-10-PCS | Mod: HCNC,CPTII,S$GLB, | Performed by: INTERNAL MEDICINE

## 2023-04-10 PROCEDURE — 1159F MED LIST DOCD IN RCRD: CPT | Mod: HCNC,CPTII,S$GLB, | Performed by: INTERNAL MEDICINE

## 2023-04-10 PROCEDURE — 1160F PR REVIEW ALL MEDS BY PRESCRIBER/CLIN PHARMACIST DOCUMENTED: ICD-10-PCS | Mod: HCNC,CPTII,S$GLB, | Performed by: INTERNAL MEDICINE

## 2023-04-10 PROCEDURE — 1101F PR PT FALLS ASSESS DOC 0-1 FALLS W/OUT INJ PAST YR: ICD-10-PCS | Mod: HCNC,CPTII,S$GLB, | Performed by: INTERNAL MEDICINE

## 2023-04-10 PROCEDURE — 99999 PR PBB SHADOW E&M-EST. PATIENT-LVL IV: CPT | Mod: PBBFAC,HCNC,, | Performed by: INTERNAL MEDICINE

## 2023-04-10 PROCEDURE — 99213 OFFICE O/P EST LOW 20 MIN: CPT | Mod: HCNC,S$GLB,, | Performed by: INTERNAL MEDICINE

## 2023-04-10 PROCEDURE — 3078F DIAST BP <80 MM HG: CPT | Mod: HCNC,CPTII,S$GLB, | Performed by: INTERNAL MEDICINE

## 2023-04-10 NOTE — PROGRESS NOTES
Patient ID:  Kathy Seymour is a 72 y.o. female who presents for follow-up of Coronary Artery Disease, Hypertension, Hyperlipidemia, and Results (labs)      She has been doing well denies any chest pains any shortness of breath.  She does have occasional dyspnea on exertion if she walks for a long distance.      Past Medical History:   Diagnosis Date    Angina pectoris     Anticoagulant long-term use     Anxiety     Arthritis     Back pain     Cataract     Chronic bronchitis     Coronary artery disease     STENT X 2    COVID 6/10/2022    CTS (carpal tunnel syndrome)     RIGHT    Depression     MILLER (dyspnea on exertion) 2/21/2017    Hallux valgus, acquired, bilateral 1/19/2017    Hematuria     Hyperlipidemia     Hypertension     Hypertensive left ventricular hypertrophy, without heart failure 5/22/2017    Hypothyroidism     Obesity     Polyneuropathy     S/P coronary artery stent placement, 2 LETTY 9/2012, 1 LETTY 9/2013 3/5/2013    Sleep apnea     USES CPAP    Thyroid disease     Tobacco dependence     Trouble in sleeping     Urinary incontinence     Wears glasses     ONE CONTAC        Past Surgical History:   Procedure Laterality Date    APPENDECTOMY      BILATERAL SALPINGOOPHORECTOMY      CARDIAC CATHETERIZATION      CORONARY ANGIOPLASTY      CORONARY STENT PLACEMENT      PCI  of the LAD with LETTY    EYE SURGERY      bilat cataract removal    HYSTERECTOMY      complete    OOPHORECTOMY      TONSILLECTOMY            Current Outpatient Medications   Medication Instructions    albuterol (VENTOLIN HFA) 90 mcg/actuation inhaler 2 puffs, Inhalation, Every 6 hours PRN, Rescue    ALPRAZolam (XANAX) 0.5 mg, Oral, 3 times daily PRN    aspirin 81 mg, Oral, Daily    atorvastatin (LIPITOR) 80 MG tablet TAKE 1 TABLET EVERY DAY.    budesonide-formoterol 160-4.5 mcg (SYMBICORT) 160-4.5 mcg/actuation HFAA 2 puffs, Inhalation, Every 12 hours, Controller    DULoxetine (CYMBALTA) 60 MG capsule TAKE 1 CAPSULE EVERY DAY     "DULoxetine (CYMBALTA) 30 mg, Oral, Daily, In addition to 60mg.  90mg qday    fluticasone propionate (FLONASE) 50 mcg, Each Nostril, Daily    losartan-hydrochlorothiazide 100-25 mg (HYZAAR) 100-25 mg per tablet TAKE 1 TABLET EVERY DAY    meloxicam (MOBIC) 7.5 mg, Oral, Daily    methIMAzole (TAPAZOLE) 5 mg, Oral, Daily    metoprolol succinate (TOPROL-XL) 25 mg, Oral, Daily    pantoprazole (PROTONIX) 40 mg, Oral, Daily    pilocarpine (SALAGEN) 5 mg, Oral, 3 times daily, Use as directed.    potassium chloride (MICRO-K) 10 MEQ CpSR 10 mEq, Oral, Daily    solifenacin (VESICARE) 5 mg, Oral, Daily    tiZANidine (ZANAFLEX) 4 MG tablet TAKE 1 TABLET (4 MG TOTAL) BY MOUTH EVERY 6 (SIX) HOURS AS NEEDED (SPASM).        Review of patient's allergies indicates:   Allergen Reactions    Macrobid [nitrofurantoin monohyd/m-cryst] Other (See Comments)     Fever and body aches.         Review of Systems   Cardiovascular:  Positive for dyspnea on exertion. Negative for chest pain, leg swelling and palpitations.   Respiratory:  Negative for cough and shortness of breath.    Gastrointestinal:  Negative for heartburn.      Objective:     Vitals:    04/10/23 1336   BP: 122/60   BP Location: Left arm   Patient Position: Sitting   BP Method: Medium (Manual)   Pulse: 71   SpO2: 95%   Weight: 82.2 kg (181 lb 1.7 oz)   Height: 5' 5" (1.651 m)       Physical Exam  Vitals and nursing note reviewed.   Constitutional:       Appearance: She is well-developed.   HENT:      Head: Normocephalic and atraumatic.   Eyes:      Conjunctiva/sclera: Conjunctivae normal.   Cardiovascular:      Rate and Rhythm: Normal rate and regular rhythm.      Heart sounds: Normal heart sounds.   Pulmonary:      Effort: Pulmonary effort is normal.      Breath sounds: Normal breath sounds.   Abdominal:      General: Bowel sounds are normal.      Palpations: Abdomen is soft.   Musculoskeletal:         General: Normal range of motion.   Skin:     General: Skin is warm and dry. "   Neurological:      Mental Status: She is alert and oriented to person, place, and time.   Psychiatric:         Behavior: Behavior normal.         Thought Content: Thought content normal.         Judgment: Judgment normal.     CMP  Sodium   Date Value Ref Range Status   03/23/2023 142 136 - 145 mmol/L Final     Potassium   Date Value Ref Range Status   03/23/2023 4.1 3.5 - 5.1 mmol/L Final     Chloride   Date Value Ref Range Status   03/23/2023 103 95 - 110 mmol/L Final     CO2   Date Value Ref Range Status   03/23/2023 25 23 - 29 mmol/L Final     Glucose   Date Value Ref Range Status   03/23/2023 121 (H) 70 - 110 mg/dL Final     BUN   Date Value Ref Range Status   03/23/2023 19 8 - 23 mg/dL Final     Creatinine   Date Value Ref Range Status   03/23/2023 1.0 0.5 - 1.4 mg/dL Final   09/14/2012 0.7 0.2 - 1.4 mg/dl Final     Calcium   Date Value Ref Range Status   03/23/2023 9.1 8.7 - 10.5 mg/dL Final   09/14/2012 8.8 8.6 - 10.2 mg/dl Final     Total Protein   Date Value Ref Range Status   03/23/2023 6.8 6.0 - 8.4 g/dL Final     Albumin   Date Value Ref Range Status   03/23/2023 3.5 3.5 - 5.2 g/dL Final     Total Bilirubin   Date Value Ref Range Status   03/23/2023 0.4 0.1 - 1.0 mg/dL Final     Comment:     For infants and newborns, interpretation of results should be based  on gestational age, weight and in agreement with clinical  observations.    Premature Infant recommended reference ranges:  Up to 24 hours.............<8.0 mg/dL  Up to 48 hours............<12.0 mg/dL  3-5 days..................<15.0 mg/dL  6-29 days.................<15.0 mg/dL       Alkaline Phosphatase   Date Value Ref Range Status   03/23/2023 96 55 - 135 U/L Final   09/14/2012 68 23 - 119 UNIT/L Final     AST   Date Value Ref Range Status   03/23/2023 17 10 - 40 U/L Final   09/14/2012 16 10 - 30 UNIT/L Final     ALT   Date Value Ref Range Status   03/23/2023 20 10 - 44 U/L Final     Anion Gap   Date Value Ref Range Status   03/23/2023 14 8 -  16 mmol/L Final   09/14/2012 7 5 - 15 meq/L Final     eGFR if    Date Value Ref Range Status   08/25/2021 >60.0 >60 mL/min/1.73 m^2 Final     eGFR if non    Date Value Ref Range Status   08/25/2021 56.4 (A) >60 mL/min/1.73 m^2 Final     Comment:     Calculation used to obtain the estimated glomerular filtration  rate (eGFR) is the CKD-EPI equation.         BMP  Lab Results   Component Value Date     03/23/2023    K 4.1 03/23/2023     03/23/2023    CO2 25 03/23/2023    BUN 19 03/23/2023    CREATININE 1.0 03/23/2023    CALCIUM 9.1 03/23/2023    ANIONGAP 14 03/23/2023    ESTGFRAFRICA >60.0 08/25/2021    EGFRNONAA 56.4 (A) 08/25/2021      BNP  @LABRCNTIP(BNP,BNPTRIAGEBLO)@   Lab Results   Component Value Date    CHOL 115 (L) 03/23/2023    CHOL 132 09/21/2022    CHOL 128 08/25/2021     Lab Results   Component Value Date    HDL 39 (L) 03/23/2023    HDL 52 09/21/2022    HDL 49 08/25/2021     Lab Results   Component Value Date    LDLCALC 58.2 (L) 03/23/2023    LDLCALC 63.0 09/21/2022    LDLCALC 61.8 (L) 08/25/2021     Lab Results   Component Value Date    TRIG 89 03/23/2023    TRIG 85 09/21/2022    TRIG 86 08/25/2021     Lab Results   Component Value Date    CHOLHDL 33.9 03/23/2023    CHOLHDL 39.4 09/21/2022    CHOLHDL 38.3 08/25/2021      Lab Results   Component Value Date    TSH 1.606 03/23/2023    I4LTZRI 135 04/24/2018    P4UBXNZ 7.3 08/02/2012    FREET4 0.97 03/23/2023     Lab Results   Component Value Date    HGBA1C 5.9 (H) 03/23/2023     Lab Results   Component Value Date    WBC 7.98 03/23/2023    HGB 11.6 (L) 03/23/2023    HCT 37.4 03/23/2023    MCV 93 03/23/2023     03/23/2023         Results for orders placed during the hospital encounter of 01/11/21    Echo Color Flow Doppler? Yes    Interpretation Summary  · The left ventricle is normal in size with concentric remodeling and normal systolic function. The estimated ejection fraction is 65%  · Grade I left  ventricular diastolic dysfunction.  · Normal right ventricular size with normal right ventricular systolic function.  · Mild tricuspid regurgitation.  · Normal central venous pressure (3 mmHg).  · The estimated PA systolic pressure is 24 mmHg.  · Overall the study quality was technically difficult     No results found for this or any previous visit.         Assessment:       Coronary artery disease involving native coronary artery of native heart without angina pectoris  Stable no symptoms angina    Hyperlipidemia, baseline   Controlled on atorvastatin 80 mg    Hypertension, onset before 1995  Controlled on metoprolol, losartan HCT       Plan:           Follow low-carbohydrate and low-cholesterol diet.  She will be seen in the office in 6 months with a lipid CMP and TSH

## 2023-04-14 ENCOUNTER — LAB VISIT (OUTPATIENT)
Dept: LAB | Facility: HOSPITAL | Age: 73
End: 2023-04-14
Attending: FAMILY MEDICINE
Payer: MEDICARE

## 2023-04-14 ENCOUNTER — TELEPHONE (OUTPATIENT)
Dept: FAMILY MEDICINE | Facility: CLINIC | Age: 73
End: 2023-04-14
Payer: MEDICARE

## 2023-04-14 DIAGNOSIS — R30.0 DYSURIA: ICD-10-CM

## 2023-04-14 DIAGNOSIS — R50.81 FEVER IN OTHER DISEASES: ICD-10-CM

## 2023-04-14 DIAGNOSIS — R30.0 DYSURIA: Primary | ICD-10-CM

## 2023-04-14 LAB
BACTERIA #/AREA URNS HPF: ABNORMAL /HPF
BILIRUB UR QL STRIP: NEGATIVE
CLARITY UR: ABNORMAL
COLOR UR: YELLOW
GLUCOSE UR QL STRIP: NEGATIVE
HGB UR QL STRIP: ABNORMAL
HYALINE CASTS #/AREA URNS LPF: 19 /LPF
KETONES UR QL STRIP: NEGATIVE
LEUKOCYTE ESTERASE UR QL STRIP: NEGATIVE
MICROSCOPIC COMMENT: ABNORMAL
NITRITE UR QL STRIP: POSITIVE
PH UR STRIP: 6 [PH] (ref 5–8)
PROT UR QL STRIP: ABNORMAL
RBC #/AREA URNS HPF: 5 /HPF (ref 0–4)
SP GR UR STRIP: 1.01 (ref 1–1.03)
SQUAMOUS #/AREA URNS HPF: 6 /HPF
URN SPEC COLLECT METH UR: ABNORMAL
UROBILINOGEN UR STRIP-ACNC: NEGATIVE EU/DL
WBC #/AREA URNS HPF: 5 /HPF (ref 0–5)

## 2023-04-14 PROCEDURE — 87077 CULTURE AEROBIC IDENTIFY: CPT | Performed by: FAMILY MEDICINE

## 2023-04-14 PROCEDURE — 81001 URINALYSIS AUTO W/SCOPE: CPT | Performed by: FAMILY MEDICINE

## 2023-04-14 PROCEDURE — 87086 URINE CULTURE/COLONY COUNT: CPT | Performed by: FAMILY MEDICINE

## 2023-04-14 PROCEDURE — 87186 SC STD MICRODIL/AGAR DIL: CPT | Performed by: FAMILY MEDICINE

## 2023-04-14 NOTE — TELEPHONE ENCOUNTER
Her last UTI one month ago grew 2 organisms, both sensitive to the Macrobid she was originally started on but resistant to the Cipro she was changed to. The change was made before culture results were available.  I want her to come in for a U/A and culture. I will send in another antibiotic that both bacteria were sensitive to last time, but collect the urine for U/A and culture before starting antibiotics. Have her do the U/A and urine culture at [facility], not [facility] (I will get culture results faster).    I spoke with the patient about the above to get more clarity and also spoke with ID about antibiotic choice. Not sure it was a true allergy to Macrobid so patient will restart Macrobid today after getting U/A and culture at [facility]. You do not need to call her.

## 2023-04-14 NOTE — TELEPHONE ENCOUNTER
Spoke with patient   States she had a temp of 99.7 and odor in urine.   She is requesting Cipro.  She is afraid of another UTI  last one 3/14/2023   Please advise

## 2023-04-14 NOTE — TELEPHONE ENCOUNTER
----- Message from Maureen Campbell sent at 4/14/2023  9:04 AM CDT -----  Contact: pt  Type: Needs Medical Advice  Who Called:  pt  Symptoms (please be specific):  fever  How long has patient had these symptoms:  since yesterday  Pharmacy name and phone #:    [id number] - [place] - [address]  [address] [place] [address]  Phone: [phone] Fax: [phone]        Best Call Back Number: [phone]  Additional Information: pt is calling to see if she can get medicine prescribed a med for her uti. She said the  nitrofurantoin didn't agree with her last time. Please call back and advise

## 2023-04-16 LAB — BACTERIA UR CULT: ABNORMAL

## 2023-05-22 ENCOUNTER — HOSPITAL ENCOUNTER (OUTPATIENT)
Dept: RADIOLOGY | Facility: CLINIC | Age: 73
Discharge: HOME | End: 2023-05-22
Attending: PHYSICIAN ASSISTANT
Payer: MEDICARE

## 2023-05-22 ENCOUNTER — OFFICE VISIT (OUTPATIENT)
Dept: URGENT CARE | Facility: CLINIC | Age: 73
End: 2023-05-22
Payer: MEDICARE

## 2023-05-22 VITALS
BODY MASS INDEX: 29.36 KG/M2 | HEART RATE: 66 BPM | DIASTOLIC BLOOD PRESSURE: 81 MMHG | WEIGHT: 176.19 LBS | RESPIRATION RATE: 16 BRPM | OXYGEN SATURATION: 96 % | HEIGHT: 65 IN | SYSTOLIC BLOOD PRESSURE: 136 MMHG | TEMPERATURE: 97 F

## 2023-05-22 DIAGNOSIS — R39.11 URINARY HESITANCY: Primary | ICD-10-CM

## 2023-05-22 DIAGNOSIS — N30.00 ACUTE CYSTITIS WITHOUT HEMATURIA: ICD-10-CM

## 2023-05-22 DIAGNOSIS — Z12.31 ENCOUNTER FOR SCREENING MAMMOGRAM FOR MALIGNANT NEOPLASM OF BREAST: ICD-10-CM

## 2023-05-22 LAB
BILIRUB UR QL STRIP: NEGATIVE
GLUCOSE UR QL STRIP: NEGATIVE
KETONES UR QL STRIP: NEGATIVE
LEUKOCYTE ESTERASE UR QL STRIP: POSITIVE
PH, POC UA: 5
POC BLOOD, URINE: POSITIVE
POC NITRATES, URINE: NEGATIVE
PROT UR QL STRIP: NEGATIVE
SP GR UR STRIP: 1.02 (ref 1–1.03)
UROBILINOGEN UR STRIP-ACNC: ABNORMAL (ref 0.1–1.1)

## 2023-05-22 PROCEDURE — 99214 OFFICE O/P EST MOD 30 MIN: CPT | Mod: S$GLB,,, | Performed by: NURSE PRACTITIONER

## 2023-05-22 PROCEDURE — 99214 PR OFFICE/OUTPT VISIT, EST, LEVL IV, 30-39 MIN: ICD-10-PCS | Mod: S$GLB,,, | Performed by: NURSE PRACTITIONER

## 2023-05-22 PROCEDURE — 77067 SCR MAMMO BI INCL CAD: CPT | Mod: 26,,, | Performed by: RADIOLOGY

## 2023-05-22 PROCEDURE — 77067 SCR MAMMO BI INCL CAD: CPT | Mod: TC,PO

## 2023-05-22 PROCEDURE — 77063 MAMMO DIGITAL SCREENING BILAT WITH TOMO: ICD-10-PCS | Mod: 26,,, | Performed by: RADIOLOGY

## 2023-05-22 PROCEDURE — 77063 BREAST TOMOSYNTHESIS BI: CPT | Mod: 26,,, | Performed by: RADIOLOGY

## 2023-05-22 PROCEDURE — 81003 URINALYSIS AUTO W/O SCOPE: CPT | Mod: QW,S$GLB,, | Performed by: NURSE PRACTITIONER

## 2023-05-22 PROCEDURE — 81003 POCT URINALYSIS, DIPSTICK, AUTOMATED, W/O SCOPE: ICD-10-PCS | Mod: QW,S$GLB,, | Performed by: NURSE PRACTITIONER

## 2023-05-22 PROCEDURE — 77067 MAMMO DIGITAL SCREENING BILAT WITH TOMO: ICD-10-PCS | Mod: 26,,, | Performed by: RADIOLOGY

## 2023-05-22 RX ORDER — CEPHALEXIN 500 MG/1
500 CAPSULE ORAL EVERY 12 HOURS
Qty: 14 CAPSULE | Refills: 0 | Status: SHIPPED | OUTPATIENT
Start: 2023-05-22 | End: 2023-05-29

## 2023-05-22 NOTE — PATIENT INSTRUCTIONS
Keflex twice a day for 7 days    Referral was placed to Dr. Wise's office.  Somebody should be calling you to schedule an  Appointment.      For your recurrent UTIs:  Behavioral changes to help decrease UTI recurrences   -increase water intake (6 to 8 bottles water per day)   -don't hold urine, void every 2 to 3 hours   -prevent constipation (miralax capful daily if necessary) to have a bowel movement daily and keep stools soft  -cut down on caffeine,drink mainly water  -no douching   -void immediately after sexual intercourse  -wipe front to back      OTC meds to try (go to the pharmacist and ask where they are, they are over the counter)  -Use lactobacillus probiotic in capsule form in vagina once nightly for 10 days if you feel like you have an infection coming on   -cranberry pills 500mg tabs TID, can buy over the counter  -take a probiotic daily

## 2023-05-22 NOTE — PROGRESS NOTES
"Subjective:      Patient ID: Kathy Seymour is a 72 y.o. female.    Vitals:  height is 5' 5" (1.651 m) and weight is 79.9 kg (176 lb 3.2 oz). Her oral temperature is 97 °F (36.1 °C). Her blood pressure is 136/81 and her pulse is 66. Her respiration is 16 and oxygen saturation is 96%.     Chief Complaint: Urinary Tract Infection    Urinary Tract Infection   Associated symptoms include frequency (Hesitancy) and hesitancy. Pertinent negatives include no chills, flank pain, hematuria, nausea or vomiting. Associated symptoms comments: Urine odor  . Treatments tried: Azo.     Constitution: Negative for appetite change, chills and fever.   Gastrointestinal:  Negative for abdominal pain, nausea, vomiting and diarrhea.   Genitourinary:  Positive for frequency (Hesitancy). Negative for flank pain, hematuria and pelvic pain.    Objective:     Physical Exam   Constitutional: She is oriented to person, place, and time. She is cooperative.  Non-toxic appearance. She does not appear ill. No distress. awake  HENT:   Head: Normocephalic and atraumatic.   Mouth/Throat: Mucous membranes are moist.   Eyes: Conjunctivae are normal. Right eye exhibits no discharge. Left eye exhibits no discharge.   Cardiovascular: Normal rate.   Pulmonary/Chest: Effort normal. No accessory muscle usage. No tachypnea. No respiratory distress.   Abdominal: Normal appearance.   Neurological: no focal deficit. She is alert and oriented to person, place, and time. No sensory deficit.   Skin: Skin is warm, dry and not diaphoretic. Capillary refill takes 2 to 3 seconds.   Psychiatric: Her behavior is normal. Mood normal.   Nursing note and vitals reviewed.    Assessment:     1. Urinary hesitancy    2. Acute cystitis without hematuria      rBC 10 rbc's, 75 wbc's  Plan:     Urine culture pending    Urinary hesitancy  -     POCT Urinalysis, Dipstick, Automated, W/O Scope    Acute cystitis without hematuria  -     Urine culture  -     Ambulatory " referral/consult to Urology  -     cephALEXin (KEFLEX) 500 MG capsule; Take 1 capsule (500 mg total) by mouth every 12 (twelve) hours. for 7 days  Dispense: 14 capsule; Refill: 0

## 2023-05-23 ENCOUNTER — TELEPHONE (OUTPATIENT)
Dept: FAMILY MEDICINE | Facility: CLINIC | Age: 73
End: 2023-05-23
Payer: MEDICARE

## 2023-05-24 LAB
BACTERIA UR CULT: NORMAL
BACTERIA UR CULT: NORMAL

## 2023-05-25 ENCOUNTER — OFFICE VISIT (OUTPATIENT)
Dept: ENDOCRINOLOGY | Facility: CLINIC | Age: 73
End: 2023-05-25
Payer: MEDICARE

## 2023-05-25 VITALS
OXYGEN SATURATION: 96 % | HEART RATE: 77 BPM | TEMPERATURE: 98 F | DIASTOLIC BLOOD PRESSURE: 60 MMHG | WEIGHT: 176.13 LBS | SYSTOLIC BLOOD PRESSURE: 110 MMHG | BODY MASS INDEX: 29.34 KG/M2 | HEIGHT: 65 IN

## 2023-05-25 DIAGNOSIS — M85.88 OSTEOPENIA OF LUMBAR SPINE: ICD-10-CM

## 2023-05-25 DIAGNOSIS — E05.90 HYPERTHYROIDISM: ICD-10-CM

## 2023-05-25 DIAGNOSIS — E05.90 SUBCLINICAL HYPERTHYROIDISM: Primary | ICD-10-CM

## 2023-05-25 DIAGNOSIS — E04.2 MULTIPLE THYROID NODULES: ICD-10-CM

## 2023-05-25 DIAGNOSIS — M85.9 DISORDER OF BONE DENSITY AND STRUCTURE, UNSPECIFIED: ICD-10-CM

## 2023-05-25 PROCEDURE — 3044F HG A1C LEVEL LT 7.0%: CPT | Mod: CPTII,S$GLB,, | Performed by: INTERNAL MEDICINE

## 2023-05-25 PROCEDURE — 1126F PR PAIN SEVERITY QUANTIFIED, NO PAIN PRESENT: ICD-10-PCS | Mod: CPTII,S$GLB,, | Performed by: INTERNAL MEDICINE

## 2023-05-25 PROCEDURE — 1101F PR PT FALLS ASSESS DOC 0-1 FALLS W/OUT INJ PAST YR: ICD-10-PCS | Mod: CPTII,S$GLB,, | Performed by: INTERNAL MEDICINE

## 2023-05-25 PROCEDURE — 99214 PR OFFICE/OUTPT VISIT, EST, LEVL IV, 30-39 MIN: ICD-10-PCS | Mod: S$GLB,,, | Performed by: INTERNAL MEDICINE

## 2023-05-25 PROCEDURE — 3074F SYST BP LT 130 MM HG: CPT | Mod: CPTII,S$GLB,, | Performed by: INTERNAL MEDICINE

## 2023-05-25 PROCEDURE — 99999 PR PBB SHADOW E&M-EST. PATIENT-LVL III: ICD-10-PCS | Mod: PBBFAC,,, | Performed by: INTERNAL MEDICINE

## 2023-05-25 PROCEDURE — 99999 PR PBB SHADOW E&M-EST. PATIENT-LVL III: CPT | Mod: PBBFAC,,, | Performed by: INTERNAL MEDICINE

## 2023-05-25 PROCEDURE — 99214 OFFICE O/P EST MOD 30 MIN: CPT | Mod: S$GLB,,, | Performed by: INTERNAL MEDICINE

## 2023-05-25 PROCEDURE — 1101F PT FALLS ASSESS-DOCD LE1/YR: CPT | Mod: CPTII,S$GLB,, | Performed by: INTERNAL MEDICINE

## 2023-05-25 PROCEDURE — 3288F FALL RISK ASSESSMENT DOCD: CPT | Mod: CPTII,S$GLB,, | Performed by: INTERNAL MEDICINE

## 2023-05-25 PROCEDURE — 1159F MED LIST DOCD IN RCRD: CPT | Mod: CPTII,S$GLB,, | Performed by: INTERNAL MEDICINE

## 2023-05-25 PROCEDURE — 3288F PR FALLS RISK ASSESSMENT DOCUMENTED: ICD-10-PCS | Mod: CPTII,S$GLB,, | Performed by: INTERNAL MEDICINE

## 2023-05-25 PROCEDURE — 3008F BODY MASS INDEX DOCD: CPT | Mod: CPTII,S$GLB,, | Performed by: INTERNAL MEDICINE

## 2023-05-25 PROCEDURE — 3008F PR BODY MASS INDEX (BMI) DOCUMENTED: ICD-10-PCS | Mod: CPTII,S$GLB,, | Performed by: INTERNAL MEDICINE

## 2023-05-25 PROCEDURE — 3078F PR MOST RECENT DIASTOLIC BLOOD PRESSURE < 80 MM HG: ICD-10-PCS | Mod: CPTII,S$GLB,, | Performed by: INTERNAL MEDICINE

## 2023-05-25 PROCEDURE — 1126F AMNT PAIN NOTED NONE PRSNT: CPT | Mod: CPTII,S$GLB,, | Performed by: INTERNAL MEDICINE

## 2023-05-25 PROCEDURE — 3074F PR MOST RECENT SYSTOLIC BLOOD PRESSURE < 130 MM HG: ICD-10-PCS | Mod: CPTII,S$GLB,, | Performed by: INTERNAL MEDICINE

## 2023-05-25 PROCEDURE — 3078F DIAST BP <80 MM HG: CPT | Mod: CPTII,S$GLB,, | Performed by: INTERNAL MEDICINE

## 2023-05-25 PROCEDURE — 3044F PR MOST RECENT HEMOGLOBIN A1C LEVEL <7.0%: ICD-10-PCS | Mod: CPTII,S$GLB,, | Performed by: INTERNAL MEDICINE

## 2023-05-25 PROCEDURE — 1160F RVW MEDS BY RX/DR IN RCRD: CPT | Mod: CPTII,S$GLB,, | Performed by: INTERNAL MEDICINE

## 2023-05-25 PROCEDURE — 1160F PR REVIEW ALL MEDS BY PRESCRIBER/CLIN PHARMACIST DOCUMENTED: ICD-10-PCS | Mod: CPTII,S$GLB,, | Performed by: INTERNAL MEDICINE

## 2023-05-25 PROCEDURE — 1159F PR MEDICATION LIST DOCUMENTED IN MEDICAL RECORD: ICD-10-PCS | Mod: CPTII,S$GLB,, | Performed by: INTERNAL MEDICINE

## 2023-05-25 RX ORDER — METHIMAZOLE 5 MG/1
5 TABLET ORAL DAILY
Qty: 90 TABLET | Refills: 3 | Status: SHIPPED | OUTPATIENT
Start: 2023-05-25

## 2023-05-25 NOTE — ASSESSMENT & PLAN NOTE
Multiple nodules. S/p 3x FNA.   - most benign   - however, the largest nodule in the isthmus was nondiagnostic on last FNA   had recommended FNA last 2 times, pt was not interested.   again discussed today can't be sure it's benign without FNA/surgery, recommend repeat FNA, pt not interested. Pt is open to US monitoring, repeat US order placed, can check later this year vs next year  If larger, again would recommend FNA. Ideally with some sample saved for molecular testing in case nondiagnostic again.

## 2023-05-25 NOTE — PROGRESS NOTES
Subjective:      Chief Complaint: Thyroid Nodule and Hyperthyroidism    HPI: Kathy Seymour is a 72 y.o. female who is here for a follow-up evaluation for thyroid. Last seen 1/11/2022    Hx subclinical hyperthyroidism. TSH as low as 0.07   TSI, TRAB, Tg antibody negative. Thought to be 2/2 toxic nodule.    NM scan: 2013, normal overall uptake but heterogenous gland with multiple nodules, no clear cold nodules  NM 2012: slightly increased in area of a nodule    Medication: Methimazole 5 mg/day.    Lab Results   Component Value Date    TSH 1.606 03/23/2023    I7XTUFM 135 04/24/2018    A7UJKQT 7.3 08/02/2012    FREET4 0.97 03/23/2023     Also has hx multinodular goiter.   Last US 1/2022. MNG   Largest isthmus 2.3 cm, stable from prior overall.    Prior US 7/2020  Right side 1x0.9x0.8 cm  Left side: 1.2x0.9x1.0 cm, stable  Isthmus 2.5x1.4x2.6 cm, stable from prior    Prior US 4/2018:   2.5x1.4x2.6    Prior FNA: Yes.   2016: 2 nodules on the Left and isthmus     - left nodules: both benign     - isthmus, nondiagnostic    Had recommended repeat FNA for isthmus nodule in 7/2020, pt declined    Bones:  DXA 3/23/2023. Osteopenia at spine  The L1 to L4 vertebral bone mineral density is equal to 0.919 g/cm squared with a T score of -1.2.  There has been 4.7% decrease relative to the prior study.  The left femoral neck bone mineral density is equal to 0.75 g/cm squared with a T score of -0.9.  There has been  4.7% decrease relative to the prior study.    DXA 10/2017: normal.    Last labs:  Lab Results   Component Value Date    CALCIUM 9.1 03/23/2023    ALBUMIN 3.5 03/23/2023    CREATININE 1.0 03/23/2023    TYRKMBKC63MC 39 10/20/2017    PTH 45.0 10/20/2017     Today, pt reports feeling okay overall.    Lost some weight  Not having heat/cold intolerance   No trouble swallowing lately (had EGD with dilation in the past). No palpitations  No diarrhea/constipation    Reviewed past medical, family, social history and updated  as appropriate.    Review of Systems   As above    Objective:     Vitals:    05/25/23 1032   BP: 110/60   Pulse: 77   Temp: 98.2 °F (36.8 °C)       BP Readings from Last 5 Encounters:   05/25/23 110/60   05/22/23 136/81   04/10/23 122/60   03/14/23 120/60   12/05/22 132/78     Physical Exam  Vitals reviewed.   Constitutional:       General: She is not in acute distress.  Neck:      Comments: +nodule, nontender  Cardiovascular:      Heart sounds: Normal heart sounds.   Pulmonary:      Effort: Pulmonary effort is normal.     Wt Readings from Last 10 Encounters:   05/25/23 1032 79.9 kg (176 lb 2.4 oz)   05/22/23 1117 79.9 kg (176 lb 3.2 oz)   04/10/23 1336 82.2 kg (181 lb 1.7 oz)   03/14/23 0900 81.6 kg (179 lb 14.3 oz)   12/05/22 1334 82 kg (180 lb 12.4 oz)   10/06/22 0949 83.8 kg (184 lb 13.7 oz)   09/12/22 1058 83.3 kg (183 lb 8.5 oz)   07/25/22 1256 84.9 kg (187 lb 3.2 oz)   06/10/22 0909 84.4 kg (186 lb)   05/20/22 1018 85.7 kg (189 lb)     Lab Results   Component Value Date    HGBA1C 5.9 (H) 03/23/2023     Lab Results   Component Value Date    CHOL 115 (L) 03/23/2023    HDL 39 (L) 03/23/2023    LDLCALC 58.2 (L) 03/23/2023    TRIG 89 03/23/2023    CHOLHDL 33.9 03/23/2023     Lab Results   Component Value Date     03/23/2023    K 4.1 03/23/2023     03/23/2023    CO2 25 03/23/2023     (H) 03/23/2023    BUN 19 03/23/2023    CREATININE 1.0 03/23/2023    CALCIUM 9.1 03/23/2023    PROT 6.8 03/23/2023    ALBUMIN 3.5 03/23/2023    BILITOT 0.4 03/23/2023    ALKPHOS 96 03/23/2023    AST 17 03/23/2023    ALT 20 03/23/2023    ANIONGAP 14 03/23/2023    ESTGFRAFRICA >60.0 08/25/2021    EGFRNONAA 56.4 (A) 08/25/2021    TSH 1.606 03/23/2023      Assessment/Plan:     Subclinical hyperthyroidism  Last thyroid labs normal   - clinically, euthyroid   - on methimazole 5mg/day   - continue same regimen   recheck labs 1-2 times a year. Sooner if symptoms change      Multiple thyroid nodules  Multiple nodules. S/p 3x  FNA.   - most benign   - however, the largest nodule in the isthmus was nondiagnostic on last FNA   had recommended FNA last 2 times, pt was not interested.   again discussed today can't be sure it's benign without FNA/surgery, recommend repeat FNA, pt not interested. Pt is open to US monitoring, repeat US order placed, can check later this year vs next year  If larger, again would recommend FNA. Ideally with some sample saved for molecular testing in case nondiagnostic again.    Osteopenia of lumbar spine  Seen on DXA 2023.   discussed aiming for 1200 mg/day calcium intake, 1,000 units/day vitamin D   encourage weight bearing exercises as tolerated   avoid falls   recheck DXA after 2 years      Follow up in about 1 year (around 5/25/2024) for lab review, further monitoring, imaging review.      Jersey Esposito MD  Endocrinology

## 2023-05-25 NOTE — ASSESSMENT & PLAN NOTE
Seen on DXA 2023.   discussed aiming for 1200 mg/day calcium intake, 1,000 units/day vitamin D   encourage weight bearing exercises as tolerated   avoid falls   recheck DXA after 2 years

## 2023-05-25 NOTE — ASSESSMENT & PLAN NOTE
Last thyroid labs normal   - clinically, euthyroid   - on methimazole 5mg/day   - continue same regimen   recheck labs 1-2 times a year. Sooner if symptoms change

## 2023-05-26 ENCOUNTER — TELEPHONE (OUTPATIENT)
Dept: URGENT CARE | Facility: CLINIC | Age: 73
End: 2023-05-26
Payer: MEDICARE

## 2023-05-26 NOTE — TELEPHONE ENCOUNTER
----- Message from Pat Cummins NP sent at 5/25/2023  9:16 AM CDT -----  Please inform that the urine culture grew more than 2 organisms so the lab did not run the sensitivity panel.  If symptoms persist return to clinic, PCP or ER for further evaluation and possible need to recollect another urine specimen if symptoms worsen or do not resolve after completing antibiotics already prescribed.

## 2023-06-03 DIAGNOSIS — F32.0 MILD MAJOR DEPRESSION: ICD-10-CM

## 2023-06-03 DIAGNOSIS — G62.9 NEUROPATHY: ICD-10-CM

## 2023-06-04 NOTE — TELEPHONE ENCOUNTER
Preferred pharmacy: [facility] - [place], [place] - [address]   Delivery method: Mail     LOV--3-14-23  FOV--None Noted

## 2023-06-05 RX ORDER — DULOXETIN HYDROCHLORIDE 30 MG/1
30 CAPSULE, DELAYED RELEASE ORAL DAILY
Qty: 90 CAPSULE | Refills: 3 | Status: SHIPPED | OUTPATIENT
Start: 2023-06-05 | End: 2024-03-27

## 2023-06-05 RX ORDER — METOPROLOL SUCCINATE 25 MG/1
25 TABLET, EXTENDED RELEASE ORAL DAILY
Qty: 30 TABLET | Refills: 11 | Status: SHIPPED | OUTPATIENT
Start: 2023-06-05 | End: 2024-01-10 | Stop reason: SDUPTHER

## 2023-06-06 ENCOUNTER — PATIENT MESSAGE (OUTPATIENT)
Dept: UROLOGY | Facility: CLINIC | Age: 73
End: 2023-06-06

## 2023-06-06 ENCOUNTER — OFFICE VISIT (OUTPATIENT)
Dept: UROLOGY | Facility: CLINIC | Age: 73
End: 2023-06-06
Payer: MEDICARE

## 2023-06-06 VITALS
SYSTOLIC BLOOD PRESSURE: 115 MMHG | WEIGHT: 174.69 LBS | DIASTOLIC BLOOD PRESSURE: 71 MMHG | HEIGHT: 65 IN | BODY MASS INDEX: 29.11 KG/M2 | HEART RATE: 70 BPM

## 2023-06-06 DIAGNOSIS — R82.71 ASYMPTOMATIC BACTERIURIA: ICD-10-CM

## 2023-06-06 DIAGNOSIS — N32.81 OAB (OVERACTIVE BLADDER): Primary | ICD-10-CM

## 2023-06-06 DIAGNOSIS — N39.46 MIXED INCONTINENCE: ICD-10-CM

## 2023-06-06 LAB
BACTERIA #/AREA URNS HPF: ABNORMAL /HPF
BILIRUB UR QL STRIP: NEGATIVE
BILIRUBIN, UA POC OHS: NEGATIVE
BLOOD, UA POC OHS: ABNORMAL
CLARITY UR: CLEAR
CLARITY, UA POC OHS: ABNORMAL
COLOR UR: YELLOW
COLOR, UA POC OHS: YELLOW
GLUCOSE UR QL STRIP: NEGATIVE
GLUCOSE, UA POC OHS: NEGATIVE
HGB UR QL STRIP: ABNORMAL
HYALINE CASTS #/AREA URNS LPF: 3 /LPF
KETONES UR QL STRIP: NEGATIVE
KETONES, UA POC OHS: NEGATIVE
LEUKOCYTE ESTERASE UR QL STRIP: ABNORMAL
LEUKOCYTES, UA POC OHS: ABNORMAL
MICROSCOPIC COMMENT: ABNORMAL
NITRITE UR QL STRIP: NEGATIVE
NITRITE, UA POC OHS: NEGATIVE
PH UR STRIP: 5 [PH] (ref 5–8)
PH, UA POC OHS: 5.5
POC RESIDUAL URINE VOLUME: 3 ML (ref 0–100)
PROT UR QL STRIP: NEGATIVE
PROTEIN, UA POC OHS: NEGATIVE
RBC #/AREA URNS HPF: 0 /HPF (ref 0–4)
SP GR UR STRIP: 1.02 (ref 1–1.03)
SPECIFIC GRAVITY, UA POC OHS: 1.02
URN SPEC COLLECT METH UR: ABNORMAL
UROBILINOGEN UR STRIP-ACNC: NEGATIVE EU/DL
UROBILINOGEN, UA POC OHS: 0.2
WBC #/AREA URNS HPF: 35 /HPF (ref 0–5)
WBC CLUMPS URNS QL MICRO: ABNORMAL

## 2023-06-06 PROCEDURE — 3008F PR BODY MASS INDEX (BMI) DOCUMENTED: ICD-10-PCS | Mod: CPTII,S$GLB,, | Performed by: UROLOGY

## 2023-06-06 PROCEDURE — 87077 CULTURE AEROBIC IDENTIFY: CPT | Performed by: UROLOGY

## 2023-06-06 PROCEDURE — 3044F PR MOST RECENT HEMOGLOBIN A1C LEVEL <7.0%: ICD-10-PCS | Mod: CPTII,S$GLB,, | Performed by: UROLOGY

## 2023-06-06 PROCEDURE — 3074F SYST BP LT 130 MM HG: CPT | Mod: CPTII,S$GLB,, | Performed by: UROLOGY

## 2023-06-06 PROCEDURE — 99999 PR PBB SHADOW E&M-EST. PATIENT-LVL IV: CPT | Mod: PBBFAC,,, | Performed by: UROLOGY

## 2023-06-06 PROCEDURE — 51798 US URINE CAPACITY MEASURE: CPT | Mod: S$GLB,,, | Performed by: UROLOGY

## 2023-06-06 PROCEDURE — 1126F PR PAIN SEVERITY QUANTIFIED, NO PAIN PRESENT: ICD-10-PCS | Mod: CPTII,S$GLB,, | Performed by: UROLOGY

## 2023-06-06 PROCEDURE — 87086 URINE CULTURE/COLONY COUNT: CPT | Performed by: UROLOGY

## 2023-06-06 PROCEDURE — 1159F PR MEDICATION LIST DOCUMENTED IN MEDICAL RECORD: ICD-10-PCS | Mod: CPTII,S$GLB,, | Performed by: UROLOGY

## 2023-06-06 PROCEDURE — 3074F PR MOST RECENT SYSTOLIC BLOOD PRESSURE < 130 MM HG: ICD-10-PCS | Mod: CPTII,S$GLB,, | Performed by: UROLOGY

## 2023-06-06 PROCEDURE — 3288F PR FALLS RISK ASSESSMENT DOCUMENTED: ICD-10-PCS | Mod: CPTII,S$GLB,, | Performed by: UROLOGY

## 2023-06-06 PROCEDURE — 1101F PR PT FALLS ASSESS DOC 0-1 FALLS W/OUT INJ PAST YR: ICD-10-PCS | Mod: CPTII,S$GLB,, | Performed by: UROLOGY

## 2023-06-06 PROCEDURE — 81003 POCT URINALYSIS(INSTRUMENT): ICD-10-PCS | Mod: QW,S$GLB,, | Performed by: UROLOGY

## 2023-06-06 PROCEDURE — 3288F FALL RISK ASSESSMENT DOCD: CPT | Mod: CPTII,S$GLB,, | Performed by: UROLOGY

## 2023-06-06 PROCEDURE — 87186 SC STD MICRODIL/AGAR DIL: CPT | Performed by: UROLOGY

## 2023-06-06 PROCEDURE — 81000 URINALYSIS NONAUTO W/SCOPE: CPT | Performed by: UROLOGY

## 2023-06-06 PROCEDURE — 1126F AMNT PAIN NOTED NONE PRSNT: CPT | Mod: CPTII,S$GLB,, | Performed by: UROLOGY

## 2023-06-06 PROCEDURE — 81003 URINALYSIS AUTO W/O SCOPE: CPT | Mod: QW,S$GLB,, | Performed by: UROLOGY

## 2023-06-06 PROCEDURE — 51798 POCT BLADDER SCAN: ICD-10-PCS | Mod: S$GLB,,, | Performed by: UROLOGY

## 2023-06-06 PROCEDURE — 1101F PT FALLS ASSESS-DOCD LE1/YR: CPT | Mod: CPTII,S$GLB,, | Performed by: UROLOGY

## 2023-06-06 PROCEDURE — 99204 PR OFFICE/OUTPT VISIT, NEW, LEVL IV, 45-59 MIN: ICD-10-PCS | Mod: S$GLB,,, | Performed by: UROLOGY

## 2023-06-06 PROCEDURE — 1159F MED LIST DOCD IN RCRD: CPT | Mod: CPTII,S$GLB,, | Performed by: UROLOGY

## 2023-06-06 PROCEDURE — 1160F PR REVIEW ALL MEDS BY PRESCRIBER/CLIN PHARMACIST DOCUMENTED: ICD-10-PCS | Mod: CPTII,S$GLB,, | Performed by: UROLOGY

## 2023-06-06 PROCEDURE — 99204 OFFICE O/P NEW MOD 45 MIN: CPT | Mod: S$GLB,,, | Performed by: UROLOGY

## 2023-06-06 PROCEDURE — 1160F RVW MEDS BY RX/DR IN RCRD: CPT | Mod: CPTII,S$GLB,, | Performed by: UROLOGY

## 2023-06-06 PROCEDURE — 3078F PR MOST RECENT DIASTOLIC BLOOD PRESSURE < 80 MM HG: ICD-10-PCS | Mod: CPTII,S$GLB,, | Performed by: UROLOGY

## 2023-06-06 PROCEDURE — 99999 PR PBB SHADOW E&M-EST. PATIENT-LVL IV: ICD-10-PCS | Mod: PBBFAC,,, | Performed by: UROLOGY

## 2023-06-06 PROCEDURE — 3008F BODY MASS INDEX DOCD: CPT | Mod: CPTII,S$GLB,, | Performed by: UROLOGY

## 2023-06-06 PROCEDURE — 3044F HG A1C LEVEL LT 7.0%: CPT | Mod: CPTII,S$GLB,, | Performed by: UROLOGY

## 2023-06-06 PROCEDURE — 3078F DIAST BP <80 MM HG: CPT | Mod: CPTII,S$GLB,, | Performed by: UROLOGY

## 2023-06-06 PROCEDURE — 87088 URINE BACTERIA CULTURE: CPT | Performed by: UROLOGY

## 2023-06-06 RX ORDER — ESTRADIOL 0.1 MG/G
1 CREAM VAGINAL DAILY
Qty: 42.5 G | Refills: 3 | Status: SHIPPED | OUTPATIENT
Start: 2023-06-06 | End: 2023-10-27 | Stop reason: SDUPTHER

## 2023-06-06 NOTE — PATIENT INSTRUCTIONS
Assessment:     Kathy Seymour is a 72 y.o. female with Recurrent Urinary Tract Infections BUT REALLY ASYMPTOMATIC BACTERIURIA. NO TX NEEDED. NEED TO R/O HYDRO. BLADDER EMPTIES. RF: PREDIABETES and pads.    1. OAB (overactive bladder)    2. Mixed incontinence    3. Asymptomatic bacteriuria        copy and paste all of the below into their patient instructions    SHORT PLAN (read below for details):  Start vibegron/gemtesa if covered- oxybutynin and solfenacin help but caused dry mouth. If not covered can see if myrbetriq/mibegron (pt to ask pharmacist)  A catheterized urine was performed today. The urine was sent for urinalysis (which reflexes urinalysis microscopic automatically if indicated) and culture.   The patient was not presribed an antibiotic. A culture DOES NOT have to be rechecked after treatment.   Daily Supplements: Cranberry 36mg PACS daily, D-mannose 2000mg daily, probiotic with lactobacillus daily (see below for details)  START estrace cream. Nightly for 2 weeks then indefinitely. See below for details. USE regularly and INDEFINITELY. NOT as needed  Imaging of the kidneys is indicated at this time.rbus  Cystoscopy is not indicated at this time. No h/o mesh    Return and follow up plan  Return to clinic in 3 months with nurse practitioner to ensure no other infections and following recommendations. If uti's persist or patient develops flank pain or fever, imaging of the kidneys may be warranted.   NO CHECKING FOR UTI IF NO SYMPTOMS. NO TREATMENT FOR UTI IF NO SYMPTOMS. ALREADY HAS MDR  UTI.     DETAILED PLAN: .   Preventative DAILY supplements:  Recommend Buying a Cranberry Supplement that has 36mg PAC (only a few have this much) of cranberry - it is a very specific dose but many companies sell it.   Preferred: Utiva Cranberry Tablets (Utiva Cranberry PACs Supplement Pills $39.99 for 30 pills)- their particular tablet is the equivalent of 9 cranberry tablets that you buy over the counter. (phone  6-232-965-9350 or online [email or web address]://[email or web address]/products/utiva-uti-control)  Uriexo Cranberry Tablets   Cirilo Thayer $30/month: https://[employer]/products/cranberex-urinary-health-support  If unable to afford- can use over the counter cranberry caplets but will need to take 1500mg daily (about 3 capsules, 3x a day)   Ok to Buy Over the Counter at [facility] (ask pharmacist for help) or buy from [employer] (see above)  Probiotic with lactobacillus - take once a day. This helps populate the vagina with good bacteria instead of bad bacteria- you can buy this over the counter or buy from the company [employer]. Make sure to look for LACTOBACILLUS on the bottle.   D-mannose supplement (2000mg a day)  $20/30d supply  This helps keep the bad bacteria from sticking to your bladder wall. You can buy this over the counter or buy from [employer] by visiting [employer].         UTI prevention recommendations  Drink more water (2 to 4 bottles) to dilute the bacteria that are replicating. This will also help you urinate more often if you tend to hold your bladder.   Timed voiding (which means- Urinate every 2 hours during the day) to rid the bladder of bacteria before they multiply and start to stick to your bladder walls and cause more symptoms and problems  Avoid pads if possible or wear thinner pads and change them more often    As long as patient has no history of breast cancer and post-menopausal:  Vaginal Hormone cream (Estrace, Premarin or Compounded) you place vaginally 2x a week using your finger(if no history of heart attack, blood clots, cervical, breast, uterine or ovarian cancer). This will help the good bacteria replenish in the vagina. Similar to a probiotic. Lack of hormones in the vagina in post-menopausal women is a common cause of UTIs in women.   Use nightly for 2 weeks and then every other night after indefinitely around the urethra and into the vagina using finger for  application. See d/c instructions for details if too expensive. The patient denies any history of breast cancer, uterine cancer, cervical cancer, abnormal pap smears.   Hormone cream- why you need it, how to use it  We all have bacteria that cover our bodies, however we want good bacteria, not bad bacteria. When you lack hormones,  your vagina starts to let the bad bacteria take over because there is an imbalance. The hormone cream allows for good bacteria to take over again and this can help decrease the risks of UTIs. It can also help with vaginal dryness.   Please let your provider know if you have a history of breast cancer, uterine cancer, cervical cancer or a history of blood clots or heart attack.  If it costs more than $70   we can write you for a compounded less expensive form of estrace cream from [employer]. Please let us know. They will mail it to you for around $8 or you can pick it up in [place]. They will call you for payment first and you can ask the address. If you haven't heard from them in 2 days, please call them to confirm they received the prescription.   Can also use goodrx  Do not use the applicator, just your finger. This will make it last longer. Apply small smear to 2nd knuckle. Apply around urethra and into vagina to 2nd knuckle. Prescription should last around 6 months. Apply inside vagina with finger daily x 2 weeks and then 2 to 3x per week after this indefinitely.  You will only see improvement if you use on a regular basis in about a month. (less dryness, maybe some less overactive bladder, less UTIs possibly if having UTIs).   It is not a medicine to use as needed. Typically a lifelong medication.           Other helpful information:    If you have a UTI try to self treat first for 1-2 days with conservative treatment:  Increase fluid intake - drink 4 to 5 bottles of water a day and empty bladder often  AZO for burning or urinary frequency (over the counter)  Utiva  "cranberry tablets when having uti symptoms: Take 2x a day for 2 days then daily for a week (buy from [employer]). Visit https://www.[employer] or call 1-716.100.9879 to order. They costs about $30/month and offer a subscribe and save option. They also have a probiotic and D mannose supplementation, if the patient is able to afford, I suggest taking. Utiva cranberry tablets only available from [employer] are equivalent to the suggested dose of 9 tablets if you buy over the counter.   D mannose 2000mx a day for 2 days then daily for a week (can buy from [employer] or over the counter)    If your symptoms persists despite increased water intake and azo then:  see pcp, gynecologist, or urgent care and make sure to give a clean catch urine or catheterized urine. This means wipe, urinate in the toilet, then provide a MID STREAM sample (your hand should get urine on it if you are doing it right). This avoids urine picking up the normal bacteria that line the vagina on the way out to the cup.   ask for a URINE CULTURE. You need to make sure you know what antibiotics will kill your particular bacteria  if you are told you have "multiple organisms" or "normal vaginal sydnie" it means the urine sample picked up the normal bacteria in the vagina and contaminated your urine specimen. If you are still having symptoms of a UTI then you will need to provide ANOTHER clean catch sample or CATHETERIZED urine to bypass the vagina and get urine directly from the bladder:     If you are given antibiotics from primary care, ER, urgent care or gynecologist:  only take 3 to 5 days of the antibiotics (unless you have diabetes or a urologist tells you take take more), even if they give you a 10d supply and keep or discard the rest  only take the full 10 days if you are having fever, chills or pain in your kidneys     Things to remember:   Try to avoid antibiotics or at least try to avoid taking them for more than 3 to 5 days for simple " "uti.    Bacteria are smart and they will become resistant to antibiotics. We have only a limited amount of antibiotics that work.   ALWAYS request a urine culture so you can know which antibiotics work.   If you ever see bloody red urine call us ASAP, even with uti's and even if you are on a blood thinner, this can sometimes be a sign of bladder cancer or kidney stones.     If you do have uti symptoms but do not have a culture proven uti (with E.coli, for example)  You may need a catheterized urine if it says "multiple organisms" which means normal vaginal sydnie  You may have a kidney stone, interstitial cystitis, overactive bladder, bladder cancer, so please call to make a follow-up     Once we rule out any anatomic obstruction and have given UTI recs, we will see for follow-up and then will have you return to your PCP/primary care physician  for symptomatic UTI treatment wit the information and recommendations we have given them.   "

## 2023-06-06 NOTE — PROGRESS NOTES
[facility]  PCP: Jersey Seymour Jr, MD  DOS: 2023  Referred by:Pat Cummins NP      Initial consult by me in clinic on 23 for Recurrent uti's:    HPI: Kathy Seymour is a very pleasant 72 y.o. female     Previously saw me for OAB in 2019 with h/o mixed incontinence UUI>JUD. H/o bladder lift (no mesh) with hysterectomy in . Was wearing 2 to 3 pads a day. . Was also having uti's about 1x a year. Symptomatic (dysuria, frequency). And also found to have mod blood in urine on dipstick. Family hx of stones. 50 pk yr hx of smoking but quit in . W/u included ctu 19 (nothing to explain MH). Cytology 19 was neg and cx was +. Cystoscopy on 19 revealed evidence of recent uti. Vaginal exam showed a rectocele. Referred to  who saw her on 7/10/19. Pt decided on conservative treatment. She had been written for myrbetriq but not covered. Then oxybutynin which was helping some.  So he increased it to oxybutynin 15mgXL. She f/u with ciarra on 9/10/19 and stated she had 60% improvement on 1/2 tab of oxybutynin 15mg.     She hasn't been seen by urology or gyn since. Returns bc she is been essentially asymp having matic bacteriuria over the past year, initially infrequently, once every few months but now feels more frequently and now once every few weeks.  She has gone to urgent care a few times for foul-smelling urine.  No dysuria, no frequency no urgency. Her voided urine today with mod leuk and small blood. Cath urine looks cloudy. Sending. Residual only 40cc.     Uti symptoms: none. Foul smelling urine   UTI prevention tried in the past:   Cranberry No.   Drinking more fluid  No.  2 bottles of water and 2 cups of coffee  Timed voiding Yes - 2 to 3 hours and more than this someitme.   Prevention No.   UTI risk factors:    Previous bladder or vaginal surgery: Yes - hysterectomy and lift but no mesh, cystoscopy as above for .   Personal history of  kidney stones: No. But strong family  hx.   Urinary Incontinence episodes: Yes - only if she waits a few hours to go to restroom.  OAB meds: She has been on oxybutynin in the past already and now on solfenacin 10mg once daily. Essentially works for her but dries her mouth. With cpap machine worse  Pads: Yes - pantyliners if she's goes out or to dinner.   Diabetes: No. A1c 5.9 3/2023  Daytime frequency: q 1-2 hours .  Drinks 2 c of coffee every day and coke sometimes  Bowel Incontinence episodes: No.   Sexually active: No.   Water intake: medium.   History of breast cancer or any other gyn cancer: No. Never tried estrace  Hysterectomy: Yes - metromenhorragia   Imaging:ctu 6/6/19 independently reviewed. Normal. No nydro. No stones. Ct chest 9/2022- half of L kidney seen. No hydro. No stones.   In and out catheter performed today: Yes    Catheterized PVR was 40mL.        Previous urine cultures pasted here: Anticoagulation:  Yes - asa 81mg daily. +50 pack yr hx of smoking. +fam hx of stones.   Component       Urine Culture, Routine   Latest Ref Rng & Units          5/22/2023       Final report   4/14/2023       ESCHERICHIA COLI (A) . . .   3/14/2023      2:41 PM KLEBSIELLA OXYTOCA (A) . . .   3/14/2023      2:41 PM ESCHERICHIA COLI (A) . . .   7/25/2022       Final report   7/1/2019       ESCHERICHIA COLI (A) . . .   5/30/2019       ESCHERICHIA COLI . . .     5/30/19            E.coli, void: mod blood, asx but treated, cytology: neg  12/10/18          E.coli, void: n/a  10/20/17          No cx, void: nit+/2+bld/1+leuk  4/6/17              E.coli, void: tr bld  9/9/16              No cx, void:1+bld/nit+  9/4/13              No cx, void: 250 bld/wbc+  11/11/12          No cx, void: lrg bld/nit+/1+leuk  8/5/08              No cx, void: sml blood/nit+      Past Medical History:   Diagnosis Date    Angina pectoris     Anticoagulant long-term use     Anxiety     Arthritis     Back pain     Cataract     Chronic bronchitis      Coronary artery disease     STENT X 2    COVID 6/10/2022    CTS (carpal tunnel syndrome)     RIGHT    Depression     MILLER (dyspnea on exertion) 2/21/2017    Hallux valgus, acquired, bilateral 1/19/2017    Hematuria     Hyperlipidemia     Hypertension     Hypertensive left ventricular hypertrophy, without heart failure 5/22/2017    Hypothyroidism     Obesity     Polyneuropathy     S/P coronary artery stent placement, 2 LETTY 9/2012, 1 LETTY 9/2013 3/5/2013    Sleep apnea     USES CPAP    Thyroid disease     Tobacco dependence     Trouble in sleeping     Urinary incontinence     Wears glasses     ONE CONTAC     Past Surgical History:   Procedure Laterality Date    APPENDECTOMY      BILATERAL SALPINGOOPHORECTOMY      CARDIAC CATHETERIZATION      CORONARY ANGIOPLASTY      CORONARY STENT PLACEMENT      PCI  of the LAD with LETTY    EYE SURGERY      bilat cataract removal    HYSTERECTOMY      complete    OOPHORECTOMY      TONSILLECTOMY       Review of patient's allergies indicates:   Allergen Reactions    Macrobid [nitrofurantoin monohyd/m-cryst] Other (See Comments)     Fever and body aches.        Review of Systems:  As above.     Vitals:    06/06/23 0924   BP: 115/71   Pulse: 70     Assessment:     Kathy Seymour is a 72 y.o. female with Recurrent Urinary Tract Infections BUT REALLY ASYMPTOMATIC BACTERIURIA. NO TX NEEDED. NEED TO R/O HYDRO. BLADDER EMPTIES. RF: PREDIABETES and pads.    1. OAB (overactive bladder)    2. Mixed incontinence    3. Asymptomatic bacteriuria        copy and paste all of the below into their patient instructions    SHORT PLAN (read below for details):  Start vibegron/gemtesa if covered- oxybutynin and solfenacin help but caused dry mouth. If not covered can see if myrbetriq/mibegron (pt to ask pharmacist)  A catheterized urine was performed today. The urine was sent for urinalysis (which reflexes urinalysis microscopic automatically if indicated) and culture.   The patient was not presribed an  antibiotic. A culture DOES NOT have to be rechecked after treatment.   Daily Supplements: Cranberry 36mg PACS daily, D-mannose 2000mg daily, probiotic with lactobacillus daily (see below for details)  START estrace cream. Nightly for 2 weeks then indefinitely. See below for details. USE regularly and INDEFINITELY. NOT as needed  Imaging of the kidneys is indicated at this time.rbus  Cystoscopy is not indicated at this time. No h/o mesh    Return and follow up plan  Return to clinic in 3 months with nurse practitioner to ensure no other infections and following recommendations. If uti's persist or patient develops flank pain or fever, imaging of the kidneys may be warranted.   NO CHECKING FOR UTI IF NO SYMPTOMS. NO TREATMENT FOR UTI IF NO SYMPTOMS. ALREADY HAS MDR  UTI.     DETAILED PLAN: .   Preventative DAILY supplements:  Recommend Buying a Cranberry Supplement that has 36mg PAC (only a few have this much) of cranberry - it is a very specific dose but many companies sell it.   Preferred: Utiva Cranberry Tablets (Utiva Cranberry PACs Supplement Pills $39.99 for 30 pills)- their particular tablet is the equivalent of 9 cranberry tablets that you buy over the counter. (phone 1-327.297.3532 or online https://www.[employer]/products/utiva-uti-control)  Uriexo Cranberry Tablets   [facility]  [address] $30/month: https://[employer]/products/cranberex-urinary-health-support  If unable to afford- can use over the counter cranberry caplets but will need to take 1500mg daily (about 3 capsules, 3x a day)   Ok to Buy Over the Counter at [employer] (ask pharmacist for help) or buy from [employer] (see above)  Probiotic with lactobacillus - take once a day. This helps populate the vagina with good bacteria instead of bad bacteria- you can buy this over the counter or buy from the company [employer]. Make sure to look for LACTOBACILLUS on the bottle.   D-mannose supplement (2000mg a day)  $20/30d supply   This helps keep the bad bacteria from sticking to your bladder wall. You can buy this over the counter or buy from [employer] by visiting [facility].         UTI prevention recommendations  Drink more water (2 to 4 bottles) to dilute the bacteria that are replicating. This will also help you urinate more often if you tend to hold your bladder.   Timed voiding (which means- Urinate every 2 hours during the day) to rid the bladder of bacteria before they multiply and start to stick to your bladder walls and cause more symptoms and problems  Avoid pads if possible or wear thinner pads and change them more often    As long as patient has no history of breast cancer and post-menopausal:  Vaginal Hormone cream (Estrace, Premarin or Compounded) you place vaginally 2x a week using your finger(if no history of heart attack, blood clots, cervical, breast, uterine or ovarian cancer). This will help the good bacteria replenish in the vagina. Similar to a probiotic. Lack of hormones in the vagina in post-menopausal women is a common cause of UTIs in women.   Use nightly for 2 weeks and then every other night after indefinitely around the urethra and into the vagina using finger for application. See d/c instructions for details if too expensive. The patient denies any history of breast cancer, uterine cancer, cervical cancer, abnormal pap smears.   Hormone cream- why you need it, how to use it  We all have bacteria that cover our bodies, however we want good bacteria, not bad bacteria. When you lack hormones,  your vagina starts to let the bad bacteria take over because there is an imbalance. The hormone cream allows for good bacteria to take over again and this can help decrease the risks of UTIs. It can also help with vaginal dryness.   Please let your provider know if you have a history of breast cancer, uterine cancer, cervical cancer or a history of blood clots or heart attack.  If it costs more than $70   we can write  you for a compounded less expensive form of estrace cream from [employer]. Please let us know. They will mail it to you for around $8 or you can pick it up in [place]. They will call you for payment first and you can ask the address. If you haven't heard from them in 2 days, please call them to confirm they received the prescription.   Can also use goodrx  Do not use the applicator, just your finger. This will make it last longer. Apply small smear to 2nd knuckle. Apply around urethra and into vagina to 2nd knuckle. Prescription should last around 6 months. Apply inside vagina with finger daily x 2 weeks and then 2 to 3x per week after this indefinitely.  You will only see improvement if you use on a regular basis in about a month. (less dryness, maybe some less overactive bladder, less UTIs possibly if having UTIs).   It is not a medicine to use as needed. Typically a lifelong medication.           Other helpful information:    If you have a UTI try to self treat first for 1-2 days with conservative treatment:  Increase fluid intake - drink 4 to 5 bottles of water a day and empty bladder often  AZO for burning or urinary frequency (over the counter)  Utiva cranberry tablets when having uti symptoms: Take 2x a day for 2 days then daily for a week (buy from [employer]). Visit https://www.ReferralMD.com or call 1-103.927.2356 to order. They costs about $30/month and offer a subscribe and save option. They also have a probiotic and D mannose supplementation, if the patient is able to afford, I suggest taking. Utiva cranberry tablets only available from [employer] are equivalent to the suggested dose of 9 tablets if you buy over the counter.   D mannose 2000mx a day for 2 days then daily for a week (can buy from [employer] or over the counter)    If your symptoms persists despite increased water intake and azo then:  see pcp, gynecologist, or urgent care and make sure to give a clean catch urine or  "catheterized urine. This means wipe, urinate in the toilet, then provide a MID STREAM sample (your hand should get urine on it if you are doing it right). This avoids urine picking up the normal bacteria that line the vagina on the way out to the cup.   ask for a URINE CULTURE. You need to make sure you know what antibiotics will kill your particular bacteria  if you are told you have "multiple organisms" or "normal vaginal sydnie" it means the urine sample picked up the normal bacteria in the vagina and contaminated your urine specimen. If you are still having symptoms of a UTI then you will need to provide ANOTHER clean catch sample or CATHETERIZED urine to bypass the vagina and get urine directly from the bladder:     If you are given antibiotics from primary care, ER, urgent care or gynecologist:  only take 3 to 5 days of the antibiotics (unless you have diabetes or a urologist tells you take take more), even if they give you a 10d supply and keep or discard the rest  only take the full 10 days if you are having fever, chills or pain in your kidneys     Things to remember:   Try to avoid antibiotics or at least try to avoid taking them for more than 3 to 5 days for simple uti.    Bacteria are smart and they will become resistant to antibiotics. We have only a limited amount of antibiotics that work.   ALWAYS request a urine culture so you can know which antibiotics work.   If you ever see bloody red urine call us ASAP, even with uti's and even if you are on a blood thinner, this can sometimes be a sign of bladder cancer or kidney stones.     If you do have uti symptoms but do not have a culture proven uti (with E.coli, for example)  You may need a catheterized urine if it says "multiple organisms" which means normal vaginal sydnie  You may have a kidney stone, interstitial cystitis, overactive bladder, bladder cancer, so please call to make a follow-up     Once we rule out any anatomic obstruction and have given " UTI recs, we will see for follow-up and then will have you return to your PCP/primary care physician  for symptomatic UTI treatment wit the information and recommendations we have given them.

## 2023-06-08 LAB — BACTERIA UR CULT: ABNORMAL

## 2023-06-10 ENCOUNTER — OFFICE VISIT (OUTPATIENT)
Dept: URGENT CARE | Facility: CLINIC | Age: 73
End: 2023-06-10
Payer: MEDICARE

## 2023-06-10 VITALS
TEMPERATURE: 98 F | HEART RATE: 80 BPM | SYSTOLIC BLOOD PRESSURE: 115 MMHG | WEIGHT: 174 LBS | OXYGEN SATURATION: 97 % | BODY MASS INDEX: 28.99 KG/M2 | RESPIRATION RATE: 18 BRPM | HEIGHT: 65 IN | DIASTOLIC BLOOD PRESSURE: 68 MMHG

## 2023-06-10 DIAGNOSIS — J04.0 LARYNGITIS: ICD-10-CM

## 2023-06-10 DIAGNOSIS — J40 BRONCHITIS: Primary | ICD-10-CM

## 2023-06-10 PROCEDURE — 99214 OFFICE O/P EST MOD 30 MIN: CPT | Mod: S$GLB,,, | Performed by: EMERGENCY MEDICINE

## 2023-06-10 PROCEDURE — 99214 PR OFFICE/OUTPT VISIT, EST, LEVL IV, 30-39 MIN: ICD-10-PCS | Mod: S$GLB,,, | Performed by: EMERGENCY MEDICINE

## 2023-06-10 RX ORDER — CETIRIZINE HYDROCHLORIDE 10 MG/1
10 TABLET ORAL DAILY
Qty: 30 TABLET | Refills: 11 | Status: SHIPPED | OUTPATIENT
Start: 2023-06-10 | End: 2024-06-09

## 2023-06-10 RX ORDER — BENZONATATE 100 MG/1
200 CAPSULE ORAL 3 TIMES DAILY PRN
Qty: 30 CAPSULE | Refills: 1 | Status: SHIPPED | OUTPATIENT
Start: 2023-06-10 | End: 2023-07-10

## 2023-06-10 RX ORDER — AMOXICILLIN AND CLAVULANATE POTASSIUM 875; 125 MG/1; MG/1
1 TABLET, FILM COATED ORAL 2 TIMES DAILY
Qty: 14 TABLET | Refills: 0 | Status: SHIPPED | OUTPATIENT
Start: 2023-06-10 | End: 2023-09-07

## 2023-06-10 NOTE — PROGRESS NOTES
"Subjective:      Patient ID: Kathy Seymour is a 72 y.o. female.    Vitals:  height is 5' 5" (1.651 m) and weight is 78.9 kg (174 lb). Her oral temperature is 97.9 °F (36.6 °C). Her blood pressure is 115/68 and her pulse is 80. Her respiration is 18 and oxygen saturation is 97%.     Chief Complaint: Cough    72-year-old female presented emergency department with symptoms of laryngitis.  Patient said she lost her voice few days ago and now has cough which is getting worse.  Denies fever or chills or nausea vomiting.  Denies any shortness of breath or dysuria or hematuria    Cough  This is a new problem. The current episode started yesterday. The problem has been unchanged. The problem occurs hourly. The cough is Productive of sputum. Associated symptoms include nasal congestion and postnasal drip. Pertinent negatives include no headaches. Treatments tried: honey and hot tea. The treatment provided no relief.     Constitution: Negative.   HENT:  Positive for postnasal drip and voice change.    Neck: neck negative.   Cardiovascular: Negative.    Eyes: Negative.    Respiratory:  Positive for cough.    Gastrointestinal: Negative.    Genitourinary: Negative.    Musculoskeletal: Negative.    Skin: Negative.    Allergic/Immunologic: Negative.    Neurological: Negative.  Negative for headaches.   Hematologic/Lymphatic: Negative.     Objective:     Physical Exam   Constitutional: She is oriented to person, place, and time. She appears well-developed. She is cooperative.  Non-toxic appearance. She does not appear ill. No distress.   HENT:   Head: Normocephalic and atraumatic.   Ears:   Right Ear: Hearing, tympanic membrane, external ear and ear canal normal.   Left Ear: Hearing, tympanic membrane, external ear and ear canal normal.   Nose: Nose normal. No mucosal edema, rhinorrhea or nasal deformity. No epistaxis. Right sinus exhibits no maxillary sinus tenderness and no frontal sinus tenderness. Left sinus exhibits no " maxillary sinus tenderness and no frontal sinus tenderness.   Mouth/Throat: Uvula is midline and oropharynx is clear and moist. Mucous membranes are dry. No trismus in the jaw. Normal dentition. No uvula swelling. No oropharyngeal exudate, posterior oropharyngeal edema or posterior oropharyngeal erythema.   Eyes: Conjunctivae and lids are normal. No scleral icterus.   Neck: Trachea normal and phonation normal. Neck supple. No edema present. No erythema present. No neck rigidity present.   Cardiovascular: Normal rate, regular rhythm, normal heart sounds and normal pulses.   Pulmonary/Chest: Effort normal and breath sounds normal. No respiratory distress. She has no decreased breath sounds. She has no rhonchi.   Abdominal: Normal appearance. Soft.   Musculoskeletal: Normal range of motion.         General: No deformity. Normal range of motion.   Neurological: She is alert and oriented to person, place, and time. She exhibits normal muscle tone. Coordination normal.   Skin: Skin is warm, dry, intact, not diaphoretic and not pale.   Psychiatric: Her speech is normal and behavior is normal. Judgment and thought content normal.   Nursing note and vitals reviewed.    Assessment:     1. Bronchitis    2. Laryngitis        Plan:       Bronchitis    Laryngitis    Other orders  -     benzonatate (TESSALON PERLES) 100 MG capsule; Take 2 capsules (200 mg total) by mouth 3 (three) times daily as needed.  Dispense: 30 capsule; Refill: 1  -     amoxicillin-clavulanate 875-125mg (AUGMENTIN) 875-125 mg per tablet; Take 1 tablet by mouth 2 (two) times daily.  Dispense: 14 tablet; Refill: 0  -     cetirizine (ZYRTEC) 10 MG tablet; Take 1 tablet (10 mg total) by mouth once daily.  Dispense: 30 tablet; Refill: 11          Medical Decision Making:   Initial Assessment:   72-year-old female with symptoms of laryngitis and bronchitis with gradually worsening symptoms and given patient's age and worsening symptoms will start treatment with  antibiotic.  Discharged with return precautions and instructions and follow-up

## 2023-06-14 ENCOUNTER — HOSPITAL ENCOUNTER (OUTPATIENT)
Dept: RADIOLOGY | Facility: HOSPITAL | Age: 73
Discharge: HOME | End: 2023-06-14
Attending: UROLOGY
Payer: MEDICARE

## 2023-06-14 DIAGNOSIS — R82.71 ASYMPTOMATIC BACTERIURIA: ICD-10-CM

## 2023-06-14 PROCEDURE — 76770 US EXAM ABDO BACK WALL COMP: CPT | Mod: 26,,, | Performed by: RADIOLOGY

## 2023-06-14 PROCEDURE — 76770 US RETROPERITONEAL COMPLETE: ICD-10-PCS | Mod: 26,,, | Performed by: RADIOLOGY

## 2023-06-14 PROCEDURE — 76770 US EXAM ABDO BACK WALL COMP: CPT | Mod: TC

## 2023-08-18 ENCOUNTER — PATIENT MESSAGE (OUTPATIENT)
Dept: ADMINISTRATIVE | Facility: OTHER | Age: 73
End: 2023-08-18
Payer: MEDICARE

## 2023-09-07 ENCOUNTER — OFFICE VISIT (OUTPATIENT)
Dept: UROLOGY | Facility: CLINIC | Age: 73
End: 2023-09-07
Payer: MEDICARE

## 2023-09-07 VITALS — BODY MASS INDEX: 28.98 KG/M2 | RESPIRATION RATE: 16 BRPM | WEIGHT: 173.94 LBS | HEIGHT: 65 IN

## 2023-09-07 DIAGNOSIS — N39.0 RECURRENT UTI: ICD-10-CM

## 2023-09-07 DIAGNOSIS — N32.81 OAB (OVERACTIVE BLADDER): Primary | ICD-10-CM

## 2023-09-07 LAB — POC RESIDUAL URINE VOLUME: 52 ML (ref 0–100)

## 2023-09-07 PROCEDURE — 1159F MED LIST DOCD IN RCRD: CPT | Mod: HCNC,CPTII,S$GLB, | Performed by: NURSE PRACTITIONER

## 2023-09-07 PROCEDURE — 1101F PR PT FALLS ASSESS DOC 0-1 FALLS W/OUT INJ PAST YR: ICD-10-PCS | Mod: HCNC,CPTII,S$GLB, | Performed by: NURSE PRACTITIONER

## 2023-09-07 PROCEDURE — 3008F BODY MASS INDEX DOCD: CPT | Mod: HCNC,CPTII,S$GLB, | Performed by: NURSE PRACTITIONER

## 2023-09-07 PROCEDURE — 99999 PR PBB SHADOW E&M-EST. PATIENT-LVL V: CPT | Mod: PBBFAC,HCNC,, | Performed by: NURSE PRACTITIONER

## 2023-09-07 PROCEDURE — 99999 PR PBB SHADOW E&M-EST. PATIENT-LVL V: ICD-10-PCS | Mod: PBBFAC,HCNC,, | Performed by: NURSE PRACTITIONER

## 2023-09-07 PROCEDURE — 1126F PR PAIN SEVERITY QUANTIFIED, NO PAIN PRESENT: ICD-10-PCS | Mod: HCNC,CPTII,S$GLB, | Performed by: NURSE PRACTITIONER

## 2023-09-07 PROCEDURE — 3008F PR BODY MASS INDEX (BMI) DOCUMENTED: ICD-10-PCS | Mod: HCNC,CPTII,S$GLB, | Performed by: NURSE PRACTITIONER

## 2023-09-07 PROCEDURE — 3044F PR MOST RECENT HEMOGLOBIN A1C LEVEL <7.0%: ICD-10-PCS | Mod: HCNC,CPTII,S$GLB, | Performed by: NURSE PRACTITIONER

## 2023-09-07 PROCEDURE — 3288F PR FALLS RISK ASSESSMENT DOCUMENTED: ICD-10-PCS | Mod: HCNC,CPTII,S$GLB, | Performed by: NURSE PRACTITIONER

## 2023-09-07 PROCEDURE — 1160F RVW MEDS BY RX/DR IN RCRD: CPT | Mod: HCNC,CPTII,S$GLB, | Performed by: NURSE PRACTITIONER

## 2023-09-07 PROCEDURE — 1126F AMNT PAIN NOTED NONE PRSNT: CPT | Mod: HCNC,CPTII,S$GLB, | Performed by: NURSE PRACTITIONER

## 2023-09-07 PROCEDURE — 1101F PT FALLS ASSESS-DOCD LE1/YR: CPT | Mod: HCNC,CPTII,S$GLB, | Performed by: NURSE PRACTITIONER

## 2023-09-07 PROCEDURE — 1160F PR REVIEW ALL MEDS BY PRESCRIBER/CLIN PHARMACIST DOCUMENTED: ICD-10-PCS | Mod: HCNC,CPTII,S$GLB, | Performed by: NURSE PRACTITIONER

## 2023-09-07 PROCEDURE — 51798 POCT BLADDER SCAN: ICD-10-PCS | Mod: HCNC,S$GLB,, | Performed by: NURSE PRACTITIONER

## 2023-09-07 PROCEDURE — 99214 PR OFFICE/OUTPT VISIT, EST, LEVL IV, 30-39 MIN: ICD-10-PCS | Mod: HCNC,S$GLB,, | Performed by: NURSE PRACTITIONER

## 2023-09-07 PROCEDURE — 51798 US URINE CAPACITY MEASURE: CPT | Mod: HCNC,S$GLB,, | Performed by: NURSE PRACTITIONER

## 2023-09-07 PROCEDURE — 3044F HG A1C LEVEL LT 7.0%: CPT | Mod: HCNC,CPTII,S$GLB, | Performed by: NURSE PRACTITIONER

## 2023-09-07 PROCEDURE — 1159F PR MEDICATION LIST DOCUMENTED IN MEDICAL RECORD: ICD-10-PCS | Mod: HCNC,CPTII,S$GLB, | Performed by: NURSE PRACTITIONER

## 2023-09-07 PROCEDURE — 99214 OFFICE O/P EST MOD 30 MIN: CPT | Mod: HCNC,S$GLB,, | Performed by: NURSE PRACTITIONER

## 2023-09-07 PROCEDURE — 3288F FALL RISK ASSESSMENT DOCD: CPT | Mod: HCNC,CPTII,S$GLB, | Performed by: NURSE PRACTITIONER

## 2023-09-07 RX ORDER — MIRABEGRON 25 MG/1
25 TABLET, FILM COATED, EXTENDED RELEASE ORAL DAILY
Qty: 30 TABLET | Refills: 11 | Status: SHIPPED | OUTPATIENT
Start: 2023-09-07 | End: 2023-10-27 | Stop reason: SDUPTHER

## 2023-09-07 NOTE — PROGRESS NOTES
CHIEF COMPLAINT:    Mrs Seymour is a 72 y.o. female presenting for f/u OAB.  PRESENTING ILLNESS:    Kathy Seymour is a 72 y.o. female who presents for f/u OAB. Last clinic visit was 23 with Dr. Wise    Previously saw me for OAB in 2019 with h/o mixed incontinence UUI>JUD. H/o bladder lift (no mesh) with hysterectomy in . Was wearing 2 to 3 pads a day. . Was also having uti's about 1x a year. Symptomatic (dysuria, frequency). And also found to have mod blood in urine on dipstick. Family hx of stones. 50 pk yr hx of smoking but quit in . W/u included ctu 19 (nothing to explain MH). Cytology 19 was neg and cx was +. Cystoscopy on 19 revealed evidence of recent uti. Vaginal exam showed a rectocele. Referred to  who saw her on 7/10/19. Pt decided on conservative treatment. She had been written for myrbetriq but not covered. Then oxybutynin which was helping some.  So he increased it to oxybutynin 15mgXL. She f/u with ciarra on 9/10/19 and stated she had 60% improvement on 1/2 tab of oxybutynin 15mg.      She hasn't been seen by urology or gyn since. [facility] she is been essentially asymp having matic bacteriuria over the past year, initially infrequently, once every few months but now feels more frequently and now once every few weeks.  She has gone to urgent care a few times for foul-smelling urine.  No dysuria, no frequency no urgency. Her voided urine today with mod leuk and small blood. Cath urine looks cloudy. Sending. Residual only 40cc.      Uti symptoms: none. Foul smelling urine   UTI prevention tried in the past:   Cranberry No.   Drinking more fluid  No.  2 bottles of water and 2 cups of coffee  Timed voiding Yes - 2 to 3 hours and more than this someitme.   Prevention No.   UTI risk factors:    Previous bladder or vaginal surgery: Yes - hysterectomy and lift but no mesh, cystoscopy as above for Mh.   Personal history of kidney stones: No. But strong  family  hx.   Urinary Incontinence episodes: Yes - only if she waits a few hours to go to restroom.  OAB meds: She has been on oxybutynin in the past already and now on solfenacin 10mg once daily. Essentially works for her but dries her mouth. With cpap machine worse  Pads: Yes - pantyliners if she's goes out or to dinner.   Diabetes: No. A1c 5.9 3/2023  Daytime frequency: q 1-2 hours .  Drinks 2 c of coffee every day and coke sometimes  Bowel Incontinence episodes: No.   Sexually active: No.   Water intake: medium.   History of breast cancer or any other gyn cancer: No. Never tried estrace  Hysterectomy: Yes - metromenhorragia   Imaging:ctu 6/6/19 independently reviewed. Normal. No nydro. No stones. Ct chest 9/2022- half of L kidney seen. No hydro. No stones.   In and out catheter performed today: Yes    Catheterized PVR was 40mL.      Interval history 9/7/23  Pt presents today for f/u OAB, recurrent UTI  Did not start Gemtesa d/t cost  Still taking vesicare for OAB. Voiding every 1-2 hours. Wears panty liners if goes out for occasional UUI. + dry mouth from medication.  PVR 52 ml    No UTI's since last visit. Denies any UTI symptoms today in clinic.   Taking Utiva Cranberry and d mannose daily  Using estrace cream 2x per week  Good water intake    6/14/23 DU  No obstructive uropathy     Previous urine cultures pasted here: Anticoagulation:  Yes - asa 81mg daily. +50 pack yr hx of smoking. +fam hx of stones.   Component       Urine Culture, Routine   Latest Ref Rng & Units           5/22/2023       Final report   4/14/2023       ESCHERICHIA COLI (A) . . .   3/14/2023      2:41 PM KLEBSIELLA OXYTOCA (A) . . .   3/14/2023      2:41 PM ESCHERICHIA COLI (A) . . .   7/25/2022       Final report   7/1/2019       ESCHERICHIA COLI (A) . . .   5/30/2019       ESCHERICHIA COLI . . .      5/30/19            E.coli, void: mod blood, asx but treated, cytology: neg  12/10/18          E.coli, void: n/a  10/20/17          No cx,  void: nit+/2+bld/1+leuk  4/6/17              E.coli, void: tr bld  9/9/16              No cx, void:1+bld/nit+  9/4/13              No cx, void: 250 bld/wbc+  11/11/12          No cx, void: lrg bld/nit+/1+leuk  8/5/08              No cx, void: sml blood/nit+         REVIEW OF SYSTEMS:    Review of Systems    Constitutional: Negative for fever and chills.   HENT: Negative for hearing loss.   Eyes: Negative for visual disturbance.   Respiratory: Negative for shortness of breath.   Cardiovascular: Negative for chest pain.   Gastrointestinal: Negative for nausea, vomiting  Genitourinary:  See above  Neurological: Negative for dizziness.   Hematological: Does not bruise/bleed easily.   Psychiatric/Behavioral: Negative for confusion.     PATIENT HISTORY:    Past Medical History:   Diagnosis Date    Angina pectoris     Anticoagulant long-term use     Anxiety     Arthritis     Back pain     Cataract     Chronic bronchitis     Coronary artery disease     STENT X 2    COVID 6/10/2022    CTS (carpal tunnel syndrome)     RIGHT    Depression     MILLER (dyspnea on exertion) 2/21/2017    Hallux valgus, acquired, bilateral 1/19/2017    Hematuria     Hyperlipidemia     Hypertension     Hypertensive left ventricular hypertrophy, without heart failure 5/22/2017    Hypothyroidism     Obesity     Polyneuropathy     S/P coronary artery stent placement, 2 LETTY 9/2012, 1 LETTY 9/2013 3/5/2013    Sleep apnea     USES CPAP    Thyroid disease     Tobacco dependence     Trouble in sleeping     Urinary incontinence     Wears glasses     ONE CONTAC       Past Surgical History:   Procedure Laterality Date    APPENDECTOMY      BILATERAL SALPINGOOPHORECTOMY      CARDIAC CATHETERIZATION      CORONARY ANGIOPLASTY      CORONARY STENT PLACEMENT      PCI  of the LAD with LETTY    EYE SURGERY      bilat cataract removal    HYSTERECTOMY      complete    OOPHORECTOMY      TONSILLECTOMY         Family History   Problem Relation Age of Onset    Hypertension Sister      Arthritis Sister     Heart failure Mother     Hypertension Mother     Heart failure Father     Hypertension Father     No Known Problems Brother     No Known Problems Son     Heart disease Brother     Arthritis Sister     Hypertension Sister     Cancer Sister     Asthma Son     Arthritis Son         psoriatic arthritis       Social History     Socioeconomic History    Marital status:    Tobacco Use    Smoking status: Former     Current packs/day: 0.00     Average packs/day: 1 pack/day for 50.0 years (50.0 ttl pk-yrs)     Types: Cigarettes     Start date: 1964     Quit date: 2012     Years since quittin.0    Smokeless tobacco: Never   Substance and Sexual Activity    Alcohol use: Yes     Alcohol/week: 2.0 standard drinks of alcohol     Types: 2 Shots of liquor per week     Comment: Social - vodka on wednesday    Drug use: No    Sexual activity: Yes     Partners: Male       Allergies:  Macrobid [nitrofurantoin monohyd/m-cryst]    Medications:    Current Outpatient Medications:     albuterol (VENTOLIN HFA) 90 mcg/actuation inhaler, Inhale 2 puffs into the lungs every 6 (six) hours as needed for Wheezing. Rescue, Disp: 8 g, Rfl: 0    ALPRAZolam (XANAX) 0.5 MG tablet, Take 1 tablet (0.5 mg total) by mouth 3 (three) times daily as needed for Anxiety., Disp: 90 tablet, Rfl: 5    aspirin 81 MG Chew, Take 1 tablet (81 mg total) by mouth once daily., Disp: 50 tablet, Rfl: 3    atorvastatin (LIPITOR) 80 MG tablet, TAKE 1 TABLET EVERY DAY., Disp: 90 tablet, Rfl: 3    budesonide-formoterol 160-4.5 mcg (SYMBICORT) 160-4.5 mcg/actuation HFAA, Inhale 2 puffs into the lungs every 12 (twelve) hours. Controller, Disp: 30.6 g, Rfl: 3    cetirizine (ZYRTEC) 10 MG tablet, Take 1 tablet (10 mg total) by mouth once daily., Disp: 30 tablet, Rfl: 11    DULoxetine (CYMBALTA) 30 MG capsule, Take 1 capsule (30 mg total) by mouth once daily. In addition to 60mg.  90mg qday, Disp: 90 capsule, Rfl: 3    DULoxetine  (CYMBALTA) 60 MG capsule, TAKE 1 CAPSULE EVERY DAY, Disp: 90 capsule, Rfl: 3    estradioL (ESTRACE) 0.01 % (0.1 mg/gram) vaginal cream, Place 1 g vaginally once daily. Apply pea sized amount up to second knuckle. Apply nightly for 2 weeks then every other night., Disp: 42.5 g, Rfl: 3    fluticasone propionate (FLONASE) 50 mcg/actuation nasal spray, 1 spray (50 mcg total) by Each Nostril route once daily., Disp: 16 g, Rfl: 0    losartan-hydrochlorothiazide 100-25 mg (HYZAAR) 100-25 mg per tablet, TAKE 1 TABLET EVERY DAY, Disp: 90 tablet, Rfl: 2    meloxicam (MOBIC) 7.5 MG tablet, Take 1 tablet (7.5 mg total) by mouth once daily., Disp: 90 tablet, Rfl: 3    methIMAzole (TAPAZOLE) 5 MG Tab, Take 1 tablet (5 mg total) by mouth once daily., Disp: 90 tablet, Rfl: 3    metoprolol succinate (TOPROL-XL) 25 MG 24 hr tablet, Take 1 tablet (25 mg total) by mouth once daily., Disp: 30 tablet, Rfl: 11    pantoprazole (PROTONIX) 40 MG tablet, Take 40 mg by mouth once daily., Disp: , Rfl:     pilocarpine (SALAGEN) 5 MG Tab, Take 1 tablet (5 mg total) by mouth 3 (three) times daily. Use as directed., Disp: 90 tablet, Rfl: 0    potassium chloride (MICRO-K) 10 MEQ CpSR, Take 1 capsule (10 mEq total) by mouth once daily., Disp: 90 capsule, Rfl: 3    solifenacin (VESICARE) 5 MG tablet, TAKE 1 TABLET EVERY DAY, Disp: 90 tablet, Rfl: 1    tiZANidine (ZANAFLEX) 4 MG tablet, TAKE 1 TABLET (4 MG TOTAL) BY MOUTH EVERY 6 (SIX) HOURS AS NEEDED (SPASM)., Disp: 20 tablet, Rfl: 1    mirabegron (MYRBETRIQ) 25 mg Tb24 ER tablet, Take 1 tablet (25 mg total) by mouth once daily., Disp: 30 tablet, Rfl: 11    PHYSICAL EXAMINATION:    Constitutional: She is oriented to person, place, and time. She appears well-developed and well-nourished.  She is in no apparent distress.    Neck: Normal ROM.     Cardiovascular: Normal rate.      Pulmonary/Chest: Effort normal. No respiratory distress.     Abdominal:  She exhibits no distension.  There is no CVA  tenderness.     Neurological: She is alert and oriented to person, place, and time.     Skin: Skin is warm and dry.     Psych: Cooperative with normal affect.    Physical Exam    LABS:      Lab Results   Component Value Date    CREATININE 1.0 03/23/2023       IMPRESSION:    Encounter Diagnoses   Name Primary?    OAB (overactive bladder) Yes    Recurrent UTI        PLAN:  -Pt would like to try Myrbetriq for OAB  Discussed side effects and indications for myrbetriq. Prescription sent to the pharmacy. Pt verbalized understanding.  Stop vesicare.   If myrbetriq too expensive, can restart vesicare.  Discussed conservative measures to control urgency and frequency including   1. Avoiding/minimizing bladder irritants (see below), especially in afternoon and evening hours  Discussed bladder irritants include coffe (even decaf), tea, alcohol, soda, spicy foods, acidic juices (orange, tomato), vinegar, and artificial sweeteners/sugary beverages.  2. timed voiding - empty on a schedule (approx ~2-3 hours) in spite of need to urinate, to get ahead of urge  3. dont postponing voiding - dont hold it on purpose   4. bowel regimen as distended bowel has extrinsic compressive effect on bladder.   - any or all of the following in any combination, titrate to soft daily bowel movement without pushing or straining  - colace/stool softener capsule - once to twice daily  - miralax - 1 capful daily to start, can increase to 2x daily (or decrease to 1/2 cap daily)  - increase dietary fibery  - fiber supplements, such as metamucil  - prunes, prune juice  5. INCREASE water intake  6. Stop fluids 2 hours before bed, and urinate just before bed    -Continue utiva, d mannose, probiotic and estrace cream for UTI prevention  Avoid constipation.  Drink lots of water  Void every 2-3 hrs regardless of urge  Void soon after urge arises. Do not hold urine once urge occurs.    -RTC 6 months        I encouraged her or any of her family members to call or  email me with questions and/or concerns.      30 minutes of total time spent on the encounter, which includes face to face time and non-face to face time preparing to see the patient (eg, review of tests), Obtaining and/or reviewing separately obtained history, Documenting clinical information in the electronic or other health record, Independently interpreting results (not separately reported) and communicating results to the patient/family/caregiver, or Care coordination (not separately reported).

## 2023-09-07 NOTE — PATIENT INSTRUCTIONS
Stop vesicare (solifenacin)  Will start Myrbetriq for overactive bladder  If too expensive, will restart solifenacin    Discussed conservative measures to control urgency and frequency including   1. Avoiding/minimizing bladder irritants (see below), especially in afternoon and evening hours  Discussed bladder irritants include coffe (even decaf), tea, alcohol, soda, spicy foods, acidic juices (orange, tomato), vinegar, and artificial sweeteners/sugary beverages.  2. timed voiding - empty on a schedule (approx ~2-3 hours) in spite of need to urinate, to get ahead of urge  3. dont postponing voiding - dont hold it on purpose   4. bowel regimen as distended bowel has extrinsic compressive effect on bladder.   - any or all of the following in any combination, titrate to soft daily bowel movement without pushing or straining  - colace/stool softener capsule - once to twice daily  - miralax - 1 capful daily to start, can increase to 2x daily (or decrease to 1/2 cap daily)  - increase dietary fibery  - fiber supplements, such as metamucil  - prunes, prune juice  5. INCREASE water intake  6. Stop fluids 2 hours before bed, and urinate just before bed      For UTI:  UTI precautions:  Avoid constipation.  Drink lots of water  Void every 2-3 hrs regardless of urge  Void soon after urge arises. Do not hold urine once urge occurs.      OVER THE COUNTER:  Utiva Cranberry supplement- need 36mg of proanthocyanidins (PAC) in supplement- helps to block bacteria from attaching to bladder wall.  D-mannose- 2 grams daily- blocks bacteria receptors  Probiotic with Lactobacillus    Continue Estrace- apply a pea sized amount to vagina 2x weekly at night.

## 2023-09-20 ENCOUNTER — OFFICE VISIT (OUTPATIENT)
Dept: FAMILY MEDICINE | Facility: CLINIC | Age: 73
End: 2023-09-20
Payer: MEDICARE

## 2023-09-20 DIAGNOSIS — I10 PRIMARY HYPERTENSION: ICD-10-CM

## 2023-09-20 DIAGNOSIS — E78.2 MIXED HYPERLIPIDEMIA: Primary | Chronic | ICD-10-CM

## 2023-09-20 DIAGNOSIS — E04.2 MULTIPLE THYROID NODULES: ICD-10-CM

## 2023-09-20 DIAGNOSIS — Z12.2 ENCOUNTER FOR SCREENING FOR LUNG CANCER: ICD-10-CM

## 2023-09-20 DIAGNOSIS — J30.1 SEASONAL ALLERGIC RHINITIS DUE TO POLLEN: ICD-10-CM

## 2023-09-20 DIAGNOSIS — R73.03 PREDIABETES: ICD-10-CM

## 2023-09-20 DIAGNOSIS — Z87.891 PERSONAL HISTORY OF NICOTINE DEPENDENCE: ICD-10-CM

## 2023-09-20 PROCEDURE — 1159F MED LIST DOCD IN RCRD: CPT | Mod: HCNC,CPTII,95, | Performed by: PHYSICIAN ASSISTANT

## 2023-09-20 PROCEDURE — 1160F RVW MEDS BY RX/DR IN RCRD: CPT | Mod: HCNC,CPTII,95, | Performed by: PHYSICIAN ASSISTANT

## 2023-09-20 PROCEDURE — 1159F PR MEDICATION LIST DOCUMENTED IN MEDICAL RECORD: ICD-10-PCS | Mod: HCNC,CPTII,95, | Performed by: PHYSICIAN ASSISTANT

## 2023-09-20 PROCEDURE — 1160F PR REVIEW ALL MEDS BY PRESCRIBER/CLIN PHARMACIST DOCUMENTED: ICD-10-PCS | Mod: HCNC,CPTII,95, | Performed by: PHYSICIAN ASSISTANT

## 2023-09-20 PROCEDURE — 99214 OFFICE O/P EST MOD 30 MIN: CPT | Mod: HCNC,95,, | Performed by: PHYSICIAN ASSISTANT

## 2023-09-20 PROCEDURE — 3044F PR MOST RECENT HEMOGLOBIN A1C LEVEL <7.0%: ICD-10-PCS | Mod: HCNC,CPTII,95, | Performed by: PHYSICIAN ASSISTANT

## 2023-09-20 PROCEDURE — 3044F HG A1C LEVEL LT 7.0%: CPT | Mod: HCNC,CPTII,95, | Performed by: PHYSICIAN ASSISTANT

## 2023-09-20 PROCEDURE — 99214 PR OFFICE/OUTPT VISIT, EST, LEVL IV, 30-39 MIN: ICD-10-PCS | Mod: HCNC,95,, | Performed by: PHYSICIAN ASSISTANT

## 2023-09-20 RX ORDER — FLUTICASONE PROPIONATE 50 MCG
1 SPRAY, SUSPENSION (ML) NASAL DAILY
Qty: 48 G | Refills: 3 | Status: SHIPPED | OUTPATIENT
Start: 2023-09-20

## 2023-09-20 NOTE — PATIENT INSTRUCTIONS
Kojo Chavez,     If you are due for any health screening(s) below please notify me so we can arrange them to be ordered and scheduled. Most healthy patients at your age complete them, but you are free to accept or refuse.     If you can't do it, I'll definitely understand. If you can, I'd certainly appreciate it!    All of your core healthy metrics are met.

## 2023-09-20 NOTE — PROGRESS NOTES
The patient location is: [place]  The chief complaint leading to consultation is: 6 month follow up    Visit type: audiovisual    Face to Face time with patient: 30 minutes of total time spent on the encounter, which includes face to face time and non-face to face time preparing to see the patient (eg, review of tests), Obtaining and/or reviewing separately obtained history, Documenting clinical information in the electronic or other health record, Independently interpreting results (not separately reported) and communicating results to the patient/family/caregiver, or Care coordination (not separately reported).         Each patient to whom he or she provides medical services by telemedicine is:  (1) informed of the relationship between the physician and patient and the respective role of any other health care provider with respect to management of the patient; and (2) notified that he or she may decline to receive medical services by telemedicine and may withdraw from such care at any time.    Notes:  Patient presents for 6 month follow up.  Needs CT chest and blood work.  Patient has recurrent UTI followed by urology.  No complaints today.    Lab Results   Component Value Date    WBC 7.98 03/23/2023    HGB 11.6 (L) 03/23/2023    HCT 37.4 03/23/2023     03/23/2023    CHOL 115 (L) 03/23/2023    TRIG 89 03/23/2023    HDL 39 (L) 03/23/2023    ALT 20 03/23/2023    AST 17 03/23/2023     03/23/2023    K 4.1 03/23/2023     03/23/2023    CREATININE 1.0 03/23/2023    BUN 19 03/23/2023    CO2 25 03/23/2023    TSH 1.606 03/23/2023    INR 1.0 09/27/2013    HGBA1C 5.9 (H) 03/23/2023          Current Outpatient Medications:     albuterol (VENTOLIN HFA) 90 mcg/actuation inhaler, Inhale 2 puffs into the lungs every 6 (six) hours as needed for Wheezing. Rescue, Disp: 8 g, Rfl: 0    ALPRAZolam (XANAX) 0.5 MG tablet, Take 1 tablet (0.5 mg total) by mouth 3 (three) times daily as needed for Anxiety., Disp: 90  tablet, Rfl: 5    aspirin 81 MG Chew, Take 1 tablet (81 mg total) by mouth once daily., Disp: 50 tablet, Rfl: 3    atorvastatin (LIPITOR) 80 MG tablet, TAKE 1 TABLET EVERY DAY., Disp: 90 tablet, Rfl: 3    budesonide-formoterol 160-4.5 mcg (SYMBICORT) 160-4.5 mcg/actuation HFAA, Inhale 2 puffs into the lungs every 12 (twelve) hours. Controller, Disp: 30.6 g, Rfl: 3    cetirizine (ZYRTEC) 10 MG tablet, Take 1 tablet (10 mg total) by mouth once daily., Disp: 30 tablet, Rfl: 11    DULoxetine (CYMBALTA) 30 MG capsule, Take 1 capsule (30 mg total) by mouth once daily. In addition to 60mg.  90mg qday, Disp: 90 capsule, Rfl: 3    DULoxetine (CYMBALTA) 60 MG capsule, TAKE 1 CAPSULE EVERY DAY, Disp: 90 capsule, Rfl: 3    estradioL (ESTRACE) 0.01 % (0.1 mg/gram) vaginal cream, Place 1 g vaginally once daily. Apply pea sized amount up to second knuckle. Apply nightly for 2 weeks then every other night., Disp: 42.5 g, Rfl: 3    fluticasone propionate (FLONASE) 50 mcg/actuation nasal spray, 1 spray (50 mcg total) by Each Nostril route once daily., Disp: 48 g, Rfl: 3    losartan-hydrochlorothiazide 100-25 mg (HYZAAR) 100-25 mg per tablet, TAKE 1 TABLET EVERY DAY, Disp: 90 tablet, Rfl: 2    meloxicam (MOBIC) 7.5 MG tablet, Take 1 tablet (7.5 mg total) by mouth once daily., Disp: 90 tablet, Rfl: 3    methIMAzole (TAPAZOLE) 5 MG Tab, Take 1 tablet (5 mg total) by mouth once daily., Disp: 90 tablet, Rfl: 3    metoprolol succinate (TOPROL-XL) 25 MG 24 hr tablet, Take 1 tablet (25 mg total) by mouth once daily., Disp: 30 tablet, Rfl: 11    mirabegron (MYRBETRIQ) 25 mg Tb24 ER tablet, Take 1 tablet (25 mg total) by mouth once daily., Disp: 30 tablet, Rfl: 11    pantoprazole (PROTONIX) 40 MG tablet, Take 40 mg by mouth once daily., Disp: , Rfl:     pilocarpine (SALAGEN) 5 MG Tab, Take 1 tablet (5 mg total) by mouth 3 (three) times daily. Use as directed., Disp: 90 tablet, Rfl: 0    potassium chloride (MICRO-K) 10 MEQ CpSR, Take 1 capsule  (10 mEq total) by mouth once daily., Disp: 90 capsule, Rfl: 3    tiZANidine (ZANAFLEX) 4 MG tablet, TAKE 1 TABLET (4 MG TOTAL) BY MOUTH EVERY 6 (SIX) HOURS AS NEEDED (SPASM)., Disp: 20 tablet, Rfl: 1     Patient Active Problem List   Diagnosis    Anxiety    S/P coronary artery stent placement, 2 LETTY 9/2012, 1 LETTY 9/2013    Varicose veins of lower extremities with other complications    Hyperlipidemia, baseline     Lumbar spondylosis    Carpal tunnel syndrome    Hypertension, onset before 1995    Abdominal obesity    RAMONA on CPAP    Multiple thyroid nodules    Prediabetes    Urinary incontinence    Dysmetabolic syndrome    Subclinical hyperthyroidism    On home oxygen therapy    COPD mixed type    Memory difficulties    Excessive daytime sleepiness    Bilateral sciatica, R>L, onset 1/2018    History of smoking greater than 50 pack years, quit 2012    NSAID long-term use    Coronary artery disease involving native coronary artery of native heart without angina pectoris    Chronic kidney disease, stage 3a    Aortic atherosclerosis    Overweight (BMI 25.0-29.9)    Osteopenia of lumbar spine        Kathy was seen today for follow-up.    Diagnoses and all orders for this visit:    Mixed hyperlipidemia  -     Lipid Panel; Future  -     Comprehensive Metabolic Panel; Future    Primary hypertension  -     Comprehensive Metabolic Panel; Future    Prediabetes  -     Hemoglobin A1C; Future    Encounter for screening for lung cancer  -     CT Chest Lung Screening Low Dose; Future    Multiple thyroid nodules  -     TSH; Future    Personal history of nicotine dependence  -     CT Chest Lung Screening Low Dose; Future    Seasonal allergic rhinitis due to pollen  -     fluticasone propionate (FLONASE) 50 mcg/actuation nasal spray; 1 spray (50 mcg total) by Each Nostril route once daily.         Will call with results    Answers submitted by the patient for this visit:  Review of Systems Questionnaire (Submitted on  9/20/2023)  activity change: No  unexpected weight change: Yes  neck pain: No  hearing loss: No  rhinorrhea: Yes  trouble swallowing: No  eye discharge: No  visual disturbance: No  chest tightness: No  wheezing: No  chest pain: No  palpitations: No  blood in stool: No  constipation: Yes  vomiting: No  diarrhea: No  polydipsia: No  polyuria: No  difficulty urinating: No  hematuria: Yes  menstrual problem: No  dysuria: No  joint swelling: No  arthralgias: Yes  headaches: No  weakness: No  confusion: Yes  dysphoric mood: Yes

## 2023-09-27 ENCOUNTER — LAB VISIT (OUTPATIENT)
Dept: LAB | Facility: HOSPITAL | Age: 73
End: 2023-09-27
Attending: PHYSICIAN ASSISTANT
Payer: MEDICARE

## 2023-09-27 ENCOUNTER — HOSPITAL ENCOUNTER (OUTPATIENT)
Dept: RADIOLOGY | Facility: HOSPITAL | Age: 73
Discharge: HOME | End: 2023-09-27
Attending: PHYSICIAN ASSISTANT
Payer: MEDICARE

## 2023-09-27 DIAGNOSIS — Z87.891 PERSONAL HISTORY OF NICOTINE DEPENDENCE: ICD-10-CM

## 2023-09-27 DIAGNOSIS — R73.03 PREDIABETES: ICD-10-CM

## 2023-09-27 DIAGNOSIS — Z12.2 ENCOUNTER FOR SCREENING FOR LUNG CANCER: ICD-10-CM

## 2023-09-27 DIAGNOSIS — E78.2 MIXED HYPERLIPIDEMIA: Chronic | ICD-10-CM

## 2023-09-27 DIAGNOSIS — I10 PRIMARY HYPERTENSION: ICD-10-CM

## 2023-09-27 DIAGNOSIS — E04.2 MULTIPLE THYROID NODULES: ICD-10-CM

## 2023-09-27 LAB
ALBUMIN SERPL BCP-MCNC: 3.9 G/DL (ref 3.5–5.2)
ALP SERPL-CCNC: 91 U/L (ref 55–135)
ALT SERPL W/O P-5'-P-CCNC: 15 U/L (ref 10–44)
ANION GAP SERPL CALC-SCNC: 9 MMOL/L (ref 8–16)
AST SERPL-CCNC: 19 U/L (ref 10–40)
BILIRUB SERPL-MCNC: 0.5 MG/DL (ref 0.1–1)
BUN SERPL-MCNC: 14 MG/DL (ref 8–23)
CALCIUM SERPL-MCNC: 9.1 MG/DL (ref 8.7–10.5)
CHLORIDE SERPL-SCNC: 105 MMOL/L (ref 95–110)
CHOLEST SERPL-MCNC: 109 MG/DL (ref 120–199)
CHOLEST/HDLC SERPL: 2 {RATIO} (ref 2–5)
CO2 SERPL-SCNC: 30 MMOL/L (ref 23–29)
CREAT SERPL-MCNC: 0.9 MG/DL (ref 0.5–1.4)
EST. GFR  (NO RACE VARIABLE): >60 ML/MIN/1.73 M^2
ESTIMATED AVG GLUCOSE: 120 MG/DL (ref 68–131)
GLUCOSE SERPL-MCNC: 97 MG/DL (ref 70–110)
HBA1C MFR BLD: 5.8 % (ref 4–5.6)
HDLC SERPL-MCNC: 54 MG/DL (ref 40–75)
HDLC SERPL: 49.5 % (ref 20–50)
LDLC SERPL CALC-MCNC: 44.2 MG/DL (ref 63–159)
NONHDLC SERPL-MCNC: 55 MG/DL
POTASSIUM SERPL-SCNC: 3.5 MMOL/L (ref 3.5–5.1)
PROT SERPL-MCNC: 7 G/DL (ref 6–8.4)
SODIUM SERPL-SCNC: 144 MMOL/L (ref 136–145)
TRIGL SERPL-MCNC: 54 MG/DL (ref 30–150)
TSH SERPL DL<=0.005 MIU/L-ACNC: 2.79 UIU/ML (ref 0.4–4)

## 2023-09-27 PROCEDURE — 80061 LIPID PANEL: CPT | Mod: HCNC | Performed by: PHYSICIAN ASSISTANT

## 2023-09-27 PROCEDURE — 83036 HEMOGLOBIN GLYCOSYLATED A1C: CPT | Mod: HCNC | Performed by: PHYSICIAN ASSISTANT

## 2023-09-27 PROCEDURE — 71271 CT THORAX LUNG CANCER SCR C-: CPT | Mod: TC

## 2023-09-27 PROCEDURE — 84443 ASSAY THYROID STIM HORMONE: CPT | Mod: HCNC | Performed by: PHYSICIAN ASSISTANT

## 2023-09-27 PROCEDURE — 36415 COLL VENOUS BLD VENIPUNCTURE: CPT | Mod: HCNC,PO | Performed by: PHYSICIAN ASSISTANT

## 2023-09-27 PROCEDURE — 80053 COMPREHEN METABOLIC PANEL: CPT | Mod: HCNC | Performed by: PHYSICIAN ASSISTANT

## 2023-09-27 PROCEDURE — 71271 CT CHEST LUNG SCREENING LOW DOSE: ICD-10-PCS | Mod: 26,,, | Performed by: RADIOLOGY

## 2023-09-27 PROCEDURE — 71271 CT THORAX LUNG CANCER SCR C-: CPT | Mod: 26,,, | Performed by: RADIOLOGY

## 2023-10-12 ENCOUNTER — OFFICE VISIT (OUTPATIENT)
Dept: CARDIOLOGY | Facility: CLINIC | Age: 73
End: 2023-10-12
Payer: MEDICARE

## 2023-10-12 VITALS
WEIGHT: 169.56 LBS | OXYGEN SATURATION: 97 % | BODY MASS INDEX: 28.25 KG/M2 | DIASTOLIC BLOOD PRESSURE: 60 MMHG | HEIGHT: 65 IN | SYSTOLIC BLOOD PRESSURE: 120 MMHG | HEART RATE: 71 BPM

## 2023-10-12 DIAGNOSIS — E78.2 MIXED HYPERLIPIDEMIA: Chronic | ICD-10-CM

## 2023-10-12 DIAGNOSIS — E87.6 HYPOKALEMIA: Primary | ICD-10-CM

## 2023-10-12 DIAGNOSIS — I25.10 CORONARY ARTERY DISEASE INVOLVING NATIVE CORONARY ARTERY OF NATIVE HEART WITHOUT ANGINA PECTORIS: ICD-10-CM

## 2023-10-12 PROCEDURE — 1126F PR PAIN SEVERITY QUANTIFIED, NO PAIN PRESENT: ICD-10-PCS | Mod: HCNC,CPTII,S$GLB, | Performed by: INTERNAL MEDICINE

## 2023-10-12 PROCEDURE — 1101F PT FALLS ASSESS-DOCD LE1/YR: CPT | Mod: HCNC,CPTII,S$GLB, | Performed by: INTERNAL MEDICINE

## 2023-10-12 PROCEDURE — 1101F PR PT FALLS ASSESS DOC 0-1 FALLS W/OUT INJ PAST YR: ICD-10-PCS | Mod: HCNC,CPTII,S$GLB, | Performed by: INTERNAL MEDICINE

## 2023-10-12 PROCEDURE — 3288F PR FALLS RISK ASSESSMENT DOCUMENTED: ICD-10-PCS | Mod: HCNC,CPTII,S$GLB, | Performed by: INTERNAL MEDICINE

## 2023-10-12 PROCEDURE — 3078F PR MOST RECENT DIASTOLIC BLOOD PRESSURE < 80 MM HG: ICD-10-PCS | Mod: HCNC,CPTII,S$GLB, | Performed by: INTERNAL MEDICINE

## 2023-10-12 PROCEDURE — 99213 OFFICE O/P EST LOW 20 MIN: CPT | Mod: HCNC,S$GLB,, | Performed by: INTERNAL MEDICINE

## 2023-10-12 PROCEDURE — 1126F AMNT PAIN NOTED NONE PRSNT: CPT | Mod: HCNC,CPTII,S$GLB, | Performed by: INTERNAL MEDICINE

## 2023-10-12 PROCEDURE — 3078F DIAST BP <80 MM HG: CPT | Mod: HCNC,CPTII,S$GLB, | Performed by: INTERNAL MEDICINE

## 2023-10-12 PROCEDURE — 3008F PR BODY MASS INDEX (BMI) DOCUMENTED: ICD-10-PCS | Mod: HCNC,CPTII,S$GLB, | Performed by: INTERNAL MEDICINE

## 2023-10-12 PROCEDURE — 3074F SYST BP LT 130 MM HG: CPT | Mod: HCNC,CPTII,S$GLB, | Performed by: INTERNAL MEDICINE

## 2023-10-12 PROCEDURE — 3288F FALL RISK ASSESSMENT DOCD: CPT | Mod: HCNC,CPTII,S$GLB, | Performed by: INTERNAL MEDICINE

## 2023-10-12 PROCEDURE — 99999 PR PBB SHADOW E&M-EST. PATIENT-LVL IV: CPT | Mod: PBBFAC,HCNC,, | Performed by: INTERNAL MEDICINE

## 2023-10-12 PROCEDURE — 3074F PR MOST RECENT SYSTOLIC BLOOD PRESSURE < 130 MM HG: ICD-10-PCS | Mod: HCNC,CPTII,S$GLB, | Performed by: INTERNAL MEDICINE

## 2023-10-12 PROCEDURE — 99999 PR PBB SHADOW E&M-EST. PATIENT-LVL IV: ICD-10-PCS | Mod: PBBFAC,HCNC,, | Performed by: INTERNAL MEDICINE

## 2023-10-12 PROCEDURE — 3044F PR MOST RECENT HEMOGLOBIN A1C LEVEL <7.0%: ICD-10-PCS | Mod: HCNC,CPTII,S$GLB, | Performed by: INTERNAL MEDICINE

## 2023-10-12 PROCEDURE — 1159F PR MEDICATION LIST DOCUMENTED IN MEDICAL RECORD: ICD-10-PCS | Mod: HCNC,CPTII,S$GLB, | Performed by: INTERNAL MEDICINE

## 2023-10-12 PROCEDURE — 1160F RVW MEDS BY RX/DR IN RCRD: CPT | Mod: HCNC,CPTII,S$GLB, | Performed by: INTERNAL MEDICINE

## 2023-10-12 PROCEDURE — 1160F PR REVIEW ALL MEDS BY PRESCRIBER/CLIN PHARMACIST DOCUMENTED: ICD-10-PCS | Mod: HCNC,CPTII,S$GLB, | Performed by: INTERNAL MEDICINE

## 2023-10-12 PROCEDURE — 1159F MED LIST DOCD IN RCRD: CPT | Mod: HCNC,CPTII,S$GLB, | Performed by: INTERNAL MEDICINE

## 2023-10-12 PROCEDURE — 3044F HG A1C LEVEL LT 7.0%: CPT | Mod: HCNC,CPTII,S$GLB, | Performed by: INTERNAL MEDICINE

## 2023-10-12 PROCEDURE — 99213 PR OFFICE/OUTPT VISIT, EST, LEVL III, 20-29 MIN: ICD-10-PCS | Mod: HCNC,S$GLB,, | Performed by: INTERNAL MEDICINE

## 2023-10-12 PROCEDURE — 3008F BODY MASS INDEX DOCD: CPT | Mod: HCNC,CPTII,S$GLB, | Performed by: INTERNAL MEDICINE

## 2023-10-12 RX ORDER — POTASSIUM CHLORIDE 750 MG/1
20 CAPSULE, EXTENDED RELEASE ORAL DAILY
Qty: 180 CAPSULE | Refills: 3 | Status: SHIPPED | OUTPATIENT
Start: 2023-10-12

## 2023-10-12 NOTE — PROGRESS NOTES
Patient ID:  Kathy Seymour is a 72 y.o. female who presents for follow-up of Coronary Artery Disease, Hypertension, Hyperlipidemia, and Results (labs)      She has lost 7 lb.  She has not been drinking any soft drinks.  Prior to the stent she was having chest pain.  She denies any chest pains any shortness of breath she does describe having episodes her her face getting red she gets flushed and her blood pressure is elevated.  This usually occurs every 2 weeks whenever she goes to the [place] restaurant.  After discussion with her she usually has Monica with salt that could be making her blood pressure go up.        Past Medical History:   Diagnosis Date    Angina pectoris     Anticoagulant long-term use     Anxiety     Arthritis     Back pain     Cataract     Chronic bronchitis     Coronary artery disease     STENT X 2    COVID 6/10/2022    CTS (carpal tunnel syndrome)     RIGHT    Depression     MILLER (dyspnea on exertion) 2/21/2017    Hallux valgus, acquired, bilateral 1/19/2017    Hematuria     Hyperlipidemia     Hypertension     Hypertensive left ventricular hypertrophy, without heart failure 5/22/2017    Hypothyroidism     Obesity     Polyneuropathy     S/P coronary artery stent placement, 2 LETTY 9/2012, 1 LETTY 9/2013 3/5/2013    Sleep apnea     USES CPAP    Thyroid disease     Tobacco dependence     Trouble in sleeping     Urinary incontinence     Wears glasses     ONE CONTAC        Past Surgical History:   Procedure Laterality Date    APPENDECTOMY      BILATERAL SALPINGOOPHORECTOMY      CARDIAC CATHETERIZATION      CORONARY ANGIOPLASTY      CORONARY STENT PLACEMENT      PCI  of the LAD with LETTY    EYE SURGERY      bilat cataract removal    HYSTERECTOMY      complete    OOPHORECTOMY      TONSILLECTOMY            Current Outpatient Medications   Medication Instructions    albuterol (VENTOLIN HFA) 90 mcg/actuation inhaler 2 puffs, Inhalation, Every 6 hours PRN, Rescue    ALPRAZolam (XANAX) 0.5 mg,  "Oral, 3 times daily PRN    aspirin 81 mg, Oral, Daily    atorvastatin (LIPITOR) 80 MG tablet TAKE 1 TABLET EVERY DAY.    budesonide-formoterol 160-4.5 mcg (SYMBICORT) 160-4.5 mcg/actuation HFAA 2 puffs, Inhalation, Every 12 hours, Controller    cetirizine (ZYRTEC) 10 mg, Oral, Daily    DULoxetine (CYMBALTA) 60 MG capsule TAKE 1 CAPSULE EVERY DAY    DULoxetine (CYMBALTA) 30 mg, Oral, Daily, In addition to 60mg.  90mg qday    estradioL (ESTRACE) 1 g, Vaginal, Daily, Apply1 gram up to second knuckle. Apply nightly for 2 weeks then every other night.    fluticasone propionate (FLONASE) 50 mcg, Each Nostril, Daily    losartan-hydrochlorothiazide 100-25 mg (HYZAAR) 100-25 mg per tablet TAKE 1 TABLET EVERY DAY    meloxicam (MOBIC) 7.5 mg, Oral, Daily    methIMAzole (TAPAZOLE) 5 mg, Oral, Daily    metoprolol succinate (TOPROL-XL) 25 mg, Oral, Daily    MYRBETRIQ 25 mg, Oral, Daily    pantoprazole (PROTONIX) 40 mg, Oral, Daily    pilocarpine (SALAGEN) 5 mg, Oral, 3 times daily, Use as directed.    potassium chloride (MICRO-K) 10 MEQ CpSR 20 mEq, Oral, Daily    tiZANidine (ZANAFLEX) 4 MG tablet TAKE 1 TABLET (4 MG TOTAL) BY MOUTH EVERY 6 (SIX) HOURS AS NEEDED (SPASM).        Review of patient's allergies indicates:   Allergen Reactions    Macrobid [nitrofurantoin monohyd/m-cryst] Other (See Comments)     Fever and body aches.         Review of Systems   Constitutional: Positive for weight loss.   Cardiovascular:  Positive for dyspnea on exertion. Negative for chest pain, leg swelling and palpitations.   Respiratory:  Negative for cough and shortness of breath.         Objective:     Vitals:    10/12/23 0929   BP: 120/60   BP Location: Left arm   Patient Position: Sitting   BP Method: Medium (Manual)   Pulse: 71   SpO2: 97%   Weight: 76.9 kg (169 lb 8.5 oz)   Height: 5' 5" (1.651 m)       Physical Exam  Vitals and nursing note reviewed.   Constitutional:       Appearance: She is well-developed.   HENT:      Head: Normocephalic " and atraumatic.   Eyes:      Conjunctiva/sclera: Conjunctivae normal.   Cardiovascular:      Rate and Rhythm: Normal rate and regular rhythm.      Heart sounds: Normal heart sounds.   Pulmonary:      Effort: Pulmonary effort is normal.      Breath sounds: Normal breath sounds.   Abdominal:      General: Bowel sounds are normal.      Palpations: Abdomen is soft.   Musculoskeletal:         General: Normal range of motion.   Skin:     General: Skin is warm and dry.   Neurological:      Mental Status: She is alert and oriented to person, place, and time.   Psychiatric:         Behavior: Behavior normal.         Thought Content: Thought content normal.         Judgment: Judgment normal.       CMP  Sodium   Date Value Ref Range Status   09/27/2023 144 136 - 145 mmol/L Final     Potassium   Date Value Ref Range Status   09/27/2023 3.5 3.5 - 5.1 mmol/L Final     Chloride   Date Value Ref Range Status   09/27/2023 105 95 - 110 mmol/L Final     CO2   Date Value Ref Range Status   09/27/2023 30 (H) 23 - 29 mmol/L Final     Glucose   Date Value Ref Range Status   09/27/2023 97 70 - 110 mg/dL Final     BUN   Date Value Ref Range Status   09/27/2023 14 8 - 23 mg/dL Final     Creatinine   Date Value Ref Range Status   09/27/2023 0.9 0.5 - 1.4 mg/dL Final   09/14/2012 0.7 0.2 - 1.4 mg/dl Final     Calcium   Date Value Ref Range Status   09/27/2023 9.1 8.7 - 10.5 mg/dL Final   09/14/2012 8.8 8.6 - 10.2 mg/dl Final     Total Protein   Date Value Ref Range Status   09/27/2023 7.0 6.0 - 8.4 g/dL Final     Albumin   Date Value Ref Range Status   09/27/2023 3.9 3.5 - 5.2 g/dL Final     Total Bilirubin   Date Value Ref Range Status   09/27/2023 0.5 0.1 - 1.0 mg/dL Final     Comment:     For infants and newborns, interpretation of results should be based  on gestational age, weight and in agreement with clinical  observations.    Premature Infant recommended reference ranges:  Up to 24 hours.............<8.0 mg/dL  Up to 48  hours............<12.0 mg/dL  3-5 days..................<15.0 mg/dL  6-29 days.................<15.0 mg/dL       Alkaline Phosphatase   Date Value Ref Range Status   09/27/2023 91 55 - 135 U/L Final   09/14/2012 68 23 - 119 UNIT/L Final     AST   Date Value Ref Range Status   09/27/2023 19 10 - 40 U/L Final   09/14/2012 16 10 - 30 UNIT/L Final     ALT   Date Value Ref Range Status   09/27/2023 15 10 - 44 U/L Final     Anion Gap   Date Value Ref Range Status   09/27/2023 9 8 - 16 mmol/L Final   09/14/2012 7 5 - 15 meq/L Final     eGFR if    Date Value Ref Range Status   08/25/2021 >60.0 >60 mL/min/1.73 m^2 Final     eGFR if non    Date Value Ref Range Status   08/25/2021 56.4 (A) >60 mL/min/1.73 m^2 Final     Comment:     Calculation used to obtain the estimated glomerular filtration  rate (eGFR) is the CKD-EPI equation.         BMP  Lab Results   Component Value Date     09/27/2023    K 3.5 09/27/2023     09/27/2023    CO2 30 (H) 09/27/2023    BUN 14 09/27/2023    CREATININE 0.9 09/27/2023    CALCIUM 9.1 09/27/2023    ANIONGAP 9 09/27/2023    ESTGFRAFRICA >60.0 08/25/2021    EGFRNONAA 56.4 (A) 08/25/2021      BNP  @LABRCNTIP(BNP,BNPTRIAGEBLO)@   Lab Results   Component Value Date    CHOL 109 (L) 09/27/2023    CHOL 115 (L) 03/23/2023    CHOL 132 09/21/2022     Lab Results   Component Value Date    HDL 54 09/27/2023    HDL 39 (L) 03/23/2023    HDL 52 09/21/2022     Lab Results   Component Value Date    LDLCALC 44.2 (L) 09/27/2023    LDLCALC 58.2 (L) 03/23/2023    LDLCALC 63.0 09/21/2022     Lab Results   Component Value Date    TRIG 54 09/27/2023    TRIG 89 03/23/2023    TRIG 85 09/21/2022     Lab Results   Component Value Date    CHOLHDL 49.5 09/27/2023    CHOLHDL 33.9 03/23/2023    CHOLHDL 39.4 09/21/2022      Lab Results   Component Value Date    TSH 2.795 09/27/2023    Z1EERRV 135 04/24/2018    Y7NWTCX 7.3 08/02/2012    FREET4 0.97 03/23/2023     Lab Results   Component  Value Date    HGBA1C 5.8 (H) 09/27/2023     Lab Results   Component Value Date    WBC 7.98 03/23/2023    HGB 11.6 (L) 03/23/2023    HCT 37.4 03/23/2023    MCV 93 03/23/2023     03/23/2023         Results for orders placed during the hospital encounter of 01/11/21    Echo Color Flow Doppler? Yes    Interpretation Summary  · The left ventricle is normal in size with concentric remodeling and normal systolic function. The estimated ejection fraction is 65%  · Grade I left ventricular diastolic dysfunction.  · Normal right ventricular size with normal right ventricular systolic function.  · Mild tricuspid regurgitation.  · Normal central venous pressure (3 mmHg).  · The estimated PA systolic pressure is 24 mmHg.  · Overall the study quality was technically difficult     No results found for this or any previous visit.         Assessment:       Coronary artery disease involving native coronary artery of native heart without angina pectoris  No symptoms of angina    Hypertension, onset before 1995  Controlled on losartan HCT, metoprolol    Hyperlipidemia, baseline   Controlled on 80 mg of atorvastatin       Plan:       She is to take 2 potassium on daily basis.  She will be seen in the office in 6 months with a BMP.  Blood work will be obtained from her primary care physician.

## 2023-10-27 DIAGNOSIS — N32.81 OAB (OVERACTIVE BLADDER): ICD-10-CM

## 2023-10-30 RX ORDER — MIRABEGRON 25 MG/1
25 TABLET, FILM COATED, EXTENDED RELEASE ORAL DAILY
Qty: 90 TABLET | Refills: 3 | Status: SHIPPED | OUTPATIENT
Start: 2023-10-30 | End: 2024-03-07

## 2023-10-30 RX ORDER — ESTRADIOL 0.1 MG/G
1 CREAM VAGINAL DAILY
Qty: 42.5 G | Refills: 3 | Status: SHIPPED | OUTPATIENT
Start: 2023-10-30 | End: 2023-11-02

## 2023-11-02 ENCOUNTER — TELEPHONE (OUTPATIENT)
Dept: UROLOGY | Facility: CLINIC | Age: 73
End: 2023-11-02
Payer: MEDICARE

## 2023-11-02 RX ORDER — ESTRADIOL 0.1 MG/G
1 CREAM VAGINAL DAILY
Qty: 42.5 G | Refills: 3 | Status: SHIPPED | OUTPATIENT
Start: 2023-11-02 | End: 2024-11-01

## 2023-12-14 ENCOUNTER — PATIENT MESSAGE (OUTPATIENT)
Dept: FAMILY MEDICINE | Facility: CLINIC | Age: 73
End: 2023-12-14
Payer: MEDICARE

## 2023-12-14 DIAGNOSIS — N32.81 OAB (OVERACTIVE BLADDER): Primary | ICD-10-CM

## 2023-12-14 RX ORDER — SOLIFENACIN SUCCINATE 5 MG/1
5 TABLET, FILM COATED ORAL DAILY
Qty: 90 TABLET | Refills: 3 | Status: SHIPPED | OUTPATIENT
Start: 2023-12-14 | End: 2024-03-07

## 2023-12-14 NOTE — TELEPHONE ENCOUNTER
Requesting refill for    Solifenacin  Last visit 9/20/2023  Next visit  3/25/2024   Please see portal message   Medication pended

## 2023-12-14 NOTE — TELEPHONE ENCOUNTER
No care due was identified.  [facility] Embedded Care Due Messages. Reference number: [id number].   12/14/2023 2:50:15 PM CST

## 2024-01-02 DIAGNOSIS — G62.9 NEUROPATHY: ICD-10-CM

## 2024-01-02 DIAGNOSIS — F41.9 ANXIETY: ICD-10-CM

## 2024-01-04 DIAGNOSIS — J44.9 COPD MIXED TYPE: ICD-10-CM

## 2024-01-04 RX ORDER — DULOXETIN HYDROCHLORIDE 60 MG/1
CAPSULE, DELAYED RELEASE ORAL
Qty: 90 CAPSULE | Refills: 3 | Status: SHIPPED | OUTPATIENT
Start: 2024-01-04

## 2024-01-05 RX ORDER — BUDESONIDE AND FORMOTEROL FUMARATE DIHYDRATE 160; 4.5 UG/1; UG/1
2 AEROSOL RESPIRATORY (INHALATION) EVERY 12 HOURS
Qty: 30.6 G | Refills: 3 | Status: SHIPPED | OUTPATIENT
Start: 2024-01-05

## 2024-01-11 RX ORDER — METOPROLOL SUCCINATE 25 MG/1
25 TABLET, EXTENDED RELEASE ORAL DAILY
Qty: 30 TABLET | Refills: 11 | Status: SHIPPED | OUTPATIENT
Start: 2024-01-11 | End: 2025-01-10

## 2024-01-16 ENCOUNTER — TELEPHONE (OUTPATIENT)
Dept: PULMONOLOGY | Facility: CLINIC | Age: 74
End: 2024-01-16
Payer: MEDICARE

## 2024-01-16 DIAGNOSIS — G47.33 OSA ON CPAP: Primary | ICD-10-CM

## 2024-01-17 ENCOUNTER — TELEPHONE (OUTPATIENT)
Dept: PULMONOLOGY | Facility: CLINIC | Age: 74
End: 2024-01-17
Payer: MEDICARE

## 2024-01-22 ENCOUNTER — PATIENT MESSAGE (OUTPATIENT)
Dept: PULMONOLOGY | Facility: CLINIC | Age: 74
End: 2024-01-22
Payer: MEDICARE

## 2024-01-22 ENCOUNTER — TELEPHONE (OUTPATIENT)
Dept: PULMONOLOGY | Facility: CLINIC | Age: 74
End: 2024-01-22
Payer: MEDICARE

## 2024-01-22 NOTE — TELEPHONE ENCOUNTER
Sent message to pt regarding she needs a follow up appt     ----- Message from Luna Corral sent at 1/22/2024 12:36 PM CST -----  Good afternoon,   Pt need a Current F2f to process new supply order. Pt has mask fitting scheduled for 1/25 advised pt to reach out to schedule F2f  before or apt will be canceled.     <-- Click to add NO significant Past Surgical History

## 2024-01-24 ENCOUNTER — OFFICE VISIT (OUTPATIENT)
Dept: PULMONOLOGY | Facility: CLINIC | Age: 74
End: 2024-01-24
Payer: MEDICARE

## 2024-01-24 ENCOUNTER — TELEPHONE (OUTPATIENT)
Dept: PULMONOLOGY | Facility: CLINIC | Age: 74
End: 2024-01-24
Payer: MEDICARE

## 2024-01-24 VITALS
HEIGHT: 65 IN | SYSTOLIC BLOOD PRESSURE: 126 MMHG | DIASTOLIC BLOOD PRESSURE: 81 MMHG | BODY MASS INDEX: 27.94 KG/M2 | WEIGHT: 167.69 LBS | HEART RATE: 73 BPM | OXYGEN SATURATION: 95 %

## 2024-01-24 DIAGNOSIS — F41.9 ANXIETY: ICD-10-CM

## 2024-01-24 DIAGNOSIS — F17.211 NICOTINE DEPENDENCE, CIGARETTES, IN REMISSION: ICD-10-CM

## 2024-01-24 DIAGNOSIS — G47.33 OSA ON CPAP: ICD-10-CM

## 2024-01-24 DIAGNOSIS — J44.9 COPD MIXED TYPE: Primary | ICD-10-CM

## 2024-01-24 PROCEDURE — 3079F DIAST BP 80-89 MM HG: CPT | Mod: HCNC,CPTII,S$GLB, | Performed by: INTERNAL MEDICINE

## 2024-01-24 PROCEDURE — 3074F SYST BP LT 130 MM HG: CPT | Mod: HCNC,CPTII,S$GLB, | Performed by: INTERNAL MEDICINE

## 2024-01-24 PROCEDURE — 99999 PR PBB SHADOW E&M-EST. PATIENT-LVL IV: CPT | Mod: PBBFAC,HCNC,, | Performed by: INTERNAL MEDICINE

## 2024-01-24 PROCEDURE — 99213 OFFICE O/P EST LOW 20 MIN: CPT | Mod: HCNC,S$GLB,, | Performed by: INTERNAL MEDICINE

## 2024-01-24 PROCEDURE — 1101F PT FALLS ASSESS-DOCD LE1/YR: CPT | Mod: HCNC,CPTII,S$GLB, | Performed by: INTERNAL MEDICINE

## 2024-01-24 PROCEDURE — 1159F MED LIST DOCD IN RCRD: CPT | Mod: HCNC,CPTII,S$GLB, | Performed by: INTERNAL MEDICINE

## 2024-01-24 PROCEDURE — 3288F FALL RISK ASSESSMENT DOCD: CPT | Mod: HCNC,CPTII,S$GLB, | Performed by: INTERNAL MEDICINE

## 2024-01-24 RX ORDER — ALPRAZOLAM 0.5 MG/1
0.5 TABLET ORAL 3 TIMES DAILY PRN
Qty: 90 TABLET | Refills: 5 | Status: SHIPPED | OUTPATIENT
Start: 2024-01-24

## 2024-01-24 NOTE — PATIENT INSTRUCTIONS
Would ask staff to get compliance report for cpap    New cpap mask ordered     Asthma stable    Xanax re ordered    Ct chest done sept 2023 viewed -- will order f/u for sept and notify by phone-- ordered yearly for next 4 yrs.......  we discuss screen ct and nodules were seen

## 2024-01-24 NOTE — TELEPHONE ENCOUNTER
Spoke to pt , scheduled her for today   ----- Message from Ro Brown sent at 1/24/2024  9:32 AM CST -----  Contact: self  Type: Needs Medical Advice  Who Called:  pt  Best Call Back Number: [phone]   Additional Information: please call to confirm pt apt 1/25 at 2.20pm

## 2024-01-24 NOTE — PROGRESS NOTES
1/24/2024    Kathy Seymour  Office Note    Chief Complaint   Patient presents with    Follow-up         HPI:   1/24/2024   Uses nasal mask and wishes to change to ffm. Has dry mouth.  Still dry mouth.  Lost 21 lbs. Asthma stable.     No albuterol use, symbicort.    Off smokes 6 yrs, had screen ct 9/2023 ....    12/5/2022 -- memory felt to be good.  Cpap ppt dry mouth -- nasal mask.  Ahi 48 in past.  Uses biotene as on vesicare with dry mouth.      Patient Instructions   Would be good view compliance report from cpap.    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  12/14/2021 cpap problems --  Tried airtouch n20 mask  Concerned about memory.  No breathing issues.   Patient Instructions   minimental status was 28 out of 30, 23 is bad sort of.    Xanax as needed.    New cpap mask airtouch n 20 ordered.       12/1/2020 cpap works better but has a lot of air in morning with severe dreams noted.  Use symbicort daily with no problems  Patient Instructions   Asthma stable - singulair will prevent sinus and asthma.  Could skip symbicort and use singulair only.  Use xanax as needed.  Amoxil for sinus infection may be needed.   Call when needs follow up/xanax.      6/2/2020 back on old cpap device.  Sinus and lungs ok- occ symptoms with pollen exposure..  Patient Instructions   Need to get auto cpap report for pressure readings when last used.  Your old level was 6.  Would use mid way pressure for compromise.  Could consider repeat cpap titration in sleep lab..  Call in monthly til reach auto pap level or cannot increase further then go to sleep lab.  You had 48 episodes an hour of sleep apnea.      uptodate on belching excessively- baclofen trial for  discussed.      Dec 3, 2019-  Nasal patency is now good, still uses old cpap device as auto pap ppt dry mouth and nocturnal arousal.   Machine brought in today.  July 25, 2017 cpap titration had 44% sleep efficiency,     airfit f 30 mask - goes under nostrils - full face mask without bulkiness.        Check home mask, try to use full face type mask with current auto cpap.     Could do anther titration.  Auto cpap devices give similar results to cpap titrations in sleep lab.  Your titration in July 2017 was with poor sleep efficiency and , if you use your auto cpap, the auto device repeats titration nightly.       Use during day and note what might be acceptable pressures- you say you have no symptoms on cpap 6 (doubt you are optimally treated) could use minimal pressures??     Use symbicort, use flonase.  June 4, 2019- freq nasal stuffy,uses saline.  Has cpap  6 , last 2017 cpap titration had no rem sleep so rec was 10-20 with study done 8-12.  Pt has nasal stuffy freq uses saline.      Has some bingham.  Patient Instructions   Your ahi was 48 in 2017- fairly severe.    Nasal patency needed- use saline (afrin if stuffy) then flonase once open.    If nasal obstructs will need to open mouth - mouth leaks will make machine cycles.  Would use auto pap 8-12- numbers found to be adequate in 2017    Would try nasal oral mask in future.  Work on nose.    Exercise would likely be good.      Try symbicort 2 twice daily  - in place anoro.       12/17/18- has older machine states not working correctly. Using nightly, has day time drowsiness. Insurance states having to wait for next machine. Sleep is poor. Has new nasal face mask. Has tried nasal pillars, nasal pillow, and now has full nose mask. Nervous over full face mask causing clausaphobia.   Sensation of throat closing, Not using Anoro daily.  Patient Instructions   Anoro is a daily medication intended to prevent breathing problems  Use Albuterol inhaler 1-2 puffs every 4 hours when needed for Shortness of breath      A full Face mask is needed to help with Obstructive sleep apnea due to sleeping with mouth open.  9/24/18- states had sleep titration study but never heard anything from it.  Insurance denied automatic titration machine,  was not told she had COPD. Request a different face mask. States SOB with activity every day for a few minutes, day time only, relieved with rest.  does not use albuterol inhaler feels it does not help.  Has stopped smoking five years ago.     Previous HPI Dr. Jordan:  July 10, 2017-on cymbalta- helps neuropathy but having poor sleep  With nocturia ppt arousal.  Sleep study done with no rem sleep on cymbalta?  10-20 pressure rather than 6.     2/27/2017HPI: dx osas 10 yrs ago, ahi na.  On nasal pillars- cpap 6, new machine 5 yrs ago.  Gained 45 post stent around 2012.     Prior to cpap, severe snorer, fatigue, with no other prominent symptoms recalled.       Now no snoring noted with cpap. No nasal obstruction.     Mentation a little slow but vague.      The chief compliant  problem is stable  PFSH:  Past Medical History:   Diagnosis Date    Angina pectoris     Anticoagulant long-term use     Anxiety     Arthritis     Back pain     Cataract     Chronic bronchitis     Coronary artery disease     STENT X 2    COVID 6/10/2022    CTS (carpal tunnel syndrome)     RIGHT    Depression     MILLER (dyspnea on exertion) 2/21/2017    Hallux valgus, acquired, bilateral 1/19/2017    Hematuria     Hyperlipidemia     Hypertension     Hypertensive left ventricular hypertrophy, without heart failure 5/22/2017    Hypothyroidism     Obesity     Polyneuropathy     S/P coronary artery stent placement, 2 LETTY 9/2012, 1 LETTY 9/2013 3/5/2013    Sleep apnea     USES CPAP    Thyroid disease     Tobacco dependence     Trouble in sleeping     Urinary incontinence     Wears glasses     ONE CONTAC         Past Surgical History:   Procedure Laterality Date    APPENDECTOMY      BILATERAL SALPINGOOPHORECTOMY      CARDIAC CATHETERIZATION      CORONARY ANGIOPLASTY      CORONARY STENT PLACEMENT      PCI  of the LAD with LETTY    EYE SURGERY      bilat cataract removal    HYSTERECTOMY      complete     "OOPHORECTOMY      TONSILLECTOMY       Social History     Tobacco Use    Smoking status: Former     Current packs/day: 0.00     Average packs/day: 1 pack/day for 50.0 years (50.0 ttl pk-yrs)     Types: Cigarettes     Start date: 1964     Quit date: 2012     Years since quittin.4    Smokeless tobacco: Never   Substance Use Topics    Alcohol use: Yes     Alcohol/week: 2.0 standard drinks of alcohol     Types: 2 Drinks containing 0.5 oz of alcohol per week     Comment: Social - vodka on wednesday    Drug use: No     Family History   Problem Relation Age of Onset    Hypertension Sister     Arthritis Sister     Heart failure Mother     Hypertension Mother     Depression Mother     Diabetes Mother     Heart failure Father     Hypertension Father     No Known Problems Brother     No Known Problems Son     Heart disease Brother     Arthritis Sister     Hypertension Sister     Cancer Sister     Asthma Son     Arthritis Son         psoriatic arthritis    Asthma Son      Review of patient's allergies indicates:   Allergen Reactions    Wellbutrin [bupropion hcl] Other (See Comments)     sven     I have reviewed past medical, family, and social history. I have reviewed previous nurse notes.    Performance Status:The patient's activity level is no limits with regular activity.      Review of Systems:  a review of eleven systems covering constitutional, Eye, HEENT, Psych, Respiratory, Cardiac, GI, , Musculoskeletal, Endocrine, Dermatologic was negative except for pertinent findings as listed ABOVE and below: pertinent positive as above, rest is good       Exam:Comprehensive exam done. BP (!) 154/73 (BP Location: Right arm, Patient Position: Sitting)   Pulse 77   Ht 5' 5" (1.651 m)   Wt 82.1 kg (181 lb)   SpO2 95% Comment: on room air  BMI 30.12 kg/m²   Exam included Vitals as listed, and patient's appearance and affect and alertness and mood, oral exam for yeast and hygiene and pharynx lesions and Mallapatti " (M) score, neck with inspection for jvd and masses and thyroid abnormalities and lymph nodes (supraclavicular and infraclavicular nodes and axillary also examined and noted if abn), chest exam included symmetry and effort and fremitus and percussion and auscultation, cardiac exam included rhythm and gallops and murmur and rubs and jvd and edema, abdominal exam for mass and hepatosplenomegaly and tenderness and hernias and bowel sounds, Musculoskeletal exam with muscle tone and posture and mobility/gait and  strength, and skin for rashes and cyanosis and pallor and turgor, extremity for clubbing.  Findings were normal except for pertinent findings listed below:  M3, chest is symmetric, no distress, normal percussion, normal fremitus and good normal breath sounds  No edema.             Radiographs (ct chest and cxr) reviewed: results reviewed   The mediastinal and cardiac size and contours are normal. The diaphragms and pleura are clear. There are no intra-pulmonary masses or infiltrates. There is no pneumothorax or pleural effusion.    Impression-Negative chest.      Electronically signed by:Hernán Mojica MD  Date: 03/26/14  Time:10:40     Labs reviewed  Lab Results   Component Value Date    WBC 7.98 03/23/2023    RBC 4.03 03/23/2023    HGB 11.6 (L) 03/23/2023    HCT 37.4 03/23/2023    MCV 93 03/23/2023    MCH 28.8 03/23/2023    MCHC 31.0 (L) 03/23/2023    RDW 13.9 03/23/2023     03/23/2023    MPV 11.0 03/23/2023    GRAN 5.0 03/23/2023    GRAN 62.5 03/23/2023    LYMPH 2.0 03/23/2023    LYMPH 25.4 03/23/2023    MONO 0.6 03/23/2023    MONO 7.6 03/23/2023    EOS 0.3 03/23/2023    BASO 0.04 03/23/2023    EOSINOPHIL 3.6 03/23/2023    BASOPHIL 0.5 03/23/2023       PFT results reviewed  Pulmonary Functions Testing Results:  Procedure Notes     Author Status Last  Updated Created   Tayo Jordan MD Signed Tayo Jordan MD 7/19/2017  2:38 PM 7/19/2017  2:35 PM          Assoc. Orders Procedures   None  COMPLETE PFT WITH BRONCHODILATOR       Pre-Op Dx Post-Op Dx   Bronchitis None          Pulmonary Functions, including spirometry and bronchodilator response and lung volumes and diffusion, study was done July 28, 2017.  Spirometry shows loss of vital capacity and fev1 with no obstruction and no bronchodilator response.   FEV1 is 71% or 1.65 liters.  Lung volumes show  normal TLC and elevated residual volume.  Diffusion shows reduced but falls within normal range when corrected for lung volumes.     Pulmonary functions show low vital capacity and low mvv with some air trapping,but rest is wnl. Clinical correlation recommended.     Tayo Jordan M.D.               Plan:  Clinical impression is apparently straight forward and impression with management as below.    Kathy was seen today for follow-up.    Diagnoses and all orders for this visit:    COPD mixed type    Anxiety  Comments:  Controlled, refill of Xanax given.  Orders:  -     ALPRAZolam (XANAX) 0.5 MG tablet; Take 1 tablet (0.5 mg total) by mouth 3 (three) times daily as needed for Anxiety.    RAMONA on CPAP  -     CPAP/BIPAP SUPPLIES    Nicotine dependence, cigarettes, in remission  -     CT Chest Lung Screening Low Dose; Standing            Follow up in about 1 year (around 1/24/2025), or if symptoms worsen or fail to improve.    Discussed with patient above for education the following:      Patient Instructions   Would ask staff to get compliance report for cpap    New cpap mask ordered     Asthma stable    Xanax re ordered    Ct chest done sept 2023 viewed -- will order f/u for sept and notify by phone-- ordered yearly for next 4 yrs.......  we discuss screen ct and nodules were seen

## 2024-02-03 DIAGNOSIS — I10 ESSENTIAL HYPERTENSION: ICD-10-CM

## 2024-02-03 DIAGNOSIS — E78.00 PURE HYPERCHOLESTEROLEMIA: Chronic | ICD-10-CM

## 2024-02-03 NOTE — TELEPHONE ENCOUNTER
No care due was identified.  [facility] Embedded Care Due Messages. Reference number: [id number].   2/03/2024 2:48:44 AM CST

## 2024-02-05 RX ORDER — LOSARTAN POTASSIUM AND HYDROCHLOROTHIAZIDE 25; 100 MG/1; MG/1
TABLET ORAL
Qty: 90 TABLET | Refills: 3 | Status: SHIPPED | OUTPATIENT
Start: 2024-02-05

## 2024-02-05 RX ORDER — ATORVASTATIN CALCIUM 80 MG/1
TABLET, FILM COATED ORAL
Qty: 90 TABLET | Refills: 3 | Status: SHIPPED | OUTPATIENT
Start: 2024-02-05

## 2024-02-05 NOTE — TELEPHONE ENCOUNTER
Refill Routing Note   Medication(s) are not appropriate for processing by [facility] for the following reason(s):        Patient not seen by provider within 15 months    [facility] action(s):  Defer               Appointments  past 12m or future 3m with PCP    Date Provider   Last Visit   9/12/2022 Jersey Seymour Jr., MD   Next Visit   3/25/2024 Jersey Seymour Jr., MD   ED visits in past 90 days: 0        Note composed:8:10 AM 02/05/2024

## 2024-03-07 ENCOUNTER — OFFICE VISIT (OUTPATIENT)
Dept: UROLOGY | Facility: CLINIC | Age: 74
End: 2024-03-07
Payer: MEDICARE

## 2024-03-07 VITALS — WEIGHT: 167 LBS | HEIGHT: 65 IN | BODY MASS INDEX: 27.82 KG/M2

## 2024-03-07 DIAGNOSIS — R82.90 BAD ODOR OF URINE: ICD-10-CM

## 2024-03-07 DIAGNOSIS — N32.81 OAB (OVERACTIVE BLADDER): Primary | ICD-10-CM

## 2024-03-07 DIAGNOSIS — N39.0 RECURRENT UTI: ICD-10-CM

## 2024-03-07 LAB
BILIRUBIN, UA POC OHS: NEGATIVE
BLOOD, UA POC OHS: ABNORMAL
CLARITY, UA POC OHS: CLEAR
COLOR, UA POC OHS: YELLOW
GLUCOSE, UA POC OHS: NEGATIVE
KETONES, UA POC OHS: NEGATIVE
LEUKOCYTES, UA POC OHS: ABNORMAL
NITRITE, UA POC OHS: NEGATIVE
PH, UA POC OHS: 6
POC RESIDUAL URINE VOLUME: 46 ML (ref 0–100)
PROTEIN, UA POC OHS: NEGATIVE
SPECIFIC GRAVITY, UA POC OHS: 1.02
UROBILINOGEN, UA POC OHS: 1

## 2024-03-07 PROCEDURE — 1101F PT FALLS ASSESS-DOCD LE1/YR: CPT | Mod: HCNC,CPTII,S$GLB, | Performed by: NURSE PRACTITIONER

## 2024-03-07 PROCEDURE — 1126F AMNT PAIN NOTED NONE PRSNT: CPT | Mod: HCNC,CPTII,S$GLB, | Performed by: NURSE PRACTITIONER

## 2024-03-07 PROCEDURE — 87086 URINE CULTURE/COLONY COUNT: CPT | Mod: HCNC | Performed by: NURSE PRACTITIONER

## 2024-03-07 PROCEDURE — 99999 PR PBB SHADOW E&M-EST. PATIENT-LVL IV: CPT | Mod: PBBFAC,HCNC,, | Performed by: NURSE PRACTITIONER

## 2024-03-07 PROCEDURE — 87077 CULTURE AEROBIC IDENTIFY: CPT | Mod: HCNC | Performed by: NURSE PRACTITIONER

## 2024-03-07 PROCEDURE — 1160F RVW MEDS BY RX/DR IN RCRD: CPT | Mod: HCNC,CPTII,S$GLB, | Performed by: NURSE PRACTITIONER

## 2024-03-07 PROCEDURE — 87088 URINE BACTERIA CULTURE: CPT | Mod: HCNC | Performed by: NURSE PRACTITIONER

## 2024-03-07 PROCEDURE — 81003 URINALYSIS AUTO W/O SCOPE: CPT | Mod: QW,HCNC,S$GLB, | Performed by: NURSE PRACTITIONER

## 2024-03-07 PROCEDURE — 99214 OFFICE O/P EST MOD 30 MIN: CPT | Mod: HCNC,S$GLB,, | Performed by: NURSE PRACTITIONER

## 2024-03-07 PROCEDURE — 1159F MED LIST DOCD IN RCRD: CPT | Mod: HCNC,CPTII,S$GLB, | Performed by: NURSE PRACTITIONER

## 2024-03-07 PROCEDURE — 87186 SC STD MICRODIL/AGAR DIL: CPT | Mod: HCNC | Performed by: NURSE PRACTITIONER

## 2024-03-07 PROCEDURE — 51798 US URINE CAPACITY MEASURE: CPT | Mod: HCNC,S$GLB,, | Performed by: NURSE PRACTITIONER

## 2024-03-07 PROCEDURE — 3288F FALL RISK ASSESSMENT DOCD: CPT | Mod: HCNC,CPTII,S$GLB, | Performed by: NURSE PRACTITIONER

## 2024-03-07 RX ORDER — SOLIFENACIN SUCCINATE 10 MG/1
10 TABLET, FILM COATED ORAL DAILY
Qty: 90 TABLET | Refills: 3 | Status: SHIPPED | OUTPATIENT
Start: 2024-03-07

## 2024-03-07 RX ORDER — SOLIFENACIN SUCCINATE 10 MG/1
10 TABLET, FILM COATED ORAL DAILY
Qty: 90 TABLET | Refills: 3 | Status: SHIPPED | OUTPATIENT
Start: 2024-03-07 | End: 2024-03-07 | Stop reason: SDUPTHER

## 2024-03-07 RX ORDER — AMOXICILLIN AND CLAVULANATE POTASSIUM 500; 125 MG/1; MG/1
1 TABLET, FILM COATED ORAL 2 TIMES DAILY
Qty: 10 TABLET | Refills: 0 | Status: SHIPPED | OUTPATIENT
Start: 2024-03-07 | End: 2024-03-25 | Stop reason: ALTCHOICE

## 2024-03-07 NOTE — PATIENT INSTRUCTIONS
Discussed conservative measures to control urgency and frequency including   1. Avoiding/minimizing bladder irritants (see below), especially in afternoon and evening hours    Discussed bladder irritants include coffee (even decaf), tea, alcohol, soda, spicy foods, acidic juices (orange, tomato), vinegar, and artificial sweeteners/sugary beverages.    2. timed voiding - empty on a schedule (approx ~2-3 hours) in spite of need to urinate, to get ahead of urge    3. dont postponing voiding - dont hold it on purpose     4. bowel regimen as distended bowel has extrinsic compressive effect on bladder.   - any or all of the following in any combination, titrate to soft daily bowel movement without pushing or straining  - colace/stool softener capsule - once to twice daily  - miralax - 1 capful daily to start, can increase to 2x daily (or decrease to 1/2 cap daily)  - increase dietary fibery  - fiber supplements, such as metamucil  - prunes, prune juice    5. INCREASE water intake    6. Stop fluids 2 hours before bed, and urinate just before bed    UTI prevention  -Continue utiva, d mannose, probiotic and estrace cream for UTI prevention  Avoid constipation.  Drink lots of water  Void every 2-3 hrs regardless of urge  Void soon after urge arises. Do not hold urine once urge occurs.

## 2024-03-07 NOTE — PROGRESS NOTES
CHIEF COMPLAINT:    Mrs Seymour is a 73 y.o. female presenting for f/u OAB.  PRESENTING ILLNESS:    Kathy Seymour is a 73 y.o. female who presents for f/u OAB. Last clinic visit was 23    Previously saw me for OAB in 2019 with h/o mixed incontinence UUI>JUD. H/o bladder lift (no mesh) with hysterectomy in . Was wearing 2 to 3 pads a day. . Was also having uti's about 1x a year. Symptomatic (dysuria, frequency). And also found to have mod blood in urine on dipstick. Family hx of stones. 50 pk yr hx of smoking but quit in . W/u included ctu 19 (nothing to explain MH). Cytology 19 was neg and cx was +. Cystoscopy on 19 revealed evidence of recent uti. Vaginal exam showed a rectocele. Referred to  who saw her on 7/10/19. Pt decided on conservative treatment. She had been written for myrbetriq but not covered. Then oxybutynin which was helping some.  So he increased it to oxybutynin 15mgXL. She f/u with ciarra on 9/10/19 and stated she had 60% improvement on 1/2 tab of oxybutynin 15mg.      She hasn't been seen by urology or gyn since. [facility] she is been essentially asymp having matic bacteriuria over the past year, initially infrequently, once every few months but now feels more frequently and now once every few weeks.  She has gone to urgent care a few times for foul-smelling urine.  No dysuria, no frequency no urgency. Her voided urine today with mod leuk and small blood. Cath urine looks cloudy. Sending. Residual only 40cc.      Uti symptoms: none. Foul smelling urine   UTI prevention tried in the past:   Cranberry No.   Drinking more fluid  No.  2 bottles of water and 2 cups of coffee  Timed voiding Yes - 2 to 3 hours and more than this someitme.   Prevention No.   UTI risk factors:    Previous bladder or vaginal surgery: Yes - hysterectomy and lift but no mesh, cystoscopy as above for .   Personal history of kidney stones: No. But strong family  hx.   Urinary  Incontinence episodes: Yes - only if she waits a few hours to go to restroom.  OAB meds: She has been on oxybutynin in the past already and now on solfenacin 10mg once daily. Essentially works for her but dries her mouth. With cpap machine worse  Pads: Yes - pantyliners if she's goes out or to dinner.   Diabetes: No. A1c 5.9 3/2023  Daytime frequency: q 1-2 hours .  Drinks 2 c of coffee every day and coke sometimes  Bowel Incontinence episodes: No.   Sexually active: No.   Water intake: medium.   History of breast cancer or any other gyn cancer: No. Never tried estrace  Hysterectomy: Yes - metromenhorragia   Imaging:ctu 6/6/19 independently reviewed. Normal. No nydro. No stones. Ct chest 9/2022- half of L kidney seen. No hydro. No stones.   In and out catheter performed today: Yes    Catheterized PVR was 40mL.      Interval history 9/7/23  Pt presents today for f/u OAB, recurrent UTI  Did not start Gemtesa d/t cost  Still taking vesicare for OAB. Voiding every 1-2 hours. Wears panty liners if goes out for occasional UUI. + dry mouth from medication.  PVR 52 ml    No UTI's since last visit. Denies any UTI symptoms today in clinic.   Taking Utiva Cranberry and d mannose daily  Using estrace cream 2x per week  Good water intake    6/14/23 DU  No obstructive uropathy    Interval history 3/7/24  Myrbetriq and Gemtesa too expensive  Taking vesicare 5 mg for OAB  Wears liner, changes 2-3 per day. Occasional leakage.  Does have occasional urinary frequency and urgency  Denies dysuria, gross hematuria, flank pain, fever, chills, nausea or vomiting.  Pt c/o odor to urine and cloudy urine. Unsure if has UTI. Last UTI 6/6/23  Taking cranberry, d mannose, probiotic and using estrace 2-3 x per week as UTI prevention  UA today sm blood, sm leuk  PVR 46 ml    Occasional constipation, controlled with diet     Previous urine cultures pasted here: Anticoagulation:  Yes - asa 81mg daily. +50 pack yr hx of smoking. +fam hx of stones.    Component       Urine Culture, Routine   Latest Ref Rng & Units           5/22/2023       Final report   4/14/2023       ESCHERICHIA COLI (A) . . .   3/14/2023      2:41 PM KLEBSIELLA OXYTOCA (A) . . .   3/14/2023      2:41 PM ESCHERICHIA COLI (A) . . .   7/25/2022       Final report   7/1/2019       ESCHERICHIA COLI (A) . . .   5/30/2019       ESCHERICHIA COLI . . .      5/30/19            E.coli, void: mod blood, asx but treated, cytology: neg  12/10/18          E.coli, void: n/a  10/20/17          No cx, void: nit+/2+bld/1+leuk  4/6/17              E.coli, void: tr bld  9/9/16              No cx, void:1+bld/nit+  9/4/13              No cx, void: 250 bld/wbc+  11/11/12          No cx, void: lrg bld/nit+/1+leuk  8/5/08              No cx, void: sml blood/nit+         REVIEW OF SYSTEMS:    Review of Systems    Constitutional: Negative for fever and chills.   Gastrointestinal: Negative for nausea, vomiting  Genitourinary:  See above  Neurological: Negative for dizziness.   Psychiatric/Behavioral: Negative for confusion.     PATIENT HISTORY:    Past Medical History:   Diagnosis Date    Angina pectoris     Anticoagulant long-term use     Anxiety     Arthritis     Back pain     Cataract     Chronic bronchitis     Coronary artery disease     STENT X 2    COVID 6/10/2022    CTS (carpal tunnel syndrome)     RIGHT    Depression     MILLER (dyspnea on exertion) 2/21/2017    Hallux valgus, acquired, bilateral 1/19/2017    Hematuria     Hyperlipidemia     Hypertension     Hypertensive left ventricular hypertrophy, without heart failure 5/22/2017    Hypothyroidism     Obesity     Polyneuropathy     S/P coronary artery stent placement, 2 LETTY 9/2012, 1 LETTY 9/2013 3/5/2013    Sleep apnea     USES CPAP    Thyroid disease     Tobacco dependence     Trouble in sleeping     Urinary incontinence     Wears glasses     ONE CONTAC       Past Surgical History:   Procedure Laterality Date    APPENDECTOMY      BILATERAL SALPINGOOPHORECTOMY       CARDIAC CATHETERIZATION      CORONARY ANGIOPLASTY      CORONARY STENT PLACEMENT      PCI  of the LAD with LETTY    EYE SURGERY      bilat cataract removal    HYSTERECTOMY      complete    OOPHORECTOMY      TONSILLECTOMY     PHYSICAL EXAMINATION:    Constitutional: She is oriented to person, place, and time. She appears well-developed and well-nourished.  She is in no apparent distress.    Abdominal:  She exhibits no distension.  There is no CVA tenderness.     Neurological: She is alert and oriented to person, place, and time.     Psych: Cooperative with normal affect.    Physical Exam    LABS:      Lab Results   Component Value Date    CREATININE 0.9 09/27/2023       IMPRESSION:    Encounter Diagnoses   Name Primary?    OAB (overactive bladder) Yes    Bad odor of urine     Recurrent UTI        PLAN:  -Discussed OAB symptoms. Pt would like to increase vesicare to 10 mg daily  Refilled vesicare 10 mg to pharmacy  If dry mouth or constipation worsens with vesicare 10 mg, pt will notify me in clinic    Continue conservative measures to control urgency and frequency including   1. Avoiding/minimizing bladder irritants (see below), especially in afternoon and evening hours  Discussed bladder irritants include coffe (even decaf), tea, alcohol, soda, spicy foods, acidic juices (orange, tomato), vinegar, and artificial sweeteners/sugary beverages.  2. timed voiding - empty on a schedule (approx ~2-3 hours) in spite of need to urinate, to get ahead of urge  3. dont postponing voiding - dont hold it on purpose   4. bowel regimen as distended bowel has extrinsic compressive effect on bladder.   - any or all of the following in any combination, titrate to soft daily bowel movement without pushing or straining  - colace/stool softener capsule - once to twice daily  - miralax - 1 capful daily to start, can increase to 2x daily (or decrease to 1/2 cap daily)  - increase dietary fibery  - fiber supplements, such as metamucil  -  prunes, prune juice  5. INCREASE water intake  6. Stop fluids 2 hours before bed, and urinate just before bed    -Continue UTI prevention:  -Continue utiva, d mannose, probiotic and estrace cream for UTI prevention  Avoid constipation.  Drink lots of water  Void every 2-3 hrs regardless of urge  Void soon after urge arises. Do not hold urine once urge occurs.    -Urine sent for culture. (Unable to send for micro UA d/t small amount)  Start augmentin for possible UTI  Discussed side effects and indications for augmentin. Prescription sent to the pharmacy. Pt verbalized understanding.  No issues in the past with augmentin  Will adjust antibiotic based on culture results.      -RTC 3 months      I encouraged her or any of her family members to call or email me with questions and/or concerns.    Visit today is associated with current or anticipated ongoing medical care related to this patient's single serious condition/complex condition, OAB and recurrent UTI    30 minutes of total time spent on the encounter, which includes face to face time and non-face to face time preparing to see the patient (eg, review of tests), Obtaining and/or reviewing separately obtained history, Documenting clinical information in the electronic or other health record, Independently interpreting results (not separately reported) and communicating results to the patient/family/caregiver, or Care coordination (not separately reported).

## 2024-03-09 LAB — BACTERIA UR CULT: ABNORMAL

## 2024-03-25 ENCOUNTER — OFFICE VISIT (OUTPATIENT)
Dept: FAMILY MEDICINE | Facility: CLINIC | Age: 74
End: 2024-03-25
Payer: MEDICARE

## 2024-03-25 VITALS
HEART RATE: 71 BPM | TEMPERATURE: 99 F | SYSTOLIC BLOOD PRESSURE: 110 MMHG | DIASTOLIC BLOOD PRESSURE: 70 MMHG | OXYGEN SATURATION: 94 % | HEIGHT: 65 IN | WEIGHT: 168 LBS | BODY MASS INDEX: 27.99 KG/M2

## 2024-03-25 DIAGNOSIS — F32.0 MILD MAJOR DEPRESSION: ICD-10-CM

## 2024-03-25 DIAGNOSIS — I70.0 AORTIC ATHEROSCLEROSIS: ICD-10-CM

## 2024-03-25 DIAGNOSIS — E78.2 MIXED HYPERLIPIDEMIA: Chronic | ICD-10-CM

## 2024-03-25 DIAGNOSIS — I25.10 CORONARY ARTERY DISEASE INVOLVING NATIVE CORONARY ARTERY OF NATIVE HEART WITHOUT ANGINA PECTORIS: ICD-10-CM

## 2024-03-25 DIAGNOSIS — E04.2 MULTIPLE THYROID NODULES: ICD-10-CM

## 2024-03-25 DIAGNOSIS — J44.9 COPD MIXED TYPE: ICD-10-CM

## 2024-03-25 DIAGNOSIS — N18.31 CHRONIC KIDNEY DISEASE, STAGE 3A: ICD-10-CM

## 2024-03-25 DIAGNOSIS — I10 PRIMARY HYPERTENSION: Primary | ICD-10-CM

## 2024-03-25 PROCEDURE — 3074F SYST BP LT 130 MM HG: CPT | Mod: HCNC,CPTII,S$GLB, | Performed by: FAMILY MEDICINE

## 2024-03-25 PROCEDURE — 1160F RVW MEDS BY RX/DR IN RCRD: CPT | Mod: HCNC,CPTII,S$GLB, | Performed by: FAMILY MEDICINE

## 2024-03-25 PROCEDURE — 3078F DIAST BP <80 MM HG: CPT | Mod: HCNC,CPTII,S$GLB, | Performed by: FAMILY MEDICINE

## 2024-03-25 PROCEDURE — 3288F FALL RISK ASSESSMENT DOCD: CPT | Mod: HCNC,CPTII,S$GLB, | Performed by: FAMILY MEDICINE

## 2024-03-25 PROCEDURE — 99214 OFFICE O/P EST MOD 30 MIN: CPT | Mod: HCNC,S$GLB,, | Performed by: FAMILY MEDICINE

## 2024-03-25 PROCEDURE — 1101F PT FALLS ASSESS-DOCD LE1/YR: CPT | Mod: HCNC,CPTII,S$GLB, | Performed by: FAMILY MEDICINE

## 2024-03-25 PROCEDURE — 1159F MED LIST DOCD IN RCRD: CPT | Mod: HCNC,CPTII,S$GLB, | Performed by: FAMILY MEDICINE

## 2024-03-25 PROCEDURE — 1126F AMNT PAIN NOTED NONE PRSNT: CPT | Mod: HCNC,CPTII,S$GLB, | Performed by: FAMILY MEDICINE

## 2024-03-25 PROCEDURE — 99999 PR PBB SHADOW E&M-EST. PATIENT-LVL III: CPT | Mod: PBBFAC,HCNC,, | Performed by: FAMILY MEDICINE

## 2024-03-27 DIAGNOSIS — F32.0 MILD MAJOR DEPRESSION: ICD-10-CM

## 2024-03-27 DIAGNOSIS — G62.9 NEUROPATHY: ICD-10-CM

## 2024-03-27 RX ORDER — DULOXETIN HYDROCHLORIDE 30 MG/1
CAPSULE, DELAYED RELEASE ORAL
Qty: 90 CAPSULE | Refills: 3 | Status: SHIPPED | OUTPATIENT
Start: 2024-03-27

## 2024-03-28 NOTE — PROGRESS NOTES
Subjective     Patient ID: Kathy Seymour is a 73 y.o. female.    Chief Complaint: Hypertension, Hyperlipidemia, Coronary Artery Disease, and COPD    Patient presents here for 6 month follow-up of hypertension, hyperlipidemia, CAD, and COPD.  Her weight has decreased 20 lb over the last 6 months and her BMI is down to 27.96.  She has been trying to lose weight by diet and exercise and has done a great job.  She was encouraged to continue to try to decrease the weight a little further.  Her hypertension is well controlled at this time with her present medication as well as her low-sodium diet.  Her blood pressure today is 110/70.  Her hyperlipidemia is well controlled with her present use of Lipitor 80 mg daily.  Her last lipid profile in September 2023 showed a total cholesterol 109 and an LDL down to 44.  Her coronary artery disease is stable without chest pains or palpitations.  Her last visit with her cardiologist in October of 2023 was reviewed.  Her COPD is stable with her present use of Symbicort daily and Ventolin inhaler on an as-needed basis.  She also does have a history of depression but this is well controlled with her present use of Cymbalta.  She does have a history of multiple thyroid nodules and this has been managed with methimazole.  Her last visit with the endocrinologist in May of 2023 was reviewed and he recommended continuing the present treatment and repeating an ultrasound in 1 year.  She does have a history of chronic kidney disease but her last GFR was greater than 60.  She also does have history of aortic atherosclerosis as noted on CT in September 2023.  Treatment involves blood pressure control as well as lipid control.  As far as health maintenance, she is up-to-date with all of her recommended screening exams and immunizations with the exception of RSV vaccine, flu vaccine, 2nd COVID booster, and she is coming due on her colorectal cancer screening.      Review of Systems    Constitutional:  Negative for chills, fatigue, fever and unexpected weight change.   HENT:  Negative for nasal congestion, postnasal drip and sore throat.    Respiratory:  Positive for shortness of breath (With moderate to severe exertion). Negative for cough and wheezing.    Cardiovascular:  Negative for chest pain and palpitations.   Gastrointestinal:  Negative for abdominal pain, diarrhea, nausea and vomiting.   Genitourinary:  Positive for bladder incontinence (controlled with use of VESIcare). Negative for dysuria.   Musculoskeletal:  Negative for arthralgias and back pain.   Neurological:  Negative for dizziness, light-headedness and headaches.   Psychiatric/Behavioral:  Negative for sleep disturbance. The patient is not nervous/anxious.         Depression is well controlled with her present use of Cymbalta          Objective     Physical Exam  Vitals reviewed.   Constitutional:       General: She is not in acute distress.     Appearance: Normal appearance. She is well-developed.   HENT:      Right Ear: Tympanic membrane and external ear normal.      Left Ear: Tympanic membrane and external ear normal.      Mouth/Throat:      Pharynx: Oropharynx is clear. No posterior oropharyngeal erythema.   Neck:      Thyroid: No thyromegaly.      Vascular: No carotid bruit.   Cardiovascular:      Rate and Rhythm: Normal rate and regular rhythm.      Heart sounds: Normal heart sounds. No murmur heard.  Pulmonary:      Effort: Pulmonary effort is normal.      Breath sounds: Normal breath sounds. No wheezing or rales.   Musculoskeletal:         General: No tenderness. Normal range of motion.      Cervical back: Normal range of motion and neck supple.      Right lower leg: No edema.      Left lower leg: No edema.   Lymphadenopathy:      Cervical: No cervical adenopathy.   Skin:     Comments: She had a lesion on her back that she was concerned about.  It turned out to be a blackhead which was removed   Neurological:       General: No focal deficit present.      Mental Status: She is alert and oriented to person, place, and time.      Cranial Nerves: No cranial nerve deficit.      Deep Tendon Reflexes: Reflexes are normal and symmetric.            Assessment and Plan     1. Primary hypertension    2. Mixed hyperlipidemia    3. Coronary artery disease involving native coronary artery of native heart without angina pectoris  Overview:  09/13/2012  Stent to the LAD proximal LAD 2.5 x 16  09/27/2013 99% occlusion of the proximal LAD a 3.0 x 23 xience stent was deployed.  Circumflex normal right coronary diffuse intimal irregularities.      4. COPD mixed type    5. Chronic kidney disease, stage 3a    6. Mild major depression    7. Multiple thyroid nodules    8. Aortic atherosclerosis        1. Continue present medication as her hypertension, hyperlipidemia, CAD, COPD, chronic kidney disease, and depression are stable and controlled   2. Continue low-sodium, low-fat low-cholesterol diet and exercise for continued weight loss.  BMI today is 27.96  3. Continue follow-up with pulmonology, Cardiology, and Endocrinology as scheduled  4. She does have orders for labs and thyroid ultrasound for Endocrinology  5. Contact Dr. Gilbert to schedule colonoscopy.  Last colonoscopy was 05/31/2017 and was so to repeat it in 7 years  6. Follow-up with me in 6 months or p.r.n.          Follow up in about 6 months (around 9/25/2024).

## 2024-04-03 ENCOUNTER — OFFICE VISIT (OUTPATIENT)
Dept: CARDIOLOGY | Facility: CLINIC | Age: 74
End: 2024-04-03
Payer: MEDICARE

## 2024-04-03 VITALS
BODY MASS INDEX: 28.5 KG/M2 | WEIGHT: 171.06 LBS | SYSTOLIC BLOOD PRESSURE: 122 MMHG | OXYGEN SATURATION: 94 % | DIASTOLIC BLOOD PRESSURE: 60 MMHG | HEART RATE: 74 BPM | HEIGHT: 65 IN

## 2024-04-03 DIAGNOSIS — E78.2 MIXED HYPERLIPIDEMIA: ICD-10-CM

## 2024-04-03 DIAGNOSIS — E87.6 HYPOKALEMIA: Primary | ICD-10-CM

## 2024-04-03 DIAGNOSIS — I70.0 AORTIC ATHEROSCLEROSIS: ICD-10-CM

## 2024-04-03 DIAGNOSIS — I10 HYPERTENSION, UNSPECIFIED TYPE: ICD-10-CM

## 2024-04-03 DIAGNOSIS — I25.10 CORONARY ARTERY DISEASE INVOLVING NATIVE CORONARY ARTERY OF NATIVE HEART WITHOUT ANGINA PECTORIS: ICD-10-CM

## 2024-04-03 PROCEDURE — 3074F SYST BP LT 130 MM HG: CPT | Mod: HCNC,CPTII,S$GLB, | Performed by: INTERNAL MEDICINE

## 2024-04-03 PROCEDURE — 1126F AMNT PAIN NOTED NONE PRSNT: CPT | Mod: HCNC,CPTII,S$GLB, | Performed by: INTERNAL MEDICINE

## 2024-04-03 PROCEDURE — 99213 OFFICE O/P EST LOW 20 MIN: CPT | Mod: HCNC,S$GLB,, | Performed by: INTERNAL MEDICINE

## 2024-04-03 PROCEDURE — 99999 PR PBB SHADOW E&M-EST. PATIENT-LVL IV: CPT | Mod: PBBFAC,HCNC,, | Performed by: INTERNAL MEDICINE

## 2024-04-03 PROCEDURE — 3288F FALL RISK ASSESSMENT DOCD: CPT | Mod: HCNC,CPTII,S$GLB, | Performed by: INTERNAL MEDICINE

## 2024-04-03 PROCEDURE — 1160F RVW MEDS BY RX/DR IN RCRD: CPT | Mod: HCNC,CPTII,S$GLB, | Performed by: INTERNAL MEDICINE

## 2024-04-03 PROCEDURE — 3078F DIAST BP <80 MM HG: CPT | Mod: HCNC,CPTII,S$GLB, | Performed by: INTERNAL MEDICINE

## 2024-04-03 PROCEDURE — 1159F MED LIST DOCD IN RCRD: CPT | Mod: HCNC,CPTII,S$GLB, | Performed by: INTERNAL MEDICINE

## 2024-04-03 PROCEDURE — 1101F PT FALLS ASSESS-DOCD LE1/YR: CPT | Mod: HCNC,CPTII,S$GLB, | Performed by: INTERNAL MEDICINE

## 2024-04-03 NOTE — ASSESSMENT & PLAN NOTE
No symptoms of angina.  Status post percutaneous revascularization of the LAD on 2012 and 2013.  The right coronary and the circumflex were within normal limits

## 2024-04-03 NOTE — PROGRESS NOTES
Patient ID:  Kathy Seymour is a 73 y.o. female who presents for follow-up of Coronary Artery Disease and Hyperlipidemia      Coronary artery disease involving native coronary artery of native heart without angina pectoris  No symptoms of angina     Hypertension, onset before 1995  Controlled on losartan HCT, metoprolol     Hyperlipidemia, baseline   Controlled on 80 mg of atorvastatin    He has been doing well denies any chest pains any shortness of breath any dyspnea on exertion.  She has cut down on her eating and has lost few lb.  Whenever she consumes any kind of alcohol her face and her nose turns red although her blood pressure remains normal.  Review of the previous laboratory data her potassium was 3.5 she was asked to increase the foods that are high in potassium on her diet.        Past Medical History:   Diagnosis Date    Angina pectoris     Anticoagulant long-term use     Anxiety     Arthritis     Back pain     Cataract     Chronic bronchitis     Coronary artery disease     STENT X 2    COVID 6/10/2022    CTS (carpal tunnel syndrome)     RIGHT    Depression     MILLER (dyspnea on exertion) 2/21/2017    Hallux valgus, acquired, bilateral 1/19/2017    Hematuria     Hyperlipidemia     Hypertension     Hypertensive left ventricular hypertrophy, without heart failure 5/22/2017    Hypothyroidism     Obesity     Polyneuropathy     S/P coronary artery stent placement, 2 LETTY 9/2012, 1 LETTY 9/2013 3/5/2013    Sleep apnea     USES CPAP    Thyroid disease     Tobacco dependence     Trouble in sleeping     Urinary incontinence     Wears glasses     ONE CONTAC        Past Surgical History:   Procedure Laterality Date    APPENDECTOMY      BILATERAL SALPINGOOPHORECTOMY      CARDIAC CATHETERIZATION      CORONARY ANGIOPLASTY      CORONARY STENT PLACEMENT      PCI  of the LAD with LETTY    EYE SURGERY      bilat cataract removal    HYSTERECTOMY      complete    OOPHORECTOMY      TONSILLECTOMY            Current  Outpatient Medications   Medication Instructions    albuterol (VENTOLIN HFA) 90 mcg/actuation inhaler 2 puffs, Inhalation, Every 6 hours PRN, Rescue    ALPRAZolam (XANAX) 0.5 mg, Oral, 3 times daily PRN    aspirin 81 mg, Oral, Daily    atorvastatin (LIPITOR) 80 MG tablet TAKE 1 TABLET EVERY DAY    budesonide-formoterol 160-4.5 mcg (SYMBICORT) 160-4.5 mcg/actuation HFAA 2 puffs, Inhalation, Every 12 hours    cetirizine (ZYRTEC) 10 mg, Oral, Daily    DULoxetine (CYMBALTA) 30 MG capsule TAKE 1 CAPSULE EVERY DAY IN ADDITION TO 60MG TO TOTAL 90MG EVERY DAY    DULoxetine (CYMBALTA) 60 MG capsule TAKE 1 CAPSULE EVERY DAY    estradioL (ESTRACE) 1 g, Vaginal, Daily, Apply1 gram up to second knuckle. Apply nightly for 2 weeks then every other night.    fluticasone propionate (FLONASE) 50 mcg, Each Nostril, Daily    losartan-hydrochlorothiazide 100-25 mg (HYZAAR) 100-25 mg per tablet TAKE 1 TABLET EVERY DAY    meloxicam (MOBIC) 7.5 mg, Oral, Daily    methIMAzole (TAPAZOLE) 5 mg, Oral, Daily    metoprolol succinate (TOPROL-XL) 25 mg, Oral, Daily    pantoprazole (PROTONIX) 40 mg, Oral, Daily    pilocarpine (SALAGEN) 5 mg, Oral, 3 times daily, Use as directed.    potassium chloride (MICRO-K) 10 MEQ CpSR 20 mEq, Oral, Daily    solifenacin (VESICARE) 10 mg, Oral, Daily    tiZANidine (ZANAFLEX) 4 MG tablet TAKE 1 TABLET (4 MG TOTAL) BY MOUTH EVERY 6 (SIX) HOURS AS NEEDED (SPASM).        Review of patient's allergies indicates:   Allergen Reactions    Macrobid [nitrofurantoin monohyd/m-cryst] Other (See Comments)     Fever and body aches.         Review of Systems   Cardiovascular:  Positive for dyspnea on exertion. Negative for chest pain, leg swelling and palpitations.   Respiratory:  Negative for cough and shortness of breath.         Objective:     Vitals:    04/03/24 1314   BP: 122/60   BP Location: Left arm   Patient Position: Sitting   BP Method: Medium (Manual)   Pulse: 74   SpO2: (!) 94%   Weight: 77.6 kg (171 lb 1.2 oz)  "  Height: 5' 5" (1.651 m)       Physical Exam  Vitals and nursing note reviewed.   Constitutional:       Appearance: She is well-developed.   HENT:      Head: Normocephalic and atraumatic.   Eyes:      Conjunctiva/sclera: Conjunctivae normal.   Cardiovascular:      Rate and Rhythm: Normal rate and regular rhythm.      Heart sounds: Normal heart sounds.   Pulmonary:      Effort: Pulmonary effort is normal.      Breath sounds: Normal breath sounds.   Abdominal:      General: Bowel sounds are normal.      Palpations: Abdomen is soft.   Musculoskeletal:         General: Normal range of motion.   Skin:     General: Skin is warm and dry.   Neurological:      Mental Status: She is alert and oriented to person, place, and time.   Psychiatric:         Behavior: Behavior normal.         Thought Content: Thought content normal.         Judgment: Judgment normal.       CMP  Sodium   Date Value Ref Range Status   09/27/2023 144 136 - 145 mmol/L Final     Potassium   Date Value Ref Range Status   09/27/2023 3.5 3.5 - 5.1 mmol/L Final     Chloride   Date Value Ref Range Status   09/27/2023 105 95 - 110 mmol/L Final     CO2   Date Value Ref Range Status   09/27/2023 30 (H) 23 - 29 mmol/L Final     Glucose   Date Value Ref Range Status   09/27/2023 97 70 - 110 mg/dL Final     BUN   Date Value Ref Range Status   09/27/2023 14 8 - 23 mg/dL Final     Creatinine   Date Value Ref Range Status   09/27/2023 0.9 0.5 - 1.4 mg/dL Final   09/14/2012 0.7 0.2 - 1.4 mg/dl Final     Calcium   Date Value Ref Range Status   09/27/2023 9.1 8.7 - 10.5 mg/dL Final   09/14/2012 8.8 8.6 - 10.2 mg/dl Final     Total Protein   Date Value Ref Range Status   09/27/2023 7.0 6.0 - 8.4 g/dL Final     Albumin   Date Value Ref Range Status   09/27/2023 3.9 3.5 - 5.2 g/dL Final     Total Bilirubin   Date Value Ref Range Status   09/27/2023 0.5 0.1 - 1.0 mg/dL Final     Comment:     For infants and newborns, interpretation of results should be based  on gestational " age, weight and in agreement with clinical  observations.    Premature Infant recommended reference ranges:  Up to 24 hours.............<8.0 mg/dL  Up to 48 hours............<12.0 mg/dL  3-5 days..................<15.0 mg/dL  6-29 days.................<15.0 mg/dL       Alkaline Phosphatase   Date Value Ref Range Status   09/27/2023 91 55 - 135 U/L Final   09/14/2012 68 23 - 119 UNIT/L Final     AST   Date Value Ref Range Status   09/27/2023 19 10 - 40 U/L Final   09/14/2012 16 10 - 30 UNIT/L Final     ALT   Date Value Ref Range Status   09/27/2023 15 10 - 44 U/L Final     Anion Gap   Date Value Ref Range Status   09/27/2023 9 8 - 16 mmol/L Final   09/14/2012 7 5 - 15 meq/L Final     eGFR if    Date Value Ref Range Status   08/25/2021 >60.0 >60 mL/min/1.73 m^2 Final     eGFR if non    Date Value Ref Range Status   08/25/2021 56.4 (A) >60 mL/min/1.73 m^2 Final     Comment:     Calculation used to obtain the estimated glomerular filtration  rate (eGFR) is the CKD-EPI equation.         BMP  Lab Results   Component Value Date     09/27/2023    K 3.5 09/27/2023     09/27/2023    CO2 30 (H) 09/27/2023    BUN 14 09/27/2023    CREATININE 0.9 09/27/2023    CALCIUM 9.1 09/27/2023    ANIONGAP 9 09/27/2023    ESTGFRAFRICA >60.0 08/25/2021    EGFRNONAA 56.4 (A) 08/25/2021      BNP  @LABRCNTIP(BNP,BNPTRIAGEBLO)@   Lab Results   Component Value Date    CHOL 109 (L) 09/27/2023    CHOL 115 (L) 03/23/2023    CHOL 132 09/21/2022     Lab Results   Component Value Date    HDL 54 09/27/2023    HDL 39 (L) 03/23/2023    HDL 52 09/21/2022     Lab Results   Component Value Date    LDLCALC 44.2 (L) 09/27/2023    LDLCALC 58.2 (L) 03/23/2023    LDLCALC 63.0 09/21/2022     Lab Results   Component Value Date    TRIG 54 09/27/2023    TRIG 89 03/23/2023    TRIG 85 09/21/2022     Lab Results   Component Value Date    CHOLHDL 49.5 09/27/2023    CHOLHDL 33.9 03/23/2023    CHOLHDL 39.4 09/21/2022      Lab  Results   Component Value Date    TSH 2.795 09/27/2023    H4OKVAL 135 04/24/2018    P5KZZIY 7.3 08/02/2012    FREET4 0.97 03/23/2023     Lab Results   Component Value Date    HGBA1C 5.8 (H) 09/27/2023     Lab Results   Component Value Date    WBC 7.98 03/23/2023    HGB 11.6 (L) 03/23/2023    HCT 37.4 03/23/2023    MCV 93 03/23/2023     03/23/2023         Results for orders placed during the hospital encounter of 01/11/21    Echo Color Flow Doppler? Yes    Interpretation Summary  · The left ventricle is normal in size with concentric remodeling and normal systolic function. The estimated ejection fraction is 65%  · Grade I left ventricular diastolic dysfunction.  · Normal right ventricular size with normal right ventricular systolic function.  · Mild tricuspid regurgitation.  · Normal central venous pressure (3 mmHg).  · The estimated PA systolic pressure is 24 mmHg.  · Overall the study quality was technically difficult     No results found for this or any previous visit.         Assessment:       Coronary artery disease involving native coronary artery of native heart without angina pectoris  No symptoms of angina.  Status post percutaneous revascularization of the LAD on 2012 and 2013.  The right coronary and the circumflex were within normal limits    Aortic atherosclerosis  On cholesterol-lowering agent    Hyperlipidemia, baseline   On 80 mg of atorvastatin last LDL cholesterol 44    Hypertension, onset before 1995  Controlled on losartan /25, metoprolol       Plan:       Continue the atorvastatin for cholesterol treatment.  Continue losartan HCT and metoprolol for her hypertension.  Follow a high potassium diet.  She will be seen in 6 months with a lipid CMP TSH and a CBC.

## 2024-05-20 ENCOUNTER — TELEPHONE (OUTPATIENT)
Dept: PULMONOLOGY | Facility: CLINIC | Age: 74
End: 2024-05-20
Payer: MEDICARE

## 2024-05-20 ENCOUNTER — LAB VISIT (OUTPATIENT)
Dept: LAB | Facility: HOSPITAL | Age: 74
End: 2024-05-20
Attending: INTERNAL MEDICINE
Payer: MEDICARE

## 2024-05-20 ENCOUNTER — HOSPITAL ENCOUNTER (OUTPATIENT)
Dept: RADIOLOGY | Facility: HOSPITAL | Age: 74
Discharge: HOME | End: 2024-05-20
Attending: INTERNAL MEDICINE
Payer: MEDICARE

## 2024-05-20 DIAGNOSIS — E04.2 MULTIPLE THYROID NODULES: ICD-10-CM

## 2024-05-20 DIAGNOSIS — M85.88 OSTEOPENIA OF LUMBAR SPINE: ICD-10-CM

## 2024-05-20 DIAGNOSIS — M85.9 DISORDER OF BONE DENSITY AND STRUCTURE, UNSPECIFIED: ICD-10-CM

## 2024-05-20 DIAGNOSIS — E05.90 SUBCLINICAL HYPERTHYROIDISM: ICD-10-CM

## 2024-05-20 LAB
25(OH)D3+25(OH)D2 SERPL-MCNC: 45 NG/ML (ref 30–96)
ALBUMIN SERPL BCP-MCNC: 4 G/DL (ref 3.5–5.2)
ALP SERPL-CCNC: 95 U/L (ref 55–135)
ALT SERPL W/O P-5'-P-CCNC: 16 U/L (ref 10–44)
ANION GAP SERPL CALC-SCNC: 8 MMOL/L (ref 8–16)
AST SERPL-CCNC: 19 U/L (ref 10–40)
BILIRUB SERPL-MCNC: 0.5 MG/DL (ref 0.1–1)
BUN SERPL-MCNC: 24 MG/DL (ref 8–23)
CALCIUM SERPL-MCNC: 9.5 MG/DL (ref 8.7–10.5)
CHLORIDE SERPL-SCNC: 103 MMOL/L (ref 95–110)
CO2 SERPL-SCNC: 29 MMOL/L (ref 23–29)
CREAT SERPL-MCNC: 1.1 MG/DL (ref 0.5–1.4)
EST. GFR  (NO RACE VARIABLE): 52.4 ML/MIN/1.73 M^2
GLUCOSE SERPL-MCNC: 125 MG/DL (ref 70–110)
POTASSIUM SERPL-SCNC: 4.4 MMOL/L (ref 3.5–5.1)
PROT SERPL-MCNC: 7.4 G/DL (ref 6–8.4)
SODIUM SERPL-SCNC: 140 MMOL/L (ref 136–145)
T4 FREE SERPL-MCNC: 0.89 NG/DL (ref 0.71–1.51)
TSH SERPL DL<=0.005 MIU/L-ACNC: 2.77 UIU/ML (ref 0.4–4)

## 2024-05-20 PROCEDURE — 82306 VITAMIN D 25 HYDROXY: CPT | Performed by: INTERNAL MEDICINE

## 2024-05-20 PROCEDURE — 84439 ASSAY OF FREE THYROXINE: CPT | Performed by: INTERNAL MEDICINE

## 2024-05-20 PROCEDURE — 80053 COMPREHEN METABOLIC PANEL: CPT | Performed by: INTERNAL MEDICINE

## 2024-05-20 PROCEDURE — 36415 COLL VENOUS BLD VENIPUNCTURE: CPT | Mod: PO | Performed by: INTERNAL MEDICINE

## 2024-05-20 PROCEDURE — 84443 ASSAY THYROID STIM HORMONE: CPT | Performed by: INTERNAL MEDICINE

## 2024-05-20 PROCEDURE — 76536 US EXAM OF HEAD AND NECK: CPT | Mod: 26,,, | Performed by: RADIOLOGY

## 2024-05-20 PROCEDURE — 76536 US EXAM OF HEAD AND NECK: CPT | Mod: TC,PO

## 2024-05-23 ENCOUNTER — OFFICE VISIT (OUTPATIENT)
Dept: ENDOCRINOLOGY | Facility: CLINIC | Age: 74
End: 2024-05-23
Payer: MEDICARE

## 2024-05-23 VITALS
WEIGHT: 167.13 LBS | HEART RATE: 69 BPM | HEIGHT: 65 IN | DIASTOLIC BLOOD PRESSURE: 70 MMHG | SYSTOLIC BLOOD PRESSURE: 118 MMHG | TEMPERATURE: 98 F | OXYGEN SATURATION: 99 % | BODY MASS INDEX: 27.85 KG/M2

## 2024-05-23 DIAGNOSIS — E55.9 HYPOVITAMINOSIS D: Primary | ICD-10-CM

## 2024-05-23 DIAGNOSIS — E04.1 NODULAR THYROID DISEASE: ICD-10-CM

## 2024-05-23 DIAGNOSIS — U07.1 COVID-19: ICD-10-CM

## 2024-05-23 DIAGNOSIS — E05.90 HYPERTHYROIDISM: ICD-10-CM

## 2024-05-23 DIAGNOSIS — Z78.0 POSTMENOPAUSAL: ICD-10-CM

## 2024-05-23 PROCEDURE — 1101F PT FALLS ASSESS-DOCD LE1/YR: CPT | Mod: CPTII,S$GLB,, | Performed by: PHYSICIAN ASSISTANT

## 2024-05-23 PROCEDURE — 3074F SYST BP LT 130 MM HG: CPT | Mod: CPTII,S$GLB,, | Performed by: PHYSICIAN ASSISTANT

## 2024-05-23 PROCEDURE — 99213 OFFICE O/P EST LOW 20 MIN: CPT | Mod: S$GLB,,, | Performed by: PHYSICIAN ASSISTANT

## 2024-05-23 PROCEDURE — 1126F AMNT PAIN NOTED NONE PRSNT: CPT | Mod: CPTII,S$GLB,, | Performed by: PHYSICIAN ASSISTANT

## 2024-05-23 PROCEDURE — 1159F MED LIST DOCD IN RCRD: CPT | Mod: CPTII,S$GLB,, | Performed by: PHYSICIAN ASSISTANT

## 2024-05-23 PROCEDURE — 99999 PR PBB SHADOW E&M-EST. PATIENT-LVL V: CPT | Mod: PBBFAC,,, | Performed by: PHYSICIAN ASSISTANT

## 2024-05-23 PROCEDURE — 3288F FALL RISK ASSESSMENT DOCD: CPT | Mod: CPTII,S$GLB,, | Performed by: PHYSICIAN ASSISTANT

## 2024-05-23 PROCEDURE — 3078F DIAST BP <80 MM HG: CPT | Mod: CPTII,S$GLB,, | Performed by: PHYSICIAN ASSISTANT

## 2024-05-23 PROCEDURE — 1160F RVW MEDS BY RX/DR IN RCRD: CPT | Mod: CPTII,S$GLB,, | Performed by: PHYSICIAN ASSISTANT

## 2024-05-23 RX ORDER — ALBUTEROL SULFATE 90 UG/1
2 AEROSOL, METERED RESPIRATORY (INHALATION) EVERY 6 HOURS PRN
Qty: 18 G | Refills: 0 | Status: SHIPPED | OUTPATIENT
Start: 2024-05-23

## 2024-05-23 RX ORDER — METHIMAZOLE 5 MG/1
5 TABLET ORAL DAILY
Qty: 90 TABLET | Refills: 3 | Status: SHIPPED | OUTPATIENT
Start: 2024-05-23

## 2024-05-23 NOTE — PROGRESS NOTES
Chief Complaint: Hyperthyroidism    HPI: Kathy Seymour is a 73 y.o. female who is here for a follow-up evaluation for thyroid. Last seen 5/23    Hx subclinical hyperthyroidism. TSH as low as 0.07   TSI, TRAB, Tg antibody negative. Thought to be 2/2 toxic nodule.    NM scan: 2013, normal overall uptake but heterogenous gland with multiple nodules, no clear cold nodules  NM 2012: slightly increased in area of a nodule       Medication: Methimazole 5 mg/day.    Lab Results   Component Value Date    TSH 2.768 05/20/2024    A7TEIIJ 135 04/24/2018    C1RMROF 7.3 08/02/2012    FREET4 0.89 05/20/2024     + anxiety, fatigue, hair loss, wt loss,   No sweating, tremors, palpitations.     Occ sob (copd). + raspiness. No dysphagia.     Also has hx multinodular goiter.   Last US 1/2022. [facility]   Largest isthmus 2.3 cm, stable from prior overall.    Prior US 7/2020  Right side 1x0.9x0.8 cm  Left side: 1.2x0.9x1.0 cm, stable  Isthmus 2.5x1.4x2.6 cm, stable from prior    Prior US 4/2018:   2.5x1.4x2.6    Prior FNA: Yes.   2016: 2 nodules on the Left and isthmus     - left nodules: both benign     - isthmus, nondiagnostic    Had recommended repeat FNA for isthmus nodule in 7/2020, pt declined    Bones:  DXA 3/23/2023. Osteopenia at spine  The L1 to L4 vertebral bone mineral density is equal to 0.919 g/cm squared with a T score of -1.2.  There has been 4.7% decrease relative to the prior study.  The left femoral neck bone mineral density is equal to 0.75 g/cm squared with a T score of -0.9.  There has been  4.7% decrease relative to the prior study.    Active with 5 g. Grandchildren.  No falls, fx or steriod injections.     Last labs:  Lab Results   Component Value Date    CALCIUM 9.5 05/20/2024    ALBUMIN 4.0 05/20/2024    CREATININE 1.1 05/20/2024    YKGJRZDL26BG 45 05/20/2024    PTH 45.0 10/20/2017     Today, pt reports feeling okay overall.    Lost some weight  Not having heat/cold intolerance   No trouble swallowing lately  (had EGD with dilation in the past). No palpitations  No diarrhea/constipation    Reviewed past medical, family, social history and updated as appropriate.    Review of Systems   As above    Objective:     Vitals:    05/23/24 0805   BP: 118/70   Pulse: 69   Temp: 98.3 °F (36.8 °C)     BP Readings from Last 5 Encounters:   05/23/24 118/70   04/03/24 122/60   03/25/24 110/70   01/24/24 126/81   10/12/23 120/60     Physical Exam  Vitals reviewed.   Constitutional:       General: She is not in acute distress.  Neck:      Comments: +nodule, nontender  Cardiovascular:      Heart sounds: Normal heart sounds.   Pulmonary:      Effort: Pulmonary effort is normal.       Wt Readings from Last 10 Encounters:   05/23/24 0805 75.8 kg (167 lb 1.7 oz)   04/03/24 1314 77.6 kg (171 lb 1.2 oz)   03/25/24 0929 76.2 kg (167 lb 15.9 oz)   03/07/24 0959 75.8 kg (167 lb)   01/24/24 1422 76.1 kg (167 lb 10.6 oz)   10/12/23 0929 76.9 kg (169 lb 8.5 oz)   09/07/23 1012 78.9 kg (173 lb 15.1 oz)   06/10/23 1013 78.9 kg (174 lb)   06/06/23 0924 79.2 kg (174 lb 11.4 oz)   05/25/23 1032 79.9 kg (176 lb 2.4 oz)     Lab Results   Component Value Date    HGBA1C 5.8 (H) 09/27/2023     Lab Results   Component Value Date    CHOL 109 (L) 09/27/2023    HDL 54 09/27/2023    LDLCALC 44.2 (L) 09/27/2023    TRIG 54 09/27/2023    CHOLHDL 49.5 09/27/2023     Lab Results   Component Value Date     05/20/2024    K 4.4 05/20/2024     05/20/2024    CO2 29 05/20/2024     (H) 05/20/2024    BUN 24 (H) 05/20/2024    CREATININE 1.1 05/20/2024    CALCIUM 9.5 05/20/2024    PROT 7.4 05/20/2024    ALBUMIN 4.0 05/20/2024    BILITOT 0.5 05/20/2024    ALKPHOS 95 05/20/2024    AST 19 05/20/2024    ALT 16 05/20/2024    ANIONGAP 8 05/20/2024    ESTGFRAFRICA >60.0 08/25/2021    EGFRNONAA 56.4 (A) 08/25/2021    TSH 2.768 05/20/2024      Assessment/Plan:     Subclinical hyperthyroidism  Last thyroid labs normal   - clinically, euthyroid   - on methimazole  5mg/day   - continue same regimen   recheck labs 1-2 times a year. Sooner if symptoms change    Multiple thyroid nodules  Multiple nodules. S/p 3x FNA.   - most benign   - however, the largest nodule in the isthmus was nondiagnostic on last FNA   had recommended FNA last 2 times, pt was not interested.   again discussed today can't be sure it's benign without FNA/surgery, recommend repeat FNA, pt not interested. Pt is open to US monitoring, repeat US order placed, can check later this year vs next year  If larger, again would recommend FNA. Ideally with some sample saved for molecular testing in case nondiagnostic again.    Osteopenia of lumbar spine  Seen on DXA 2023.   discussed aiming for 1200 mg/day calcium intake, 1,000 units/day vitamin D   encourage weight bearing exercises as tolerated   avoid falls   recheck DXA after 2 years    FOLLOWUP  F/u in 1 YEAR w/ labs prior -tfts, cmp, vd and thyroid u/s/dexa

## 2024-06-07 ENCOUNTER — OFFICE VISIT (OUTPATIENT)
Dept: UROLOGY | Facility: CLINIC | Age: 74
End: 2024-06-07
Payer: MEDICARE

## 2024-06-07 VITALS
WEIGHT: 167.13 LBS | BODY MASS INDEX: 27.85 KG/M2 | HEIGHT: 65 IN | DIASTOLIC BLOOD PRESSURE: 54 MMHG | SYSTOLIC BLOOD PRESSURE: 130 MMHG | HEART RATE: 73 BPM

## 2024-06-07 DIAGNOSIS — N32.81 OAB (OVERACTIVE BLADDER): Primary | ICD-10-CM

## 2024-06-07 LAB — POC RESIDUAL URINE VOLUME: 45 ML (ref 0–100)

## 2024-06-07 PROCEDURE — 51798 US URINE CAPACITY MEASURE: CPT | Mod: HCNC,S$GLB,, | Performed by: NURSE PRACTITIONER

## 2024-06-07 PROCEDURE — 99999 PR PBB SHADOW E&M-EST. PATIENT-LVL IV: CPT | Mod: PBBFAC,HCNC,, | Performed by: NURSE PRACTITIONER

## 2024-06-07 PROCEDURE — 1160F RVW MEDS BY RX/DR IN RCRD: CPT | Mod: HCNC,CPTII,S$GLB, | Performed by: NURSE PRACTITIONER

## 2024-06-07 PROCEDURE — G2211 COMPLEX E/M VISIT ADD ON: HCPCS | Mod: HCNC,S$GLB,, | Performed by: NURSE PRACTITIONER

## 2024-06-07 PROCEDURE — 1159F MED LIST DOCD IN RCRD: CPT | Mod: HCNC,CPTII,S$GLB, | Performed by: NURSE PRACTITIONER

## 2024-06-07 PROCEDURE — 1126F AMNT PAIN NOTED NONE PRSNT: CPT | Mod: HCNC,CPTII,S$GLB, | Performed by: NURSE PRACTITIONER

## 2024-06-07 PROCEDURE — 99214 OFFICE O/P EST MOD 30 MIN: CPT | Mod: HCNC,S$GLB,, | Performed by: NURSE PRACTITIONER

## 2024-06-07 PROCEDURE — 3078F DIAST BP <80 MM HG: CPT | Mod: HCNC,CPTII,S$GLB, | Performed by: NURSE PRACTITIONER

## 2024-06-07 PROCEDURE — 3288F FALL RISK ASSESSMENT DOCD: CPT | Mod: HCNC,CPTII,S$GLB, | Performed by: NURSE PRACTITIONER

## 2024-06-07 PROCEDURE — 3075F SYST BP GE 130 - 139MM HG: CPT | Mod: HCNC,CPTII,S$GLB, | Performed by: NURSE PRACTITIONER

## 2024-06-07 PROCEDURE — 1101F PT FALLS ASSESS-DOCD LE1/YR: CPT | Mod: HCNC,CPTII,S$GLB, | Performed by: NURSE PRACTITIONER

## 2024-06-07 NOTE — PROGRESS NOTES
CHIEF COMPLAINT:    Mrs Seymour is a 73 y.o. female presenting for f/u OAB.    PRESENTING ILLNESS:    Kathy Seymour is a 73 y.o. female who presents for f/u OAB. Last clinic visit was 3/7/24.    Previously saw me for OAB in 2019 with h/o mixed incontinence UUI>JUD. H/o bladder lift (no mesh) with hysterectomy in . Was wearing 2 to 3 pads a day. . Was also having uti's about 1x a year. Symptomatic (dysuria, frequency). And also found to have mod blood in urine on dipstick. Family hx of stones. 50 pk yr hx of smoking but quit in . W/u included ctu 19 (nothing to explain MH). Cytology 19 was neg and cx was +. Cystoscopy on 19 revealed evidence of recent uti. Vaginal exam showed a rectocele. Referred to  who saw her on 7/10/19. Pt decided on conservative treatment. She had been written for myrbetriq but not covered. Then oxybutynin which was helping some.  So he increased it to oxybutynin 15mgXL. She f/u with ciarra on 9/10/19 and stated she had 60% improvement on 1/2 tab of oxybutynin 15mg.      She hasn't been seen by urology or gyn since. [facility] she is been essentially asymp having matic bacteriuria over the past year, initially infrequently, once every few months but now feels more frequently and now once every few weeks.  She has gone to urgent care a few times for foul-smelling urine.  No dysuria, no frequency no urgency. Her voided urine today with mod leuk and small blood. Cath urine looks cloudy. Sending. Residual only 40cc.      Uti symptoms: none. Foul smelling urine   UTI prevention tried in the past:   Cranberry No.   Drinking more fluid  No.  2 bottles of water and 2 cups of coffee  Timed voiding Yes - 2 to 3 hours and more than this someitme.   Prevention No.   UTI risk factors:    Previous bladder or vaginal surgery: Yes - hysterectomy and lift but no mesh, cystoscopy as above for .   Personal history of kidney stones: No. But strong family  hx.    Urinary Incontinence episodes: Yes - only if she waits a few hours to go to restroom.  OAB meds: She has been on oxybutynin in the past already and now on solfenacin 10mg once daily. Essentially works for her but dries her mouth. With cpap machine worse  Pads: Yes - pantyliners if she's goes out or to dinner.   Diabetes: No. A1c 5.9 3/2023  Daytime frequency: q 1-2 hours .  Drinks 2 c of coffee every day and coke sometimes  Bowel Incontinence episodes: No.   Sexually active: No.   Water intake: medium.   History of breast cancer or any other gyn cancer: No. Never tried estrace  Hysterectomy: Yes - metromenhorragia   Imaging:ctu 6/6/19 independently reviewed. Normal. No nydro. No stones. Ct chest 9/2022- half of L kidney seen. No hydro. No stones.   In and out catheter performed today: Yes    Catheterized PVR was 40mL.      Interval history 9/7/23  Pt presents today for f/u OAB, recurrent UTI  Did not start Gemtesa d/t cost  Still taking vesicare for OAB. Voiding every 1-2 hours. Wears panty liners if goes out for occasional UUI. + dry mouth from medication.  PVR 52 ml    No UTI's since last visit. Denies any UTI symptoms today in clinic.   Taking Utiva Cranberry and d mannose daily  Using estrace cream 2x per week  Good water intake    6/14/23 DU  No obstructive uropathy    Interval history 3/7/24  Myrbetriq and Gemtesa too expensive  Taking vesicare 5 mg for OAB  Wears liner, changes 2-3 per day. Occasional leakage.  Does have occasional urinary frequency and urgency  Denies dysuria, gross hematuria, flank pain, fever, chills, nausea or vomiting.  Pt c/o odor to urine and cloudy urine. Unsure if has UTI. Last UTI 6/6/23  Taking cranberry, d mannose, probiotic and using estrace 2-3 x per week as UTI prevention  UA today sm blood, sm leuk  PVR 46 ml    Occasional constipation, controlled with diet    Interval history 6/7/24  Taking vesicare 10 mg as ordered  No longer having urinary incontinence  Wear panty liner  as precaution only  + urinary frequency, not bothersome. Drinks plenty of water.   Denies dysuria, gross hematuria, flank pain, fever, chills, nausea or vomiting    Only 1 episode of constipation since last visit, will take stool softener as needed    Pt started uqora, 3 part supplement  (D mannose, probiotic, flush)  Helping prevent UTIs  No UTIs since last visit.  No UTI symptoms today     Previous urine cultures pasted here: Anticoagulation:  Yes - asa 81mg daily. +50 pack yr hx of smoking. +fam hx of stones.   Component       Urine Culture, Routine   Latest Ref Rng & Units           5/22/2023       Final report   4/14/2023       ESCHERICHIA COLI (A) . . .   3/14/2023      2:41 PM KLEBSIELLA OXYTOCA (A) . . .   3/14/2023      2:41 PM ESCHERICHIA COLI (A) . . .   7/25/2022       Final report   7/1/2019       ESCHERICHIA COLI (A) . . .   5/30/2019       ESCHERICHIA COLI . . .      5/30/19            E.coli, void: mod blood, asx but treated, cytology: neg  12/10/18          E.coli, void: n/a  10/20/17          No cx, void: nit+/2+bld/1+leuk  4/6/17              E.coli, void: tr bld  9/9/16              No cx, void:1+bld/nit+  9/4/13              No cx, void: 250 bld/wbc+  11/11/12          No cx, void: lrg bld/nit+/1+leuk  8/5/08              No cx, void: sml blood/nit+         REVIEW OF SYSTEMS:    Review of Systems    Constitutional: Negative for fever and chills.   Gastrointestinal: Negative for nausea, vomiting  Genitourinary:  See above  Neurological: Negative for dizziness.   Psychiatric/Behavioral: Negative for confusion.     PATIENT HISTORY:    Past Medical History:   Diagnosis Date    Angina pectoris     Anticoagulant long-term use     Anxiety     Arthritis     Back pain     Cataract     Chronic bronchitis     Coronary artery disease     STENT X 2    COVID 6/10/2022    CTS (carpal tunnel syndrome)     RIGHT    Depression     MILLER (dyspnea on exertion) 2/21/2017    Hallux valgus, acquired, bilateral 1/19/2017     Hematuria     Hyperlipidemia     Hypertension     Hypertensive left ventricular hypertrophy, without heart failure 5/22/2017    Hypothyroidism     Obesity     Polyneuropathy     S/P coronary artery stent placement, 2 LETTY 9/2012, 1 LETTY 9/2013 3/5/2013    Sleep apnea     USES CPAP    Thyroid disease     Tobacco dependence     Trouble in sleeping     Urinary incontinence     Wears glasses     ONE CONTAC       Past Surgical History:   Procedure Laterality Date    APPENDECTOMY      BILATERAL SALPINGOOPHORECTOMY      CARDIAC CATHETERIZATION      CORONARY ANGIOPLASTY      CORONARY STENT PLACEMENT      PCI  of the LAD with LETTY    EYE SURGERY      bilat cataract removal    HYSTERECTOMY      complete    OOPHORECTOMY      TONSILLECTOMY     PHYSICAL EXAMINATION:    Constitutional: She is oriented to person, place, and time. She appears well-developed and well-nourished.  She is in no apparent distress.    Abdominal:  She exhibits no distension.  There is no CVA tenderness.     Neurological: She is alert and oriented to person, place, and time.     Psych: Cooperative with normal affect.    Physical Exam    LABS:      Lab Results   Component Value Date    CREATININE 1.1 05/20/2024       IMPRESSION:    Encounter Diagnoses   Name Primary?    OAB (overactive bladder) Yes         PLAN:  -Continue vesicare 10 mg as ordered for OAB, no refill needed  -Continue conservative measures to control urgency and frequency including   1. Avoiding/minimizing bladder irritants (see below), especially in afternoon and evening hours  Discussed bladder irritants include coffee (even decaf), tea, alcohol, soda, spicy foods, acidic juices (orange, tomato), vinegar, and artificial sweeteners/sugary beverages.  2. timed voiding - empty on a schedule (approx ~2-3 hours) in spite of need to urinate, to get ahead of urge  3. dont postponing voiding - dont hold it on purpose  4. bowel regimen as distended bowel has extrinsic compressive effect on  bladder.   - any or all of the following in any combination, titrate to soft daily bowel movement without pushing or straining  - colace/stool softener capsule - once to twice daily  - miralax - 1 capful daily to start, can increase to 2x daily (or decrease to 1/2 cap daily)  - increase dietary fibery  - fiber supplements, such as metamucil  - prunes, prune juice  5. INCREASE water intake  6. Stop fluids 2 hours before bed, and urinate just before bed    -Continue UTI prevention  Avoid constipation.  Drink lots of water  Void every 2-3 hrs regardless of urge  Void soon after urge arises. Do not hold urine once urge occurs.  Continue uqora since working well     -RTC 6 months    I encouraged her or any of her family members to call or email me with questions and/or concerns.    Visit today is associated with current or anticipated ongoing medical care related to this patient's single serious condition/complex condition, OAB, recurrent UTI      30 minutes of total time spent on the encounter, which includes face to face time and non-face to face time preparing to see the patient (eg, review of tests), Obtaining and/or reviewing separately obtained history, Documenting clinical information in the electronic or other health record, Independently interpreting results (not separately reported) and communicating results to the patient/family/caregiver, or Care coordination (not separately reported).

## 2024-06-07 NOTE — PATIENT INSTRUCTIONS
Conservative measures to control urgency and frequency including   1. Avoiding/minimizing bladder irritants (see below), especially in afternoon and evening hours    Discussed bladder irritants include coffee (even decaf), tea, alcohol, soda, spicy foods, acidic juices (orange, tomato), vinegar, and artificial sweeteners/sugary beverages.    2. timed voiding - empty on a schedule (approx ~2-3 hours) in spite of need to urinate, to get ahead of urge    3. dont postponing voiding - dont hold it on purpose     4. bowel regimen as distended bowel has extrinsic compressive effect on bladder.   - any or all of the following in any combination, titrate to soft daily bowel movement without pushing or straining  - colace/stool softener capsule - once to twice daily  - miralax - 1 capful daily to start, can increase to 2x daily (or decrease to 1/2 cap daily)  - increase dietary fibery  - fiber supplements, such as metamucil  - prunes, prune juice    5. INCREASE water intake    6. Stop fluids 2 hours before bed, and urinate just before bed      Continue UTI precautions:  Avoid constipation.  Drink lots of water  Void every 2-3 hrs regardless of urge  Void soon after urge arises. Do not hold urine once urge occurs.    Continue uqora since working well

## 2024-06-21 ENCOUNTER — OFFICE VISIT (OUTPATIENT)
Dept: FAMILY MEDICINE | Facility: CLINIC | Age: 74
End: 2024-06-21
Payer: MEDICARE

## 2024-06-21 ENCOUNTER — TELEPHONE (OUTPATIENT)
Dept: FAMILY MEDICINE | Facility: CLINIC | Age: 74
End: 2024-06-21
Payer: MEDICARE

## 2024-06-21 VITALS
DIASTOLIC BLOOD PRESSURE: 70 MMHG | BODY MASS INDEX: 27.58 KG/M2 | HEIGHT: 65 IN | HEART RATE: 87 BPM | TEMPERATURE: 99 F | OXYGEN SATURATION: 98 % | WEIGHT: 165.56 LBS | SYSTOLIC BLOOD PRESSURE: 122 MMHG

## 2024-06-21 DIAGNOSIS — A08.4 VIRAL GASTROENTERITIS: Primary | ICD-10-CM

## 2024-06-21 DIAGNOSIS — R11.2 NAUSEA AND VOMITING, UNSPECIFIED VOMITING TYPE: ICD-10-CM

## 2024-06-21 DIAGNOSIS — R53.83 FATIGUE, UNSPECIFIED TYPE: ICD-10-CM

## 2024-06-21 PROCEDURE — 99999 PR PBB SHADOW E&M-EST. PATIENT-LVL V: CPT | Mod: PBBFAC,HCNC,,

## 2024-06-21 RX ORDER — ONDANSETRON 4 MG/1
4 TABLET, ORALLY DISINTEGRATING ORAL EVERY 8 HOURS PRN
Qty: 10 TABLET | Refills: 0 | Status: SHIPPED | OUTPATIENT
Start: 2024-06-21

## 2024-06-21 NOTE — PROGRESS NOTES
Subjective:       Patient ID: Kathy Seymour is a 73 y.o. female.    Chief Complaint: Nausea    Patient presents to the clinic with complaint of nausea, vomiting, headache, and fever.     States Wednesday she started with nausea, vomiting, and low grade fever. She vomited x 2 Wednesday and once yesterday. Has not vomited today. She was able to eat pancakes this am. States she still feels slightly nauseated. States she is starting to feel better just fatigued today. She did take Zofran and Pepto Bismal with mild relief.     Patient educated on plan of care, verbalized understanding.      Nausea  This is a new problem. The current episode started in the past 7 days. The problem occurs intermittently. The problem has been gradually improving. Associated symptoms include fatigue, nausea and vomiting. Pertinent negatives include no abdominal pain, chest pain, chills, congestion, coughing, diaphoresis, fever, rash or sore throat.     Review of Systems   Constitutional:  Positive for fatigue. Negative for activity change, appetite change, chills, diaphoresis and fever.   HENT:  Negative for congestion, ear pain, postnasal drip, sinus pressure, sneezing and sore throat.    Eyes:  Negative for pain, discharge, redness and itching.   Respiratory:  Negative for apnea, cough, chest tightness, shortness of breath and wheezing.    Cardiovascular:  Negative for chest pain and leg swelling.   Gastrointestinal:  Positive for nausea and vomiting. Negative for abdominal distention, abdominal pain, constipation and diarrhea.   Genitourinary:  Negative for difficulty urinating, dysuria, flank pain and frequency.   Skin:  Negative for color change, rash and wound.   Neurological:  Negative for dizziness.   All other systems reviewed and are negative.      Patient Active Problem List   Diagnosis    Anxiety    S/P coronary artery stent placement, 2 LETTY 9/2012, 1 LETTY 9/2013    Varicose veins of lower extremities with other complications     Hyperlipidemia, baseline     Lumbar spondylosis    Carpal tunnel syndrome    Hypertension, onset before 1995    Abdominal obesity    RAMONA on CPAP    Multiple thyroid nodules    Prediabetes    Urinary incontinence    Dysmetabolic syndrome    Subclinical hyperthyroidism    On home oxygen therapy    COPD mixed type    Memory difficulties    Excessive daytime sleepiness    Bilateral sciatica, R>L, onset 1/2018    History of smoking greater than 50 pack years, quit 2012    NSAID long-term use    Coronary artery disease involving native coronary artery of native heart without angina pectoris    Chronic kidney disease, stage 3a    Aortic atherosclerosis    Overweight (BMI 25.0-29.9)    Osteopenia of lumbar spine    Nicotine dependence, cigarettes, in remission    Mild major depression       Objective:      Physical Exam  Vitals and nursing note reviewed.   Constitutional:       General: She is not in acute distress.     Appearance: Normal appearance. She is well-developed.   HENT:      Head: Normocephalic.      Nose: Nose normal.   Eyes:      Conjunctiva/sclera: Conjunctivae normal.      Pupils: Pupils are equal, round, and reactive to light.   Cardiovascular:      Rate and Rhythm: Normal rate and regular rhythm.      Heart sounds: Normal heart sounds.   Pulmonary:      Effort: Pulmonary effort is normal. No respiratory distress.      Breath sounds: Normal breath sounds.   Abdominal:      General: Bowel sounds are normal. There is no distension.      Palpations: Abdomen is soft.      Tenderness: There is no abdominal tenderness. There is no guarding.   Musculoskeletal:      Cervical back: Normal range of motion and neck supple.   Skin:     General: Skin is warm and dry.      Findings: No rash.   Neurological:      Mental Status: She is alert and oriented to person, place, and time.   Psychiatric:         Behavior: Behavior normal.         Lab Results   Component Value Date    WBC 7.98 03/23/2023    HGB 11.6 (L)  "03/23/2023    HCT 37.4 03/23/2023     03/23/2023    CHOL 109 (L) 09/27/2023    TRIG 54 09/27/2023    HDL 54 09/27/2023    ALT 16 05/20/2024    AST 19 05/20/2024     05/20/2024    K 4.4 05/20/2024     05/20/2024    CREATININE 1.1 05/20/2024    BUN 24 (H) 05/20/2024    CO2 29 05/20/2024    TSH 2.768 05/20/2024    INR 1.0 09/27/2013    HGBA1C 5.8 (H) 09/27/2023     The ASCVD Risk score (Rayray DK, et al., 2019) failed to calculate for the following reasons:    The valid total cholesterol range is 130 to 320 mg/dL  Visit Vitals  /70 (BP Location: Right arm, Patient Position: Sitting, BP Method: Medium (Manual))   Pulse 87   Temp 98.5 °F (36.9 °C) (Oral)   Ht 5' 5" (1.651 m)   Wt 75.1 kg (165 lb 9.1 oz)   SpO2 98%   BMI 27.55 kg/m²      Assessment:       1. Viral gastroenteritis    2. Nausea and vomiting, unspecified vomiting type        Plan:       1. Viral gastroenteritis/Nausea and vomiting, unspecified vomiting type/Fatigue, unspecified type   -     ondansetron (ZOFRAN-ODT) 4 MG TbDL; Take 1 tablet (4 mg total) by mouth every 8 (eight) hours as needed (nausea).  Dispense: 10 tablet; Refill: 0   - Increase fluid intake   - [place] Diet   - The diagnosis, treatment plan, instructions for follow-up and reevaluation as well as ED precautions were discussed and understanding was verbalized. All questions or concerns have been addressed.            Follow up if symptoms worsen or fail to improve.      Future Appointments       Date Provider Specialty Appt Notes    11/7/2024 Jersey Seymour Jr., MD Family Medicine 6 mo    12/9/2024 Yakelin Negrete, NP Urology 6 mo f/u    5/28/2025 MUSHTAQ Christie PA-C Endocrinology year f/u thyroid             "

## 2024-06-21 NOTE — TELEPHONE ENCOUNTER
----- Message from Tatiana Giovannaananya sent at 6/21/2024  9:58 AM CDT -----  Regarding: prescription needed  Contact: patient  Type: Needs Medical Advice  Who Called:  patient  Symptoms (please be specific):  has the flu   How long has patient had these symptoms:    Pharmacy name and phone #:    [id number] - [place] - [address]  [address] [address]  Phone: [phone] Fax: [phone]          Best Call Back Number: [phone]    Additional Information: seeking a prescription be called in

## 2024-06-21 NOTE — PATIENT INSTRUCTIONS
Thank you for allowing me to be part of your healthcare team at [facility]. It is a pleasure to care for you today.   Please take all of your medications as instructed and follow all new instructions from your visit today.  If you received labs or medical tests today you should hear information about results or scheduling either by phone or mychart within approximately a week.   If you have any questions or concerns please do not hesitate to call. Have a blessed day and I hope to see you again soon.  KEVAN Trevino      WE STRIVE FOR 5'S!!!        We strive for exceptional care. Please fill out a survey if you received 5 star service.

## 2024-06-21 NOTE — TELEPHONE ENCOUNTER
Spoke with patient states she has body aches and fatigue, had fever now it's gone.  Appointment today at 2 pm with Rekha Beasley.

## 2024-08-01 RX ORDER — POTASSIUM CHLORIDE 750 MG/1
20 CAPSULE, EXTENDED RELEASE ORAL
Qty: 180 CAPSULE | Refills: 3 | Status: SHIPPED | OUTPATIENT
Start: 2024-08-01

## 2024-09-04 ENCOUNTER — OFFICE VISIT (OUTPATIENT)
Dept: URGENT CARE | Facility: CLINIC | Age: 74
End: 2024-09-04
Payer: MEDICARE

## 2024-09-04 VITALS
HEIGHT: 65 IN | HEART RATE: 66 BPM | SYSTOLIC BLOOD PRESSURE: 145 MMHG | WEIGHT: 165 LBS | TEMPERATURE: 99 F | OXYGEN SATURATION: 98 % | RESPIRATION RATE: 16 BRPM | BODY MASS INDEX: 27.49 KG/M2 | DIASTOLIC BLOOD PRESSURE: 81 MMHG

## 2024-09-04 DIAGNOSIS — Z11.52 ENCOUNTER FOR SCREENING FOR COVID-19: Primary | ICD-10-CM

## 2024-09-04 LAB
CTP QC/QA: YES
SARS-COV-2 AG RESP QL IA.RAPID: NEGATIVE

## 2024-09-04 PROCEDURE — 87811 SARS-COV-2 COVID19 W/OPTIC: CPT | Mod: QW,S$GLB,, | Performed by: EMERGENCY MEDICINE

## 2024-09-04 PROCEDURE — 99213 OFFICE O/P EST LOW 20 MIN: CPT | Mod: S$GLB,,, | Performed by: EMERGENCY MEDICINE

## 2024-09-04 NOTE — PROGRESS NOTES
"Subjective:      Patient ID: Kathy Seymour is a 73 y.o. female.    Vitals:  height is 5' 5" (1.651 m) and weight is 74.8 kg (165 lb). Her temperature is 98.5 °F (36.9 °C). Her blood pressure is 145/81 (abnormal) and her pulse is 66. Her respiration is 16 and oxygen saturation is 98%.     Chief Complaint: Hand Pain (And Covid exposure)    Family member tested positive for COVID so wanted to get a COVID test.  Denies any other complaints.  Denies fever or chills or nausea vomiting.  Patient noticed a small lesion on the finger which is drying out on the right index finger but wanted to get looked at.  No pain in that area    Hand Pain   Pain location: Left index finger.     ROS   Objective:     Physical Exam   Constitutional: She is oriented to person, place, and time. She appears well-developed. She is cooperative.   HENT:   Head: Normocephalic and atraumatic.   Ears:   Right Ear: Hearing, tympanic membrane, external ear and ear canal normal.   Left Ear: Hearing, tympanic membrane, external ear and ear canal normal.   Nose: Nose normal. No mucosal edema or nasal deformity. No epistaxis. Right sinus exhibits no maxillary sinus tenderness and no frontal sinus tenderness. Left sinus exhibits no maxillary sinus tenderness and no frontal sinus tenderness.   Mouth/Throat: Uvula is midline, oropharynx is clear and moist and mucous membranes are normal. No trismus in the jaw. Normal dentition. No uvula swelling.   Eyes: Conjunctivae and lids are normal.   Neck: Trachea normal and phonation normal. Neck supple.   Cardiovascular: Normal rate, regular rhythm, normal heart sounds and normal pulses.   Pulmonary/Chest: Effort normal and breath sounds normal.   Abdominal: Normal appearance and bowel sounds are normal. Soft.   Musculoskeletal: Normal range of motion.         General: Normal range of motion.   Neurological: She is alert and oriented to person, place, and time. She exhibits normal muscle tone.   Skin: Skin is " warm, dry and intact.         Comments: Small lesion on the right index finger the distal phalanx which is almost completely drying out likely had a recent infection which must be resolving.  Extremity otherwise neurovascularly intact   Psychiatric: Her speech is normal and behavior is normal. Judgment and thought content normal.   Nursing note and vitals reviewed.    Assessment:     1. Encounter for screening for COVID-19        Plan:       Encounter for screening for COVID-19  -     SARS Coronavirus 2 Antigen, POCT Manual Read          Medical Decision Making:   Initial Assessment:   Patient here for a COVID screen.  Patient otherwise asymptomatic and nontoxic and hemodynamically stable.  COVID test is negative.  For the finger lesion advised to use Bactroban.  Discharged with return precautions and follow-up  Clinical Tests:   Lab Tests: Reviewed and Ordered

## 2024-09-04 NOTE — PATIENT INSTRUCTIONS
Your COVID test is negative.  Apply Bactroban or Neosporin to the lesion on the finger.  Return to emergency department for worsening symptoms or any problems

## 2024-09-10 ENCOUNTER — OFFICE VISIT (OUTPATIENT)
Dept: URGENT CARE | Facility: CLINIC | Age: 74
End: 2024-09-10
Payer: MEDICARE

## 2024-09-10 VITALS
HEIGHT: 65 IN | WEIGHT: 165 LBS | DIASTOLIC BLOOD PRESSURE: 78 MMHG | HEART RATE: 81 BPM | TEMPERATURE: 100 F | RESPIRATION RATE: 16 BRPM | OXYGEN SATURATION: 97 % | BODY MASS INDEX: 27.49 KG/M2 | SYSTOLIC BLOOD PRESSURE: 164 MMHG

## 2024-09-10 DIAGNOSIS — U07.1 COVID-19: Primary | ICD-10-CM

## 2024-09-10 DIAGNOSIS — R52 BODY ACHES: ICD-10-CM

## 2024-09-10 DIAGNOSIS — U07.1 COVID-19 VIRUS DETECTED: ICD-10-CM

## 2024-09-10 LAB
CTP QC/QA: YES
SARS-COV-2 AG RESP QL IA.RAPID: POSITIVE

## 2024-09-10 PROCEDURE — 87811 SARS-COV-2 COVID19 W/OPTIC: CPT | Mod: QW,S$GLB,,

## 2024-09-10 PROCEDURE — 99214 OFFICE O/P EST MOD 30 MIN: CPT | Mod: S$GLB,,,

## 2024-09-10 NOTE — PROGRESS NOTES
"Subjective:      Patient ID: Kathy Seymour is a 73 y.o. female.    Vitals:  height is 5' 5" (1.651 m) and weight is 74.8 kg (165 lb). Her temperature is 99.9 °F (37.7 °C). Her blood pressure is 164/78 (abnormal) and her pulse is 81. Her respiration is 16 and oxygen saturation is 97%.     Chief Complaint: Generalized Body Aches    Pt c/o body aches, low grade temp x2 days. Pt had a direct exposure to covid.         Constitution: Positive for fever (Low-grade).   HENT:  Positive for congestion, postnasal drip and sore throat.    Neck: neck negative.   Cardiovascular: Negative.    Eyes: Negative.    Respiratory: Negative.     Gastrointestinal: Negative.    Endocrine: negative.   Genitourinary: Negative.    Musculoskeletal: Negative.    Skin: Negative.    Allergic/Immunologic: Positive for immunizations up-to-date.   Neurological:  Positive for headaches.   Hematologic/Lymphatic: Negative.    Psychiatric/Behavioral: Negative.        Objective:     Physical Exam   Constitutional: She is oriented to person, place, and time. She is cooperative.   HENT:   Head: Normocephalic and atraumatic.   Ears:   Right Ear: External ear normal.   Left Ear: External ear normal.   Nose: Nose normal.   Mouth/Throat: Uvula is midline, oropharynx is clear and moist and mucous membranes are normal. Mucous membranes are moist. Oropharynx is clear.   Eyes: Conjunctivae and lids are normal. Pupils are equal, round, and reactive to light. Extraocular movement intact   Neck: Trachea normal and phonation normal. Neck supple.   Cardiovascular: Normal rate, regular rhythm, normal heart sounds and normal pulses.   Pulmonary/Chest: Effort normal and breath sounds normal.   Abdominal: Normal appearance.   Musculoskeletal: Normal range of motion.         General: Normal range of motion.   Neurological: no focal deficit. She is alert, oriented to person, place, and time and at baseline. She has normal motor skills, normal sensation and intact cranial " nerves (2-12). Gait and coordination normal. GCS eye subscore is 4. GCS verbal subscore is 5. GCS motor subscore is 6.   Skin: Skin is warm and dry. Capillary refill takes 2 to 3 seconds.   Psychiatric: She experiences Normal attention and Normal perception. Her speech is normal and behavior is normal. Mood, judgment and thought content normal.   Nursing note and vitals reviewed.      Assessment:     1. COVID-19    2. Body aches    5 COVID risk      Plan:       COVID-19  -     nirmatrelvir-ritonavir 150-100 mg DsPk; Take 2 tablets by mouth 2 (two) times daily for 5 days. Each dose contains 1 nirmatrelvir (pink tablet) and 1 ritonavir (white tablet). Take both tablets together  Dispense: 20 tablet; Refill: 0    Body aches  -     SARS Coronavirus 2 Antigen, POCT Manual Read      COVID risk score 5.  Vitals are stable.  Lungs clear to auscultation.  Paxlovid renally dosed

## 2024-09-10 NOTE — PATIENT INSTRUCTIONS
Do not take Lipitor while taking Paxlovid    You may continue OTC medications for symptom control.

## 2024-09-20 ENCOUNTER — HOSPITAL ENCOUNTER (OUTPATIENT)
Dept: RADIOLOGY | Facility: HOSPITAL | Age: 74
Discharge: HOME | End: 2024-09-20
Attending: INTERNAL MEDICINE
Payer: MEDICARE

## 2024-09-20 DIAGNOSIS — F17.211 NICOTINE DEPENDENCE, CIGARETTES, IN REMISSION: ICD-10-CM

## 2024-09-20 PROCEDURE — 71271 CT THORAX LUNG CANCER SCR C-: CPT | Mod: 26,,, | Performed by: RADIOLOGY

## 2024-09-20 PROCEDURE — 71271 CT THORAX LUNG CANCER SCR C-: CPT | Mod: TC

## 2024-09-23 ENCOUNTER — OFFICE VISIT (OUTPATIENT)
Dept: URGENT CARE | Facility: CLINIC | Age: 74
End: 2024-09-23
Payer: MEDICARE

## 2024-09-23 VITALS
SYSTOLIC BLOOD PRESSURE: 129 MMHG | DIASTOLIC BLOOD PRESSURE: 76 MMHG | HEART RATE: 78 BPM | HEIGHT: 65 IN | OXYGEN SATURATION: 96 % | RESPIRATION RATE: 16 BRPM | TEMPERATURE: 98 F | WEIGHT: 165 LBS | BODY MASS INDEX: 27.49 KG/M2

## 2024-09-23 DIAGNOSIS — Z20.822 CLOSE EXPOSURE TO COVID-19 VIRUS: Primary | ICD-10-CM

## 2024-09-23 DIAGNOSIS — Z79.01 LONG TERM CURRENT USE OF ANTICOAGULANT: ICD-10-CM

## 2024-09-23 DIAGNOSIS — N18.31 STAGE 3A CHRONIC KIDNEY DISEASE: ICD-10-CM

## 2024-09-23 DIAGNOSIS — Z86.79 HISTORY OF HYPERTENSION: ICD-10-CM

## 2024-09-23 PROBLEM — J44.9 CHRONIC OBSTRUCTIVE PULMONARY DISEASE, UNSPECIFIED: Status: ACTIVE | Noted: 2024-09-23

## 2024-09-23 LAB
CTP QC/QA: YES
SARS-COV-2 AG RESP QL IA.RAPID: NEGATIVE

## 2024-09-23 PROCEDURE — 87811 SARS-COV-2 COVID19 W/OPTIC: CPT | Mod: QW,S$GLB,, | Performed by: NURSE PRACTITIONER

## 2024-09-23 PROCEDURE — 99213 OFFICE O/P EST LOW 20 MIN: CPT | Mod: S$GLB,,, | Performed by: NURSE PRACTITIONER

## 2024-09-23 NOTE — PROGRESS NOTES
"Subjective:      Patient ID: Kathy Seymour is a 73 y.o. female.    Vitals:  height is 5' 5" (1.651 m) and weight is 74.8 kg (165 lb). Her oral temperature is 98.1 °F (36.7 °C). Her blood pressure is 129/76 and her pulse is 78. Her respiration is 16 and oxygen saturation is 96%.     Chief Complaint: exposure to covid    Recent exposure to covid.  Patient would like to be tested.   Patient had covid on September 10th.  No current symptoms.   Pt reports had "mild symptoms" resolved.  Came in to have covid testing done to be certain infection clear due to her concerns regarding her     Other  The current episode started today. Pertinent negatives include no chills, congestion, coughing, fever, headaches, myalgias, rash or vomiting.       Constitution: Negative for chills and fever.   HENT:  Negative for congestion.    Respiratory:  Negative for cough.    Gastrointestinal:  Negative for vomiting.   Musculoskeletal:  Negative for muscle ache.   Skin:  Negative for rash.   Neurological:  Negative for headaches.      Objective:     Physical Exam   Constitutional: She is oriented to person, place, and time.  Non-toxic appearance. She does not appear ill. No distress.   HENT:   Head: Atraumatic.   Nose: Nose normal.   Mouth/Throat: Mucous membranes are moist.   Eyes: Conjunctivae are normal.   Cardiovascular: Normal rate.   Pulmonary/Chest: Effort normal. No respiratory distress.   Abdominal: Normal appearance.   Neurological: no focal deficit. She is alert and oriented to person, place, and time.   Skin: Skin is warm and dry. Capillary refill takes 2 to 3 seconds.   Psychiatric: Her behavior is normal.   Nursing note and vitals reviewed.      Assessment:     1. Close exposure to COVID-19 virus    2. History of hypertension    3. Long term current use of anticoagulant    4. Stage 3a chronic kidney disease      COVID neg      Plan:       Close exposure to COVID-19 virus  -     SARS Coronavirus 2 Antigen, POCT " Manual Read    History of hypertension    Long term current use of anticoagulant    Stage 3a chronic kidney disease

## 2024-09-25 NOTE — PROCEDURES
Kerline Mcdonald presents today for routine OB visit at 36w5d.  Blood Pressure: 137/87  Wt=67.6 kg (149 lb); Body mass index is 30.09 kg/m².; TWG=19.6 kg (43 lb 2.9 oz)  Fetal Heart Rate: 128; Fundal Height (cm): 35 cm  Abdomen: gravid, soft, non-tender.  She reports no complaints.  Reports occasional uterine contractions.  Denies vaginal bleeding or leaking of fluid.  Reports adequate fetal movement of at least 10 movements in 2 hours once daily.  Reviewed labor precautions and fetal kick counts as well as pre-eclampsia warning signs.  Reviewed perineal massage for decreasing risk of perineal lacerations during delivery.  Advised to continue medications and return in 1 week.      Current Outpatient Medications   Medication Instructions    Prenatal Vit-Fe Fumarate-FA (Prenatal Plus Vitamin/Mineral) 27-1 MG TABS 1 tablet, Oral, Daily       Laboratory workup: initial OB labs (done 8/1/24); 28 week labs (done 8/1/24 & 8/12/24); 36 week cultures (not indicated)    Genetic Screening: not done    Vaccinations: influenza (will offer during influenza season); Tdap (given 8/12/24); RSV (given 9/19/24); COVID-19 (rec'd 7/28/21 & 8/28/21 & 5/27/22)    Postpartum contraception: desires surgical sterilization but no health insurance so considering Mirena vs Nexplanon    Fetal Ultrasounds:  5/2/24 (16w1d) EDC confirmed, WNL  9/9/24 (34w3d) no previa, EFW=72%, AC=83%, SHARIF=12.1cm, danica WNL w/missed views, vertex      G4 Pregnancy Problems (from 07/25/24 to present)       Problem Noted Resolved    ASCUS pap 8/12/2024 by REA Wilson No    Overview Addendum 9/19/2024  2:01 PM by RAE Wilson     ASCUS pap/+ other HRHPV @29 weeks  Needs repeat pap in 1 year         Maternal anemia 8/12/2024 by RAE Wilson No    Overview Addendum 9/25/2024  1:08 PM by Delma Meghna Booker, CRNP     Needs Fe supplement - not tolerating PO  Ferritin=6  IV iron ordered x8 doses - starting 9/25/24         GBS bacteriuria  Large Joint Aspiration/Injection: R knee  Date/Time: 1/15/2020 9:00 AM  Performed by: Justino Garland MD  Authorized by: Justino Garland MD     Consent Done?:  Yes (Verbal)  Indications:  Pain  Procedure site marked: Yes    Timeout: Prior to procedure the correct patient, procedure, and site was verified    Anesthesia    Anesthetic: lidocaine 1% without epinephrine and bupivacaine 0.25% without epinephrine  Anesthetic total: 6mL    Location:  Knee  Site:  R knee  Prep: Patient was prepped and draped in usual sterile fashion    Needle size:  20 G  Approach:  Anterolateral  Medications:  40 mg triamcinolone acetonide 40 mg/mL  Patient tolerance:  Patient tolerated the procedure well with no immediate complications       8/6/2024 by Linda Gallegos MD No    Overview Addendum 8/12/2024  9:21 AM by RAE Wilson     Needs intrapartum prophylaxis         Nonimmune to hepatitis B virus 8/2/2024 by Linda Gallegos MD No    Overview Addendum 8/12/2024  9:21 AM by RAE Wilson     Needs HBV vaccine booster         Hx of preeclampsia 7/25/2024 by Linda Gallegos MD No    Overview Addendum 9/19/2024  2:03 PM by RAE Wilson     Needs baseline PEC labs - done WNL         CHTN 7/25/2024 by Linda Gallegos MD No    Overview Addendum 9/19/2024  2:33 PM by RAE Wilson     Needs baseline PEC labs - done WNL  Serial growth scans - done WNL

## 2024-10-03 DIAGNOSIS — J30.1 SEASONAL ALLERGIC RHINITIS DUE TO POLLEN: ICD-10-CM

## 2024-10-03 DIAGNOSIS — R73.03 PREDIABETES: ICD-10-CM

## 2024-10-03 RX ORDER — FLUTICASONE PROPIONATE 50 MCG
1 SPRAY, SUSPENSION (ML) NASAL DAILY
Qty: 48 G | Refills: 3 | Status: SHIPPED | OUTPATIENT
Start: 2024-10-03

## 2024-10-03 NOTE — TELEPHONE ENCOUNTER
Refill Routing Note   Medication(s) are not appropriate for processing by [facility] for the following reason(s):        No active prescription written by provider    [facility] action(s):  Defer     Requires labs : Yes      Medication Therapy Plan: FOVS      Appointments  past 12m or future 3m with PCP    Date Provider   Last Visit   Visit date not found Jersey Seymour Jr., MD   Next Visit   11/7/2024 Jersey Seymour Jr., MD   ED visits in past 90 days: 0        Note composed:12:59 PM 10/03/2024

## 2024-10-03 NOTE — TELEPHONE ENCOUNTER
Care Due:                  Date            Visit Type   Department     Provider  --------------------------------------------------------------------------------                                EP -                              PRIMARY      [facility]  Last Visit: 03-      CARE ([facility])   PRACTICE       Jersey Seymour                              EP -                              PRIMARY      [facility]  Next Visit: 11-      CARE ([facility])   PRACTICE       Jersey Seymour                                                            Last  Test          Frequency    Reason                     Performed    Due Date  --------------------------------------------------------------------------------    Lipid Panel.  12 months..  atorvastatin.............  09- 09-    [facility] Embedded Care Due Messages. Reference number: [id number].   10/03/2024 9:44:48 AM CDT

## 2024-10-08 ENCOUNTER — OFFICE VISIT (OUTPATIENT)
Dept: CARDIOLOGY | Facility: CLINIC | Age: 74
End: 2024-10-08
Payer: MEDICARE

## 2024-10-08 VITALS
SYSTOLIC BLOOD PRESSURE: 120 MMHG | DIASTOLIC BLOOD PRESSURE: 60 MMHG | BODY MASS INDEX: 27.56 KG/M2 | OXYGEN SATURATION: 94 % | WEIGHT: 165.44 LBS | HEART RATE: 74 BPM | HEIGHT: 65 IN

## 2024-10-08 DIAGNOSIS — G47.33 OSA ON CPAP: ICD-10-CM

## 2024-10-08 DIAGNOSIS — E87.6 HYPOKALEMIA: ICD-10-CM

## 2024-10-08 DIAGNOSIS — J44.9 COPD MIXED TYPE: ICD-10-CM

## 2024-10-08 DIAGNOSIS — Z95.5 S/P CORONARY ARTERY STENT PLACEMENT: ICD-10-CM

## 2024-10-08 DIAGNOSIS — I25.10 CORONARY ARTERY DISEASE INVOLVING NATIVE CORONARY ARTERY OF NATIVE HEART WITHOUT ANGINA PECTORIS: Primary | ICD-10-CM

## 2024-10-08 DIAGNOSIS — E78.2 MIXED HYPERLIPIDEMIA: ICD-10-CM

## 2024-10-08 DIAGNOSIS — K22.2 ESOPHAGEAL STRICTURE: ICD-10-CM

## 2024-10-08 DIAGNOSIS — I10 PRIMARY HYPERTENSION: ICD-10-CM

## 2024-10-08 PROCEDURE — 1160F RVW MEDS BY RX/DR IN RCRD: CPT | Mod: HCNC,CPTII,S$GLB, | Performed by: INTERNAL MEDICINE

## 2024-10-08 PROCEDURE — 1126F AMNT PAIN NOTED NONE PRSNT: CPT | Mod: HCNC,CPTII,S$GLB, | Performed by: INTERNAL MEDICINE

## 2024-10-08 PROCEDURE — 3288F FALL RISK ASSESSMENT DOCD: CPT | Mod: HCNC,CPTII,S$GLB, | Performed by: INTERNAL MEDICINE

## 2024-10-08 PROCEDURE — 1159F MED LIST DOCD IN RCRD: CPT | Mod: HCNC,CPTII,S$GLB, | Performed by: INTERNAL MEDICINE

## 2024-10-08 PROCEDURE — 99214 OFFICE O/P EST MOD 30 MIN: CPT | Mod: HCNC,S$GLB,, | Performed by: INTERNAL MEDICINE

## 2024-10-08 PROCEDURE — 3078F DIAST BP <80 MM HG: CPT | Mod: HCNC,CPTII,S$GLB, | Performed by: INTERNAL MEDICINE

## 2024-10-08 PROCEDURE — 1101F PT FALLS ASSESS-DOCD LE1/YR: CPT | Mod: HCNC,CPTII,S$GLB, | Performed by: INTERNAL MEDICINE

## 2024-10-08 PROCEDURE — 3074F SYST BP LT 130 MM HG: CPT | Mod: HCNC,CPTII,S$GLB, | Performed by: INTERNAL MEDICINE

## 2024-10-08 PROCEDURE — 99999 PR PBB SHADOW E&M-EST. PATIENT-LVL IV: CPT | Mod: PBBFAC,HCNC,, | Performed by: INTERNAL MEDICINE

## 2024-10-08 NOTE — ASSESSMENT & PLAN NOTE
Symptoms of chest pains suggestive of GI in origin.  She had coronary artery disease with percutaneous revascularization in 2012 and 2013.  If her symptoms do not subside after GI evaluation she is to call for a stress test

## 2024-10-08 NOTE — PROGRESS NOTES
Patient ID:  Kathy Seymour is a 73 y.o. female who presents for follow-up of Coronary Artery Disease and Gastroesophageal Reflux      Coronary artery disease involving native coronary artery of native heart without angina pectoris  No symptoms of angina.  Status post percutaneous revascularization of the LAD on 2012 and 2013.  The right coronary and the circumflex were within normal limits     Aortic atherosclerosis  On cholesterol-lowering agent     Hyperlipidemia, baseline   On 80 mg of atorvastatin last LDL cholesterol 44     Hypertension, onset before 1995  Controlled on losartan /25, metoprolol    She has pain in the middle of the chest she drinks a carbonated drink and then burred with total resolution of symptoms.  She had similar symptoms back in 2022 at that time Dr. Paredes perform an EGD and dilated her esophagus her symptoms resolved.  At that time she was having dysphagia to rice and she is doing the same thing now she has difficulty swallowing rice.  He is going to be seen Dr. Moore tomorrow.          Past Medical History:   Diagnosis Date    Angina pectoris     Anticoagulant long-term use     Anxiety     Arthritis     Back pain     Cataract     Chronic bronchitis     Coronary artery disease     STENT X 2    COVID 6/10/2022    CTS (carpal tunnel syndrome)     RIGHT    Depression     MILLER (dyspnea on exertion) 2/21/2017    Hallux valgus, acquired, bilateral 1/19/2017    Hematuria     Hyperlipidemia     Hypertension     Hypertensive left ventricular hypertrophy, without heart failure 5/22/2017    Hypothyroidism     Obesity     Polyneuropathy     S/P coronary artery stent placement, 2 LETTY 9/2012, 1 LETTY 9/2013 3/5/2013    Sleep apnea     USES CPAP    Thyroid disease     Tobacco dependence     Trouble in sleeping     Urinary incontinence     Wears glasses     ONE CONTAC        Past Surgical History:   Procedure Laterality Date    APPENDECTOMY      BILATERAL SALPINGOOPHORECTOMY       CARDIAC CATHETERIZATION      CORONARY ANGIOPLASTY      CORONARY STENT PLACEMENT      PCI  of the LAD with LETTY    EYE SURGERY      bilat cataract removal    HYSTERECTOMY      complete    OOPHORECTOMY      TONSILLECTOMY            Current Outpatient Medications   Medication Instructions    albuterol (VENTOLIN HFA) 90 mcg/actuation inhaler 2 puffs, Inhalation, Every 6 hours PRN, Rescue    ALPRAZolam (XANAX) 0.5 mg, Oral, 3 times daily PRN    aspirin 81 mg, Oral, Daily    atorvastatin (LIPITOR) 80 MG tablet TAKE 1 TABLET EVERY DAY    budesonide-formoterol 160-4.5 mcg (SYMBICORT) 160-4.5 mcg/actuation HFAA 2 puffs, Inhalation, Every 12 hours    cetirizine (ZYRTEC) 10 mg, Oral, Daily    DULoxetine (CYMBALTA) 30 MG capsule TAKE 1 CAPSULE EVERY DAY IN ADDITION TO 60MG TO TOTAL 90MG EVERY DAY    DULoxetine (CYMBALTA) 60 MG capsule TAKE 1 CAPSULE EVERY DAY    estradioL (ESTRACE) 1 g, Vaginal, Daily, Apply1 gram up to second knuckle. Apply nightly for 2 weeks then every other night.    fluticasone propionate (FLONASE) 50 mcg, Each Nostril, Daily    losartan-hydrochlorothiazide 100-25 mg (HYZAAR) 100-25 mg per tablet TAKE 1 TABLET EVERY DAY    meloxicam (MOBIC) 7.5 mg, Oral, Daily    methIMAzole (TAPAZOLE) 5 mg, Oral, Daily    metoprolol succinate (TOPROL-XL) 25 mg, Oral, Daily    ondansetron (ZOFRAN-ODT) 4 mg, Oral, Every 8 hours PRN    pantoprazole (PROTONIX) 40 mg, Daily    pilocarpine (SALAGEN) 5 mg, Oral, 3 times daily, Use as directed.    potassium chloride (MICRO-K) 10 MEQ CpSR 20 mEq, Oral    solifenacin (VESICARE) 10 mg, Oral, Daily    tiZANidine (ZANAFLEX) 4 MG tablet TAKE 1 TABLET (4 MG TOTAL) BY MOUTH EVERY 6 (SIX) HOURS AS NEEDED (SPASM).        Review of patient's allergies indicates:   Allergen Reactions    Macrobid [nitrofurantoin monohyd/m-cryst] Other (See Comments)     Fever and body aches.         Review of Systems   Cardiovascular:  Positive for chest pain. Negative for dyspnea on exertion and  "palpitations.   Respiratory:  Negative for cough and shortness of breath.         Objective:     Vitals:    10/08/24 1500   BP: 120/60   BP Location: Left arm   Patient Position: Sitting   Pulse: 74   SpO2: (!) 94%   Weight: 75 kg (165 lb 7.3 oz)   Height: 5' 5" (1.651 m)       Physical Exam  Vitals and nursing note reviewed.   Constitutional:       Appearance: She is well-developed.   HENT:      Head: Normocephalic and atraumatic.   Eyes:      Conjunctiva/sclera: Conjunctivae normal.   Cardiovascular:      Rate and Rhythm: Normal rate and regular rhythm.      Heart sounds: Normal heart sounds.   Pulmonary:      Effort: Pulmonary effort is normal.      Breath sounds: Normal breath sounds.   Abdominal:      General: Bowel sounds are normal.      Palpations: Abdomen is soft.   Musculoskeletal:         General: Normal range of motion.   Skin:     General: Skin is warm and dry.   Neurological:      Mental Status: She is alert and oriented to person, place, and time.   Psychiatric:         Behavior: Behavior normal.         Thought Content: Thought content normal.         Judgment: Judgment normal.       CMP  Sodium   Date Value Ref Range Status   05/20/2024 140 136 - 145 mmol/L Final     Potassium   Date Value Ref Range Status   05/20/2024 4.4 3.5 - 5.1 mmol/L Final     Chloride   Date Value Ref Range Status   05/20/2024 103 95 - 110 mmol/L Final     CO2   Date Value Ref Range Status   05/20/2024 29 23 - 29 mmol/L Final     Glucose   Date Value Ref Range Status   05/20/2024 125 (H) 70 - 110 mg/dL Final     BUN   Date Value Ref Range Status   05/20/2024 24 (H) 8 - 23 mg/dL Final     Creatinine   Date Value Ref Range Status   05/20/2024 1.1 0.5 - 1.4 mg/dL Final   09/14/2012 0.7 0.2 - 1.4 mg/dl Final     Calcium   Date Value Ref Range Status   05/20/2024 9.5 8.7 - 10.5 mg/dL Final   09/14/2012 8.8 8.6 - 10.2 mg/dl Final     Total Protein   Date Value Ref Range Status   05/20/2024 7.4 6.0 - 8.4 g/dL Final     Albumin "   Date Value Ref Range Status   05/20/2024 4.0 3.5 - 5.2 g/dL Final     Total Bilirubin   Date Value Ref Range Status   05/20/2024 0.5 0.1 - 1.0 mg/dL Final     Comment:     For infants and newborns, interpretation of results should be based  on gestational age, weight and in agreement with clinical  observations.    Premature Infant recommended reference ranges:  Up to 24 hours.............<8.0 mg/dL  Up to 48 hours............<12.0 mg/dL  3-5 days..................<15.0 mg/dL  6-29 days.................<15.0 mg/dL       Alkaline Phosphatase   Date Value Ref Range Status   05/20/2024 95 55 - 135 U/L Final   09/14/2012 68 23 - 119 UNIT/L Final     AST   Date Value Ref Range Status   05/20/2024 19 10 - 40 U/L Final   09/14/2012 16 10 - 30 UNIT/L Final     ALT   Date Value Ref Range Status   05/20/2024 16 10 - 44 U/L Final     Anion Gap   Date Value Ref Range Status   05/20/2024 8 8 - 16 mmol/L Final   09/14/2012 7 5 - 15 meq/L Final     eGFR if    Date Value Ref Range Status   08/25/2021 >60.0 >60 mL/min/1.73 m^2 Final     eGFR if non    Date Value Ref Range Status   08/25/2021 56.4 (A) >60 mL/min/1.73 m^2 Final     Comment:     Calculation used to obtain the estimated glomerular filtration  rate (eGFR) is the CKD-EPI equation.         BMP  Lab Results   Component Value Date     05/20/2024    K 4.4 05/20/2024     05/20/2024    CO2 29 05/20/2024    BUN 24 (H) 05/20/2024    CREATININE 1.1 05/20/2024    CALCIUM 9.5 05/20/2024    ANIONGAP 8 05/20/2024    ESTGFRAFRICA >60.0 08/25/2021    EGFRNONAA 56.4 (A) 08/25/2021      BNP  @LABRCNTIP(BNP,BNPTRIAGEBLO)@   Lab Results   Component Value Date    CHOL 109 (L) 09/27/2023    CHOL 115 (L) 03/23/2023    CHOL 132 09/21/2022     Lab Results   Component Value Date    HDL 54 09/27/2023    HDL 39 (L) 03/23/2023    HDL 52 09/21/2022     Lab Results   Component Value Date    LDLCALC 44.2 (L) 09/27/2023    LDLCALC 58.2 (L) 03/23/2023     LDLCALC 63.0 09/21/2022     Lab Results   Component Value Date    TRIG 54 09/27/2023    TRIG 89 03/23/2023    TRIG 85 09/21/2022     Lab Results   Component Value Date    CHOLHDL 49.5 09/27/2023    CHOLHDL 33.9 03/23/2023    CHOLHDL 39.4 09/21/2022      Lab Results   Component Value Date    TSH 2.768 05/20/2024    P4VAGPV 135 04/24/2018    O1TCPAH 7.3 08/02/2012    FREET4 0.89 05/20/2024     Lab Results   Component Value Date    HGBA1C 5.8 (H) 09/27/2023     Lab Results   Component Value Date    WBC 7.98 03/23/2023    HGB 11.6 (L) 03/23/2023    HCT 37.4 03/23/2023    MCV 93 03/23/2023     03/23/2023         Results for orders placed during the hospital encounter of 01/11/21    Echo Color Flow Doppler? Yes    Interpretation Summary  · The left ventricle is normal in size with concentric remodeling and normal systolic function. The estimated ejection fraction is 65%  · Grade I left ventricular diastolic dysfunction.  · Normal right ventricular size with normal right ventricular systolic function.  · Mild tricuspid regurgitation.  · Normal central venous pressure (3 mmHg).  · The estimated PA systolic pressure is 24 mmHg.  · Overall the study quality was technically difficult     No results found for this or any previous visit.     EKG  Results for orders placed or performed in visit on 10/15/19   EKG 12-lead    Collection Time: 10/15/19  1:25 PM    Narrative    Test Reason : I10,    Vent. Rate : 097 BPM     Atrial Rate : 097 BPM     P-R Int : 154 ms          QRS Dur : 084 ms      QT Int : 350 ms       P-R-T Axes : 066 060 065 degrees     QTc Int : 444 ms    Normal sinus rhythm  Normal ECG  When compared with ECG of 31-MAY-2018 13:22,  No significant change was found  Confirmed by Jersey Dominguez MD ([id number]) on 10/16/2019 11:01:24 AM    Referred By:             Confirmed By:Jersey Dominguez MD      Stress  Results for orders placed during the hospital encounter of 01/11/21    Nuclear Stress - Cardiology  Interpreted    Interpretation Summary    Normal myocardial perfusion scan. There is no evidence of myocardial ischemia or infarction.    The gated perfusion images showed an ejection fraction of 93% post stress. Normal ejection fraction is greater than 53%.    There is normal wall motion post stress.    LV cavity size is  and normal at stress.    The EKG portion of this study is negative for ischemia.    The patient reported no chest pain during the stress test.    There were no arrhythmias during stress.             Assessment:       Coronary artery disease involving native coronary artery of native heart without angina pectoris  Symptoms of chest pains suggestive of GI in origin.  She had coronary artery disease with percutaneous revascularization in 2012 and 2013.  If her symptoms do not subside after GI evaluation she is to call for a stress test    Hyperlipidemia, baseline   Controlled on 80 mg of atorvastatin    Hypertension, onset before 1995  Controlled on losartan HCTZ and metoprolol    S/P coronary artery stent placement, 2 LETTY 9/2012, 1 LETTY 9/2013  Status post percutaneous revascularization by Dr. Williamson    RAMONA on CPAP  Compliant with the CPAP machine    COPD mixed type  On bronchodilators    Esophageal stricture  Status post dilatation of esophageal stricture with resolution of the symptoms of chest pain resolve with carbonated drink       Plan:       She is to go see the gastroenterologist and have an EGD with potential dilatation of her esophageal stricture.  If her symptoms continued she is to call and have a stress test performed.  She will be seen in the office in 6 months with a lipid CMP and a TSH.  Meanwhile she is to continue the atorvastatin to control her hyperlipidemia.  Continue losartan HCT and metoprolol for her blood pressure control.  She is to continue CPAP for her obstructive sleep apnea.

## 2024-10-08 NOTE — ASSESSMENT & PLAN NOTE
Status post dilatation of esophageal stricture with resolution of the symptoms of chest pain resolve with carbonated drink

## 2024-10-10 LAB
OHS QRS DURATION: 80 MS
OHS QTC CALCULATION: 425 MS

## 2024-10-30 RX ORDER — METOPROLOL SUCCINATE 25 MG/1
25 TABLET, EXTENDED RELEASE ORAL
Qty: 90 TABLET | Refills: 3 | Status: SHIPPED | OUTPATIENT
Start: 2024-10-30

## 2024-10-31 ENCOUNTER — OFFICE VISIT (OUTPATIENT)
Dept: FAMILY MEDICINE | Facility: CLINIC | Age: 74
End: 2024-10-31
Payer: MEDICARE

## 2024-10-31 VITALS
DIASTOLIC BLOOD PRESSURE: 64 MMHG | TEMPERATURE: 98 F | WEIGHT: 164.44 LBS | HEART RATE: 70 BPM | SYSTOLIC BLOOD PRESSURE: 110 MMHG | BODY MASS INDEX: 27.4 KG/M2 | HEIGHT: 65 IN | OXYGEN SATURATION: 95 %

## 2024-10-31 DIAGNOSIS — I10 PRIMARY HYPERTENSION: Primary | ICD-10-CM

## 2024-10-31 DIAGNOSIS — J44.9 COPD MIXED TYPE: ICD-10-CM

## 2024-10-31 DIAGNOSIS — E04.2 MULTIPLE THYROID NODULES: ICD-10-CM

## 2024-10-31 DIAGNOSIS — F32.0 MILD MAJOR DEPRESSION: ICD-10-CM

## 2024-10-31 DIAGNOSIS — I25.10 CORONARY ARTERY DISEASE INVOLVING NATIVE CORONARY ARTERY OF NATIVE HEART WITHOUT ANGINA PECTORIS: ICD-10-CM

## 2024-10-31 DIAGNOSIS — N18.31 CHRONIC KIDNEY DISEASE, STAGE 3A: ICD-10-CM

## 2024-10-31 DIAGNOSIS — E78.2 MIXED HYPERLIPIDEMIA: Chronic | ICD-10-CM

## 2024-10-31 PROCEDURE — 3074F SYST BP LT 130 MM HG: CPT | Mod: HCNC,CPTII,S$GLB, | Performed by: FAMILY MEDICINE

## 2024-10-31 PROCEDURE — 1126F AMNT PAIN NOTED NONE PRSNT: CPT | Mod: HCNC,CPTII,S$GLB, | Performed by: FAMILY MEDICINE

## 2024-10-31 PROCEDURE — 99214 OFFICE O/P EST MOD 30 MIN: CPT | Mod: HCNC,S$GLB,, | Performed by: FAMILY MEDICINE

## 2024-10-31 PROCEDURE — 1160F RVW MEDS BY RX/DR IN RCRD: CPT | Mod: HCNC,CPTII,S$GLB, | Performed by: FAMILY MEDICINE

## 2024-10-31 PROCEDURE — 3288F FALL RISK ASSESSMENT DOCD: CPT | Mod: HCNC,CPTII,S$GLB, | Performed by: FAMILY MEDICINE

## 2024-10-31 PROCEDURE — 99999 PR PBB SHADOW E&M-EST. PATIENT-LVL III: CPT | Mod: PBBFAC,HCNC,, | Performed by: FAMILY MEDICINE

## 2024-10-31 PROCEDURE — 1101F PT FALLS ASSESS-DOCD LE1/YR: CPT | Mod: HCNC,CPTII,S$GLB, | Performed by: FAMILY MEDICINE

## 2024-10-31 PROCEDURE — 3078F DIAST BP <80 MM HG: CPT | Mod: HCNC,CPTII,S$GLB, | Performed by: FAMILY MEDICINE

## 2024-10-31 PROCEDURE — 1159F MED LIST DOCD IN RCRD: CPT | Mod: HCNC,CPTII,S$GLB, | Performed by: FAMILY MEDICINE

## 2024-11-03 NOTE — PROGRESS NOTES
Subjective     Patient ID: Kathy Seymour is a 73 y.o. female.    Chief Complaint: Hypertension, Hyperlipidemia, Coronary Artery Disease, COPD, Chronic Kidney Disease, Thyroid Nodule, and Depression    Patient presents here for 6 month follow-up of hypertension, hyperlipidemia, CAD, COPD, chronic kidney disease, depression, and thyroid nodule.  Her weight has decreased 3 lb over the last 6 months and her BMI is down to 27.37.  Her hypertension is well controlled with her present medication and she is tolerating her medication well.  Blood pressure today is 110/64.  Her hyperlipidemia is well controlled with her present use of Lipitor 80 mg daily.  She is due for lipid profile and all of her labs have been ordered by her cardiologist.  The only exception is a hemoglobin A1cs that she needs as she does have prediabetes.  Her last lipid profile showed excellent control.  Her coronary artery disease is stable and she saw her cardiologist on 10/08/2024.  This visit was reviewed and there were no changes to medications.  She does have COPD and is followed by Pulmonary and this is also stable without any recent exacerbation.  She is on Symbicort inhaler with good control.  She does have chronic kidney disease stage IIIA with her last GFR of 52.  This is also stable.  She does have a history of thyroid nodules and has undergone fine-needle aspiration in the past without evidence of malignancy.  She does have a follow-up appointment with Endocrinology with thyroid ultrasound prior in May 2025.  She also does have a history of depression but this is well controlled with her present use of Cymbalta 90 mg daily.  As far as health maintenance, she is up-to-date with all of her recommended screening exams and immunizations with the exception of RSV vaccine and COVID booster.  She does need a hemoglobin A1c as noted above and this has been added to her lab work ordered by the cardiologist.      Review of Systems    Constitutional:  Negative for chills, fatigue, fever and unexpected weight change.   HENT:  Negative for nasal congestion, postnasal drip and sore throat.    Respiratory:  Negative for cough, shortness of breath and wheezing.    Cardiovascular:  Negative for chest pain and palpitations.   Gastrointestinal:  Negative for abdominal pain, diarrhea, nausea and vomiting.   Genitourinary:  Negative for difficulty urinating and dysuria.   Musculoskeletal:  Positive for arthralgias. Negative for back pain.   Neurological:  Negative for dizziness, light-headedness and headaches.   Psychiatric/Behavioral:  Negative for sleep disturbance. The patient is not nervous/anxious.         Depression is well controlled with her present medication          Objective     Physical Exam  Vitals reviewed.   Constitutional:       General: She is not in acute distress.     Appearance: Normal appearance. She is well-developed.   HENT:      Right Ear: Tympanic membrane and external ear normal.      Left Ear: Tympanic membrane and external ear normal.      Mouth/Throat:      Pharynx: Oropharynx is clear. No posterior oropharyngeal erythema.   Neck:      Thyroid: No thyromegaly.      Vascular: No carotid bruit.   Cardiovascular:      Rate and Rhythm: Normal rate and regular rhythm.      Pulses: Normal pulses.      Heart sounds: Normal heart sounds. No murmur heard.  Pulmonary:      Effort: Pulmonary effort is normal.      Breath sounds: Normal breath sounds. No wheezing or rales.   Musculoskeletal:         General: No tenderness. Normal range of motion.      Cervical back: Normal range of motion and neck supple.      Right lower leg: No edema.      Left lower leg: No edema.   Lymphadenopathy:      Cervical: No cervical adenopathy.   Neurological:      General: No focal deficit present.      Mental Status: She is alert and oriented to person, place, and time.      Cranial Nerves: No cranial nerve deficit.      Deep Tendon Reflexes: Reflexes are  normal and symmetric.            Assessment and Plan     1. Primary hypertension    2. Mixed hyperlipidemia    3. Coronary artery disease involving native coronary artery of native heart without angina pectoris  Overview:  09/13/2012  Stent to the LAD proximal LAD 2.5 x 16  09/27/2013 99% occlusion of the proximal LAD a 3.0 x 23 xience stent was deployed.  Circumflex normal right coronary diffuse intimal irregularities.      4. COPD mixed type    5. Chronic kidney disease, stage 3a    6. Mild major depression    7. Multiple thyroid nodules        1. Continue present medication as her hypertension, hyperlipidemia, CAD, COPD, chronic kidney disease, depression, and thyroid nodules are all stable  2. Continue low-sodium, low-fat low-cholesterol diet as well as diabetic type diet since she has prediabetes.  BMI today is 27.37  3. Lab work for Cardiology   4. Add hemoglobin A1c to her lab work above  5. Follow up with new physician in 6 months           Follow up in about 6 months (around 4/30/2025).

## 2024-11-06 ENCOUNTER — LAB VISIT (OUTPATIENT)
Dept: LAB | Facility: HOSPITAL | Age: 74
End: 2024-11-06
Attending: FAMILY MEDICINE
Payer: MEDICARE

## 2024-11-06 DIAGNOSIS — E78.2 MIXED HYPERLIPIDEMIA: ICD-10-CM

## 2024-11-06 DIAGNOSIS — R73.03 PREDIABETES: ICD-10-CM

## 2024-11-06 DIAGNOSIS — I25.10 CORONARY ARTERY DISEASE INVOLVING NATIVE CORONARY ARTERY OF NATIVE HEART WITHOUT ANGINA PECTORIS: ICD-10-CM

## 2024-11-06 DIAGNOSIS — Z95.5 S/P CORONARY ARTERY STENT PLACEMENT: ICD-10-CM

## 2024-11-06 DIAGNOSIS — E87.6 HYPOKALEMIA: ICD-10-CM

## 2024-11-06 LAB
ALBUMIN SERPL BCP-MCNC: 4 G/DL (ref 3.5–5.2)
ALP SERPL-CCNC: 96 U/L (ref 40–150)
ALT SERPL W/O P-5'-P-CCNC: 17 U/L (ref 10–44)
ANION GAP SERPL CALC-SCNC: 10 MMOL/L (ref 8–16)
AST SERPL-CCNC: 22 U/L (ref 10–40)
BILIRUB SERPL-MCNC: 0.6 MG/DL (ref 0.1–1)
BUN SERPL-MCNC: 18 MG/DL (ref 8–23)
CALCIUM SERPL-MCNC: 9.4 MG/DL (ref 8.7–10.5)
CHLORIDE SERPL-SCNC: 105 MMOL/L (ref 95–110)
CHOLEST SERPL-MCNC: 112 MG/DL (ref 120–199)
CHOLEST/HDLC SERPL: 2 {RATIO} (ref 2–5)
CO2 SERPL-SCNC: 28 MMOL/L (ref 23–29)
CREAT SERPL-MCNC: 0.9 MG/DL (ref 0.5–1.4)
ERYTHROCYTE [DISTWIDTH] IN BLOOD BY AUTOMATED COUNT: 14 % (ref 11.5–14.5)
EST. GFR  (NO RACE VARIABLE): >60 ML/MIN/1.73 M^2
ESTIMATED AVG GLUCOSE: 114 MG/DL (ref 68–131)
GLUCOSE SERPL-MCNC: 111 MG/DL (ref 70–110)
HBA1C MFR BLD: 5.6 % (ref 4–5.6)
HCT VFR BLD AUTO: 38.2 % (ref 37–48.5)
HDLC SERPL-MCNC: 56 MG/DL (ref 40–75)
HDLC SERPL: 50 % (ref 20–50)
HGB BLD-MCNC: 12 G/DL (ref 12–16)
LDLC SERPL CALC-MCNC: 46.4 MG/DL (ref 63–159)
MCH RBC QN AUTO: 29.5 PG (ref 27–31)
MCHC RBC AUTO-ENTMCNC: 31.4 G/DL (ref 32–36)
MCV RBC AUTO: 94 FL (ref 82–98)
NONHDLC SERPL-MCNC: 56 MG/DL
PLATELET # BLD AUTO: 178 K/UL (ref 150–450)
PMV BLD AUTO: 12.7 FL (ref 9.2–12.9)
POTASSIUM SERPL-SCNC: 4.1 MMOL/L (ref 3.5–5.1)
PROT SERPL-MCNC: 7.1 G/DL (ref 6–8.4)
RBC # BLD AUTO: 4.07 M/UL (ref 4–5.4)
SODIUM SERPL-SCNC: 143 MMOL/L (ref 136–145)
TRIGL SERPL-MCNC: 48 MG/DL (ref 30–150)
TSH SERPL DL<=0.005 MIU/L-ACNC: 2.38 UIU/ML (ref 0.4–4)
WBC # BLD AUTO: 7.34 K/UL (ref 3.9–12.7)

## 2024-11-06 PROCEDURE — 80053 COMPREHEN METABOLIC PANEL: CPT | Mod: HCNC | Performed by: INTERNAL MEDICINE

## 2024-11-06 PROCEDURE — 85027 COMPLETE CBC AUTOMATED: CPT | Mod: HCNC | Performed by: INTERNAL MEDICINE

## 2024-11-06 PROCEDURE — 36415 COLL VENOUS BLD VENIPUNCTURE: CPT | Mod: HCNC,PO | Performed by: FAMILY MEDICINE

## 2024-11-06 PROCEDURE — 80061 LIPID PANEL: CPT | Mod: HCNC | Performed by: INTERNAL MEDICINE

## 2024-11-06 PROCEDURE — 84443 ASSAY THYROID STIM HORMONE: CPT | Mod: HCNC | Performed by: INTERNAL MEDICINE

## 2024-11-06 PROCEDURE — 83036 HEMOGLOBIN GLYCOSYLATED A1C: CPT | Mod: HCNC | Performed by: FAMILY MEDICINE

## 2024-11-25 DIAGNOSIS — I10 ESSENTIAL HYPERTENSION: ICD-10-CM

## 2024-11-25 DIAGNOSIS — E78.00 PURE HYPERCHOLESTEROLEMIA: Chronic | ICD-10-CM

## 2024-11-25 RX ORDER — LOSARTAN POTASSIUM AND HYDROCHLOROTHIAZIDE 25; 100 MG/1; MG/1
TABLET ORAL
Qty: 90 TABLET | Refills: 3 | Status: SHIPPED | OUTPATIENT
Start: 2024-11-25

## 2024-11-25 RX ORDER — ATORVASTATIN CALCIUM 80 MG/1
TABLET, FILM COATED ORAL
Qty: 90 TABLET | Refills: 3 | Status: SHIPPED | OUTPATIENT
Start: 2024-11-25

## 2024-11-25 NOTE — TELEPHONE ENCOUNTER
No care due was identified.  [facility] Embedded Care Due Messages. Reference number: [id number].   11/25/2024 1:33:53 AM CST

## 2024-11-25 NOTE — TELEPHONE ENCOUNTER
Refill Decision Note   Kathy Seymour  is requesting a refill authorization.  Brief Assessment and Rationale for Refill:  Approve     Medication Therapy Plan:         Comments:     Note composed:5:27 AM 11/25/2024

## 2024-12-25 DIAGNOSIS — F41.9 ANXIETY: ICD-10-CM

## 2024-12-25 DIAGNOSIS — G62.9 NEUROPATHY: ICD-10-CM

## 2024-12-25 NOTE — TELEPHONE ENCOUNTER
No care due was identified.  [facility] Embedded Care Due Messages. Reference number: [id number].   12/25/2024 2:29:27 AM CST

## 2024-12-26 RX ORDER — DULOXETIN HYDROCHLORIDE 60 MG/1
CAPSULE, DELAYED RELEASE ORAL
Qty: 90 CAPSULE | Refills: 3 | Status: SHIPPED | OUTPATIENT
Start: 2024-12-26

## 2024-12-26 NOTE — TELEPHONE ENCOUNTER
Refill Decision Note   Kathy Seymour  is requesting a refill authorization.  Brief Assessment and Rationale for Refill:  Approve     Medication Therapy Plan:         Alert overridden per protocol: Yes   Comments:     Note composed:10:11 AM 12/26/2024

## 2025-01-07 ENCOUNTER — TELEPHONE (OUTPATIENT)
Dept: UROLOGY | Facility: CLINIC | Age: 75
End: 2025-01-07
Payer: MEDICARE

## 2025-01-07 NOTE — TELEPHONE ENCOUNTER
Call placed to inform patient appt needs to be rescheduled with Yakelin, no answer, message left with call back number.

## 2025-01-08 ENCOUNTER — TELEPHONE (OUTPATIENT)
Dept: UROLOGY | Facility: CLINIC | Age: 75
End: 2025-01-08
Payer: MEDICARE

## 2025-01-08 ENCOUNTER — PATIENT MESSAGE (OUTPATIENT)
Dept: UROLOGY | Facility: CLINIC | Age: 75
End: 2025-01-08
Payer: MEDICARE

## 2025-01-08 NOTE — TELEPHONE ENCOUNTER
----- Message from Carmen sent at 1/8/2025  9:11 AM CST -----  Contact: Self  Type:  Sooner Appointment Request    Caller is requesting a sooner appointment.  Caller declined first available appointment listed below.  Caller will not accept being placed on the waitlist and is requesting a message be sent to doctor.    Name of Caller:  Patient  When is the first available appointment?  02/10  Symptoms:  check up bladder  Would the patient rather a call back or a response via [facility]? call  Best Call Back Number:  [phone]  Additional Information:  Pt was scheduled for today at 1:30 and had to cancel since she had already made plans for the day when her appt was cancelled yesterday. Wanted to see if we can get her in before February either with Dr Wise or Yakelin Negrete NP. Thank You

## 2025-01-24 NOTE — PROGRESS NOTES
9/24/2018    Kathy Seymour  Office Note  Established Patient of Dr. Jordan. Patient is new to me.    Chief Complaint   Patient presents with    COPD    Shortness of Breath    Sleep Apnea       HPI: states had sleep titration study but never heard anything from it. Insurance denied automatic titration machine, states was not told she had COPD. Request a different face mask. States SOB with activity every day for a few minutes, day time only, relieved with rest. States does not use albuterol inhaler feels it does not help.  Has stopped smoking five years ago.     Previous HPI Dr. Jordan:  July 10, 2017-on cymbalta- helps neuropathy but having poor sleep  With nocturia ppt arousal.  Sleep study done with no rem sleep on cymbalta?  10-20 pressure rather than 6.          2/27/2017HPI: dx osas 10 yrs ago, ahi na.  On nasal pillars- cpap 6, new machine 5 yrs ago.  Gained 45 post stent around 2012.       Prior to cpap, severe snorer, fatigue, with no other prominent symptoms recalled.       Now no snoring noted with cpap. No nasal obstruction.     Mentation a little slow but vague.      The chief compliant  problem is new to me  PFSH:  Past Medical History:   Diagnosis Date    Angina pectoris     Anticoagulant long-term use     Anxiety     Arthritis     Back pain     Cataract     Chronic bronchitis     Coronary artery disease     STENT X 2    CTS (carpal tunnel syndrome)     RIGHT    Depression     MILLER (dyspnea on exertion) 2/21/2017    Hallux valgus, acquired, bilateral 1/19/2017    Hyperlipidemia     Hypertension     Hypertensive left ventricular hypertrophy, without heart failure 5/22/2017    Hypothyroidism     Obesity     Polyneuropathy     S/P coronary artery stent placement, 2 LETTY 9/2012, 1 LETTY 9/2013 3/5/2013    Sleep apnea     USES CPAP    Thyroid disease     Tobacco dependence     Trouble in sleeping     Urinary incontinence     Wears glasses     ONE CONTAC         Past Surgical  History:   Procedure Laterality Date    APPENDECTOMY      BILATERAL SALPINGOOPHORECTOMY      CARDIAC CATHETERIZATION      CORONARY ANGIOPLASTY      CORONARY STENT PLACEMENT      PCI  of the LAD with LETTY    EYE SURGERY      bilat cataract removal    HYSTERECTOMY      complete    OOPHORECTOMY      RELEASE, CARPAL TUNNEL Right 2014    Performed by At[facility]oles [facility] at [facility]    TONSILLECTOMY       Social History     Tobacco Use    Smoking status: Former Smoker     Packs/day: 1.00     Years: 50.00     Pack years: 50.00     Types: Cigarettes     Start date: 1964     Last attempt to quit: 2012     Years since quittin.1    Smokeless tobacco: Never Used   Substance Use Topics    Alcohol use: Yes     Alcohol/week: 1.2 oz     Types: 2 Shots of liquor per week     Comment: Social - vodka on wednesday    Drug use: No     Family History   Problem Relation Age of Onset    Hypertension Sister     Arthritis Sister     Heart failure Mother     Hypertension Mother     Heart failure Father     Hypertension Father     No Known Problems Brother     No Known Problems Son     Heart disease Brother     Arthritis Sister     Hypertension Sister     Cancer Sister     Asthma Son     Arthritis Son         psoriatic arthritis     Review of patient's allergies indicates:   Allergen Reactions    Wellbutrin [bupropion hcl] Other (See Comments)     sven     I have reviewed past medical, family, and social history. I have reviewed previous nurse notes.    Performance Status:The patient's activity level is no limits with regular activity.      Review of Systems   Constitutional: Negative for activity change, appetite change, chills, diaphoresis, fatigue, fever and unexpected weight change.   HENT: Negative for dental problem, postnasal drip, rhinorrhea, sinus pressure, sinus pain, sneezing, sore throat, trouble swallowing and voice change.    Respiratory: Negative for apnea, cough, chest tightness,  "shortness of breath, wheezing and stridor.    Cardiovascular: Negative for chest pain, palpitations and leg swelling.   Gastrointestinal: Negative for abdominal distention, abdominal pain, constipation and nausea.   Musculoskeletal: Negative for gait problem, myalgias and neck pain.   Skin: Negative for color change and pallor.   Allergic/Immunologic: Negative for environmental allergies and food allergies.   Neurological: Negative for dizziness, speech difficulty, weakness, light-headedness, numbness and headaches.   Hematological: Negative for adenopathy. Does not bruise/bleed easily.   Psychiatric/Behavioral: Negative for dysphoric mood and sleep disturbance. The patient is not nervous/anxious.           Exam:Comprehensive exam done. /79 (BP Location: Left arm, Patient Position: Sitting)   Pulse 80   Ht 5' 5" (1.651 m)   Wt 85.8 kg (189 lb 2.5 oz)   SpO2 95% Comment: room air  BMI 31.48 kg/m²   Exam included Vitals as listed  Constitutional: He is oriented to person, place, and time. He appears well-developed. No distress.   Nose: Nose normal.   Mouth/Throat: Uvula is midline, oropharynx is clear and moist and mucous membranes are normal. No dental caries. No oropharyngeal exudate, posterior oropharyngeal edema, posterior oropharyngeal erythema or tonsillar abscesses.  Mallapatti (M) score 3  Eyes: Pupils are equal, round, and reactive to light.   Neck: No JVD present. No thyromegaly present.   Cardiovascular: Normal rate, regular rhythm and normal heart sounds. Exam reveals no gallop and no friction rub.   No murmur heard.  Pulmonary/Chest: Effort normal and breath sounds normal. No accessory muscle usage or stridor. No apnea and no tachypnea. No respiratory distress. He has no decreased breath sounds. He has no wheezes. He has no rhonchi. He has no rales. He exhibits no tenderness.   Abdominal: Soft. He exhibits no mass. There is no tenderness. No hepatosplenomegaly, hernias and normoactive bowel " sounds  Musculoskeletal: Normal range of motion. He exhibits no edema.   Lymphadenopathy:     He has no cervical adenopathy.     He has no axillary adenopathy.   Neurological: He is alert and oriented to person, place, and time. He is not disoriented.   Skin: Skin is warm and dry. Capillary refill takes less 2 sec. No cyanosis or erythema. No pallor. Nails show no clubbing.   Psychiatric: He has a normal mood and affect. His behavior is normal. Judgment and thought content normal.       Radiographs (ct chest and cxr) reviewed: results reviewed   The mediastinal and cardiac size and contours are normal. The diaphragms and pleura are clear. There are no intra-pulmonary masses or infiltrates. There is no pneumothorax or pleural effusion.    Impression-Negative chest.      Electronically signed by:Hernán Mojica MD  Date: 03/26/14  Time:10:40     Labs reviewed  Lab Results   Component Value Date    WBC 6.27 01/09/2018    RBC 4.38 01/09/2018    HGB 12.6 01/09/2018    HCT 39.6 01/09/2018    MCV 90 01/09/2018    MCH 28.8 01/09/2018    MCHC 31.8 (L) 01/09/2018    RDW 13.5 01/09/2018     01/09/2018    MPV 11.9 01/09/2018    GRAN 3.8 01/09/2018    GRAN 61.1 01/09/2018    LYMPH 1.8 01/09/2018    LYMPH 27.9 01/09/2018    MONO 0.5 01/09/2018    MONO 8.3 01/09/2018    EOS 0.1 01/09/2018    BASO 0.02 01/09/2018    EOSINOPHIL 2.2 01/09/2018    BASOPHIL 0.3 01/09/2018       PFT results reviewed  Pulmonary Functions Testing Results:  Procedure Notes     Author Status Last  Updated Created   Tayo Jordan MD Signed Tayo Jordan MD 7/19/2017  2:38 PM 7/19/2017  2:35 PM          Assoc. Orders Procedures   None COMPLETE PFT WITH BRONCHODILATOR       Pre-Op Dx Post-Op Dx   Bronchitis None          Pulmonary Functions, including spirometry and bronchodilator response and lung volumes and diffusion, study was done July 28, 2017.  Spirometry shows loss of vital capacity and fev1 with no obstruction and no bronchodilator  response.   FEV1 is 71% or 1.65 liters.  Lung volumes show  normal TLC and elevated residual volume.  Diffusion shows reduced but falls within normal range when corrected for lung volumes.     Pulmonary functions show low vital capacity and low mvv with some air trapping,but rest is wnl. Clinical correlation recommended.     Tayo Jordan M.D.               Plan:  Clinical impression is apparently straight forward and impression with management as below.    Kathy was seen today for copd, shortness of breath and sleep apnea.    Diagnoses and all orders for this visit:    COPD mixed type  -     umeclidinium-vilanterol (ANORO ELLIPTA) 62.5-25 mcg/actuation DsDv; Inhale 1 puff into the lungs once daily. Controller    RAMONA on CPAP, 100% use  -     CPAP/BIPAP SUPPLIES  -     CPAP FOR HOME USE    MILLER (dyspnea on exertion)  -     umeclidinium-vilanterol (ANORO ELLIPTA) 62.5-25 mcg/actuation DsDv; Inhale 1 puff into the lungs once daily. Controller        Follow-up in about 3 months (around 12/24/2018), or if symptoms worsen or fail to improve.    Discussed with patient above for education the following:      Patient Instructions   Sleep apnea:  Reorder automatic titration machine, contact clinic if insurance denies again    Order full nose mask    COPD  Anoro daily; if no decrease in shortness of breath ok to discontinue and re-evaluate in three months.    Exercise and weight loss should help       No

## 2025-01-28 ENCOUNTER — OFFICE VISIT (OUTPATIENT)
Dept: PULMONOLOGY | Facility: CLINIC | Age: 75
End: 2025-01-28
Payer: MEDICARE

## 2025-01-28 VITALS
WEIGHT: 162.25 LBS | OXYGEN SATURATION: 96 % | HEART RATE: 68 BPM | BODY MASS INDEX: 27 KG/M2 | DIASTOLIC BLOOD PRESSURE: 77 MMHG | SYSTOLIC BLOOD PRESSURE: 130 MMHG

## 2025-01-28 DIAGNOSIS — F41.9 ANXIETY: ICD-10-CM

## 2025-01-28 DIAGNOSIS — Z99.81 CHRONIC RESPIRATORY FAILURE WITH HYPOXIA, ON HOME O2 THERAPY: Primary | ICD-10-CM

## 2025-01-28 DIAGNOSIS — K21.9 GASTROESOPHAGEAL REFLUX DISEASE WITHOUT ESOPHAGITIS: ICD-10-CM

## 2025-01-28 DIAGNOSIS — J96.11 CHRONIC RESPIRATORY FAILURE WITH HYPOXIA, ON HOME O2 THERAPY: Primary | ICD-10-CM

## 2025-01-28 DIAGNOSIS — J44.9 COPD MIXED TYPE: ICD-10-CM

## 2025-01-28 PROCEDURE — 99999 PR PBB SHADOW E&M-EST. PATIENT-LVL III: CPT | Mod: PBBFAC,HCNC,, | Performed by: INTERNAL MEDICINE

## 2025-01-28 RX ORDER — AZITHROMYCIN 500 MG/1
TABLET, FILM COATED ORAL
Qty: 3 TABLET | Refills: 3 | Status: SHIPPED | OUTPATIENT
Start: 2025-01-28

## 2025-01-28 RX ORDER — PANTOPRAZOLE SODIUM 40 MG/1
40 TABLET, DELAYED RELEASE ORAL DAILY
Qty: 90 TABLET | Refills: 3 | Status: SHIPPED | OUTPATIENT
Start: 2025-01-28

## 2025-01-28 RX ORDER — ALPRAZOLAM 0.5 MG/1
0.5 TABLET ORAL 3 TIMES DAILY PRN
Qty: 90 TABLET | Refills: 5 | Status: SHIPPED | OUTPATIENT
Start: 2025-01-28

## 2025-01-28 RX ORDER — OSELTAMIVIR PHOSPHATE 75 MG/1
75 CAPSULE ORAL 2 TIMES DAILY
Qty: 10 CAPSULE | Refills: 0 | Status: SHIPPED | OUTPATIENT
Start: 2025-01-28 | End: 2025-02-02

## 2025-01-28 RX ORDER — BUDESONIDE AND FORMOTEROL FUMARATE DIHYDRATE 160; 4.5 UG/1; UG/1
2 AEROSOL RESPIRATORY (INHALATION) EVERY 12 HOURS
Qty: 30.6 G | Refills: 3 | Status: SHIPPED | OUTPATIENT
Start: 2025-01-28

## 2025-01-28 NOTE — PROGRESS NOTES
1/28/2025    Kathy Seymour  Office Note    Chief Complaint   Patient presents with    annual appt.         HPI:     1/28/2025 uses nasal mask , no issues, copd stable with symbicort, xanax reordered.  Ct chstg viewed      1/24/2024   Uses nasal mask and wishes to change to ffm. Has dry mouth.  Still dry mouth.  Lost 21 lbs. Asthma stable.     No albuterol use, symbicort.    Off smokes 6 yrs, had screen ct 9/2023 ....  Patient Instructions   Would ask staff to get compliance report for cpap    New cpap mask ordered     Asthma stable    Xanax re ordered    Ct chest done sept 2023 viewed -- will order f/u for sept and notify by phone-- ordered yearly for next 4 yrs.......  we discuss screen ct and nodules were seen   12/5/2022 -- memory felt to be good.  Cpap ppt dry mouth -- nasal mask.  Ahi 48 in past.  Uses biotene as on vesicare with dry mouth.      Patient Instructions   Would be good view compliance report from cpap.    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  12/14/2021 cpap problems --  Tried airtouch n20 mask  Concerned about memory.  No breathing issues.   Patient Instructions   minimental status was 28 out of 30, 23 is bad sort of.    Xanax as needed.    New cpap mask airtouch n 20 ordered.       12/1/2020 cpap works better but has a lot of air in morning with severe dreams noted.  Use symbicort daily with no problems  Patient Instructions   Asthma stable - singulair will prevent sinus and asthma.  Could skip symbicort and use singulair only.  Use xanax as needed.  Amoxil for sinus infection may be needed.   Call when needs follow up/xanax.      6/2/2020 back on old cpap device.  Sinus and lungs ok- occ symptoms with pollen exposure..  Patient Instructions   Need to get auto cpap report for pressure readings when last used.  Your old level was 6.  Would use mid way pressure for compromise.  Could consider repeat cpap titration in sleep lab..  Call in  monthly til reach auto pap level or cannot increase further then go to sleep lab.  You had 48 episodes an hour of sleep apnea.      uptodate on belching excessively- baclofen trial for  discussed.      Dec 3, 2019-  Nasal patency is now good, still uses old cpap device as auto pap ppt dry mouth and nocturnal arousal.   Machine brought in today.  July 25, 2017 cpap titration had 44% sleep efficiency,    airfit f 30 mask - goes under nostrils - full face mask without bulkiness.        Check home mask, try to use full face type mask with current auto cpap.     Could do anther titration.  Auto cpap devices give similar results to cpap titrations in sleep lab.  Your titration in July 2017 was with poor sleep efficiency and , if you use your auto cpap, the auto device repeats titration nightly.       Use during day and note what might be acceptable pressures- you say you have no symptoms on cpap 6 (doubt you are optimally treated) could use minimal pressures??     Use symbicort, use flonase.  June 4, 2019- freq nasal stuffy,uses saline.  Has cpap  6 , last 2017 cpap titration had no rem sleep so rec was 10-20 with study done 8-12.  Pt has nasal stuffy freq uses saline.      Has some bingham.  Patient Instructions   Your ahi was 48 in 2017- fairly severe.    Nasal patency needed- use saline (afrin if stuffy) then flonase once open.    If nasal obstructs will need to open mouth - mouth leaks will make machine cycles.  Would use auto pap 8-12- numbers found to be adequate in 2017    Would try nasal oral mask in future.  Work on nose.    Exercise would likely be good.      Try symbicort 2 twice daily  - in place anoro.       12/17/18- has older machine states not working correctly. Using nightly, has day time drowsiness. Insurance states having to wait for next machine. Sleep is poor. Has new nasal face mask. Has tried nasal pillars, nasal pillow, and now has full nose mask. Nervous over full face mask causing  clausaphobia.   Sensation of throat closing, Not using Anoro daily.  Patient Instructions   Anoro is a daily medication intended to prevent breathing problems  Use Albuterol inhaler 1-2 puffs every 4 hours when needed for Shortness of breath      A full Face mask is needed to help with Obstructive sleep apnea due to sleeping with mouth open.  9/24/18-  had sleep titration study but never heard anything from it. Insurance denied automatic titration machine,  was not told she had COPD. Request a different face mask. States SOB with activity every day for a few minutes, day time only, relieved with rest. States does not use albuterol inhaler feels it does not help.  Has stopped smoking five years ago.     Previous HPI Dr. Jordan:  July 10, 2017-on cymbalta- helps neuropathy but having poor sleep  With nocturia ppt arousal.  Sleep study done with no rem sleep on cymbalta?  10-20 pressure rather than 6.     2/27/2017HPI: dx osas 10 yrs ago, ahi na.  On nasal pillars- cpap 6, new machine 5 yrs ago.  Gained 45 post stent around 2012.     Prior to cpap, severe snorer, fatigue, with no other prominent symptoms recalled.       Now no snoring noted with cpap. No nasal obstruction.     Mentation a little slow but vague.      The chief compliant  problem is stable  PFSH:  Past Medical History:   Diagnosis Date    Angina pectoris     Anticoagulant long-term use     Anxiety     Arthritis     Back pain     Cataract     Chronic bronchitis     Coronary artery disease     STENT X 2    COVID 6/10/2022    CTS (carpal tunnel syndrome)     RIGHT    Depression     MILLER (dyspnea on exertion) 2/21/2017    Hallux valgus, acquired, bilateral 1/19/2017    Hematuria     Hyperlipidemia     Hypertension     Hypertensive left ventricular hypertrophy, without heart failure 5/22/2017    Hypothyroidism     Obesity     Polyneuropathy     S/P coronary artery stent placement, 2 LETTY 9/2012, 1 LETTY 9/2013 3/5/2013    Sleep apnea     USES CPAP     Thyroid disease     Tobacco dependence     Trouble in sleeping     Urinary incontinence     Wears glasses     ONE CONTAC         Past Surgical History:   Procedure Laterality Date    APPENDECTOMY      BILATERAL SALPINGOOPHORECTOMY      CARDIAC CATHETERIZATION      CORONARY ANGIOPLASTY      CORONARY STENT PLACEMENT      PCI  of the LAD with LETTY    EYE SURGERY      bilat cataract removal    HYSTERECTOMY      complete    OOPHORECTOMY      TONSILLECTOMY       Social History     Tobacco Use    Smoking status: Former     Current packs/day: 0.00     Average packs/day: 1 pack/day for 50.0 years (50.0 ttl pk-yrs)     Types: Cigarettes     Start date: 1964     Quit date: 2012     Years since quittin.4    Smokeless tobacco: Never   Substance Use Topics    Alcohol use: Yes     Alcohol/week: 2.0 standard drinks of alcohol     Types: 2 Drinks containing 0.5 oz of alcohol per week     Comment: Social - vodka on wednesday    Drug use: No     Family History   Problem Relation Name Age of Onset    Hypertension Sister Jennifer Barrow     Arthritis Sister Jennifer Barrow     Heart failure Mother Jose Manuel Anderson     Hypertension Mother Jose Manuel Anderson     Depression Mother Jose Manuel Anderson     Diabetes Mother Jose Manuel Anderson     Heart failure Father Jovanni Cueto     Hypertension Father Jovanni Cueto     No Known Problems Brother 1/2 bother     No Known Problems Son      Heart disease Brother 1/2 brother     Arthritis Sister Syeda Lizascio     Hypertension Sister Syeda Cueto     Cancer Sister 1/2 sister     Asthma Son      Arthritis Son          psoriatic arthritis    Asthma Son Rafita Seymour      Review of patient's allergies indicates:   Allergen Reactions    Wellbutrin [bupropion hcl] Other (See Comments)     sven     I have reviewed past medical, family, and social history. I have reviewed previous nurse notes.    Performance Status:The patient's activity level is no limits with regular activity.      Review of Systems:   "a review of eleven systems covering constitutional, Eye, HEENT, Psych, Respiratory, Cardiac, GI, , Musculoskeletal, Endocrine, Dermatologic was negative except for pertinent findings as listed ABOVE and below: pertinent positive as above, rest is good       Exam:Comprehensive exam done. BP (!) 154/73 (BP Location: Right arm, Patient Position: Sitting)   Pulse 77   Ht 5' 5" (1.651 m)   Wt 82.1 kg (181 lb)   SpO2 95% Comment: on room air  BMI 30.12 kg/m²   Exam included Vitals as listed, and patient's appearance and affect and alertness and mood, oral exam for yeast and hygiene and pharynx lesions and Mallapatti (M) score, neck with inspection for jvd and masses and thyroid abnormalities and lymph nodes (supraclavicular and infraclavicular nodes and axillary also examined and noted if abn), chest exam included symmetry and effort and fremitus and percussion and auscultation, cardiac exam included rhythm and gallops and murmur and rubs and jvd and edema, abdominal exam for mass and hepatosplenomegaly and tenderness and hernias and bowel sounds, Musculoskeletal exam with muscle tone and posture and mobility/gait and  strength, and skin for rashes and cyanosis and pallor and turgor, extremity for clubbing.  Findings were normal except for pertinent findings listed below:  M3, chest is symmetric, no distress, normal percussion, normal fremitus and good normal breath sounds  No edema.             Radiographs (ct chest and cxr) reviewed: results reviewed   The mediastinal and cardiac size and contours are normal. The diaphragms and pleura are clear. There are no intra-pulmonary masses or infiltrates. There is no pneumothorax or pleural effusion.    Impression-Negative chest.      Electronically signed by:Hernán Mojica MD  Date: 03/26/14  Time:10:40     Labs reviewed  Lab Results   Component Value Date    WBC 7.34 11/06/2024    RBC 4.07 11/06/2024    HGB 12.0 11/06/2024    HCT 38.2 11/06/2024    MCV 94 " 11/06/2024    MCH 29.5 11/06/2024    MCHC 31.4 (L) 11/06/2024    RDW 14.0 11/06/2024     11/06/2024    MPV 12.7 11/06/2024    GRAN 5.0 03/23/2023    GRAN 62.5 03/23/2023    LYMPH 2.0 03/23/2023    LYMPH 25.4 03/23/2023    MONO 0.6 03/23/2023    MONO 7.6 03/23/2023    EOS 0.3 03/23/2023    BASO 0.04 03/23/2023    EOSINOPHIL 3.6 03/23/2023    BASOPHIL 0.5 03/23/2023       PFT results reviewed  Pulmonary Functions Testing Results:  Procedure Notes     Author Status Last  Updated Created   Tayo Jordan MD Signed Tayo Jordan MD 7/19/2017  2:38 PM 7/19/2017  2:35 PM          Assoc. Orders Procedures   None COMPLETE PFT WITH BRONCHODILATOR       Pre-Op Dx Post-Op Dx   Bronchitis None          Pulmonary Functions, including spirometry and bronchodilator response and lung volumes and diffusion, study was done July 28, 2017.  Spirometry shows loss of vital capacity and fev1 with no obstruction and no bronchodilator response.   FEV1 is 71% or 1.65 liters.  Lung volumes show  normal TLC and elevated residual volume.  Diffusion shows reduced but falls within normal range when corrected for lung volumes.     Pulmonary functions show low vital capacity and low mvv with some air trapping,but rest is wnl. Clinical correlation recommended.     Tayo Jordan M.D.               Plan:  Clinical impression is apparently straight forward and impression with management as below.    Kathy was seen today for annual appt..    Diagnoses and all orders for this visit:    Chronic respiratory failure with hypoxia, on home O2 therapy  -     oseltamivir (TAMIFLU) 75 MG capsule; Take 1 capsule (75 mg total) by mouth 2 (two) times daily. for 5 days    COPD mixed type  -     budesonide-formoterol 160-4.5 mcg (SYMBICORT) 160-4.5 mcg/actuation HFAA; Inhale 2 puffs into the lungs every 12 (twelve) hours.  -     oseltamivir (TAMIFLU) 75 MG capsule; Take 1 capsule (75 mg total) by mouth 2 (two) times daily. for 5 days  -      azithromycin (ZITHROMAX) 500 MG tablet; One daily for yellow mucous, repeat if needed    Anxiety  Comments:  Controlled, refill of Xanax given.  Orders:  -     ALPRAZolam (XANAX) 0.5 MG tablet; Take 1 tablet (0.5 mg total) by mouth 3 (three) times daily as needed for Anxiety.  -     oseltamivir (TAMIFLU) 75 MG capsule; Take 1 capsule (75 mg total) by mouth 2 (two) times daily. for 5 days    Gastroesophageal reflux disease without esophagitis  -     pantoprazole (PROTONIX) 40 MG tablet; Take 1 tablet (40 mg total) by mouth once daily.  -     oseltamivir (TAMIFLU) 75 MG capsule; Take 1 capsule (75 mg total) by mouth 2 (two) times daily. for 5 days              Follow up in about 1 year (around 1/28/2026).    Discussed with patient above for education the following:      Patient Instructions   Ct chest from sept viewed and nodules stable from 2023     Sleep apnea going  well    Use azithromycin if yellow mucous    Ct set up for September     Re check in a yr -- call prn    Flu vaccine recommended -- use tamiflu if flu.

## 2025-01-28 NOTE — PATIENT INSTRUCTIONS
Ct chest from sept viewed and nodules stable from 2023     Sleep apnea going  well    Use azithromycin if yellow mucous    Ct set up for September     Re check in a yr -- call prn    Flu vaccine recommended -- use tamiflu if flu.

## 2025-02-10 ENCOUNTER — OFFICE VISIT (OUTPATIENT)
Dept: UROLOGY | Facility: CLINIC | Age: 75
End: 2025-02-10
Payer: MEDICARE

## 2025-02-10 VITALS — BODY MASS INDEX: 27.03 KG/M2 | WEIGHT: 162.25 LBS | HEIGHT: 65 IN

## 2025-02-10 DIAGNOSIS — R82.90 ABNORMAL URINE ODOR: ICD-10-CM

## 2025-02-10 DIAGNOSIS — N32.81 OAB (OVERACTIVE BLADDER): Primary | ICD-10-CM

## 2025-02-10 LAB
BILIRUBIN, UA POC OHS: NEGATIVE
BLOOD, UA POC OHS: ABNORMAL
CLARITY, UA POC OHS: CLEAR
COLOR, UA POC OHS: YELLOW
GLUCOSE, UA POC OHS: NEGATIVE
KETONES, UA POC OHS: NEGATIVE
LEUKOCYTES, UA POC OHS: ABNORMAL
NITRITE, UA POC OHS: POSITIVE
PH, UA POC OHS: 7.5
POC RESIDUAL URINE VOLUME: 99 ML (ref 0–100)
PROTEIN, UA POC OHS: NEGATIVE
SPECIFIC GRAVITY, UA POC OHS: 1.01
UROBILINOGEN, UA POC OHS: 2

## 2025-02-10 PROCEDURE — 81001 URINALYSIS AUTO W/SCOPE: CPT | Mod: HCNC | Performed by: NURSE PRACTITIONER

## 2025-02-10 PROCEDURE — G2211 COMPLEX E/M VISIT ADD ON: HCPCS | Mod: HCNC,S$GLB,, | Performed by: NURSE PRACTITIONER

## 2025-02-10 PROCEDURE — 87088 URINE BACTERIA CULTURE: CPT | Mod: HCNC | Performed by: NURSE PRACTITIONER

## 2025-02-10 PROCEDURE — 87086 URINE CULTURE/COLONY COUNT: CPT | Mod: HCNC | Performed by: NURSE PRACTITIONER

## 2025-02-10 PROCEDURE — 99214 OFFICE O/P EST MOD 30 MIN: CPT | Mod: HCNC,S$GLB,, | Performed by: NURSE PRACTITIONER

## 2025-02-10 PROCEDURE — 99999 PR PBB SHADOW E&M-EST. PATIENT-LVL IV: CPT | Mod: PBBFAC,HCNC,, | Performed by: NURSE PRACTITIONER

## 2025-02-10 PROCEDURE — 1159F MED LIST DOCD IN RCRD: CPT | Mod: HCNC,CPTII,S$GLB, | Performed by: NURSE PRACTITIONER

## 2025-02-10 PROCEDURE — 51798 US URINE CAPACITY MEASURE: CPT | Mod: HCNC,S$GLB,, | Performed by: NURSE PRACTITIONER

## 2025-02-10 PROCEDURE — 87186 SC STD MICRODIL/AGAR DIL: CPT | Mod: HCNC | Performed by: NURSE PRACTITIONER

## 2025-02-10 PROCEDURE — 1126F AMNT PAIN NOTED NONE PRSNT: CPT | Mod: HCNC,CPTII,S$GLB, | Performed by: NURSE PRACTITIONER

## 2025-02-10 PROCEDURE — 1101F PT FALLS ASSESS-DOCD LE1/YR: CPT | Mod: HCNC,CPTII,S$GLB, | Performed by: NURSE PRACTITIONER

## 2025-02-10 PROCEDURE — 81003 URINALYSIS AUTO W/O SCOPE: CPT | Mod: QW,HCNC,S$GLB, | Performed by: NURSE PRACTITIONER

## 2025-02-10 PROCEDURE — 3288F FALL RISK ASSESSMENT DOCD: CPT | Mod: HCNC,CPTII,S$GLB, | Performed by: NURSE PRACTITIONER

## 2025-02-10 PROCEDURE — 1160F RVW MEDS BY RX/DR IN RCRD: CPT | Mod: HCNC,CPTII,S$GLB, | Performed by: NURSE PRACTITIONER

## 2025-02-10 RX ORDER — CEPHALEXIN 500 MG/1
500 CAPSULE ORAL EVERY 12 HOURS
Qty: 10 CAPSULE | Refills: 0 | Status: SHIPPED | OUTPATIENT
Start: 2025-02-10 | End: 2025-02-13 | Stop reason: ALTCHOICE

## 2025-02-10 RX ORDER — SOLIFENACIN SUCCINATE 10 MG/1
10 TABLET, FILM COATED ORAL DAILY
Qty: 90 TABLET | Refills: 3 | Status: SHIPPED | OUTPATIENT
Start: 2025-02-10

## 2025-02-10 NOTE — PATIENT INSTRUCTIONS
Conservative measures to control urgency and frequency including   1. Avoiding/minimizing bladder irritants (see below), especially in afternoon and evening hours    Discussed bladder irritants include coffee (even decaf), tea, alcohol, soda, spicy foods, acidic juices (orange, tomato), vinegar, and artificial sweeteners/sugary beverages.    2. timed voiding - empty on a schedule (approx ~2-3 hours) in spite of need to urinate, to get ahead of urge    3. dont postponing voiding - dont hold it on purpose     4. bowel regimen as distended bowel has extrinsic compressive effect on bladder.   - any or all of the following in any combination, titrate to soft daily bowel movement without pushing or straining  - colace/stool softener capsule - once to twice daily  - miralax - 1 capful daily to start, can increase to 2x daily (or decrease to 1/2 cap daily)  - increase dietary fibery  - fiber supplements, such as metamucil  - prunes, prune juice    5. INCREASE water intake    6. Stop fluids 2 hours before bed, and urinate just before bed      UTI precautions:  Avoid constipation.  Drink lots of water  Void every 2-3 hrs regardless of urge  Void soon after urge arises. Do not hold urine once urge occurs.  OVER THE COUNTER:  Utiva Cranberry supplement- need 36mg of proanthocyanidins (PAC) in supplement- helps to block bacteria from attaching to bladder wall.  D-mannose- 2 grams daily- blocks bacteria receptors  Probiotic with Lactobacillus  Continue Estrace- apply a pea sized amount to vagina 2x weekly at night.

## 2025-02-10 NOTE — PROGRESS NOTES
CHIEF COMPLAINT:    Mrs Seymour is a 74 y.o. female presenting for f/u OAB.    PRESENTING ILLNESS:    Kathy Seymour is a 74 y.o. female who presents for f/u OAB. Last clinic visit was 24.    Previously saw me for OAB in 2019 with h/o mixed incontinence UUI>JUD. H/o bladder lift (no mesh) with hysterectomy in . Was wearing 2 to 3 pads a day. . Was also having uti's about 1x a year. Symptomatic (dysuria, frequency). And also found to have mod blood in urine on dipstick. Family hx of stones. 50 pk yr hx of smoking but quit in . W/u included ctu 19 (nothing to explain MH). Cytology 19 was neg and cx was +. Cystoscopy on 19 revealed evidence of recent uti. Vaginal exam showed a rectocele. Referred to  who saw her on 7/10/19. Pt decided on conservative treatment. She had been written for myrbetriq but not covered. Then oxybutynin which was helping some.  So he increased it to oxybutynin 15mgXL. She f/u with ciarra on 9/10/19 and stated she had 60% improvement on 1/2 tab of oxybutynin 15mg.      She hasn't been seen by urology or gyn since. [facility] she is been essentially asymp having matic bacteriuria over the past year, initially infrequently, once every few months but now feels more frequently and now once every few weeks.  She has gone to urgent care a few times for foul-smelling urine.  No dysuria, no frequency no urgency. Her voided urine today with mod leuk and small blood. Cath urine looks cloudy. Sending. Residual only 40cc.      Uti symptoms: none. Foul smelling urine   UTI prevention tried in the past:   Cranberry No.   Drinking more fluid  No.  2 bottles of water and 2 cups of coffee  Timed voiding Yes - 2 to 3 hours and more than this someitme.   Prevention No.   UTI risk factors:    Previous bladder or vaginal surgery: Yes - hysterectomy and lift but no mesh, cystoscopy as above for .   Personal history of kidney stones: No. But strong family  hx.    Urinary Incontinence episodes: Yes - only if she waits a few hours to go to restroom.  OAB meds: She has been on oxybutynin in the past already and now on solfenacin 10mg once daily. Essentially works for her but dries her mouth. With cpap machine worse  Pads: Yes - pantyliners if she's goes out or to dinner.   Diabetes: No. A1c 5.9 3/2023  Daytime frequency: q 1-2 hours .  Drinks 2 c of coffee every day and coke sometimes  Bowel Incontinence episodes: No.   Sexually active: No.   Water intake: medium.   History of breast cancer or any other gyn cancer: No. Never tried estrace  Hysterectomy: Yes - metromenhorragia   Imaging:ctu 6/6/19 independently reviewed. Normal. No nydro. No stones. Ct chest 9/2022- half of L kidney seen. No hydro. No stones.   In and out catheter performed today: Yes    Catheterized PVR was 40mL.      Interval history 9/7/23  Pt presents today for f/u OAB, recurrent UTI  Did not start Gemtesa d/t cost  Still taking vesicare for OAB. Voiding every 1-2 hours. Wears panty liners if goes out for occasional UUI. + dry mouth from medication.  PVR 52 ml    No UTI's since last visit. Denies any UTI symptoms today in clinic.   Taking Utiva Cranberry and d mannose daily  Using estrace cream 2x per week  Good water intake    6/14/23 UD  No obstructive uropathy    Interval history 3/7/24  Myrbetriq and Gemtesa too expensive  Taking vesicare 5 mg for OAB  Wears liner, changes 2-3 per day. Occasional leakage.  Does have occasional urinary frequency and urgency  Denies dysuria, gross hematuria, flank pain, fever, chills, nausea or vomiting.  Pt c/o odor to urine and cloudy urine. Unsure if has UTI. Last UTI 6/6/23  Taking cranberry, d mannose, probiotic and using estrace 2-3 x per week as UTI prevention  UA today sm blood, sm leuk  PVR 46 ml    Occasional constipation, controlled with diet    Interval history 6/7/24  Taking vesicare 10 mg as ordered  No longer having urinary incontinence  Wear panty liner  as precaution only  + urinary frequency, not bothersome. Drinks plenty of water.   Denies dysuria, gross hematuria, flank pain, fever, chills, nausea or vomiting    Only 1 episode of constipation since last visit, will take stool softener as needed    Pt started uqora, 3 part supplement  (D mannose, probiotic, flush)  Helping prevent UTIs  No UTIs since last visit.  No UTI symptoms today    Interval history 2/10/25 (Caden NP)  Taking vesicare 10 mg as ordered for OAB  + urinary frequency, baseline, not bothersome  Rarely has UUI, only if holds too long after urge occurs  Wears 1-2 panty liners per day. Will change more frequently if having worsening urinary odor.    Bladder scan prior to voiding 99 ml  Pt voided 90 ml, adjusted PVR 9 ml  UA sm blood, + nitrite, tr leuk  C/o of ongoing urine odor. No other UTI symptoms  Denies dysuria, gross hematuria, flank pain, fever, chills, nausea or vomiting    Pt stopped using uqora d/t cost  Started Semaine urinary tract cleanse and protect  Taking d mannose  Using estrace cream 2 x per week       Previous urine cultures pasted here: Anticoagulation:  Yes - asa 81mg daily. +50 pack yr hx of smoking. +fam hx of stones.   Component       Urine Culture, Routine   Latest Ref Rng & Units           5/22/2023       Final report   4/14/2023       ESCHERICHIA COLI (A) . . .   3/14/2023      2:41 PM KLEBSIELLA OXYTOCA (A) . . .   3/14/2023      2:41 PM ESCHERICHIA COLI (A) . . .   7/25/2022       Final report   7/1/2019       ESCHERICHIA COLI (A) . . .   5/30/2019       ESCHERICHIA COLI . . .      5/30/19            E.coli, void: mod blood, asx but treated, cytology: neg  12/10/18          E.coli, void: n/a  10/20/17          No cx, void: nit+/2+bld/1+leuk  4/6/17              E.coli, void: tr bld  9/9/16              No cx, void:1+bld/nit+  9/4/13              No cx, void: 250 bld/wbc+  11/11/12          No cx, void: lrg bld/nit+/1+leuk  8/5/08              No cx, void: sml  blood/nit+         REVIEW OF SYSTEMS:    Review of Systems    Constitutional: Negative for fever and chills.   Gastrointestinal: Negative for nausea, vomiting  Genitourinary:  See above  Neurological: Negative for dizziness.   Psychiatric/Behavioral: Negative for confusion.     PATIENT HISTORY:    Past Medical History:   Diagnosis Date    Angina pectoris     Anticoagulant long-term use     Anxiety     Arthritis     Back pain     Cataract     Chronic bronchitis     Coronary artery disease     STENT X 2    COVID 6/10/2022    CTS (carpal tunnel syndrome)     RIGHT    Depression     MILLER (dyspnea on exertion) 2/21/2017    Hallux valgus, acquired, bilateral 1/19/2017    Hematuria     Hyperlipidemia     Hypertension     Hypertensive left ventricular hypertrophy, without heart failure 5/22/2017    Hypothyroidism     Obesity     Polyneuropathy     S/P coronary artery stent placement, 2 LETTY 9/2012, 1 LETTY 9/2013 3/5/2013    Sleep apnea     USES CPAP    Thyroid disease     Tobacco dependence     Trouble in sleeping     Urinary incontinence     Wears glasses     ONE CONTAC       Past Surgical History:   Procedure Laterality Date    APPENDECTOMY      BILATERAL SALPINGOOPHORECTOMY      CARDIAC CATHETERIZATION      CORONARY ANGIOPLASTY      CORONARY STENT PLACEMENT      PCI  of the LAD with LETTY    EYE SURGERY      bilat cataract removal    HYSTERECTOMY      complete    OOPHORECTOMY      TONSILLECTOMY     PHYSICAL EXAMINATION:    Constitutional: She is oriented to person, place, and time. She appears well-developed and well-nourished.  She is in no apparent distress.    Abdominal:  She exhibits no distension.  There is no CVA tenderness.     Neurological: She is alert and oriented to person, place, and time.     Psych: Cooperative with normal affect.    Physical Exam    LABS:      Lab Results   Component Value Date    CREATININE 0.9 11/06/2024       IMPRESSION:    Encounter Diagnoses   Name Primary?    OAB (overactive bladder) Yes     Abnormal urine odor      PLAN:  -Continue vesicare 10 mg as ordered for OAB, refilled to pharmacy  -Continue conservative measures to control urgency and frequency including   1. Avoiding/minimizing bladder irritants (see below), especially in afternoon and evening hours  Discussed bladder irritants include coffee (even decaf), tea, alcohol, soda, spicy foods, acidic juices (orange, tomato), vinegar, and artificial sweeteners/sugary beverages.  2. timed voiding - empty on a schedule (approx ~2-3 hours) in spite of need to urinate, to get ahead of urge  3. dont postponing voiding - dont hold it on purpose  4. bowel regimen as distended bowel has extrinsic compressive effect on bladder.   - any or all of the following in any combination, titrate to soft daily bowel movement without pushing or straining  - colace/stool softener capsule - once to twice daily  - miralax - 1 capful daily to start, can increase to 2x daily (or decrease to 1/2 cap daily)  - increase dietary fibery  - fiber supplements, such as metamucil  - prunes, prune juice  5. INCREASE water intake  6. Stop fluids 2 hours before bed, and urinate just before bed    -Continue UTI prevention  Avoid constipation.  Drink lots of water  Void every 2-3 hrs regardless of urge  Void soon after urge arises. Do not hold urine once urge occurs.  Continue UTI prevention supplements and estrace cream as ordered    -Pt c/o ongoing odor to urine. No other UTI symptoms. Discussed that typically we do not treat for UTI based on odor alone but pt request to have urine checked.  Urine sent for micro UA and culture  Start keflex based on POCT UA results  Discussed side effects and indications for keflex. Prescription sent to the pharmacy. Pt verbalized understanding.  Will adjust antibiotic based on culture results if needed    -RTC 3 months    I encouraged her or any of her family members to call or email me with questions and/or concerns.    Visit today is associated  with current or anticipated ongoing medical care related to this patient's single serious condition/complex condition, OAB, recurrent UTI      30 minutes of total time spent on the encounter, which includes face to face time and non-face to face time preparing to see the patient (eg, review of tests), Obtaining and/or reviewing separately obtained history, Documenting clinical information in the electronic or other health record, Independently interpreting results (not separately reported) and communicating results to the patient/family/caregiver, or Care coordination (not separately reported).

## 2025-02-11 LAB
MICROSCOPIC COMMENT: NORMAL
SQUAMOUS #/AREA URNS AUTO: 3 /HPF

## 2025-02-12 LAB — BACTERIA UR CULT: ABNORMAL

## 2025-02-13 ENCOUNTER — TELEPHONE (OUTPATIENT)
Dept: UROLOGY | Facility: CLINIC | Age: 75
End: 2025-02-13
Payer: MEDICARE

## 2025-02-13 RX ORDER — CEFUROXIME AXETIL 250 MG/1
250 TABLET ORAL 2 TIMES DAILY
Qty: 10 TABLET | Refills: 0 | Status: SHIPPED | OUTPATIENT
Start: 2025-02-13 | End: 2025-02-18

## 2025-02-13 NOTE — TELEPHONE ENCOUNTER
Spoke with pt regarding urine culture results  + UTI  Pt states foul odor has improved with keflex  Since organism is resistant to cefazolin, instructed to stop keflex and start ceftin  Discussed side effects and indications for ceftin. Prescription sent to the pharmacy. Pt verbalized understanding.  Pt has allergy to macrobid  Pt takes cymbalta and has potential interaction with cipro  F/u as scheduled

## 2025-02-25 ENCOUNTER — TELEPHONE (OUTPATIENT)
Dept: CARDIOLOGY | Facility: CLINIC | Age: 75
End: 2025-02-25
Payer: MEDICARE

## 2025-02-25 NOTE — TELEPHONE ENCOUNTER
----- Message from Chikis sent at 2/25/2025  1:14 PM CST -----  Type:  Sooner Appointment RequestCaller is requesting a sooner appointment.  Caller declined first available appointment listed below.  Caller will not accept being placed on the waitlist and is requesting a message be sent to doctor.Name of Caller: PT When is the first available appointment?None Symptoms:6 moth checkupWould the patient rather a call back or a response via [facility]? [facility] Call Back Number [phone] Information:  Please call back to advise. Thank you!

## 2025-03-10 DIAGNOSIS — E05.90 HYPERTHYROIDISM: ICD-10-CM

## 2025-03-10 RX ORDER — METHIMAZOLE 5 MG/1
5 TABLET ORAL
Qty: 90 TABLET | Refills: 0 | Status: SHIPPED | OUTPATIENT
Start: 2025-03-10

## 2025-03-13 ENCOUNTER — OFFICE VISIT (OUTPATIENT)
Dept: CARDIOLOGY | Facility: CLINIC | Age: 75
End: 2025-03-13
Payer: MEDICARE

## 2025-03-13 VITALS
WEIGHT: 160.94 LBS | HEART RATE: 73 BPM | BODY MASS INDEX: 26.81 KG/M2 | OXYGEN SATURATION: 95 % | DIASTOLIC BLOOD PRESSURE: 62 MMHG | SYSTOLIC BLOOD PRESSURE: 120 MMHG | HEIGHT: 65 IN

## 2025-03-13 DIAGNOSIS — I10 HYPERTENSION, UNSPECIFIED TYPE: ICD-10-CM

## 2025-03-13 DIAGNOSIS — I25.10 CORONARY ARTERY DISEASE INVOLVING NATIVE CORONARY ARTERY OF NATIVE HEART WITHOUT ANGINA PECTORIS: Primary | ICD-10-CM

## 2025-03-13 DIAGNOSIS — J44.9 COPD MIXED TYPE: ICD-10-CM

## 2025-03-13 DIAGNOSIS — Z95.5 S/P CORONARY ARTERY STENT PLACEMENT: ICD-10-CM

## 2025-03-13 DIAGNOSIS — N18.31 CHRONIC KIDNEY DISEASE, STAGE 3A: ICD-10-CM

## 2025-03-13 DIAGNOSIS — E78.2 MIXED HYPERLIPIDEMIA: ICD-10-CM

## 2025-03-13 DIAGNOSIS — I70.0 AORTIC ATHEROSCLEROSIS: ICD-10-CM

## 2025-03-13 PROCEDURE — 3074F SYST BP LT 130 MM HG: CPT | Mod: CPTII,S$GLB,, | Performed by: INTERNAL MEDICINE

## 2025-03-13 PROCEDURE — 3288F FALL RISK ASSESSMENT DOCD: CPT | Mod: CPTII,S$GLB,, | Performed by: INTERNAL MEDICINE

## 2025-03-13 PROCEDURE — 1159F MED LIST DOCD IN RCRD: CPT | Mod: CPTII,S$GLB,, | Performed by: INTERNAL MEDICINE

## 2025-03-13 PROCEDURE — 3078F DIAST BP <80 MM HG: CPT | Mod: CPTII,S$GLB,, | Performed by: INTERNAL MEDICINE

## 2025-03-13 PROCEDURE — 99999 PR PBB SHADOW E&M-EST. PATIENT-LVL IV: CPT | Mod: PBBFAC,,, | Performed by: INTERNAL MEDICINE

## 2025-03-13 PROCEDURE — 1126F AMNT PAIN NOTED NONE PRSNT: CPT | Mod: CPTII,S$GLB,, | Performed by: INTERNAL MEDICINE

## 2025-03-13 PROCEDURE — 99214 OFFICE O/P EST MOD 30 MIN: CPT | Mod: S$GLB,,, | Performed by: INTERNAL MEDICINE

## 2025-03-13 PROCEDURE — 1160F RVW MEDS BY RX/DR IN RCRD: CPT | Mod: CPTII,S$GLB,, | Performed by: INTERNAL MEDICINE

## 2025-03-13 PROCEDURE — 1101F PT FALLS ASSESS-DOCD LE1/YR: CPT | Mod: CPTII,S$GLB,, | Performed by: INTERNAL MEDICINE

## 2025-03-13 NOTE — PROGRESS NOTES
Patient ID:  Kathy Seymour is a 74 y.o. female who presents for follow-up of Coronary Artery Disease, Hyperlipidemia, and Hypertension      Coronary artery disease involving native coronary artery of native heart without angina pectoris  No symptoms of angina.  Status post percutaneous revascularization of the LAD on 2012 and 2013.  The right coronary and the circumflex were within normal limits     Aortic atherosclerosis  On cholesterol-lowering agent     Hyperlipidemia, baseline   On 80 mg of atorvastatin last LDL cholesterol 44     Hypertension, onset before 1995  Controlled on losartan /25, metoprolol     10/08/2024  She has pain in the middle of the chest she drinks a carbonated drink and then burred with total resolution of symptoms.  She had similar symptoms back in 2022 at that time Dr. Paredes perform an EGD and dilated her esophagus her symptoms resolved.  At that time she was having dysphagia to rice and she is doing the same thing now she has difficulty swallowing rice.  He is going to be seen Dr. Moore tomorrow.  03/13/2025  She had an EGD performed her esophagus was stretched.  She had been doing better until yesterday she ate some cucumbers and had some discomfort she had a carbonated drink and had relief of her symptoms.  She also has occasional dyspnea on exertion.          Past Medical History:   Diagnosis Date    Angina pectoris     Anticoagulant long-term use     Anxiety     Arthritis     Back pain     Cataract     Chronic bronchitis     Coronary artery disease     STENT X 2    COVID 6/10/2022    CTS (carpal tunnel syndrome)     RIGHT    Depression     MILLER (dyspnea on exertion) 2/21/2017    Hallux valgus, acquired, bilateral 1/19/2017    Hematuria     Hyperlipidemia     Hypertension     Hypertensive left ventricular hypertrophy, without heart failure 5/22/2017    Hypothyroidism     Obesity     Polyneuropathy     S/P coronary artery stent placement, 2 LETTY 9/2012, 1 LETTY  9/2013 3/5/2013    Sleep apnea     USES CPAP    Thyroid disease     Tobacco dependence     Trouble in sleeping     Urinary incontinence     Wears glasses     ONE CONTAC        Past Surgical History:   Procedure Laterality Date    APPENDECTOMY      BILATERAL SALPINGOOPHORECTOMY      CARDIAC CATHETERIZATION      CORONARY ANGIOPLASTY      CORONARY STENT PLACEMENT      PCI  of the LAD with LETTY    EYE SURGERY      bilat cataract removal    HYSTERECTOMY      complete    OOPHORECTOMY      TONSILLECTOMY            Current Outpatient Medications   Medication Instructions    albuterol (VENTOLIN HFA) 90 mcg/actuation inhaler 2 puffs, Inhalation, Every 6 hours PRN, Rescue    ALPRAZolam (XANAX) 0.5 mg, Oral, 3 times daily PRN    aspirin 81 mg, Oral, Daily    atorvastatin (LIPITOR) 80 MG tablet TAKE 1 TABLET EVERY DAY    budesonide-formoterol 160-4.5 mcg (SYMBICORT) 160-4.5 mcg/actuation HFAA 2 puffs, Inhalation, Every 12 hours    cetirizine (ZYRTEC) 10 mg, Oral, Daily    DULoxetine (CYMBALTA) 30 MG capsule TAKE 1 CAPSULE EVERY DAY IN ADDITION TO 60MG TO TOTAL 90MG EVERY DAY    DULoxetine (CYMBALTA) 60 MG capsule TAKE 1 CAPSULE EVERY DAY    estradioL (ESTRACE) 1 g, Vaginal, Daily, Apply1 gram up to second knuckle. Apply nightly for 2 weeks then every other night.    fluticasone propionate (FLONASE) 50 mcg, Each Nostril, Daily    losartan-hydrochlorothiazide 100-25 mg (HYZAAR) 100-25 mg per tablet TAKE 1 TABLET EVERY DAY    methIMAzole (TAPAZOLE) 5 mg, Oral    metoprolol succinate (TOPROL-XL) 25 mg, Oral    pantoprazole (PROTONIX) 40 mg, Oral, Daily    potassium chloride (MICRO-K) 10 MEQ CpSR 20 mEq, Oral    solifenacin (VESICARE) 10 mg, Oral, Daily        Review of patient's allergies indicates:   Allergen Reactions    Macrobid [nitrofurantoin monohyd/m-cryst] Other (See Comments)     Fever and body aches.         Review of Systems   Cardiovascular:  Negative for chest pain, dyspnea on exertion and palpitations.   Respiratory:   "Negative for cough and shortness of breath.         Objective:     Vitals:    03/13/25 1432   BP: 120/62   BP Location: Left arm   Patient Position: Sitting   Pulse: 73   SpO2: 95%   Weight: 73 kg (160 lb 15 oz)   Height: 5' 5" (1.651 m)       Physical Exam  Vitals and nursing note reviewed.   Constitutional:       Appearance: She is well-developed.   HENT:      Head: Normocephalic and atraumatic.   Eyes:      Conjunctiva/sclera: Conjunctivae normal.   Cardiovascular:      Rate and Rhythm: Normal rate and regular rhythm.      Heart sounds: Normal heart sounds.   Pulmonary:      Effort: Pulmonary effort is normal.      Breath sounds: Normal breath sounds.   Abdominal:      General: Bowel sounds are normal.      Palpations: Abdomen is soft.   Musculoskeletal:         General: Normal range of motion.   Skin:     General: Skin is warm and dry.   Neurological:      Mental Status: She is alert and oriented to person, place, and time.   Psychiatric:         Behavior: Behavior normal.         Thought Content: Thought content normal.         Judgment: Judgment normal.       CMP  Sodium   Date Value Ref Range Status   11/06/2024 143 136 - 145 mmol/L Final     Potassium   Date Value Ref Range Status   11/06/2024 4.1 3.5 - 5.1 mmol/L Final     Chloride   Date Value Ref Range Status   11/06/2024 105 95 - 110 mmol/L Final     CO2   Date Value Ref Range Status   11/06/2024 28 23 - 29 mmol/L Final     Glucose   Date Value Ref Range Status   11/06/2024 111 (H) 70 - 110 mg/dL Final     BUN   Date Value Ref Range Status   11/06/2024 18 8 - 23 mg/dL Final     Creatinine   Date Value Ref Range Status   11/06/2024 0.9 0.5 - 1.4 mg/dL Final   09/14/2012 0.7 0.2 - 1.4 mg/dl Final     Calcium   Date Value Ref Range Status   11/06/2024 9.4 8.7 - 10.5 mg/dL Final   09/14/2012 8.8 8.6 - 10.2 mg/dl Final     Total Protein   Date Value Ref Range Status   11/06/2024 7.1 6.0 - 8.4 g/dL Final     Albumin   Date Value Ref Range Status   11/06/2024 " 4.0 3.5 - 5.2 g/dL Final     Total Bilirubin   Date Value Ref Range Status   11/06/2024 0.6 0.1 - 1.0 mg/dL Final     Comment:     For infants and newborns, interpretation of results should be based  on gestational age, weight and in agreement with clinical  observations.    Premature Infant recommended reference ranges:  Up to 24 hours.............<8.0 mg/dL  Up to 48 hours............<12.0 mg/dL  3-5 days..................<15.0 mg/dL  6-29 days.................<15.0 mg/dL       Alkaline Phosphatase   Date Value Ref Range Status   11/06/2024 96 40 - 150 U/L Final   09/14/2012 68 23 - 119 UNIT/L Final     AST   Date Value Ref Range Status   11/06/2024 22 10 - 40 U/L Final   09/14/2012 16 10 - 30 UNIT/L Final     ALT   Date Value Ref Range Status   11/06/2024 17 10 - 44 U/L Final     Anion Gap   Date Value Ref Range Status   11/06/2024 10 8 - 16 mmol/L Final   09/14/2012 7 5 - 15 meq/L Final     eGFR if    Date Value Ref Range Status   08/25/2021 >60.0 >60 mL/min/1.73 m^2 Final     eGFR if non    Date Value Ref Range Status   08/25/2021 56.4 (A) >60 mL/min/1.73 m^2 Final     Comment:     Calculation used to obtain the estimated glomerular filtration  rate (eGFR) is the CKD-EPI equation.         BMP  Lab Results   Component Value Date     11/06/2024    K 4.1 11/06/2024     11/06/2024    CO2 28 11/06/2024    BUN 18 11/06/2024    CREATININE 0.9 11/06/2024    CALCIUM 9.4 11/06/2024    ANIONGAP 10 11/06/2024    ESTGFRAFRICA >60.0 08/25/2021    EGFRNONAA 56.4 (A) 08/25/2021      BNP  @LABRCNTIP(BNP,BNPTRIAGEBLO)@   Lab Results   Component Value Date    CHOL 112 (L) 11/06/2024    CHOL 109 (L) 09/27/2023    CHOL 115 (L) 03/23/2023     Lab Results   Component Value Date    HDL 56 11/06/2024    HDL 54 09/27/2023    HDL 39 (L) 03/23/2023     Lab Results   Component Value Date    LDLCALC 46.4 (L) 11/06/2024    LDLCALC 44.2 (L) 09/27/2023    LDLCALC 58.2 (L) 03/23/2023     Lab Results    Component Value Date    TRIG 48 11/06/2024    TRIG 54 09/27/2023    TRIG 89 03/23/2023     Lab Results   Component Value Date    CHOLHDL 50.0 11/06/2024    CHOLHDL 49.5 09/27/2023    CHOLHDL 33.9 03/23/2023      Lab Results   Component Value Date    TSH 2.376 11/06/2024    A7VBTIZ 135 04/24/2018    G5TZNWA 7.3 08/02/2012    FREET4 0.89 05/20/2024     Lab Results   Component Value Date    HGBA1C 5.6 11/06/2024     Lab Results   Component Value Date    WBC 7.34 11/06/2024    HGB 12.0 11/06/2024    HCT 38.2 11/06/2024    MCV 94 11/06/2024     11/06/2024         Results for orders placed during the hospital encounter of 01/11/21    Echo Color Flow Doppler? Yes    Interpretation Summary  · The left ventricle is normal in size with concentric remodeling and normal systolic function. The estimated ejection fraction is 65%  · Grade I left ventricular diastolic dysfunction.  · Normal right ventricular size with normal right ventricular systolic function.  · Mild tricuspid regurgitation.  · Normal central venous pressure (3 mmHg).  · The estimated PA systolic pressure is 24 mmHg.  · Overall the study quality was technically difficult     No results found for this or any previous visit.     EKG  Results for orders placed or performed in visit on 10/08/24   IN OFFICE EKG 12-LEAD (to [facility])    Collection Time: 10/08/24  3:06 PM   Result Value Ref Range    QRS Duration 80 ms    [facility] QTC Calculation 425 ms    Narrative    Test Reason : I25.10,    Vent. Rate : 073 BPM     Atrial Rate : 073 BPM     P-R Int : 184 ms          QRS Dur : 080 ms      QT Int : 386 ms       P-R-T Axes : 065 041 066 degrees     QTc Int : 425 ms    Normal sinus rhythm  Normal ECG  When compared with ECG of 15-OCT-2019 13:25,  No significant change was found  Confirmed by Sagar Chavarria MD ([id number]) on 11/11/2024 12:57:01 PM    Referred By:  BETY           Confirmed By:Sagar Chavarria MD      Stress  Results for orders placed during the hospital  encounter of 01/11/21    Nuclear Stress - Cardiology Interpreted    Interpretation Summary    Normal myocardial perfusion scan. There is no evidence of myocardial ischemia or infarction.    The gated perfusion images showed an ejection fraction of 93% post stress. Normal ejection fraction is greater than 53%.    There is normal wall motion post stress.    LV cavity size is  and normal at stress.    The EKG portion of this study is negative for ischemia.    The patient reported no chest pain during the stress test.    There were no arrhythmias during stress.             Assessment:       Coronary artery disease involving native coronary artery of native heart without angina pectoris  She has coronary artery disease stent placed in the proximal LAD.  She is having some symptoms of chest discomfort will perform a pharmacological myocardial perfusion scan.  To evaluate for myocardial ischemia    Hyperlipidemia, baseline   Her cholesterol is well controlled on atorvastatin.  Continue the medication    S/P coronary artery stent placement, 2 LETTY 9/2012, 1 LETTY 9/2013  Will perform a walking pharmacological myocardial perfusion scan to rule out ischemia    Chronic kidney disease, stage 3a  Stable    Aortic atherosclerosis  On 80 mg of atorvastatin       Plan:       Perform a walking pharmacological myocardial perfusion scan to rule out ischemia.  Obtain an echocardiogram to assess left ventricular function diastolic function as well as valves.  She will be seen in the office in a proximally 2 month.

## 2025-03-18 NOTE — ASSESSMENT & PLAN NOTE
She has coronary artery disease stent placed in the proximal LAD.  She is having some symptoms of chest discomfort will perform a pharmacological myocardial perfusion scan.  To evaluate for myocardial ischemia

## 2025-03-29 ENCOUNTER — OFFICE VISIT (OUTPATIENT)
Dept: URGENT CARE | Facility: CLINIC | Age: 75
End: 2025-03-29
Payer: MEDICARE

## 2025-03-29 VITALS
OXYGEN SATURATION: 97 % | WEIGHT: 160 LBS | DIASTOLIC BLOOD PRESSURE: 75 MMHG | HEIGHT: 65 IN | SYSTOLIC BLOOD PRESSURE: 146 MMHG | TEMPERATURE: 98 F | HEART RATE: 85 BPM | BODY MASS INDEX: 26.66 KG/M2 | RESPIRATION RATE: 18 BRPM

## 2025-03-29 DIAGNOSIS — J02.9 SORE THROAT: Primary | ICD-10-CM

## 2025-03-29 LAB
CTP QC/QA: YES
S PYO RRNA THROAT QL PROBE: NEGATIVE

## 2025-03-29 PROCEDURE — 99214 OFFICE O/P EST MOD 30 MIN: CPT | Mod: S$GLB,,,

## 2025-03-29 RX ORDER — LIDOCAINE HYDROCHLORIDE 20 MG/ML
15 SOLUTION OROPHARYNGEAL ONCE
Status: COMPLETED | OUTPATIENT
Start: 2025-03-29 | End: 2025-03-29

## 2025-03-29 RX ORDER — ALUMINUM HYDROXIDE, MAGNESIUM HYDROXIDE, AND SIMETHICONE 1200; 120; 1200 MG/30ML; MG/30ML; MG/30ML
30 SUSPENSION ORAL
Status: COMPLETED | OUTPATIENT
Start: 2025-03-29 | End: 2025-03-29

## 2025-03-29 RX ORDER — MONTELUKAST SODIUM 10 MG/1
10 TABLET ORAL NIGHTLY
Qty: 30 TABLET | Refills: 0 | Status: SHIPPED | OUTPATIENT
Start: 2025-03-29 | End: 2025-04-28

## 2025-03-29 RX ORDER — PREDNISONE 20 MG/1
20 TABLET ORAL 2 TIMES DAILY
Qty: 10 TABLET | Refills: 0 | Status: SHIPPED | OUTPATIENT
Start: 2025-03-29 | End: 2025-04-03

## 2025-03-29 RX ADMIN — ALUMINUM HYDROXIDE, MAGNESIUM HYDROXIDE, AND SIMETHICONE 30 ML: 1200; 120; 1200 SUSPENSION ORAL at 11:03

## 2025-03-29 RX ADMIN — LIDOCAINE HYDROCHLORIDE 15 ML: 20 SOLUTION OROPHARYNGEAL at 11:03

## 2025-03-29 NOTE — PROGRESS NOTES
"Subjective:      Patient ID: Kathy Seymour is a 74 y.o. female.    Vitals:  height is 5' 5" (1.651 m) and weight is 72.6 kg (160 lb). Her oral temperature is 98.3 °F (36.8 °C). Her blood pressure is 146/75 (abnormal) and her pulse is 85. Her respiration is 18 and oxygen saturation is 97%.     Chief Complaint: Sore Throat (/)    In clinic complaint of postnasal drip, sore throat that started yesterday.  Yesterday started off as a scratchy throat that went away after breakfast.  Today symptoms have been constant.  She has been around ill contacts.  She denies fever.  She also reports history of hay fever.  She has tried [place] for symptom management.  Denies chest pain or shortness for breath.  She also reports a history of esophageal stricture with recent dilation a few months ago.    Sore Throat   This is a new problem. The current episode started yesterday. The problem has been unchanged. There has been no fever. The pain is at a severity of 2/10. The pain is mild. Associated symptoms include congestion, coughing, a hoarse voice and trouble swallowing.       Constitution: Negative.   HENT:  Positive for congestion, postnasal drip, sore throat and trouble swallowing.    Neck: neck negative.   Cardiovascular: Negative.    Respiratory:  Positive for cough.    Gastrointestinal:  Positive for heartburn.   Endocrine: negative.   Skin: Negative.    Allergic/Immunologic: Positive for environmental allergies and seasonal allergies.   Hematologic/Lymphatic: Negative.    Psychiatric/Behavioral: Negative.        Objective:     Physical Exam   Constitutional: She is oriented to person, place, and time. She is cooperative.  Non-toxic appearance. She does not appear ill. No distress.   HENT:   Head: Normocephalic and atraumatic.   Ears:   Right Ear: External ear normal.   Left Ear: External ear normal.   Nose: Nose normal.   Mouth/Throat: Uvula is midline and mucous membranes are normal. Mucous membranes are moist. " Posterior oropharyngeal erythema present. No oropharyngeal exudate. Oropharynx is clear.   Eyes: Conjunctivae and lids are normal. Pupils are equal, round, and reactive to light. Extraocular movement intact   Neck: Trachea normal and phonation normal. Neck supple.   Cardiovascular: Normal rate, regular rhythm, normal heart sounds and normal pulses.   Pulmonary/Chest: Effort normal and breath sounds normal. No stridor. She has no wheezes. She has no rhonchi. She has no rales.   Abdominal: Normal appearance. Soft. flat abdomen There is no abdominal tenderness.   Musculoskeletal: Normal range of motion.         General: Normal range of motion.   Lymphadenopathy:     She has no cervical adenopathy.   Neurological: no focal deficit. She is alert, oriented to person, place, and time and at baseline. She has normal motor skills, normal sensation and intact cranial nerves (2-12). Gait and coordination normal. GCS eye subscore is 4. GCS verbal subscore is 5. GCS motor subscore is 6.   Skin: Skin is warm, dry and not diaphoretic. Capillary refill takes 2 to 3 seconds.   Psychiatric: She experiences Normal attention and Normal perception. Her speech is normal and behavior is normal. Mood, judgment and thought content normal.   Nursing note and vitals reviewed.      Assessment:     1. Sore throat        Plan:       Sore throat  -     POCT rapid strep A  -     LIDOcaine viscous HCl 2% oral solution 15 mL  -     aluminum-magnesium hydroxide-simethicone 200-200-20 mg/5 mL suspension 30 mL  -     montelukast (SINGULAIR) 10 mg tablet; Take 1 tablet (10 mg total) by mouth every evening.  Dispense: 30 tablet; Refill: 0  -     predniSONE (DELTASONE) 20 MG tablet; Take 1 tablet (20 mg total) by mouth 2 (two) times daily. for 5 days  Dispense: 10 tablet; Refill: 0  -     diphenhydrAMINE-aluminum-magnesium hydroxide-simethicone-LIDOcaine viscous HCl 2%; Swish and swallow 10 mLs every 4 (four) hours as needed (Sore throat).  Dispense: 120  each; Refill: 0          Medical Decision Making:   Initial Assessment:   Rapid strep negative.  Centor low risk.  Suspect viral versus allergic etiology.  Symptoms were waxing and waning , but now constant.  May be related to postnasal drip, or GERD.  She does have a history of GERD.  Yesterday was having belching acquired her to drink Coke, and [place] to manage symptoms.  She is on Protonix.  She takes once a day.  I have encouraged her to take this twice a day and 45 minutes prior to meals.  Also educated her on any medication use, as well as return and follow up instructions for failure to improve.

## 2025-03-30 ENCOUNTER — OFFICE VISIT (OUTPATIENT)
Dept: URGENT CARE | Facility: CLINIC | Age: 75
End: 2025-03-30
Payer: MEDICARE

## 2025-03-30 VITALS
HEART RATE: 70 BPM | TEMPERATURE: 99 F | WEIGHT: 160 LBS | BODY MASS INDEX: 26.66 KG/M2 | HEIGHT: 65 IN | OXYGEN SATURATION: 99 % | SYSTOLIC BLOOD PRESSURE: 128 MMHG | DIASTOLIC BLOOD PRESSURE: 73 MMHG | RESPIRATION RATE: 16 BRPM

## 2025-03-30 DIAGNOSIS — K12.1 STOMATITIS: ICD-10-CM

## 2025-03-30 DIAGNOSIS — J02.9 SORE THROAT: Primary | ICD-10-CM

## 2025-03-30 LAB
CTP QC/QA: YES
FLUAV AG NPH QL: NEGATIVE
FLUBV AG NPH QL: NEGATIVE
S PYO RRNA THROAT QL PROBE: NEGATIVE
SARS CORONAVIRUS 2 ANTIGEN: NEGATIVE

## 2025-03-30 PROCEDURE — 87804 INFLUENZA ASSAY W/OPTIC: CPT | Mod: QW,,, | Performed by: NURSE PRACTITIONER

## 2025-03-30 PROCEDURE — 87880 STREP A ASSAY W/OPTIC: CPT | Mod: QW,,, | Performed by: NURSE PRACTITIONER

## 2025-03-30 PROCEDURE — 87811 SARS-COV-2 COVID19 W/OPTIC: CPT | Mod: QW,S$GLB,, | Performed by: NURSE PRACTITIONER

## 2025-03-30 PROCEDURE — 99214 OFFICE O/P EST MOD 30 MIN: CPT | Mod: S$GLB,,, | Performed by: NURSE PRACTITIONER

## 2025-03-30 NOTE — PROGRESS NOTES
"Subjective:      Patient ID: Kathy Seymour is a 74 y.o. female.    Vitals:  height is 5' 5" (1.651 m) and weight is 72.6 kg (160 lb). Her temperature is 98.9 °F (37.2 °C). Her blood pressure is 128/73 and her pulse is 70. Her respiration is 16 and oxygen saturation is 99%.     Chief Complaint: Sore Throat    Pt. Stated she went to the [facility] yesterday and feels like she needed a covid test and did not receive one and she has a sore throat.     Sore Throat   This is a new problem. Episode onset: 3/28. Associated symptoms include headaches.       HENT:  Positive for sore throat.    Neurological:  Positive for headaches.      Objective:     Physical Exam   HENT:   Ears:   Right Ear: Tympanic membrane and ear canal normal.   Left Ear: Tympanic membrane and ear canal normal.   Mouth/Throat: Posterior oropharyngeal erythema present.      Comments: Ulcerations noted specifically to R side of posterior palate   Cardiovascular: Normal rate and regular rhythm.   Pulmonary/Chest: Effort normal and breath sounds normal.   Abdominal: Normal appearance.   Neurological: She is alert.   Nursing note and vitals reviewed.      Assessment:     1. Sore throat    2. Stomatitis        Plan:       Sore throat  -     SARS Coronavirus 2 Antigen, POCT Manual Read  -     POCT rapid strep A  -     POCT Influenza A/B Rapid Antigen    Stomatitis    Pt presents after being seen and treated yesterday. She has focal concern of covid due to being into contact with grandchildren and  with lung conditions. She is flu and covid (-). Also screened again and (-) for strep. She was provided singular and prednisone but did not  magic mouth wash. Today noted ulcerations to palate. Discussed can continue with OTC spray or lozenges or pay for mouthwash if no relief. Return precautions discussed.                 " Called TAP line for transfer to [facility]. Per Margarita, there are no beds available at [facility]. Dr. Lomeli updated. Contacting Dr. Regalado.

## 2025-03-31 ENCOUNTER — TELEPHONE (OUTPATIENT)
Dept: CARDIOLOGY | Facility: CLINIC | Age: 75
End: 2025-03-31
Payer: MEDICARE

## 2025-03-31 NOTE — TELEPHONE ENCOUNTER
----- Message from Juice sent at 3/31/2025  4:51 PM CDT -----  Type:  Needs Medical AdviceWho Called: ptWould the patient rather a call back or a response via [facility]? Call Haja Call Back Number: [phone] Additional Information: pt st she would like to talk to you. & kathia stress test. But talk to you first. please call to discuss

## 2025-04-01 ENCOUNTER — TELEPHONE (OUTPATIENT)
Dept: CARDIOLOGY | Facility: HOSPITAL | Age: 75
End: 2025-04-01

## 2025-04-01 ENCOUNTER — TELEPHONE (OUTPATIENT)
Dept: CARDIOLOGY | Facility: CLINIC | Age: 75
End: 2025-04-01
Payer: MEDICARE

## 2025-04-01 NOTE — TELEPHONE ENCOUNTER
Left message on voicemail.    Patient advised, test will be at [facility] ([address]  Will need to register on the first floor at the main entrance.  Patient advised that arrival time is 07:10.  Patient advised that they may be here about 3.5-4 hours, and may want to bring something to occupy their time, as there will be periods of waiting.    Patient advised, may take their medications prior to testing if you need to. Advised if medications are taken with food, please keep it light, like toast and juice.    Patient advised to avoid all caffeine 12 hours prior to testing.  This includes chocolate, decaf tea and coffee.    Will provide peanut butter crackers for a snack after stress test.  If patient would prefer something else, please bring a snack from home.    Wear comfortable clothing.  No lotions, oils, or powders to the upper chest area. May wear deodorant.    No metal jewelry, buttons, or zippers to the upper body.    Will send instructions via [employer] as well.

## 2025-04-01 NOTE — TELEPHONE ENCOUNTER
----- Message from Kb sent at 4/1/2025  2:08 PM CDT -----  Regarding: rt call  Type:  Patient Returning CallWho Called:ptWho Left Message for Patient:Beryl Lopez the patient know what this is regarding?:yesBest Call Back Number:[phone] Information:

## 2025-04-01 NOTE — TELEPHONE ENCOUNTER
----- Message from Kb sent at 4/1/2025  9:04 AM CDT -----  Regarding: advice  Type:  Needs Medical AdviceWho Called: Ranjit Call Back Number: [phone] Information: pt wants a callback to discuss some concerns about her upcoming stress test tomorrow.  please call to discuss.

## 2025-04-08 ENCOUNTER — OFFICE VISIT (OUTPATIENT)
Dept: OTOLARYNGOLOGY | Facility: CLINIC | Age: 75
End: 2025-04-08
Payer: MEDICARE

## 2025-04-08 ENCOUNTER — LAB VISIT (OUTPATIENT)
Dept: LAB | Facility: HOSPITAL | Age: 75
End: 2025-04-08
Attending: PHYSICIAN ASSISTANT
Payer: MEDICARE

## 2025-04-08 ENCOUNTER — TELEPHONE (OUTPATIENT)
Dept: OTOLARYNGOLOGY | Facility: CLINIC | Age: 75
End: 2025-04-08

## 2025-04-08 VITALS — WEIGHT: 156.5 LBS | HEIGHT: 65 IN | BODY MASS INDEX: 26.08 KG/M2

## 2025-04-08 DIAGNOSIS — K13.79 MOUTH SORES: ICD-10-CM

## 2025-04-08 DIAGNOSIS — K13.79 MOUTH SORES: Primary | ICD-10-CM

## 2025-04-08 DIAGNOSIS — K12.1 STOMATITIS: ICD-10-CM

## 2025-04-08 PROCEDURE — 36415 COLL VENOUS BLD VENIPUNCTURE: CPT | Mod: PO

## 2025-04-08 PROCEDURE — 87529 HSV DNA AMP PROBE: CPT | Performed by: PHYSICIAN ASSISTANT

## 2025-04-08 PROCEDURE — 99999 PR PBB SHADOW E&M-EST. PATIENT-LVL IV: CPT | Mod: PBBFAC,,, | Performed by: PHYSICIAN ASSISTANT

## 2025-04-08 NOTE — TELEPHONE ENCOUNTER
----- Message from Charo sent at 4/8/2025  4:38 PM CDT -----  Regarding: Meds  Contact: [phone]  Type:  Needs Medical AdviceWho Called: Anaid name and phone #:  [facility] - [phone] the patient rather a call back or a response via [facility]? CallBe Call Back Number: [phone] Information: Patient stated she was to have 2 rx sent to the pharm but hasnt been received..

## 2025-04-08 NOTE — PROGRESS NOTES
[facility]    Subjective:      Patient: Kathy Seymour Patient PCP: Jersey Seymour Jr., MD (Inactive)         :  1950     Sex:  female      MRN:  [id number]          Date of Visit: 2025      Chief Complaint: Oral ulcers    Patient ID: Kathy Seymour is a 74 y.o. female former smoker who presents to office for evaluation of oral ulcers. Pt had issues with sore throat 2025. She went to  and had rapid strep, influenza and covid testing negative. Pt states that she had developed ulcers in the back of her throat. She was started on singulair, magic mouthwash, prednisone 20mg BID x 5 days. She had temporary relief of her throat pain. She states she then developed pain on the sides and roof of her mouth extending to her lips. Pt states that she has chronic issues with dry mouth. She uses biotene mouth rinse as needed. She stays well-hydrated. Pt denies h/o HSV.      Past Medical History  She has a past medical history of Angina pectoris, Anticoagulant long-term use, Anxiety, Arthritis, Back pain, Cataract, Chronic bronchitis, Coronary artery disease, COVID, CTS (carpal tunnel syndrome), Depression, MILLER (dyspnea on exertion), Hallux valgus, acquired, bilateral, Hematuria, Hyperlipidemia, Hypertension, Hypertensive left ventricular hypertrophy, without heart failure, Hypothyroidism, Obesity, Polyneuropathy, S/P coronary artery stent placement, 2 LETTY 2012, 1 LETTY 2013, Sleep apnea, Thyroid disease, Tobacco dependence, Trouble in sleeping, Urinary incontinence, and Wears glasses.    Family History  Her family history includes Arthritis in her sister, sister, and son; Asthma in her son and son; Cancer in her sister; Depression in her mother; Diabetes in her mother; Heart disease in her brother; Heart failure in her father and mother; Hypertension in her father, mother, sister, and sister; No Known Problems in her brother and son.    Past Surgical History:   Procedure Laterality Date     "APPENDECTOMY      BILATERAL SALPINGOOPHORECTOMY      CARDIAC CATHETERIZATION      CORONARY ANGIOPLASTY      CORONARY STENT PLACEMENT      PCI  of the LAD with LETTY    EYE SURGERY      bilat cataract removal    HYSTERECTOMY      complete    OOPHORECTOMY      TONSILLECTOMY       Social History     Tobacco Use    Smoking status: Former     Current packs/day: 0.00     Average packs/day: 1 pack/day for 50.0 years (50.0 ttl pk-yrs)     Types: Cigarettes     Start date: 1964     Quit date: 2012     Years since quittin.6    Smokeless tobacco: Never   Substance and Sexual Activity    Alcohol use: Yes     Alcohol/week: 2.0 standard drinks of alcohol     Types: 2 Drinks containing 0.5 oz of alcohol per week     Comment: Social - vodka on wednesday    Drug use: No    Sexual activity: Not Currently     Partners: Male     Birth control/protection: None     Medications  She has a current medication list which includes the following prescription(s): albuterol, alprazolam, aspirin, atorvastatin, budesonide-formoterol 160-4.5 mcg, cetirizine, duloxetine, duloxetine, estradiol, fluticasone propionate, losartan-hydrochlorothiazide 100-25 mg, methimazole, metoprolol succinate, montelukast, pantoprazole, potassium chloride, solifenacin, diphenhydramine hcl, and benzocaine-menthol.    Review of patient's allergies indicates:   Allergen Reactions    Macrobid [nitrofurantoin monohyd/m-cryst] Other (See Comments)     Fever and body aches.      All medications, allergies, and past history have been reviewed.    Objective:      Vitals:      3/29/2025    11:22 AM 3/30/2025    10:29 AM 2025     9:31 AM   Vitals - 1 value per visit   SYSTOLIC 146 128    DIASTOLIC 75 73    Pulse 85 70    Temp 98.3 °F (36.8 °C) 98.9 °F (37.2 °C)    Resp 18 16    SPO2 97 % 99 %    Weight (lb) 160 160 156.53   Weight (kg) 72.576 72.576 71   Height 5' 5" (1.651 m) 5' 5" (1.651 m) 5' 5" (1.651 m)   BMI (Calculated) 26.6 26.6 26   Pain Score Two  Four "       Body surface area is 1.8 meters squared.  Physical Exam  Constitutional:       General: She is not in acute distress.     Appearance: Normal appearance. She is not ill-appearing.   HENT:      Head: Normocephalic and atraumatic.      Right Ear: Tympanic membrane, ear canal and external ear normal.      Left Ear: Tympanic membrane, ear canal and external ear normal.      Nose: Nose normal.      Mouth/Throat:      Lips: Pink. No lesions.      Mouth: Mucous membranes are moist. No oral lesions.      Tongue: No lesions.      Palate: No lesions.      Pharynx: Oropharynx is clear. Uvula midline. No pharyngeal swelling, oropharyngeal exudate, posterior oropharyngeal erythema or uvula swelling.      Tonsils: 0 on the right. 0 on the left.        Comments: S/p tonsillectomy.  Eyes:      General:         Right eye: No discharge.         Left eye: No discharge.      Extraocular Movements: Extraocular movements intact.      Conjunctiva/sclera: Conjunctivae normal.   Pulmonary:      Effort: Pulmonary effort is normal.   Neurological:      General: No focal deficit present.      Mental Status: She is alert and oriented to person, place, and time. Mental status is at baseline.   Psychiatric:         Mood and Affect: Mood normal.         Behavior: Behavior normal.         Thought Content: Thought content normal.         Judgment: Judgment normal.         Labs:  WBC   Date Value Ref Range Status   11/06/2024 7.34 3.90 - 12.70 K/uL Final     Eosinophil %   Date Value Ref Range Status   03/23/2023 3.6 0.0 - 8.0 % Final     Eos #   Date Value Ref Range Status   03/23/2023 0.3 0.0 - 0.5 K/uL Final     Platelets   Date Value Ref Range Status   11/06/2024 178 150 - 450 K/uL Final     Glucose   Date Value Ref Range Status   11/06/2024 111 (H) 70 - 110 mg/dL Final       All lab results, imaging results, and data have been reviewed.    Assessment:        ICD-10-CM ICD-9-CM   1. Mouth sores  K13.79 528.9   2. Stomatitis  K12.1 528.00             Plan:      Mouth sores/stomatitis  -     MELQUIADES Screen w/Reflex; Future; Expected date: 04/08/2025  -     benzocaine-menthoL 15-3.6 mg Lozg; 1 lozenge by Mucous Membrane route every 2 (two) hours as needed (oral pain).  Dispense: 18 lozenge; Refill: 1  -     (Magic mouthwash) 1:1:1 diphenhydrAMINE(Benadryl) 12.5mg/5ml liq, aluminum & magnesium hydroxide-simethicone (Maalox), LIDOcaine viscous 2%; Swish and spit 10 mLs every 4 (four) hours as needed (oral pain). As needed for oral pain  Dispense: 120 mL; Refill: 1  -     Herpes Simplex Virus 1&2 PCR Non-Blood Mouth    Pt has benign appearing canker sores. Will proceed with MELQUIADES and HSV testing to further assess.   Mix 1 tsp of salt into an 8oz. Glass water. Mix together: rinse, gargle and spit three times a day for 1 week.    Follow up in 2 weeks.

## 2025-04-08 NOTE — TELEPHONE ENCOUNTER
Called [facility] to verify if they received the Rx's. Spoke to Pharmacy personnel. They did receive them. The lozenges are OTC. He will put in the mouthwash now. He stated that it only came through part of the way on one screen. He did not see the rest of it until he clicked on it. Left message for pt that the Pharmacy now has the Rx. Thanks, Mimi

## 2025-04-08 NOTE — PATIENT INSTRUCTIONS
Disp Refills Start End MADHU   (Magic mouthwash) 1:1:1 diphenhydrAMINE(Benadryl) 12.5mg/5ml liq, aluminum & magnesium hydroxide-simethicone (Maalox), LIDOcaine viscous 2% 120 mL 1 4/8/2025 -- No   Sig - Route: Swish and spit 10 mLs every 4 (four) hours as needed (oral pain). As needed for oral pain - Swish & Spit      Disp Refills Start End MADHU   benzocaine-menthoL 15-3.6 mg Lozg 18 lozenge 1 4/8/2025 -- No   Sig - Route: 1 lozenge by Mucous Membrane route every 2 (two) hours as needed (oral pain). - Mucous Membrane     Mix 1 tsp of salt into an 8oz. Glass water. Mix together: rinse, gargle and spit three times a day for 1 week.    Follow up in 2 weeks.

## 2025-04-11 ENCOUNTER — TELEPHONE (OUTPATIENT)
Dept: CARDIOLOGY | Facility: HOSPITAL | Age: 75
End: 2025-04-11

## 2025-04-11 NOTE — TELEPHONE ENCOUNTER
Left message on voicemail.    Patient advised, test will be at [facility] ([address]).  Will need to register on the first floor at the main entrance.  Patient advised that arrival time is 06:40.  Patient advised that they may be here about 3.5-4 hours, and may want to bring something to occupy their time, as there will be periods of waiting.    Patient advised, may take their medications prior to testing if you need to. Advised if medications are taken with food, please keep it light, like toast and juice.    Patient advised to avoid all caffeine 12 hours prior to testing.  This includes chocolate, decaf tea and coffee.    Will provide peanut butter crackers for a snack after stress test.  If patient would prefer something else, please bring a snack from home.    Wear comfortable clothing.  No lotions, oils, or powders to the upper chest area. May wear deodorant.    No metal jewelry, buttons, or zippers to the upper body.  Advised to call the office if any questions.    Will send instructions via [employer] as well.

## 2025-04-14 ENCOUNTER — HOSPITAL ENCOUNTER (OUTPATIENT)
Dept: CARDIOLOGY | Facility: HOSPITAL | Age: 75
Discharge: HOME | End: 2025-04-14
Attending: INTERNAL MEDICINE
Payer: MEDICARE

## 2025-04-14 ENCOUNTER — HOSPITAL ENCOUNTER (OUTPATIENT)
Dept: RADIOLOGY | Facility: HOSPITAL | Age: 75
Discharge: HOME | End: 2025-04-14
Attending: INTERNAL MEDICINE
Payer: MEDICARE

## 2025-04-14 VITALS — WEIGHT: 156 LBS | HEIGHT: 65 IN | BODY MASS INDEX: 25.99 KG/M2

## 2025-04-14 DIAGNOSIS — I10 HYPERTENSION, UNSPECIFIED TYPE: ICD-10-CM

## 2025-04-14 DIAGNOSIS — I25.10 CORONARY ARTERY DISEASE INVOLVING NATIVE CORONARY ARTERY OF NATIVE HEART WITHOUT ANGINA PECTORIS: ICD-10-CM

## 2025-04-14 DIAGNOSIS — E78.2 MIXED HYPERLIPIDEMIA: ICD-10-CM

## 2025-04-14 DIAGNOSIS — J44.9 COPD MIXED TYPE: ICD-10-CM

## 2025-04-14 DIAGNOSIS — Z95.5 S/P CORONARY ARTERY STENT PLACEMENT: ICD-10-CM

## 2025-04-14 LAB
AORTIC ROOT ANNULUS: 2.5 CM
AORTIC VALVE CUSP SEPERATION: 1.7 CM
APICAL FOUR CHAMBER EJECTION FRACTION: 65 %
AV INDEX (PROSTH): 0.79
AV MEAN GRADIENT: 3 MMHG
AV PEAK GRADIENT: 6 MMHG
AV VALVE AREA BY VELOCITY RATIO: 2.4 CM²
AV VALVE AREA: 2.5 CM²
AV VELOCITY RATIO: 0.75
BSA FOR ECHO PROCEDURE: 1.8 M2
CV ECHO LV RWT: 0.45 CM
CV PHARM DOSE: 0.4 MG
CV STRESS BASE HR: 64 BPM
DIASTOLIC BLOOD PRESSURE: 55 MMHG
DOP CALC AO PEAK VEL: 1.2 M/S
DOP CALC AO VTI: 28.2 CM
DOP CALC LVOT AREA: 3.1 CM2
DOP CALC LVOT DIAMETER: 2 CM
DOP CALC LVOT PEAK VEL: 0.9 M/S
DOP CALC LVOT STROKE VOLUME: 70 CM3
DOP CALCLVOT PEAK VEL VTI: 22.3 CM
E WAVE DECELERATION TIME: 227 MSEC
E/A RATIO: 0.78
E/E' RATIO: 14 M/S
ECHO LV POSTERIOR WALL: 1 CM (ref 0.6–1.1)
EJECTION FRACTION- HIGH: 65 %
EJECTION FRACTION: 65 %
END DIASTOLIC INDEX-HIGH: 153 ML/M2
END DIASTOLIC INDEX-LOW: 93 ML/M2
END SYSTOLIC INDEX-HIGH: 71 ML/M2
END SYSTOLIC INDEX-LOW: 31 ML/M2
FRACTIONAL SHORTENING: 40.9 % (ref 28–44)
INTERVENTRICULAR SEPTUM: 1.1 CM (ref 0.6–1.1)
IVRT: 106 MSEC
LEFT ATRIUM SIZE: 3.9 CM
LEFT INTERNAL DIMENSION IN SYSTOLE: 2.6 CM (ref 2.1–4)
LEFT VENTRICLE DIASTOLIC VOLUME INDEX: 48.88 ML/M2
LEFT VENTRICLE DIASTOLIC VOLUME: 87 ML
LEFT VENTRICLE END DIASTOLIC VOLUME APICAL 4 CHAMBER: 50.1 ML
LEFT VENTRICLE MASS INDEX: 88.9 G/M2
LEFT VENTRICLE SYSTOLIC VOLUME INDEX: 12.9 ML/M2
LEFT VENTRICLE SYSTOLIC VOLUME: 23 ML
LEFT VENTRICULAR INTERNAL DIMENSION IN DIASTOLE: 4.4 CM (ref 3.5–6)
LEFT VENTRICULAR MASS: 158.2 G
LV LATERAL E/E' RATIO: 13 M/S
LV SEPTAL E/E' RATIO: 15.2 M/S
LVED V (TEICH): 86.8 ML
LVES V (TEICH): 23.4 ML
LVOT MG: 2 MMHG
LVOT MV: 0.63 CM/S
MV PEAK A VEL: 1.17 M/S
MV PEAK E VEL: 0.91 M/S
MV STENOSIS PRESSURE HALF TIME: 49 MS
MV VALVE AREA P 1/2 METHOD: 4.49 CM2
NUC REST DIASTOLIC VOLUME INDEX: 64
NUC REST EJECTION FRACTION: 83
NUC REST SYSTOLIC VOLUME INDEX: 11
NUC STRESS DIASTOLIC VOLUME INDEX: 65
NUC STRESS EJECTION FRACTION: 74 %
NUC STRESS SYSTOLIC VOLUME INDEX: 17
OHS CV CPX 1 MINUTE RECOVERY HEART RATE: 78 BPM
OHS CV CPX 85 PERCENT MAX PREDICTED HEART RATE MALE: 122
OHS CV CPX MAX PREDICTED HEART RATE: 144
OHS CV CPX PATIENT IS FEMALE: 1
OHS CV CPX PATIENT IS MALE: 0
OHS CV CPX PEAK DIASTOLIC BLOOD PRESSURE: 54 MMHG
OHS CV CPX PEAK HEAR RATE: 78 BPM
OHS CV CPX PEAK RATE PRESSURE PRODUCT: NORMAL
OHS CV CPX PEAK SYSTOLIC BLOOD PRESSURE: 139 MMHG
OHS CV CPX PERCENT MAX PREDICTED HEART RATE ACHIEVED: 56
OHS CV CPX RATE PRESSURE PRODUCT PRESENTING: NORMAL
OHS CV INITIAL DOSE: 12.6 MCG/KG/MIN
OHS CV PEAK DOSE: 25.3 MCG/KG/MIN
OHS CV RV/LV RATIO: 0.77 CM
PISA TR MAX VEL: 2.1 M/S
RA PRESSURE ESTIMATED: 3 MMHG
RETIRED EF AND QEF - SEE NOTES: 53 %
RIGHT VENTRICLE DIASTOLIC BASEL DIMENSION: 3.4 CM
RIGHT VENTRICULAR END-DIASTOLIC DIMENSION: 3.38 CM
RV TB RVSP: 5 MMHG
SYSTOLIC BLOOD PRESSURE: 157 MMHG
TDI LATERAL: 0.07 M/S
TDI SEPTAL: 0.06 M/S
TDI: 0.07 M/S
TR MAX PG: 18 MMHG
TV REST PULMONARY ARTERY PRESSURE: 21 MMHG
Z-SCORE OF LEFT VENTRICULAR DIMENSION IN END DIASTOLE: -1.12
Z-SCORE OF LEFT VENTRICULAR DIMENSION IN END SYSTOLE: -1.24

## 2025-04-14 PROCEDURE — 63600175 PHARM REV CODE 636 W HCPCS: Performed by: INTERNAL MEDICINE

## 2025-04-14 PROCEDURE — 93016 CV STRESS TEST SUPVJ ONLY: CPT | Mod: ,,, | Performed by: INTERNAL MEDICINE

## 2025-04-14 PROCEDURE — A9502 TC99M TETROFOSMIN: HCPCS | Performed by: INTERNAL MEDICINE

## 2025-04-14 PROCEDURE — 78452 HT MUSCLE IMAGE SPECT MULT: CPT | Mod: 26,,, | Performed by: INTERNAL MEDICINE

## 2025-04-14 PROCEDURE — 93306 TTE W/DOPPLER COMPLETE: CPT | Mod: 26,,, | Performed by: INTERNAL MEDICINE

## 2025-04-14 PROCEDURE — 78452 HT MUSCLE IMAGE SPECT MULT: CPT

## 2025-04-14 PROCEDURE — 93018 CV STRESS TEST I&R ONLY: CPT | Mod: ,,, | Performed by: INTERNAL MEDICINE

## 2025-04-14 PROCEDURE — 93306 TTE W/DOPPLER COMPLETE: CPT

## 2025-04-14 RX ORDER — REGADENOSON 0.08 MG/ML
0.4 INJECTION, SOLUTION INTRAVENOUS
Status: COMPLETED | OUTPATIENT
Start: 2025-04-14 | End: 2025-04-14

## 2025-04-14 RX ADMIN — REGADENOSON 0.4 MG: 0.08 INJECTION, SOLUTION INTRAVENOUS at 08:04

## 2025-04-14 RX ADMIN — TETROFOSMIN 12.6 MILLICURIE: 1.38 INJECTION, POWDER, LYOPHILIZED, FOR SOLUTION INTRAVENOUS at 06:04

## 2025-04-14 RX ADMIN — TETROFOSMIN 25.3 MILLICURIE: 1.38 INJECTION, POWDER, LYOPHILIZED, FOR SOLUTION INTRAVENOUS at 08:04

## 2025-04-17 ENCOUNTER — RESULTS FOLLOW-UP (OUTPATIENT)
Dept: OTOLARYNGOLOGY | Facility: CLINIC | Age: 75
End: 2025-04-17

## 2025-04-23 ENCOUNTER — HOSPITAL ENCOUNTER (EMERGENCY)
Facility: HOSPITAL | Age: 75
Discharge: HOME | End: 2025-04-23
Attending: EMERGENCY MEDICINE
Payer: MEDICARE

## 2025-04-23 ENCOUNTER — OFFICE VISIT (OUTPATIENT)
Dept: URGENT CARE | Facility: CLINIC | Age: 75
End: 2025-04-23
Payer: MEDICARE

## 2025-04-23 VITALS
BODY MASS INDEX: 27.49 KG/M2 | OXYGEN SATURATION: 96 % | HEIGHT: 65 IN | SYSTOLIC BLOOD PRESSURE: 192 MMHG | WEIGHT: 165 LBS | DIASTOLIC BLOOD PRESSURE: 77 MMHG | HEART RATE: 90 BPM | RESPIRATION RATE: 18 BRPM | TEMPERATURE: 99 F

## 2025-04-23 VITALS
HEIGHT: 65 IN | TEMPERATURE: 98 F | OXYGEN SATURATION: 99 % | RESPIRATION RATE: 16 BRPM | HEART RATE: 84 BPM | BODY MASS INDEX: 25.99 KG/M2 | SYSTOLIC BLOOD PRESSURE: 179 MMHG | WEIGHT: 156 LBS | DIASTOLIC BLOOD PRESSURE: 78 MMHG

## 2025-04-23 DIAGNOSIS — R68.84 JAW PAIN: Primary | ICD-10-CM

## 2025-04-23 DIAGNOSIS — M26.621 ARTHRALGIA OF RIGHT TEMPOROMANDIBULAR JOINT: Primary | ICD-10-CM

## 2025-04-23 DIAGNOSIS — R68.84 JAW PAIN: ICD-10-CM

## 2025-04-23 DIAGNOSIS — R07.9 CHEST PAIN: ICD-10-CM

## 2025-04-23 LAB
ABSOLUTE EOSINOPHIL (SMH): 0.17 K/UL
ABSOLUTE MONOCYTE (SMH): 0.78 K/UL (ref 0.3–1)
ABSOLUTE NEUTROPHIL COUNT (SMH): 6.5 K/UL (ref 1.8–7.7)
ALBUMIN SERPL-MCNC: 4.5 G/DL (ref 3.5–5.2)
ALP SERPL-CCNC: 77 UNIT/L (ref 55–135)
ALT SERPL-CCNC: 14 UNIT/L (ref 10–44)
ANION GAP (SMH): 9 MMOL/L (ref 8–16)
AST SERPL-CCNC: 18 UNIT/L (ref 10–40)
BASOPHILS # BLD AUTO: 0.02 K/UL
BASOPHILS NFR BLD AUTO: 0.2 %
BILIRUB SERPL-MCNC: 0.8 MG/DL (ref 0.1–1)
BILIRUB UR QL STRIP.AUTO: NEGATIVE
BUN SERPL-MCNC: 16 MG/DL (ref 8–23)
C-REACTIVE PROTEIN (SMH): 0.6 MG/DL
CALCIUM SERPL-MCNC: 9.8 MG/DL (ref 8.7–10.5)
CHLORIDE SERPL-SCNC: 102 MMOL/L (ref 95–110)
CLARITY UR: ABNORMAL
CO2 SERPL-SCNC: 30 MMOL/L (ref 23–29)
COLOR UR AUTO: YELLOW
CREAT SERPL-MCNC: 0.9 MG/DL (ref 0.5–1.4)
CREAT SERPL-MCNC: 0.9 MG/DL (ref 0.5–1.4)
ERYTHROCYTE [DISTWIDTH] IN BLOOD BY AUTOMATED COUNT: 14 % (ref 11.5–14.5)
ERYTHROCYTE [SEDIMENTATION RATE] IN BLOOD: 15 MM/HR (ref 0–20)
GFR SERPLBLD CREATININE-BSD FMLA CKD-EPI: >60 ML/MIN/1.73/M2
GLUCOSE SERPL-MCNC: 106 MG/DL (ref 70–110)
GLUCOSE UR QL STRIP: NEGATIVE
HCT VFR BLD AUTO: 37.9 % (ref 37–48.5)
HGB BLD-MCNC: 12.3 GM/DL (ref 12–16)
HGB UR QL STRIP: ABNORMAL
IMM GRANULOCYTES # BLD AUTO: 0.04 K/UL (ref 0–0.04)
IMM GRANULOCYTES NFR BLD AUTO: 0.4 % (ref 0–0.5)
KETONES UR QL STRIP: NEGATIVE
LEUKOCYTE ESTERASE UR QL STRIP: NEGATIVE
LYMPHOCYTES # BLD AUTO: 1.72 K/UL (ref 1–4.8)
MCH RBC QN AUTO: 29.8 PG (ref 27–31)
MCHC RBC AUTO-ENTMCNC: 32.5 G/DL (ref 32–36)
MCV RBC AUTO: 92 FL (ref 82–98)
MICROSCOPIC COMMENT: ABNORMAL
NITRITE UR QL STRIP: NEGATIVE
NUCLEATED RBC (/100WBC) (SMH): 0 /100 WBC
PH UR STRIP: 6 [PH]
PLATELET # BLD AUTO: 179 K/UL (ref 150–450)
PMV BLD AUTO: 10.8 FL (ref 9.2–12.9)
POTASSIUM SERPL-SCNC: 4.2 MMOL/L (ref 3.5–5.1)
PROT SERPL-MCNC: 7.8 GM/DL (ref 6–8.4)
PROT UR QL STRIP: NEGATIVE
RBC # BLD AUTO: 4.13 M/UL (ref 4–5.4)
RBC #/AREA URNS AUTO: 6 /HPF
RELATIVE EOSINOPHIL (SMH): 1.8 % (ref 0–8)
RELATIVE LYMPHOCYTE (SMH): 18.6 % (ref 18–48)
RELATIVE MONOCYTE (SMH): 8.4 % (ref 4–15)
RELATIVE NEUTROPHIL (SMH): 70.6 % (ref 38–73)
SAMPLE: NORMAL
SODIUM SERPL-SCNC: 141 MMOL/L (ref 136–145)
SP GR UR STRIP: 1.01
SQUAMOUS #/AREA URNS AUTO: 5 /HPF
UROBILINOGEN UR STRIP-ACNC: NEGATIVE EU/DL
WBC # BLD AUTO: 9.25 K/UL (ref 3.9–12.7)
WBC #/AREA URNS AUTO: 3 /HPF

## 2025-04-23 PROCEDURE — 99285 EMERGENCY DEPT VISIT HI MDM: CPT | Mod: 25

## 2025-04-23 PROCEDURE — 96374 THER/PROPH/DIAG INJ IV PUSH: CPT

## 2025-04-23 PROCEDURE — 85025 COMPLETE CBC W/AUTO DIFF WBC: CPT

## 2025-04-23 PROCEDURE — 93010 ELECTROCARDIOGRAM REPORT: CPT | Mod: ,,, | Performed by: INTERNAL MEDICINE

## 2025-04-23 PROCEDURE — 80053 COMPREHEN METABOLIC PANEL: CPT

## 2025-04-23 PROCEDURE — 25500020 PHARM REV CODE 255: Performed by: EMERGENCY MEDICINE

## 2025-04-23 PROCEDURE — 82565 ASSAY OF CREATININE: CPT

## 2025-04-23 PROCEDURE — 96376 TX/PRO/DX INJ SAME DRUG ADON: CPT

## 2025-04-23 PROCEDURE — 96375 TX/PRO/DX INJ NEW DRUG ADDON: CPT

## 2025-04-23 PROCEDURE — 99214 OFFICE O/P EST MOD 30 MIN: CPT | Mod: S$GLB,,, | Performed by: NURSE PRACTITIONER

## 2025-04-23 PROCEDURE — 93005 ELECTROCARDIOGRAM TRACING: CPT | Performed by: INTERNAL MEDICINE

## 2025-04-23 PROCEDURE — 63600175 PHARM REV CODE 636 W HCPCS

## 2025-04-23 PROCEDURE — 85651 RBC SED RATE NONAUTOMATED: CPT

## 2025-04-23 PROCEDURE — 86140 C-REACTIVE PROTEIN: CPT

## 2025-04-23 PROCEDURE — 81001 URINALYSIS AUTO W/SCOPE: CPT

## 2025-04-23 RX ORDER — MORPHINE SULFATE 2 MG/ML
2 INJECTION, SOLUTION INTRAMUSCULAR; INTRAVENOUS
Refills: 0 | Status: COMPLETED | OUTPATIENT
Start: 2025-04-23 | End: 2025-04-23

## 2025-04-23 RX ORDER — DEXAMETHASONE SODIUM PHOSPHATE 4 MG/ML
8 INJECTION, SOLUTION INTRA-ARTICULAR; INTRALESIONAL; INTRAMUSCULAR; INTRAVENOUS; SOFT TISSUE
Status: COMPLETED | OUTPATIENT
Start: 2025-04-23 | End: 2025-04-23

## 2025-04-23 RX ORDER — HYDROCODONE BITARTRATE AND ACETAMINOPHEN 5; 325 MG/1; MG/1
1 TABLET ORAL EVERY 6 HOURS PRN
Qty: 12 TABLET | Refills: 0 | Status: SHIPPED | OUTPATIENT
Start: 2025-04-23 | End: 2025-04-23

## 2025-04-23 RX ORDER — HYDROCODONE BITARTRATE AND ACETAMINOPHEN 5; 325 MG/1; MG/1
1 TABLET ORAL EVERY 6 HOURS PRN
Qty: 12 TABLET | Refills: 0 | Status: SHIPPED | OUTPATIENT
Start: 2025-04-23 | End: 2025-04-26

## 2025-04-23 RX ORDER — ONDANSETRON HYDROCHLORIDE 2 MG/ML
4 INJECTION, SOLUTION INTRAVENOUS
Status: COMPLETED | OUTPATIENT
Start: 2025-04-23 | End: 2025-04-23

## 2025-04-23 RX ADMIN — ONDANSETRON 4 MG: 2 INJECTION INTRAMUSCULAR; INTRAVENOUS at 04:04

## 2025-04-23 RX ADMIN — DEXAMETHASONE SODIUM PHOSPHATE 8 MG: 4 INJECTION, SOLUTION INTRA-ARTICULAR; INTRALESIONAL; INTRAMUSCULAR; INTRAVENOUS; SOFT TISSUE at 08:04

## 2025-04-23 RX ADMIN — MORPHINE SULFATE 2 MG: 2 INJECTION, SOLUTION INTRAMUSCULAR; INTRAVENOUS at 04:04

## 2025-04-23 RX ADMIN — IOHEXOL 100 ML: 350 INJECTION, SOLUTION INTRAVENOUS at 05:04

## 2025-04-23 RX ADMIN — MORPHINE SULFATE 2 MG: 2 INJECTION, SOLUTION INTRAMUSCULAR; INTRAVENOUS at 08:04

## 2025-04-23 NOTE — PROGRESS NOTES
"Subjective:      Patient ID: Kathy Seymour is a 74 y.o. female.    Vitals:  height is 5' 5" (1.651 m) and weight is 70.8 kg (156 lb). Her oral temperature is 98.4 °F (36.9 °C). Her blood pressure is 179/78 (abnormal) and her pulse is 84. Her respiration is 16 and oxygen saturation is 99%.     Chief Complaint: Jaw Pain    Kathy Sinha is a 74 year old female presenting to the clinic with c/o right sided jaw pain x 1 day. 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 The ulcers in her mouth cleared but she began having significant jaw pain and sore throat last night. She denies fever. Pain resolves if she keeps her head still. She is able to rotate the head without pain but has significant pain with palpation of the jaw, neck extension and opening her mouth.     Constitution: Negative.   HENT:  Negative for mouth sores (resolved).         Right jaw pain   Neck: neck negative.   Cardiovascular: Negative.    Eyes: Negative.    Respiratory: Negative.     Gastrointestinal: Negative.    Genitourinary: Negative.    Musculoskeletal: Negative.    Skin: Negative.    Neurological: Negative.       Objective:     Physical Exam   Constitutional: She is oriented to person, place, and time. She appears well-developed. She is cooperative.   HENT:   Head: Normocephalic and atraumatic.   Ears:   Right Ear: Hearing, tympanic membrane and external ear normal. impacted cerumen  Left Ear: Hearing, tympanic membrane, external ear and ear canal normal.   Nose: Nose normal. No mucosal edema or nasal deformity. No epistaxis. Right sinus exhibits no maxillary sinus tenderness and no frontal sinus tenderness. Left sinus exhibits no maxillary sinus tenderness and no frontal sinus tenderness.   Mouth/Throat: Uvula is midline, oropharynx is clear and moist and mucous membranes are normal. No oral lesions. There is trismus in the jaw. Normal dentition. No uvula swelling.   Right jaw tenderness to " palpation, slight erythema over TMJ      Comments: Right jaw tenderness to palpation, slight erythema over TMJ  Eyes: Conjunctivae and lids are normal.   Neck: Trachea normal and phonation normal. Neck supple.   Cardiovascular: Normal rate, regular rhythm, normal heart sounds and normal pulses.   Pulmonary/Chest: Effort normal and breath sounds normal.   Abdominal: Normal appearance and bowel sounds are normal. Soft.   Musculoskeletal: Normal range of motion.         General: Normal range of motion.   Neurological: She is alert and oriented to person, place, and time. She exhibits normal muscle tone.   Skin: Skin is warm, dry and intact.   Psychiatric: Her speech is normal and behavior is normal. Judgment and thought content normal.   Nursing note and vitals reviewed.      Assessment:     1. Jaw pain        Plan:     The patient has right jaw pain with trismus. Tenderness to palpation of the jaw with slight erythema. She has increased pain with extension of the neck. Discussed the case with Dr. Salas and he recommends that the patient be transferred to the ED for imaging and additional workup. EMS offered but patient's  will transport her to [facility].  Jaw pain

## 2025-04-23 NOTE — ED PROVIDER NOTES
Encounter Date: 4/23/2025       History     Chief Complaint   Patient presents with    Jaw Pain     RT X 1 DAY, PAIN WITH OPENING AND CLOSING MOUTH. SENT FROM URGENT CARE     Patient is a 74 y.o. female with past medical history of hypertension, hypothyroidism, hyperlipidemia, coronary artery disease, on aspirin who presents to ED via family for concern for jaw pain which began 1 day(s) ago.  Patient reports all day yesterday she is having pain in the right side of her jaw.  Patient reports the pain has been constant and is very severe.  Patient reports in the past this has happened with mild pain on both sides of redraw but that has never been one-sided in his severe.  Patient endorses difficulty opening her mouth and pain with swallowing.  Patient has pain in the right side of her draw when trying to look up.  Patient denies fever cough congestion runny nose or sore throat.  Patient had HSV 1 mouth sores on April 17th but reports the sores or neuro gone.  Patient did not take antivirals.  Patient denies any associated chest pain or shortness of breath.  Patient reports a mild headache but denies dizziness or syncope.  Patient denies any falls or injuries to her face.  Patient is awake and alert in moderate distress due to pain.    Review of patient's allergies indicates:   Allergen Reactions    Macrobid [nitrofurantoin monohyd/m-cryst] Other (See Comments)     Fever and body aches.      Past Medical History:   Diagnosis Date    Angina pectoris     Anticoagulant long-term use     Anxiety     Arthritis     Back pain     Cataract     Chronic bronchitis     Coronary artery disease     STENT X 2    COVID 6/10/2022    CTS (carpal tunnel syndrome)     RIGHT    Depression     MILLER (dyspnea on exertion) 2/21/2017    Hallux valgus, acquired, bilateral 1/19/2017    Hematuria     Hyperlipidemia     Hypertension     Hypertensive left ventricular hypertrophy, without heart failure 5/22/2017    Hypothyroidism      Obesity     Polyneuropathy     S/P coronary artery stent placement, 2 LETTY 9/2012, 1 LETTY 9/2013 3/5/2013    Sleep apnea     USES CPAP    Thyroid disease     Tobacco dependence     Trouble in sleeping     Urinary incontinence     Wears glasses     ONE CONTAC     Past Surgical History:   Procedure Laterality Date    APPENDECTOMY      BILATERAL SALPINGOOPHORECTOMY      CARDIAC CATHETERIZATION      CORONARY ANGIOPLASTY      CORONARY STENT PLACEMENT      PCI  of the LAD with LETTY    EYE SURGERY      bilat cataract removal    HYSTERECTOMY      complete    OOPHORECTOMY      TONSILLECTOMY       Family History   Problem Relation Name Age of Onset    Hypertension Sister Jennifer Barrow     Arthritis Sister Jennifer Barrow     Heart failure Mother Jose Manuel Anderson     Hypertension Mother Jose Manuel Anderson     Depression Mother Jose Manuel Anderson     Diabetes Mother Jose Manuel Anderson     Heart failure Father Jovanni Cueto     Hypertension Father Jovanni Cueto     No Known Problems Brother 1/2 bother     No Known Problems Son      Heart disease Brother 1/2 brother     Arthritis Sister Syeda Cueto     Hypertension Sister Syeda Cueto     Cancer Sister 1/2 sister     Asthma Son      Arthritis Son          psoriatic arthritis    Asthma Son [facility]      Social History[1]  Review of Systems   Constitutional:  Negative for appetite change and fever.   HENT:  Positive for ear pain and facial swelling. Negative for congestion, drooling, ear discharge, sore throat, trouble swallowing and voice change.    Respiratory: Negative.  Negative for cough and shortness of breath.    Cardiovascular: Negative.  Negative for chest pain.   Gastrointestinal:  Negative for nausea and vomiting.   Musculoskeletal:  Negative for back pain and neck pain.   Skin:  Negative for color change, pallor and rash.   Neurological:  Positive for headaches. Negative for dizziness, tremors, seizures, syncope, facial asymmetry, speech  difficulty, weakness, light-headedness and numbness.   Hematological:  Does not bruise/bleed easily.       Physical Exam     Initial Vitals [04/23/25 1445]   BP Pulse Resp Temp SpO2   (!) 180/78 76 17 98.4 °F (36.9 °C) 99 %      MAP       --         Physical Exam    Nursing note and vitals reviewed.  Constitutional: She appears well-developed and well-nourished. She is not diaphoretic. No distress.   HENT:   Head: Normocephalic and atraumatic.       Right Ear: There is tenderness. No drainage or swelling. There is mastoid tenderness.   Left Ear: Tympanic membrane, external ear and ear canal normal. No swelling or tenderness. No mastoid tenderness.   Nose: Nose normal. Mouth/Throat: Oropharynx is clear and moist and mucous membranes are normal. No oral lesions. There is trismus in the jaw. Abnormal dentition. No dental abscesses, uvula swelling or lacerations.   Unable to visualize right TM due to cerumen   Eyes: EOM are normal.   Neck:   Normal range of motion.  Cardiovascular:  Normal rate, regular rhythm, normal heart sounds and intact distal pulses.     Exam reveals no gallop and no friction rub.       No murmur heard.  Pulmonary/Chest: Breath sounds normal. No respiratory distress. She has no wheezes. She has no rhonchi. She has no rales. She exhibits no tenderness.   Musculoskeletal:         General: Normal range of motion.      Cervical back: Normal range of motion.     Neurological: She is alert and oriented to person, place, and time. She has normal strength. GCS score is 15. GCS eye subscore is 4. GCS verbal subscore is 5. GCS motor subscore is 6.   Skin: Skin is warm and dry. Capillary refill takes less than 2 seconds.   Psychiatric: She has a normal mood and affect. Her behavior is normal. Judgment and thought content normal.       ED Course   Procedures  Labs Reviewed   COMPREHENSIVE METABOLIC PANEL - Abnormal       Result Value    Sodium 141      Potassium 4.2      Chloride 102      CO2 30 (*)      Glucose 106      BUN 16      Creatinine 0.9      Calcium 9.8      Protein Total 7.8      Albumin 4.5      Bilirubin Total 0.8      ALP 77      AST 18      ALT 14      Anion Gap 9      eGFR >60     URINALYSIS, REFLEX TO URINE CULTURE - Abnormal    Color, UA Yellow      Appearance, UA Hazy (*)     Spec Grav UA 1.015      pH, UA 6.0      Protein, UA Negative      Glucose, UA Negative      Ketones, UA Negative      Blood, UA 1+ (*)     Bilirubin, UA Negative      Urobilinogen, UA Negative      Nitrites, UA Negative      Leukocyte Esterase, UA Negative     URINALYSIS MICROSCOPIC - Abnormal    RBC, UA 6 (*)     WBC, UA 3      Squamous Epithelial Cells, UA 5      Microscopic Comment       SEDIMENTATION RATE - Normal    Sed Rate 15     C-REACTIVE PROTEIN - Normal    CRP 0.60     CBC WITH DIFFERENTIAL - Normal    WBC 9.25      RBC 4.13      Hgb 12.3      Hct 37.9      MCV 92      MCH 29.8      MCHC 32.5      RDW 14.0      Platelet Count 179      MPV 10.8      Nucleated RBC 0      Neut % 70.6      Lymph % 18.6      Mono % 8.4      Eos % 1.8      Basophil % 0.2      Imm Grans % 0.4      Neut # 6.5      Lymph # 1.72      Mono # 0.78      Eos # 0.17      Baso # 0.02      Imm Grans # 0.04     CBC W/ AUTO DIFFERENTIAL    Narrative:     The following orders were created for panel order CBC auto differential.  Procedure                               Abnormality         Status                     ---------                               -----------         ------                     CBC with Differential[[id number]]       Normal              Final result                 Please view results for these tests on the individual orders.   ISTAT CREATININE    POC Creatinine 0.9      Sample VENOUS          ECG Results              EKG 12-lead (In process)        Collection Time Result Time QRS Duration OHS QTC Calculation    04/23/25 14:39:47 04/23/25 14:50:24 82 405                     In process by Interface, Lab In [facility] (04/23/25  14:50:30)                   Narrative:    Test Reason : R07.9,    Vent. Rate :  79 BPM     Atrial Rate :  79 BPM     P-R Int : 178 ms          QRS Dur :  82 ms      QT Int : 354 ms       P-R-T Axes :  72  44  65 degrees    QTcB Int : 405 ms    Normal sinus rhythm  Normal ECG  No previous ECGs available    Referred By:            Confirmed By:                                   Imaging Results              CT Maxillofacial With Contrast (Final result)  Result time 04/23/25 18:42:52      Final result by Radha Vasques MD (04/23/25 18:42:52)                   Impression:      Bilateral temporomandibular joint degenerative change, advanced.  Associated joint effusions.  If concern for infection, recommend aspiration.      Electronically signed by: Radha Vasques  Date:    04/23/2025  Time:    18:42               Narrative:    EXAMINATION:  CT MAXILLOFACIAL WITH CONTRAST    CLINICAL HISTORY:  Maxillofacial pain;TMJ pain or limited movement;    TECHNIQUE:  CT maxillofacial skeleton after 100 cc intravenous contrast.  Coronal and sagittal reformatted images were obtained.    COMPARISON:  None    FINDINGS:  Advanced bilateral temporomandibular joint degenerative change moderate right and small left sided joint effusions, nonspecific.    Paranasal sinuses are essentially clear.    Intact maxillofacial skeleton.                                       Medications   morphine injection 2 mg (2 mg Intravenous Given 4/23/25 1620)   ondansetron injection 4 mg (4 mg Intravenous Given 4/23/25 1619)   iohexoL (OMNIPAQUE 350) injection 100 mL (100 mLs Intravenous Given 4/23/25 1705)   morphine injection 2 mg (2 mg Intravenous Given 4/23/25 2013)   dexAMETHasone injection 8 mg (8 mg Intravenous Given 4/23/25 2027)     Medical Decision Making  MDM    Patient presents for emergent evaluation of acute pain the right jaw that poses a possible threat to life and/or bodily function.    Differential diagnosis included but not limited  to arthralgia of TMJ, temporal arteritis, trigeminal neuralgia, abscess, infection, parotitis.  In the ED patient found to have acute clear lung sounds bilaterally with no increased work of breathing.  Patient has a pain to palpation of the TMJ joint with limited ability to open the mouth due to pain.  There is mild swelling noted to the right side of the face over the TMJ joint with mild warmth.  Patient has a no pain to palpation over the left side of the face.  Patient has moist mucous membranes and cap refill less than 2 seconds.  No inflammation of the parotid appreciated on exam.      Labs significant for CBC within normal limits without leukocytosis, CMP significant for CO2 30, CRP 0.60, sed rate 15, UA negative nitrites, negative leukocytes, 1+ blood, 6 RBC, 3 WBC, 5 squamous epithelial cells  Imaging significant for CT max face significant for Impression: Bilateral temporomandibular joint degenerative change, advanced.  Associated joint effusions.    Patient treated in the ED with IV morphine, Zofran, and dexamethasone.  Patient had decreased pain.    Discharge MDM  I discussed the patient presentation labs, ekg CT findings with ED attending Dr. Whyte who individually evaluated the patient and agrees with plan of care.    Patient does have a dentist.  No signs of infectious etiology workup at this time.  Patient does have significant arthritis on CT scan.  Patient is stable for discharge home with oral pain medication and patient given IV steroids to help decrease inflammation.  Discussed strict follow up with dental and strict return precautions for worsening symptoms.  Patient verbalized understanding.  Patient was discharged in stable condition with close follow up.  Detailed return precautions discussed to return to the ED for any new or worsening concerns.  Patient verbalizes understanding.    NP uses Epic and voice recognition software prone to occasional and minor errors that may persist in the  medical record.     Amount and/or Complexity of Data Reviewed  Labs: ordered. Decision-making details documented in ED Course.  Radiology: ordered. Decision-making details documented in ED Course.  ECG/medicine tests: independent interpretation performed. Decision-making details documented in ED Course.    Risk  Prescription drug management.               ED Course as of 04/24/25 0013   Wed Apr 23, 2025   1523 EKG 12-lead  EKG significant for normal sinus rhythm, ventricular rate 79 beats per minute, no signs of occlusive MI [MP]   1638 WBC: 9.25 [MP]   1638 Hemoglobin: 12.3 [MP]   1638 Hematocrit: 37.9 [MP]   1659 CRP: 0.60 [MP]   1659 Sed Rate: 15 [MP]   1659 CO2(!): 30 [MP]   1853 CT Maxillofacial With Contrast  Impression:     Bilateral temporomandibular joint degenerative change, advanced.  Associated joint effusions.     [MP]      ED Course User Index  [MP] Aisha Ivory NP                           Clinical Impression:  Final diagnoses:  [R07.9] Chest pain  [R68.84] Jaw pain  [M26.621] Arthralgia of right temporomandibular joint (Primary)          ED Disposition Condition    Discharge Stable          ED Prescriptions       Medication Sig Dispense Start Date End Date Auth. Provider    HYDROcodone-acetaminophen (NORCO) 5-325 mg per tablet  (Status: Discontinued) Take 1 tablet by mouth every 6 (six) hours as needed for Pain. 12 tablet 4/23/2025 4/23/2025 Aisha Ivory NP    HYDROcodone-acetaminophen (NORCO) 5-325 mg per tablet Take 1 tablet by mouth every 6 (six) hours as needed for Pain. 12 tablet 4/23/2025 4/26/2025 Aisha Ivory NP          Follow-up Information       Follow up With Specialties Details Why Contact Info Additional Information    Leda Lazar MD Family Medicine   [address] [facility] [address] [phone]       [facility] - Emergency Dept Emergency Medicine  If symptoms worsen [address] [address] [phone] 1st floor                  [1]  Social History  Tobacco Use    Smoking status: Former     Current packs/day: 0.00     Average packs/day: 1 pack/day for 50.0 years (50.0 ttl pk-yrs)     Types: Cigarettes     Start date: 1964     Quit date: 2012     Years since quittin.7    Smokeless tobacco: Never   Substance Use Topics    Alcohol use: Yes     Alcohol/week: 2.0 standard drinks of alcohol     Types: 2 Drinks containing 0.5 oz of alcohol per week     Comment: Social - vodka on wednesday    Drug use: No      Aisha Ivory NP  25 0013

## 2025-04-24 NOTE — DISCHARGE INSTRUCTIONS
Please follow up with dentist as soon as possible for further evaluation and rechecked.  Take the narcotic as needed for severe breakthrough pain.  You can also take Tylenol and ibuprofen.    Please return to the ED for worsening facial swelling, redness and warmth of the face, fever, inability to swallow, drooling, worsening pain, or any new or worsening concerns.

## 2025-04-28 LAB
OHS QRS DURATION: 82 MS
OHS QTC CALCULATION: 405 MS

## 2025-05-02 ENCOUNTER — OFFICE VISIT (OUTPATIENT)
Dept: CARDIOLOGY | Facility: CLINIC | Age: 75
End: 2025-05-02
Payer: MEDICARE

## 2025-05-02 VITALS
HEIGHT: 65 IN | DIASTOLIC BLOOD PRESSURE: 62 MMHG | OXYGEN SATURATION: 98 % | BODY MASS INDEX: 26.54 KG/M2 | HEART RATE: 73 BPM | SYSTOLIC BLOOD PRESSURE: 122 MMHG | WEIGHT: 159.31 LBS

## 2025-05-02 DIAGNOSIS — J44.9 COPD MIXED TYPE: Primary | ICD-10-CM

## 2025-05-02 DIAGNOSIS — E04.2 MULTIPLE THYROID NODULES: ICD-10-CM

## 2025-05-02 DIAGNOSIS — I10 PRIMARY HYPERTENSION: ICD-10-CM

## 2025-05-02 DIAGNOSIS — E78.2 MIXED HYPERLIPIDEMIA: Chronic | ICD-10-CM

## 2025-05-02 DIAGNOSIS — I25.10 CORONARY ARTERY DISEASE INVOLVING NATIVE CORONARY ARTERY OF NATIVE HEART WITHOUT ANGINA PECTORIS: ICD-10-CM

## 2025-05-02 DIAGNOSIS — Z95.5 S/P CORONARY ARTERY STENT PLACEMENT: Chronic | ICD-10-CM

## 2025-05-02 PROCEDURE — 99999 PR PBB SHADOW E&M-EST. PATIENT-LVL IV: CPT | Mod: PBBFAC,,, | Performed by: INTERNAL MEDICINE

## 2025-05-02 NOTE — PROGRESS NOTES
Patient ID:  Kathy Seymour is a 74 y.o. female who presents with Coronary Artery Disease and Results (Stress  & echo)      History of Present Illness    CHIEF COMPLAINT:  Patient presents today for follow up.    TMJ:  She recently visited the ER for severe TMJ symptoms including complete inability to talk and swallow saliva, requiring treatment with morphine and prednisone. She currently experiences jaw discomfort with activities such as blowing nose. She reports history of intermittent jaw clicking but denies prior TMJ diagnosis.    CARDIOVASCULAR:  She reports dyspnea with activities involving bending over, such as transferring laundry and pulling weeds. She denies dyspnea with walking, chest tightness, and palpitations. Myocardial perfusion scan was normal with normal EKG. Echo showed normal left ventricular contractility, normal right ventricle, mild atrial dilation, and mild MR without clinical significance.    CURRENT MEDICATIONS:  She takes ASA 81 mg, atorvastatin 80mg for cholesterol, losartan and metoprolol for BP, protonix for stomach, and potassium supplements twice daily.    LABS:  Recent labs show normal potassium, blood sugar, and thyroid function. Total cholesterol is 112 with LDL of 46. Renal function was normal 9 days ago.      ROS:  Cardiovascular: -palpitations  Respiratory: +exertional dyspnea  Head: +difficulty swallowing, +mouth pain, +speech difficulty       Coronary artery disease involving native coronary artery of native heart without angina pectoris  No symptoms of angina.  Status post percutaneous revascularization of the LAD on 2012 and 2013.  The right coronary and the circumflex were within normal limits     Aortic atherosclerosis  On cholesterol-lowering agent     Hyperlipidemia, baseline   On 80 mg of atorvastatin last LDL cholesterol 44     Hypertension, onset before 1995  Controlled on losartan /25, metoprolol     10/08/2024  She has pain in the middle of  the chest she drinks a carbonated drink and then burred with total resolution of symptoms.  She had similar symptoms back in 2022 at that time Dr. Paredes perform an EGD and dilated her esophagus her symptoms resolved.  At that time she was having dysphagia to rice and she is doing the same thing now she has difficulty swallowing rice.  He is going to be seen Dr. Moore tomorrow.  03/13/2025  She had an EGD performed her esophagus was stretched.  She had been doing better until yesterday she ate some cucumbers and had some discomfort she had a carbonated drink and had relief of her symptoms.  She also has occasional dyspnea on exertion.          Past Medical History:   Diagnosis Date    Angina pectoris     Anticoagulant long-term use     Anxiety     Arthritis     Back pain     Cataract     Chronic bronchitis     Coronary artery disease     STENT X 2    COVID 6/10/2022    CTS (carpal tunnel syndrome)     RIGHT    Depression     MILLER (dyspnea on exertion) 2/21/2017    Hallux valgus, acquired, bilateral 1/19/2017    Hematuria     Hyperlipidemia     Hypertension     Hypertensive left ventricular hypertrophy, without heart failure 5/22/2017    Hypothyroidism     Obesity     Polyneuropathy     S/P coronary artery stent placement, 2 LETTY 9/2012, 1 LETTY 9/2013 3/5/2013    Sleep apnea     USES CPAP    Thyroid disease     Tobacco dependence     Trouble in sleeping     Urinary incontinence     Wears glasses     ONE CONTAC        Past Surgical History:   Procedure Laterality Date    APPENDECTOMY      BILATERAL SALPINGOOPHORECTOMY      CARDIAC CATHETERIZATION      CORONARY ANGIOPLASTY      CORONARY STENT PLACEMENT      PCI  of the LAD with LETTY    EYE SURGERY      bilat cataract removal    HYSTERECTOMY      complete    OOPHORECTOMY      TONSILLECTOMY            Current Outpatient Medications   Medication Instructions    (Magic mouthwash) 1:1:1 diphenhydrAMINE(Benadryl) 12.5mg/5ml liq, aluminum & magnesium hydroxide-simethicone  "(Maalox), LIDOcaine viscous 2% 10 mLs, Swish & Spit, Every 4 hours PRN, As needed for oral pain    albuterol (VENTOLIN HFA) 90 mcg/actuation inhaler 2 puffs, Inhalation, Every 6 hours PRN, Rescue    ALPRAZolam (XANAX) 0.5 mg, Oral, 3 times daily PRN    aspirin 81 mg, Oral, Daily    atorvastatin (LIPITOR) 80 MG tablet TAKE 1 TABLET EVERY DAY    benzocaine-menthoL 15-3.6 mg Lozg 1 lozenge, Mucous Membrane, Every 2 hours PRN    budesonide-formoterol 160-4.5 mcg (SYMBICORT) 160-4.5 mcg/actuation HFAA 2 puffs, Inhalation, Every 12 hours    cetirizine (ZYRTEC) 10 mg, Oral, Daily    DULoxetine (CYMBALTA) 30 MG capsule TAKE 1 CAPSULE EVERY DAY IN ADDITION TO 60MG TO TOTAL 90MG EVERY DAY    DULoxetine (CYMBALTA) 60 MG capsule TAKE 1 CAPSULE EVERY DAY    estradioL (ESTRACE) 1 g, Vaginal, Daily, Apply1 gram up to second knuckle. Apply nightly for 2 weeks then every other night.    fluticasone propionate (FLONASE) 50 mcg, Each Nostril, Daily    losartan-hydrochlorothiazide 100-25 mg (HYZAAR) 100-25 mg per tablet TAKE 1 TABLET EVERY DAY    methIMAzole (TAPAZOLE) 5 mg, Oral    metoprolol succinate (TOPROL-XL) 25 mg, Oral    pantoprazole (PROTONIX) 40 mg, Oral, Daily    potassium chloride (MICRO-K) 10 MEQ CpSR 20 mEq, Oral    solifenacin (VESICARE) 10 mg, Oral, Daily        Review of patient's allergies indicates:   Allergen Reactions    Macrobid [nitrofurantoin monohyd/m-cryst] Other (See Comments)     Fever and body aches.         Review of Systems   Cardiovascular:  Positive for dyspnea on exertion. Negative for chest pain and palpitations.   Respiratory:  Negative for cough and shortness of breath.         Objective:     Vitals:    05/02/25 0936   BP: 122/62   BP Location: Left arm   Patient Position: Sitting   Pulse: 73   SpO2: 98%   Weight: 72.3 kg (159 lb 4.5 oz)   Height: 5' 5" (1.651 m)      Physical Exam    General: No acute distress. Well-developed. Well-nourished.  Eyes: EOMI. Sclerae anicteric.  HENT: Normocephalic. " Atraumatic. Nares patent. Moist oral mucosa.  Ears: Bilateral TMs clear. Bilateral EACs clear.  Cardiovascular: Regular rate. Regular rhythm. No murmurs. No rubs. No gallops. Normal S1, S2.  Respiratory: Normal respiratory effort. Clear to auscultation bilaterally. No rales. No rhonchi. No wheezing.  Abdomen: Soft. Non-tender. Non-distended. Normoactive bowel sounds.  Musculoskeletal: No  obvious deformity.  Extremities: No lower extremity edema.  Neurological: Alert & oriented x3. No slurred speech. Normal gait.  Psychiatric: Normal mood. Normal affect. Good insight. Good judgment.  Skin: Warm. Dry. No rash.          BNP  @LABRCNTIP(BNP,BNPTRIAGEBLO)@  CMP  Sodium   Date Value Ref Range Status   04/23/2025 141 136 - 145 mmol/L Final     Potassium   Date Value Ref Range Status   04/23/2025 4.2 3.5 - 5.1 mmol/L Final     Chloride   Date Value Ref Range Status   04/23/2025 102 95 - 110 mmol/L Final     CO2   Date Value Ref Range Status   04/23/2025 30 (H) 23 - 29 mmol/L Final     Glucose   Date Value Ref Range Status   04/23/2025 106 70 - 110 mg/dL Final     BUN   Date Value Ref Range Status   04/23/2025 16 8 - 23 mg/dL Final     Creatinine   Date Value Ref Range Status   04/23/2025 0.9 0.5 - 1.4 mg/dL Final   09/14/2012 0.7 0.2 - 1.4 mg/dl Final     Calcium   Date Value Ref Range Status   04/23/2025 9.8 8.7 - 10.5 mg/dL Final   09/14/2012 8.8 8.6 - 10.2 mg/dl Final     Protein Total   Date Value Ref Range Status   04/23/2025 7.8 6.0 - 8.4 gm/dL Final     Albumin   Date Value Ref Range Status   04/23/2025 4.5 3.5 - 5.2 g/dL Final     Bilirubin Total   Date Value Ref Range Status   04/23/2025 0.8 0.1 - 1.0 mg/dL Final     Comment:     For infants and newborns, interpretation of results should be based   on gestational age, weight and in agreement with clinical   observations.    Premature Infant recommended reference ranges:   0-24 hours:  <8.0 mg/dL   24-48 hours: <12.0 mg/dL   3-5 days:    <15.0 mg/dL   6-29 days:    <15.0 mg/dL     Alkaline Phosphatase   Date Value Ref Range Status   09/14/2012 68 23 - 119 UNIT/L Final     ALP   Date Value Ref Range Status   04/23/2025 77 55 - 135 unit/L Final     AST   Date Value Ref Range Status   04/23/2025 18 10 - 40 unit/L Final   09/14/2012 16 10 - 30 UNIT/L Final     ALT   Date Value Ref Range Status   04/23/2025 14 10 - 44 unit/L Final     Anion Gap   Date Value Ref Range Status   04/23/2025 9 8 - 16 mmol/L Final   09/14/2012 7 5 - 15 meq/L Final     eGFR if    Date Value Ref Range Status   08/25/2021 >60.0 >60 mL/min/1.73 m^2 Final     eGFR if non    Date Value Ref Range Status   08/25/2021 56.4 (A) >60 mL/min/1.73 m^2 Final     Comment:     Calculation used to obtain the estimated glomerular filtration  rate (eGFR) is the CKD-EPI equation.         @LABRCNTIP(BNP,BNPTRIAGEBLO)@  Lab Results   Component Value Date     04/23/2025    K 4.2 04/23/2025     04/23/2025    CO2 30 (H) 04/23/2025    BUN 16 04/23/2025    CREATININE 0.9 04/23/2025    CALCIUM 9.8 04/23/2025    ANIONGAP 9 04/23/2025    ESTGFRAFRICA >60.0 08/25/2021    EGFRNONAA 56.4 (A) 08/25/2021      Lab Results   Component Value Date    CPK 70 02/04/2014    CPKMB 1.4 02/04/2014    CPKMB 2.37 09/14/2012      Lab Results   Component Value Date    CHOL 112 (L) 11/06/2024    CHOL 109 (L) 09/27/2023    CHOL 115 (L) 03/23/2023     Lab Results   Component Value Date    HDL 56 11/06/2024    HDL 54 09/27/2023    HDL 39 (L) 03/23/2023     Lab Results   Component Value Date    LDLCALC 46.4 (L) 11/06/2024    LDLCALC 44.2 (L) 09/27/2023    LDLCALC 58.2 (L) 03/23/2023     Lab Results   Component Value Date    TRIG 48 11/06/2024    TRIG 54 09/27/2023    TRIG 89 03/23/2023     Lab Results   Component Value Date    CHOLHDL 50.0 11/06/2024    CHOLHDL 49.5 09/27/2023    CHOLHDL 33.9 03/23/2023      Lab Results   Component Value Date    TSH 2.376 11/06/2024    G0ULSSR 135 04/24/2018    J0KNULW 7.3  08/02/2012    FREET4 0.89 05/20/2024     Lab Results   Component Value Date    HGBA1C 5.6 11/06/2024     Lab Results   Component Value Date    WBC 9.25 04/23/2025    HGB 12.3 04/23/2025    HCT 37.9 04/23/2025    MCV 92 04/23/2025     04/23/2025        Results for orders placed during the hospital encounter of 04/14/25    Echo Saline Bubble? No    Interpretation Summary    Left Ventricle: The left ventricle is normal in size. Normal wall thickness. There is normal systolic function. Ejection fraction is approximately 65%. Grade I diastolic dysfunction.    Right Ventricle: The right ventricle is normal in size. Wall thickness is normal. Systolic function is normal.    Left Atrium: Mildly dilated    Mitral Valve: There is mild regurgitation with a centrally directed jet.    Tricuspid Valve: There is mild regurgitation.    IVC/SVC: Normal venous pressure at 3 mmHg.    The estimated pulmonary artery systolic pressure is 21 mmHg. LVAD:     No results found for this or any previous visit.       EKG  Results for orders placed or performed during the hospital encounter of 04/23/25   EKG 12-lead    Collection Time: 04/23/25  2:39 PM   Result Value Ref Range    QRS Duration 82 ms    [facility] QTC Calculation 405 ms    Narrative    Test Reason : R07.9,    Vent. Rate :  79 BPM     Atrial Rate :  79 BPM     P-R Int : 178 ms          QRS Dur :  82 ms      QT Int : 354 ms       P-R-T Axes :  72  44  65 degrees    QTcB Int : 405 ms    Normal sinus rhythm  Normal ECG    Confirmed by Dima Parra ([id number]) on 4/28/2025 12:56:30 PM    Referred By:            Confirmed By: Dima Parra        Stress  Results for orders placed during the hospital encounter of 04/14/25    Nuclear Stress - Cardiology Interpreted    Interpretation Summary    Normal myocardial perfusion scan. There is no evidence of myocardial ischemia or infarction.    The gated perfusion images showed an ejection fraction of 83% at rest. The gated perfusion  images showed an ejection fraction of 74% post stress. Normal ejection fraction is greater than 53%.    There is normal wall motion at rest and post-stress.    The ECG portion of the study is negative for ischemia.    The patient reported no chest pain during the stress test.    During stress, occasional PVCs are noted.           Assessment/Plan:          1. COPD mixed type    2. Coronary artery disease involving native coronary artery of native heart without angina pectoris    3. Mixed hyperlipidemia    4. Primary hypertension    5. S/P coronary artery stent placement, 2 LETTY 9/2012, 1 LETTY 9/2013    6. Multiple thyroid nodules      Assessment & Plan    I10 Essential (primary) hypertension  I34.0 Nonrheumatic mitral (valve) insufficiency  I51.7 Cardiomegaly  R06.02 Shortness of breath  E78.5 Hyperlipidemia, unspecified  M26.69 Other specified disorders of temporomandibular joint  K21.9 Gastro-esophageal reflux disease without esophagitis    PLAN SUMMARY:  - Continue aspirin 81 mg  - Continue atorvastatin 80 mg for cholesterol  - Continue metoprolol for blood pressure  - Continue Protonix for stomach  - Continue losartan for high blood pressure  - Continue potassium pills, two daily  - Follow up in 6 months  - Contact office if any problems or changes before next visit    CARDIOMEGALY:  - Recent stress test results showed normal myocardial perfusion scan and EKG.  - Echocardiogram findings: normal EF, slightly dilated atrium, minor mitral valve leakage (not concerning).    NONRHEUMATIC MITRAL VALVE INSUFFICIENCY:  - Echocardiogram findings: normal EF, slightly dilated atrium, minor mitral valve leakage (not concerning).    ESSENTIAL HYPERTENSION:  - Continued losartan for high blood pressure.  - Continued metoprolol for blood pressure.    HYPERLIPIDEMIA:  - Continued atorvastatin 80 mg for cholesterol.  - Excellent cholesterol levels: total cholesterol 112, LDL 46.    SHORTNESS OF BREATH:  - Shortness of breath occurs  with bending activities.    GASTROESOPHAGEAL REFLUX DISEASE:  - Continued Protonix for stomach.    OTHER:  - Continued aspirin 81 mg.  - Recent labs showed normal potassium, blood sugar, renal function, and thyroid levels.  - Continued potassium pills, two daily.    FOLLOW-UP:  - Follow up in 6 months.  - Contact the office if there are any problems or changes before the next visit.           This note was generated with the assistance of ambient listening technology. Verbal consent was obtained by the patient and accompanying visitor(s) for the recording of patient appointment to facilitate this note. I attest to having reviewed and edited the generated note for accuracy, though some syntax or spelling errors may persist. Please contact the author of this note for any clarification.

## 2025-05-12 NOTE — PROGRESS NOTES
[facility]  Office Visit Note     SUBJECTIVE:   HPI: Kathy Seymour  is a 74 y.o. female here to est care     Medical conditions:  Lumbar spondylosis, anxiety, depression, COPD, HLD, aortic atherosclerosis, prediabetes, thyroid nodules, GERD, RAMONA on CPAP, history of smoking, subclinical hyperthyroidism     Meds:  Albuterol, xanax 0.5 mg TID PRN, aspirin, atorvastatin 80 mg daily, duloxetine 30? 60 mg?, flonase, losartan-HCTZ 100-25 mg, methimazole 5 mg, metoprolol, pantoprazole, solifenacin (bladder relaxant) 10 mg daily     Specialists:   - cardiology (Dr. Chavarria): CAD with stent placement, HLD, s/p CAD, CKD, aortic atherosclerosis   - pulmonology (Dr. Jordan): COPD, RAMONA  - urology (Dr. Wise): overactive bladder  - endo (Ms. Christie): subclinical hyperthyroidism     History of Present Illness    CHIEF COMPLAINT:  Patient presents today to est care     TMJ:  She experienced severe TMJ issues recently requiring an ER visit on April 23rd due to intense jaw pain that made swallowing saliva extremely difficult. She continues to have ongoing jaw pain, though less severe than during the acute episode.    CARDIOVASCULAR:  She has history of coronary artery disease with stents. A routine pharmacological stress test was recently performed with Dr. Chavarria    RESPIRATORY:  She has history of COPD with shortness of breath during certain activities, particularly when bending over or exerting herself. She quit smoking approximately 10 years ago.    GENITOURINARY:  She has history of overactive bladder, recurrent bladder infections, and persistent microscopic hematuria. Current symptoms include urine odor without pain or burning on urination. Symptoms are not well controlled with current management.    SLEEP:  She has sleep apnea requiring nightly CPAP use. She experiences claustrophobia with CPAP mask use and has required trials of multiple masks due to feelings of  suffocation.    ENDOCRINE:  She has an upcoming endocrinology appointment scheduled for thyroid evaluation.    SOCIAL HISTORY:  She reports occasional social drinking, typically consuming two drinks when dining out.         Admission on 04/23/2025, Discharged on 04/23/2025   Component Date Value Ref Range Status    QRS Duration 04/23/2025 82  ms Final    [facility] QTC Calculation 04/23/2025 405  ms Final    Sodium 04/23/2025 141  136 - 145 mmol/L Final    Potassium 04/23/2025 4.2  3.5 - 5.1 mmol/L Final    Chloride 04/23/2025 102  95 - 110 mmol/L Final    CO2 04/23/2025 30 (H)  23 - 29 mmol/L Final    Glucose 04/23/2025 106  70 - 110 mg/dL Final    BUN 04/23/2025 16  8 - 23 mg/dL Final    Creatinine 04/23/2025 0.9  0.5 - 1.4 mg/dL Final    Calcium 04/23/2025 9.8  8.7 - 10.5 mg/dL Final    Protein Total 04/23/2025 7.8  6.0 - 8.4 gm/dL Final    Albumin 04/23/2025 4.5  3.5 - 5.2 g/dL Final    Bilirubin Total 04/23/2025 0.8  0.1 - 1.0 mg/dL Final    ALP 04/23/2025 77  55 - 135 unit/L Final    AST 04/23/2025 18  10 - 40 unit/L Final    ALT 04/23/2025 14  10 - 44 unit/L Final    Anion Gap 04/23/2025 9  8 - 16 mmol/L Final    eGFR 04/23/2025 >60  >60 mL/min/1.73/m2 Final    Sed Rate 04/23/2025 15  0 - 20 mm/hr Final    CRP 04/23/2025 0.60  <1.00 mg/dL Final    WBC 04/23/2025 9.25  3.90 - 12.70 K/uL Final    RBC 04/23/2025 4.13  4.00 - 5.40 M/uL Final    Hgb 04/23/2025 12.3  12.0 - 16.0 gm/dL Final    Hct 04/23/2025 37.9  37.0 - 48.5 % Final    MCV 04/23/2025 92  82 - 98 fL Final    MCH 04/23/2025 29.8  27.0 - 31.0 pg Final    MCHC 04/23/2025 32.5  32.0 - 36.0 g/dL Final    RDW 04/23/2025 14.0  11.5 - 14.5 % Final    Platelet Count 04/23/2025 179  150 - 450 K/uL Final    MPV 04/23/2025 10.8  9.2 - 12.9 fL Final    Nucleated RBC 04/23/2025 0  <=0 /100 WBC Final    Neut % 04/23/2025 70.6  38 - 73 % Final    Lymph % 04/23/2025 18.6  18 - 48 % Final    Mono % 04/23/2025 8.4  4 - 15 % Final    Eos % 04/23/2025 1.8  0 - 8 % Final     Basophil % 04/23/2025 0.2  <=1.9 % Final    Imm Grans % 04/23/2025 0.4  0.0 - 0.5 % Final    Neut # 04/23/2025 6.5  1.8 - 7.7 K/uL Final    Lymph # 04/23/2025 1.72  1 - 4.8 K/uL Final    Mono # 04/23/2025 0.78  0.3 - 1 K/uL Final    Eos # 04/23/2025 0.17  <=0.5 K/uL Final    Baso # 04/23/2025 0.02  <=0.2 K/uL Final    Imm Grans # 04/23/2025 0.04  0.00 - 0.04 K/uL Final    POC Creatinine 04/23/2025 0.9  0.5 - 1.4 mg/dL Final    Sample 04/23/2025 VENOUS   Final    Color, UA 04/23/2025 Yellow  Yellow, Straw, Jessica Final    Appearance, UA 04/23/2025 Hazy (A)  Clear Final    Spec Grav UA 04/23/2025 1.015  1.005 - 1.030 Final    pH, UA 04/23/2025 6.0  5.0 - 8.0 Final    Protein, UA 04/23/2025 Negative  Negative Final    Glucose, UA 04/23/2025 Negative  Negative Final    Ketones, UA 04/23/2025 Negative  Negative Final    Blood, UA 04/23/2025 1+ (A)  Negative Final    Bilirubin, UA 04/23/2025 Negative  Negative Final    Urobilinogen, UA 04/23/2025 Negative  <2.0 EU/dL Final    Nitrites, UA 04/23/2025 Negative  Negative Final    Leukocyte Esterase, UA 04/23/2025 Negative  Negative Final    RBC, UA 04/23/2025 6 (H)  <=4 /HPF Final    WBC, UA 04/23/2025 3  <=5 /HPF Final    Squamous Epithelial Cells, UA 04/23/2025 5  /HPF Final    Microscopic Comment 04/23/2025    Final   Hospital Outpatient Visit on 04/14/2025   Component Date Value Ref Range Status    BSA 04/14/2025 1.8  m2 Final    A4C EF 04/14/2025 65  % Final    LVOT stroke volume 04/14/2025 70.0  cm3 Final    LVIDd 04/14/2025 4.4  3.5 - 6.0 cm Final    LV Systolic Volume 04/14/2025 23  mL Final    LV Systolic Volume Index 04/14/2025 12.9  mL/m2 Final    LVIDs 04/14/2025 2.6  2.1 - 4.0 cm Final    LV Diastolic Volume 04/14/2025 87  mL Final    LV Diastolic Volume Index 04/14/2025 48.88  mL/m2 Final    LV EDV A4C 04/14/2025 50.10  mL Final    Left Ventricular End Systolic Volu* 04/14/2025 23.40  mL Final    Left Ventricular End Diastolic Vol* 04/14/2025 86.80  mL Final     IVS 04/14/2025 1.1  0.6 - 1.1 cm Final    LVOT diameter 04/14/2025 2.0  cm Final    LVOT area 04/14/2025 3.1  cm2 Final    FS 04/14/2025 40.9  28 - 44 % Final    Left Ventricle Relative Wall Thick* 04/14/2025 0.45  cm Final    PW 04/14/2025 1.0  0.6 - 1.1 cm Final    LV mass 04/14/2025 158.2  g Final    LV Mass Index 04/14/2025 88.9  g/m2 Final    MV Peak E Mathew 04/14/2025 0.91  m/s Final    TDI LATERAL 04/14/2025 0.07  m/s Final    TDI SEPTAL 04/14/2025 0.06  m/s Final    E/E' ratio 04/14/2025 14  m/s Final    MV Peak A Mathew 04/14/2025 1.17  m/s Final    TR Max Mathew 04/14/2025 2.1  m/s Final    E/A ratio 04/14/2025 0.78   Final    IVRT 04/14/2025 106  msec Final    E wave deceleration time 04/14/2025 227  msec Final    LV SEPTAL E/E' RATIO 04/14/2025 15.2  m/s Final    LV LATERAL E/E' RATIO 04/14/2025 13.0  m/s Final    LVOT peak mathew 04/14/2025 0.9  m/s Final    Left Ventricular Outflow Tract Angela* 04/14/2025 0.63  cm/s Final    Left Ventricular Outflow Tract Angela* 04/14/2025 2.00  mmHg Final    RV- smith basal diam 04/14/2025 3.4  cm Final    RV/LV Ratio 04/14/2025 0.77  cm Final    LA size 04/14/2025 3.9  cm Final    AV mean gradient 04/14/2025 3  mmHg Final    AV peak gradient 04/14/2025 6  mmHg Final    Ao peak mathew 04/14/2025 1.2  m/s Final    Ao VTI 04/14/2025 28.2  cm Final    LVOT peak VTI 04/14/2025 22.3  cm Final    AV valve area 04/14/2025 2.5  cm² Final    AV Velocity Ratio 04/14/2025 0.75   Final    AV index (prosthetic) 04/14/2025 0.79   Final    EDSON by Velocity Ratio 04/14/2025 2.4  cm² Final    MV stenosis pressure 1/2 time 04/14/2025 49.00  ms Final    MV valve area p 1/2 method 04/14/2025 4.49  cm2 Final    Triscuspid Valve Regurgitation Pea* 04/14/2025 18  mmHg Final    Ao root annulus 04/14/2025 2.50  cm Final    Mean e' 04/14/2025 0.07  m/s Final    ZLVIDS 04/14/2025 -1.24   Final    ZLVIDD 04/14/2025 -1.12   Final    RVDD 04/14/2025 3.38  cm Final    AORTIC VALVE CUSP SEPERATION 04/14/2025 1.70  cm  Final    EF 04/14/2025 65  % Final    TV resting pulmonary artery pressu* 04/14/2025 21  mmHg Final    RV TB RVSP 04/14/2025 5  mmHg Final    Est. RA pres 04/14/2025 3  mmHg Final   Hospital Outpatient Visit on 04/14/2025   Component Date Value Ref Range Status    85% Max Predicted HR 04/14/2025 122   Final    Max Predicted HR 04/14/2025 144   Final    [facility] CV CPX PATIENT IS MALE 04/14/2025 0.0   Final    [facility] CV CPX PATIENT IS FEMALE 04/14/2025 1.0   Final    dose 04/14/2025 12.6  mcg/kg/min Final    dose 04/14/2025 25.3  mcg/kg/min Final    Peak HR 04/14/2025 78.0  bpm Final    % Max HR Achieved 04/14/2025 56   Final    EF + QEF 04/14/2025 53  % Final    Ejection Fraction- High Stress 04/14/2025 65  % Final    End diastolic index (mL/m2) 04/14/2025 93  mL/m2 Final    End diastolic index (mL/m2) 04/14/2025 153  mL/m2 Final    End systolic index (mL/m2) 04/14/2025 31  mL/m2 Final    End systolic index (mL/m2) 04/14/2025 71  mL/m2 Final    Nuc Rest EF 04/14/2025 83   Final    Nuc Rest Diastolic Volume Index 04/14/2025 64   Final    Nuc Rest Systolic Volume Index 04/14/2025 11   Final    Nuc Stress EF 04/14/2025 74  % Final    Nuc Rest Diastolic Volume Index 04/14/2025 65   Final    Nuc Rest Systolic Volume Index 04/14/2025 17   Final    dose 04/14/2025 0.4  mg Final    HR at rest 04/14/2025 64  bpm Final    Systolic blood pressure 04/14/2025 157  mmHg Final    Diastolic blood pressure 04/14/2025 55  mmHg Final    RPP 04/14/2025 10,048   Final    Peak Systolic BP 04/14/2025 139  mmHg Final    Peak Diatolic BP 04/14/2025 54  mmHg Final    Peak RPP 04/14/2025 10,842   Final    1 Minute Recovery HR 04/14/2025 78  bpm Final   Office Visit on 04/08/2025   Component Date Value Ref Range Status    Specimen Source Herpes Simplex 04/08/2025 See Scanned Report   Final    Herpes simplex Type I 04/08/2025 See Scanned Report   Final    Herpes simplex Type 2 04/08/2025 See Scanned Report   Final   Office Visit on 03/30/2025    Component Date Value Ref Range Status    SARS Coronavirus 2 Antigen 03/30/2025 Negative  Negative, Presumptive Negative Final     Acceptable 03/30/2025 Yes   Final    Rapid Strep A Screen 03/30/2025 Negative  Negative Final     Acceptable 03/30/2025 Yes   Final    Rapid Influenza A Ag 03/30/2025 Negative  Negative Final    Rapid Influenza B Ag 03/30/2025 Negative  Negative Final     Acceptable 03/30/2025 Yes   Final   Office Visit on 03/29/2025   Component Date Value Ref Range Status    Rapid Strep A Screen 03/29/2025 Negative  Negative Final     Acceptable 03/29/2025 Yes   Final   Office Visit on 02/10/2025   Component Date Value Ref Range Status    POC Residual Urine Volume 02/10/2025 99  0 - 100 mL Final    Squam Epithel, UA 02/10/2025 3  /hpf Final    Microscopic Comment 02/10/2025 SEE COMMENT   Final    Urine Culture, Routine 02/10/2025  (A)   Final                    Value:CITROBACTER FREUNDII  >100,000 cfu/ml      Color, POC UA 02/10/2025 Yellow  Yellow, Straw, Colorless Final    Clarity, POC UA 02/10/2025 Clear  Clear Final    Glucose, POC UA 02/10/2025 Negative  Negative Final    Bilirubin, POC UA 02/10/2025 Negative  Negative Final    Ketones, POC UA 02/10/2025 Negative  Negative Final    Spec Grav POC UA 02/10/2025 1.015  1.005 - 1.030 Final    Blood, POC UA 02/10/2025 Small (A)  Negative Final    pH, POC UA 02/10/2025 7.5  5.0 - 8.0 Final    Protein, POC UA 02/10/2025 Negative  Negative Final    Urobilinogen, POC UA 02/10/2025 2.0 (A)  <=1.0 Final    Nitrite, POC UA 02/10/2025 Positive (A)  Negative Final    WBC, POC UA 02/10/2025 Trace (A)  Negative Final         Medications Ordered Prior to Encounter[1]  Past Medical History:   Diagnosis Date    Angina pectoris     Anticoagulant long-term use     Anxiety     Arthritis     Back pain     Cataract     Chronic bronchitis     Coronary artery disease     STENT X 2    COVID 6/10/2022    CTS (carpal  tunnel syndrome)     RIGHT    Depression     MILLER (dyspnea on exertion) 2/21/2017    Hallux valgus, acquired, bilateral 1/19/2017    Hematuria     Hyperlipidemia     Hypertension     Hypertensive left ventricular hypertrophy, without heart failure 5/22/2017    Hypothyroidism     Obesity     Polyneuropathy     S/P coronary artery stent placement, 2 LETTY 9/2012, 1 LETTY 9/2013 3/5/2013    Sleep apnea     USES CPAP    Thyroid disease     Tobacco dependence     Trouble in sleeping     Urinary incontinence     Wears glasses     ONE CONTAC     Past Surgical History:   Procedure Laterality Date    APPENDECTOMY      BILATERAL SALPINGOOPHORECTOMY      CARDIAC CATHETERIZATION      CORONARY ANGIOPLASTY      CORONARY STENT PLACEMENT      PCI  of the LAD with LETTY    EYE SURGERY      bilat cataract removal    HYSTERECTOMY      complete    OOPHORECTOMY      TONSILLECTOMY       Past Surgical History:   Procedure Laterality Date    APPENDECTOMY      BILATERAL SALPINGOOPHORECTOMY      CARDIAC CATHETERIZATION      CORONARY ANGIOPLASTY      CORONARY STENT PLACEMENT      PCI  of the LAD with LETTY    EYE SURGERY      bilat cataract removal    HYSTERECTOMY      complete    OOPHORECTOMY      TONSILLECTOMY       Family History   Problem Relation Name Age of Onset    Hypertension Sister Jennifer Barrow     Arthritis Sister Jennifer Barrow     Heart failure Mother Jose Manuel Jenkinsmilagros     Hypertension Mother Jose Manuel Husseinryanne     Depression Mother Jose Manuel Monica     Diabetes Mother Jose Manuel Husseinryanne     Heart failure Father Jovanni Payneio     Hypertension Father Jovanni Cueto     No Known Problems Brother 1/2 bother     No Known Problems Son      Heart disease Brother 1/2 brother     Arthritis Sister Syeda Nory     Hypertension Sister Syeda Lizascio     Cancer Sister 1/2 sister     Asthma Son      Arthritis Son          psoriatic arthritis    Asthma Son Rafita Seymour        Marital Status:   Alcohol History:  reports current alcohol use of about 2.0  "standard drinks of alcohol per week.  Tobacco History:  reports that she quit smoking about 12 years ago. Her smoking use included cigarettes. She started smoking about 60 years ago. She has a 50 pack-year smoking history. She has never used smokeless tobacco.  Drug History:  reports no history of drug use.    Health Maintenance Topics with due status: Not Due       Topic Last Completion Date    TETANUS VACCINE 12/22/2016    Influenza Vaccine 12/01/2020    DEXA Scan 03/23/2023    LDCT Lung Screen 09/20/2024    Hemoglobin A1c (Prediabetes) 11/06/2024    Lipid Panel 11/06/2024    Aspirin/Antiplatelet Therapy 05/02/2025     Immunization History   Administered Date(s) Administered    COVID-19, MRNA, LN-S, PF (Pfizer) (Purple Cap) 01/07/2021, 01/28/2021, 08/18/2021    Influenza - Quadrivalent 01/08/2015    Influenza - Trivalent - Afluria, Fluzone MDV 01/08/2015    Influenza Split 12/13/2012, 12/13/2012, 11/18/2013, 11/18/2013    Pneumococcal Conjugate - 13 Valent 05/17/2016    Pneumococcal Polysaccharide - 23 Valent 07/03/2014    Tdap 12/22/2016    Zoster Recombinant 02/09/2022, 08/30/2022       Review of patient's allergies indicates:   Allergen Reactions    Macrobid [nitrofurantoin monohyd/m-cryst] Other (See Comments)     Fever and body aches.      Current Medications[2]    Review of Systems   Constitutional:  Negative for chills and fever.   Respiratory:  Negative for shortness of breath.    Cardiovascular:  Negative for chest pain.   Gastrointestinal:  Negative for nausea and vomiting.   Genitourinary:         Overactive bladder    Musculoskeletal:         Right jaw pain       OBJECTIVE:      Vitals:    05/14/25 0957   BP: 126/60   BP Location: Right arm   Patient Position: Sitting   Pulse: 79   SpO2: 97%   Weight: 72.4 kg (159 lb 9.8 oz)   Height: 5' 5" (1.651 m)     Physical Exam  Constitutional:       General: She is not in acute distress.     Appearance: She is not ill-appearing or toxic-appearing.   HENT:      " Head: Normocephalic and atraumatic.      Mouth/Throat:      Mouth: Mucous membranes are moist.      Pharynx: Uvula midline. No pharyngeal swelling.   Cardiovascular:      Rate and Rhythm: Normal rate and regular rhythm.   Pulmonary:      Effort: Pulmonary effort is normal. No tachypnea, bradypnea, accessory muscle usage, prolonged expiration or respiratory distress.      Breath sounds: Normal breath sounds. No stridor. No wheezing, rhonchi or rales.   Abdominal:      General: Bowel sounds are normal. There is no distension.      Palpations: Abdomen is soft.      Tenderness: There is no abdominal tenderness. There is no guarding or rebound.   Musculoskeletal:      Comments: Right jaw tense when opening and closing    Neurological:      General: No focal deficit present.      Mental Status: She is alert.   Psychiatric:         Mood and Affect: Mood normal.         Behavior: Behavior normal.        Assessment:       1. Routine general medical examination at a health care facility    2. RAMONA on CPAP    3. S/P coronary artery stent placement, 2 LETTY 9/2012, 1 LETTY 9/2013    4. TMJ tenderness, right    5. Essential hypertension    6. Mixed hyperlipidemia    7. Prediabetes    8. Anxiety    9. Seasonal allergic rhinitis due to pollen    10. OAB (overactive bladder)    11. Gastroesophageal reflux disease without esophagitis        Plan:       Routine general medical examination at a health care facility  - Reviewed her ongoing medical conditions and medications     RAMONA on CPAP  - Patient uses CPAP for sleep apnea but experiences claustrophobia and anxiety with the mask.  - Continued alprazolam (Xanax) at current dose for sleep and anxiety related to CPAP use.  - Advised patient to attempt to reduce dosage on nights without next-day commitments and can try half dose when possible.    S/P coronary artery stent placement, 2 LETTY 9/2012, 1 LETTY 9/2013  - Patient has a history of coronary artery disease with stents placed.  -  Confirmed patient is under the care of cardiologist for cholesterol management related to heart disease.    TMJ tenderness, right  -     methocarbamoL (ROBAXIN) 500 MG Tab; Take 1 tablet (500 mg total) by mouth 2 (two) times daily as needed (muscle spasm).  Dispense: 30 tablet; Refill: 0  - Evaluated TMJ condition which was severe enough to require emergency room visit where morphine, prednisone, and antiemetic medication were administered.  - Recommend supportive care including massage and avoiding certain foods.  - Prescribed muscle relaxants to be taken as needed, but not concurrently with Xanax.  - Advised patient to avoid driving after taking muscle relaxants.  - Referred patient to a dentist for further evaluation of TMJ structures and emphasized importance of dental care and regular check-ups.    Essential hypertension  - BP stable  - Continue with losartan-HCTZ. Due to chronically low K level, she is taking potassium 20 mEq daily which is prescribed and followed by cardiologist     Mixed hyperlipidemia  -     Lipid Panel; Future; Expected date: 05/14/2025  - Stable  - Continue with atorvastatin     Prediabetes  -     Hemoglobin A1C; Future; Expected date: 05/14/2025  - Will recheck her A1c to assess stability     Anxiety  - Advised the patient to cut down on xanax     Seasonal allergic rhinitis due to pollen  - Continue with the current regimen including Flonase     OAB (overactive bladder)  - Patient reports ongoing bladder issues with odor in urine but no pain, dysuria, or burning.  - Symptoms have been stable but suboptimal, persisting for over 1 year.  - Patient reports frequent bladder infections and persistent microscopic hematuria (not grossly visible).  - Patient uses estradiol cream and solifenacin (VESIcare) for management.  - Currently under the care of Dr. Wise (urology).    Gastroesophageal reflux disease without esophagitis  - stable  - Continue with pantoprazole    CHRONIC OBSTRUCTIVE  PULMONARY DISEASE (COPD):  - Patient uses albuterol and Symbicort for COPD management and is under the care of Dr. Jordan (pulmonology).    OTHER CONSIDERATIONS:  - Considered potassium supplementation in context of current medications and recent lab results.  - Educated on risks associated with long-term benzodiazepine use, including increased fall risk and potential memory effects.    FOLLOW-UP:  - Follow up in 1 year or sooner if needed.    Counseled on age and gender appropriate medical preventative services, including cancer screenings, immunizations, overall nutritional health, need for a consistent exercise regimen and an overall push towards maintaining a vigorous and active lifestyle.        Leda Lazar M.D.  5/14/2025    This note was created using [employer] voice recognition software that occasionally misinterprets phrases or words            [1]   Current Outpatient Medications on File Prior to Visit   Medication Sig Dispense Refill    (Magic mouthwash) 1:1:1 diphenhydrAMINE(Benadryl) 12.5mg/5ml liq, aluminum & magnesium hydroxide-simethicone (Maalox), LIDOcaine viscous 2% Swish and spit 10 mLs every 4 (four) hours as needed (oral pain). As needed for oral pain 120 mL 1    albuterol (VENTOLIN HFA) 90 mcg/actuation inhaler Inhale 2 puffs into the lungs every 6 (six) hours as needed for Wheezing. Rescue 18 g 0    ALPRAZolam (XANAX) 0.5 MG tablet Take 1 tablet (0.5 mg total) by mouth 3 (three) times daily as needed for Anxiety. 90 tablet 5    aspirin 81 MG Chew Take 1 tablet (81 mg total) by mouth once daily. 50 tablet 3    atorvastatin (LIPITOR) 80 MG tablet TAKE 1 TABLET EVERY DAY 90 tablet 3    budesonide-formoterol 160-4.5 mcg (SYMBICORT) 160-4.5 mcg/actuation HFAA Inhale 2 puffs into the lungs every 12 (twelve) hours. 30.6 g 3    cetirizine (ZYRTEC) 10 MG tablet Take 1 tablet (10 mg total) by mouth once daily. 30 tablet 11    DULoxetine (CYMBALTA) 30 MG capsule TAKE 1 CAPSULE EVERY DAY IN ADDITION TO 60MG  TO TOTAL 90MG EVERY DAY 90 capsule 3    DULoxetine (CYMBALTA) 60 MG capsule TAKE 1 CAPSULE EVERY DAY 90 capsule 3    estradioL (ESTRACE) 0.01 % (0.1 mg/gram) vaginal cream Place 1 g vaginally once daily. Apply1 gram up to second knuckle. Apply nightly for 2 weeks then every other night. 42.5 g 3    fluticasone propionate (FLONASE) 50 mcg/actuation nasal spray 1 spray (50 mcg total) by Each Nostril route once daily. 48 g 3    losartan-hydrochlorothiazide 100-25 mg (HYZAAR) 100-25 mg per tablet TAKE 1 TABLET EVERY DAY 90 tablet 3    methIMAzole (TAPAZOLE) 5 MG Tab TAKE 1 TABLET ONE TIME DAILY 90 tablet 0    metoprolol succinate (TOPROL-XL) 25 MG 24 hr tablet TAKE 1 TABLET ONE TIME DAILY 90 tablet 3    pantoprazole (PROTONIX) 40 MG tablet Take 1 tablet (40 mg total) by mouth once daily. 90 tablet 3    potassium chloride (MICRO-K) 10 MEQ CpSR TAKE 2 CAPSULES ONE TIME DAILY 180 capsule 3    solifenacin (VESICARE) 10 MG tablet Take 1 tablet (10 mg total) by mouth once daily. 90 tablet 3    [DISCONTINUED] benzocaine-menthoL 15-3.6 mg Lozg 1 lozenge by Mucous Membrane route every 2 (two) hours as needed (oral pain). (Patient not taking: Reported on 5/14/2025) 18 lozenge 1     No current facility-administered medications on file prior to visit.   [2]   Current Outpatient Medications:     (Magic mouthwash) 1:1:1 diphenhydrAMINE(Benadryl) 12.5mg/5ml liq, aluminum & magnesium hydroxide-simethicone (Maalox), LIDOcaine viscous 2%, Swish and spit 10 mLs every 4 (four) hours as needed (oral pain). As needed for oral pain, Disp: 120 mL, Rfl: 1    albuterol (VENTOLIN HFA) 90 mcg/actuation inhaler, Inhale 2 puffs into the lungs every 6 (six) hours as needed for Wheezing. Rescue, Disp: 18 g, Rfl: 0    ALPRAZolam (XANAX) 0.5 MG tablet, Take 1 tablet (0.5 mg total) by mouth 3 (three) times daily as needed for Anxiety., Disp: 90 tablet, Rfl: 5    aspirin 81 MG Chew, Take 1 tablet (81 mg total) by mouth once daily., Disp: 50 tablet, Rfl:  3    atorvastatin (LIPITOR) 80 MG tablet, TAKE 1 TABLET EVERY DAY, Disp: 90 tablet, Rfl: 3    budesonide-formoterol 160-4.5 mcg (SYMBICORT) 160-4.5 mcg/actuation HFAA, Inhale 2 puffs into the lungs every 12 (twelve) hours., Disp: 30.6 g, Rfl: 3    cetirizine (ZYRTEC) 10 MG tablet, Take 1 tablet (10 mg total) by mouth once daily., Disp: 30 tablet, Rfl: 11    DULoxetine (CYMBALTA) 30 MG capsule, TAKE 1 CAPSULE EVERY DAY IN ADDITION TO 60MG TO TOTAL 90MG EVERY DAY, Disp: 90 capsule, Rfl: 3    DULoxetine (CYMBALTA) 60 MG capsule, TAKE 1 CAPSULE EVERY DAY, Disp: 90 capsule, Rfl: 3    estradioL (ESTRACE) 0.01 % (0.1 mg/gram) vaginal cream, Place 1 g vaginally once daily. Apply1 gram up to second knuckle. Apply nightly for 2 weeks then every other night., Disp: 42.5 g, Rfl: 3    fluticasone propionate (FLONASE) 50 mcg/actuation nasal spray, 1 spray (50 mcg total) by Each Nostril route once daily., Disp: 48 g, Rfl: 3    losartan-hydrochlorothiazide 100-25 mg (HYZAAR) 100-25 mg per tablet, TAKE 1 TABLET EVERY DAY, Disp: 90 tablet, Rfl: 3    methIMAzole (TAPAZOLE) 5 MG Tab, TAKE 1 TABLET ONE TIME DAILY, Disp: 90 tablet, Rfl: 0    metoprolol succinate (TOPROL-XL) 25 MG 24 hr tablet, TAKE 1 TABLET ONE TIME DAILY, Disp: 90 tablet, Rfl: 3    pantoprazole (PROTONIX) 40 MG tablet, Take 1 tablet (40 mg total) by mouth once daily., Disp: 90 tablet, Rfl: 3    potassium chloride (MICRO-K) 10 MEQ CpSR, TAKE 2 CAPSULES ONE TIME DAILY, Disp: 180 capsule, Rfl: 3    solifenacin (VESICARE) 10 MG tablet, Take 1 tablet (10 mg total) by mouth once daily., Disp: 90 tablet, Rfl: 3    methocarbamoL (ROBAXIN) 500 MG Tab, Take 1 tablet (500 mg total) by mouth 2 (two) times daily as needed (muscle spasm)., Disp: 30 tablet, Rfl: 0

## 2025-05-14 ENCOUNTER — OFFICE VISIT (OUTPATIENT)
Dept: FAMILY MEDICINE | Facility: CLINIC | Age: 75
End: 2025-05-14
Payer: MEDICARE

## 2025-05-14 VITALS
HEART RATE: 79 BPM | BODY MASS INDEX: 26.6 KG/M2 | DIASTOLIC BLOOD PRESSURE: 60 MMHG | SYSTOLIC BLOOD PRESSURE: 126 MMHG | WEIGHT: 159.63 LBS | HEIGHT: 65 IN | OXYGEN SATURATION: 97 %

## 2025-05-14 DIAGNOSIS — E78.2 MIXED HYPERLIPIDEMIA: ICD-10-CM

## 2025-05-14 DIAGNOSIS — Z95.5 S/P CORONARY ARTERY STENT PLACEMENT: Chronic | ICD-10-CM

## 2025-05-14 DIAGNOSIS — M26.621 TMJ TENDERNESS, RIGHT: ICD-10-CM

## 2025-05-14 DIAGNOSIS — F41.9 ANXIETY: ICD-10-CM

## 2025-05-14 DIAGNOSIS — G47.33 OSA ON CPAP: ICD-10-CM

## 2025-05-14 DIAGNOSIS — I10 ESSENTIAL HYPERTENSION: ICD-10-CM

## 2025-05-14 DIAGNOSIS — J30.1 SEASONAL ALLERGIC RHINITIS DUE TO POLLEN: ICD-10-CM

## 2025-05-14 DIAGNOSIS — K21.9 GASTROESOPHAGEAL REFLUX DISEASE WITHOUT ESOPHAGITIS: ICD-10-CM

## 2025-05-14 DIAGNOSIS — R73.03 PREDIABETES: ICD-10-CM

## 2025-05-14 DIAGNOSIS — Z00.00 ROUTINE GENERAL MEDICAL EXAMINATION AT A HEALTH CARE FACILITY: Primary | ICD-10-CM

## 2025-05-14 DIAGNOSIS — N32.81 OAB (OVERACTIVE BLADDER): ICD-10-CM

## 2025-05-14 PROCEDURE — 99999 PR PBB SHADOW E&M-EST. PATIENT-LVL IV: CPT | Mod: PBBFAC,,, | Performed by: STUDENT IN AN ORGANIZED HEALTH CARE EDUCATION/TRAINING PROGRAM

## 2025-05-14 RX ORDER — METHOCARBAMOL 500 MG/1
500 TABLET, FILM COATED ORAL 2 TIMES DAILY PRN
Qty: 30 TABLET | Refills: 0 | Status: SHIPPED | OUTPATIENT
Start: 2025-05-14

## 2025-05-21 ENCOUNTER — HOSPITAL ENCOUNTER (OUTPATIENT)
Dept: RADIOLOGY | Facility: HOSPITAL | Age: 75
Discharge: HOME | End: 2025-05-21
Attending: PHYSICIAN ASSISTANT
Payer: MEDICARE

## 2025-05-21 DIAGNOSIS — E04.1 NODULAR THYROID DISEASE: ICD-10-CM

## 2025-05-21 PROCEDURE — 76536 US EXAM OF HEAD AND NECK: CPT | Mod: TC,PO

## 2025-05-21 PROCEDURE — 76536 US EXAM OF HEAD AND NECK: CPT | Mod: 26,,, | Performed by: RADIOLOGY

## 2025-05-22 ENCOUNTER — LAB VISIT (OUTPATIENT)
Dept: LAB | Facility: HOSPITAL | Age: 75
End: 2025-05-22
Attending: PHYSICIAN ASSISTANT
Payer: MEDICARE

## 2025-05-22 DIAGNOSIS — I10 HYPERTENSION, UNSPECIFIED TYPE: ICD-10-CM

## 2025-05-22 DIAGNOSIS — I25.10 CORONARY ARTERY DISEASE INVOLVING NATIVE CORONARY ARTERY OF NATIVE HEART WITHOUT ANGINA PECTORIS: ICD-10-CM

## 2025-05-22 DIAGNOSIS — E05.90 HYPERTHYROIDISM: ICD-10-CM

## 2025-05-22 DIAGNOSIS — E87.6 HYPOKALEMIA: ICD-10-CM

## 2025-05-22 DIAGNOSIS — E78.2 MIXED HYPERLIPIDEMIA: ICD-10-CM

## 2025-05-22 LAB
ALBUMIN SERPL-MCNC: 4.2 G/DL (ref 3.5–5.2)
ALP SERPL-CCNC: 74 UNIT/L (ref 55–135)
ALT SERPL-CCNC: 14 UNIT/L (ref 10–44)
ANION GAP (SMH): 10 MMOL/L (ref 8–16)
AST SERPL-CCNC: 19 UNIT/L (ref 10–40)
BILIRUB SERPL-MCNC: 0.4 MG/DL (ref 0.1–1)
BUN SERPL-MCNC: 20 MG/DL (ref 8–23)
CALCIUM SERPL-MCNC: 9.1 MG/DL (ref 8.7–10.5)
CHLORIDE SERPL-SCNC: 107 MMOL/L (ref 95–110)
CO2 SERPL-SCNC: 27 MMOL/L (ref 23–29)
CREAT SERPL-MCNC: 0.9 MG/DL (ref 0.5–1.4)
GFR SERPLBLD CREATININE-BSD FMLA CKD-EPI: >60 ML/MIN/1.73/M2
GLUCOSE SERPL-MCNC: 108 MG/DL (ref 70–110)
POTASSIUM SERPL-SCNC: 3.7 MMOL/L (ref 3.5–5.1)
PROT SERPL-MCNC: 7.1 GM/DL (ref 6–8.4)
SODIUM SERPL-SCNC: 144 MMOL/L (ref 136–145)

## 2025-05-22 PROCEDURE — 80053 COMPREHEN METABOLIC PANEL: CPT

## 2025-05-22 PROCEDURE — 36415 COLL VENOUS BLD VENIPUNCTURE: CPT | Mod: PO

## 2025-05-22 RX ORDER — POTASSIUM CHLORIDE 750 MG/1
20 CAPSULE, EXTENDED RELEASE ORAL
Qty: 180 CAPSULE | Refills: 3 | Status: SHIPPED | OUTPATIENT
Start: 2025-05-22

## 2025-05-23 RX ORDER — METHIMAZOLE 5 MG/1
5 TABLET ORAL
Qty: 90 TABLET | Refills: 0 | Status: SHIPPED | OUTPATIENT
Start: 2025-05-23

## 2025-05-24 ENCOUNTER — TELEPHONE (OUTPATIENT)
Dept: PULMONOLOGY | Facility: CLINIC | Age: 75
End: 2025-05-24
Payer: MEDICARE

## 2025-05-24 NOTE — TELEPHONE ENCOUNTER
Faxed signed order for nocturnal oxygen @3L bled into Cpap to [facility] [phone]

## 2025-05-25 ENCOUNTER — RESULTS FOLLOW-UP (OUTPATIENT)
Dept: FAMILY MEDICINE | Facility: CLINIC | Age: 75
End: 2025-05-25

## 2025-05-28 ENCOUNTER — TELEPHONE (OUTPATIENT)
Dept: FAMILY MEDICINE | Facility: CLINIC | Age: 75
End: 2025-05-28
Payer: MEDICARE

## 2025-05-28 ENCOUNTER — OFFICE VISIT (OUTPATIENT)
Dept: ENDOCRINOLOGY | Facility: CLINIC | Age: 75
End: 2025-05-28
Payer: MEDICARE

## 2025-05-28 DIAGNOSIS — E55.9 HYPOVITAMINOSIS D: ICD-10-CM

## 2025-05-28 DIAGNOSIS — E05.90 HYPERTHYROIDISM: ICD-10-CM

## 2025-05-28 DIAGNOSIS — R73.03 PRE-DIABETES: ICD-10-CM

## 2025-05-28 DIAGNOSIS — E04.1 NODULAR THYROID DISEASE: Primary | ICD-10-CM

## 2025-05-28 DIAGNOSIS — Z78.0 POSTMENOPAUSAL: ICD-10-CM

## 2025-05-28 PROCEDURE — G2211 COMPLEX E/M VISIT ADD ON: HCPCS | Mod: 95,,, | Performed by: PHYSICIAN ASSISTANT

## 2025-05-28 PROCEDURE — 1160F RVW MEDS BY RX/DR IN RCRD: CPT | Mod: CPTII,95,, | Performed by: PHYSICIAN ASSISTANT

## 2025-05-28 PROCEDURE — 98005 SYNCH AUDIO-VIDEO EST LOW 20: CPT | Mod: 95,,, | Performed by: PHYSICIAN ASSISTANT

## 2025-05-28 PROCEDURE — 1159F MED LIST DOCD IN RCRD: CPT | Mod: CPTII,95,, | Performed by: PHYSICIAN ASSISTANT

## 2025-05-28 NOTE — TELEPHONE ENCOUNTER
----- Message from Allen sent at 5/28/2025  9:02 AM CDT -----  Type: Reschedule Appointment RequestCaller is requesting a sooner appointment.  Name of Caller:pt When is the pt's appointment?today at 11:00 Symptoms:NodulesWould the patient rather a call back or a response via [facility]? Call back Best Call Back Number:[phone] Information: has her great grand daughter today and she hasn't heard from the  yet, would like to reschedule at the earliest opportunity OR , if the sitter shows up, she will come in today. Please call back to advise. Thanks!

## 2025-05-28 NOTE — PROGRESS NOTES
The patient location is: [place]  The chief complaint leading to consultation is: Hyperthryoidism    Visit type: audiovisual    Face to Face time with patient: 8 min  20 minutes of total time spent on the encounter, which includes face to face time and non-face to face time preparing to see the patient (eg, review of tests), Obtaining and/or reviewing separately obtained history, Documenting clinical information in the electronic or other health record, Independently interpreting results (not separately reported) and communicating results to the patient/family/caregiver, or Care coordination (not separately reported).     Each patient to whom he or she provides medical services by telemedicine is:  (1) informed of the relationship between the physician and patient and the respective role of any other health care provider with respect to management of the patient; and (2) notified that he or she may decline to receive medical services by telemedicine and may withdraw from such care at any time.    Chief Complaint: Hyperthyroidism    HPI: Kathy Seymour is a 74 y.o. female who is here for a follow-up evaluation for thyroid. Last seen 5/23    Hx subclinical hyperthyroidism. TSH as low as 0.07   TSI, TRAB, Tg antibody negative. Thought to be 2/2 toxic nodule.    NM scan: 2013, normal overall uptake but heterogenous gland with multiple nodules, no clear cold nodules  NM 2012: slightly increased in area of a nodule       Medication: Methimazole 5 mg/day.    Lab Results   Component Value Date    TSH 4.258 05/22/2025    M5NFIMR 135 04/24/2018    J0TWVNL 7.3 08/02/2012    FREET4 0.74 05/22/2025     + anxiety, fatigue, hair loss, wt loss,   No sweating, tremors, palpitations.     Occ sob (copd). + raspiness. No dysphagia.     Also has hx multinodular goiter.   Last US 1/2022. MNG   Largest isthmus 2.3 cm, stable from prior overall.    Prior US 7/2020  Right side 1x0.9x0.8 cm  Left side: 1.2x0.9x1.0 cm, stable  Isthmus  2.5x1.4x2.6 cm, stable from prior    Prior US 4/2018:   2.5x1.4x2.6    Prior FNA: Yes.   2016: 2 nodules on the Left and isthmus     - left nodules: both benign     - isthmus, nondiagnostic    Had recommended repeat FNA for isthmus nodule in 7/2020, pt declined    Bones:  DXA 3/23/2023. Osteopenia at spine  The L1 to L4 vertebral bone mineral density is equal to 0.919 g/cm squared with a T score of -1.2.  There has been 4.7% decrease relative to the prior study.  The left femoral neck bone mineral density is equal to 0.75 g/cm squared with a T score of -0.9.  There has been  4.7% decrease relative to the prior study.    Active with 5 g. Grandchildren.  No falls, fx.   One steriod injection this year.   Active at home.  On mv.    Last labs:  Lab Results   Component Value Date    CALCIUM 9.1 05/22/2025    ALBUMIN 4.2 05/22/2025    CREATININE 0.9 05/22/2025    DUISYVNX32KP 49 05/22/2025    PTH 45.0 10/20/2017     Today, pt reports feeling okay overall.    Lost some weight  Not having heat/cold intolerance   No trouble swallowing lately (had EGD with dilation in the past). No palpitations  No diarrhea/constipation    Reviewed past medical, family, social history and updated as appropriate.    Review of Systems   As above    Objective:     There were no vitals filed for this visit.    BP Readings from Last 5 Encounters:   05/14/25 126/60   05/02/25 122/62   04/23/25 (!) 192/77   04/23/25 (!) 179/78   03/30/25 128/73     Physical Exam  Vitals reviewed.   Constitutional:       General: She is not in acute distress.  Neck:      Comments: +nodule, nontender  Cardiovascular:      Heart sounds: Normal heart sounds.   Pulmonary:      Effort: Pulmonary effort is normal.       Wt Readings from Last 10 Encounters:   05/14/25 0957 72.4 kg (159 lb 9.8 oz)   05/02/25 0936 72.3 kg (159 lb 4.5 oz)   04/23/25 1445 74.8 kg (165 lb)   04/23/25 1327 70.8 kg (156 lb)   04/14/25 0717 70.8 kg (156 lb)   04/08/25 0931 71 kg (156 lb 8.4 oz)    03/30/25 1029 72.6 kg (160 lb)   03/29/25 1122 72.6 kg (160 lb)   03/13/25 1432 73 kg (160 lb 15 oz)   02/10/25 1137 73.6 kg (162 lb 4.1 oz)     Lab Results   Component Value Date    HGBA1C 5.8 05/22/2025     Lab Results   Component Value Date    CHOL 111 (L) 05/22/2025    HDL 59 05/22/2025    LDLCALC 32.6 (L) 05/22/2025    TRIG 97 05/22/2025    CHOLHDL 53.2 (H) 05/22/2025     Lab Results   Component Value Date     05/22/2025    K 3.7 05/22/2025     05/22/2025    CO2 27 05/22/2025     05/22/2025    BUN 20 05/22/2025    CREATININE 0.9 05/22/2025    CALCIUM 9.1 05/22/2025    PROT 7.1 05/22/2025    ALBUMIN 4.2 05/22/2025    BILITOT 0.4 05/22/2025    ALKPHOS 74 05/22/2025    AST 19 05/22/2025    ALT 14 05/22/2025    ANIONGAP 10 05/22/2025    ESTGFRAFRICA >60.0 08/25/2021    EGFRNONAA 56.4 (A) 08/25/2021    TSH 4.258 05/22/2025      Assessment/Plan:     Subclinical hyperthyroidism  Last thyroid labs normal   - feeling tired  -TSH has increased.   - decrease Methimazoel to5 mg for 6 days   - continue same regimen   recheck labs 1-2 times a year. Sooner if symptoms change    Multiple thyroid nodules  Multiple nodules. S/p 3x FNA.   - most benign   - however, the largest nodule in the isthmus was nondiagnostic on last FNA   had recommended FNA last 2 times, pt was not interested.   again discussed today can't be sure it's benign without FNA/surgery, recommend repeat FNA, pt not interested. Pt is open to US monitoring, repeat US order placed, can check later this year vs next year  If larger, again would recommend FNA. Ideally with some sample saved for molecular testing in case nondiagnostic again.    Osteopenia of lumbar spine  Seen on DXA 2023.   discussed aiming for 1200 mg/day calcium intake, 1,000 units/day vitamin D   encourage weight bearing exercises as tolerated   avoid falls   recheck DXA after 2 years    FOLLOWUP  Dexa scan  Tfts, cbc in six weeks  F/u in 1 YEAR w/ labs prior -tfts, cmp, cbc,  vd and thyroid u/s

## 2025-06-11 NOTE — PROGRESS NOTES
[facility]- [facility]  PCP: Leda Lazar MD  DOS: 2025  Referred by:No ref. provider found      Initial consult by me in clinic on 23 for Recurrent uti's:    HPI: Kathy Seymour is a very pleasant 74 y.o. female     Previously saw me for OAB in 2019 with h/o mixed incontinence UUI>JUD. H/o bladder lift (no mesh) with hysterectomy in . Was wearing 2 to 3 pads a day. . Was also having uti's about 1x a year. Symptomatic (dysuria, frequency). And also found to have mod blood in urine on dipstick. Family hx of stones. 50 pk yr hx of smoking but quit in . W/u included ctu 19 (nothing to explain MH). Cytology 19 was neg and cx was +. Cystoscopy on 19 revealed evidence of recent uti. Vaginal exam showed a rectocele. Referred to  who saw her on 7/10/19. Pt decided on conservative treatment. She had been written for myrbetriq but not covered. Then oxybutynin which was helping some.  So he increased it to oxybutynin 15mgXL. She f/u with ciarra on 9/10/19 and stated she had 60% improvement on 1/2 tab of oxybutynin 15mg.     She hasn't been seen by urology or gyn since. Returns bc she is been essentially asymp having matic bacteriuria over the past year, initially infrequently, once every few months but now feels more frequently and now once every few weeks.  She has gone to urgent care a few times for foul-smelling urine.  No dysuria, no frequency no urgency. Her voided urine today with mod leuk and small blood. Cath urine looks cloudy. Sending. Residual only 40cc.     Uti symptoms: none. Foul smelling urine   UTI prevention tried in the past:   Cranberry No.   Drinking more fluid  No.  2 bottles of water and 2 cups of coffee  Timed voiding Yes - 2 to 3 hours and more than this someitme.   Prevention No.   UTI risk factors:    Previous bladder or vaginal surgery: Yes - hysterectomy and lift but no mesh, cystoscopy as above for Mh.   Personal history of  kidney stones: No. But strong family  hx.   Urinary Incontinence episodes: Yes - only if she waits a few hours to go to restroom.  OAB meds: She has been on oxybutynin in the past already and now on solfenacin 10mg once daily. Essentially works for her but dries her mouth. With cpap machine worse  Pads: Yes - pantyliners if she's goes out or to dinner.   Diabetes: No. A1c 5.9 3/2023  Daytime frequency: q 1-2 hours .  Drinks 2 c of coffee every day and coke sometimes  Bowel Incontinence episodes: No.   Sexually active: No.   Water intake: medium.   History of breast cancer or any other gyn cancer: No. Never tried estrace  Hysterectomy: Yes - metromenhorragia   Imaging:ctu 6/6/19 independently reviewed. Normal. No nydro. No stones. Ct chest 9/2022- half of L kidney seen. No hydro. No stones.   In and out catheter performed today: Yes    Catheterized PVR was 40mL.      Interval history by ME/ in CLINIC (In-person) on 6/12/25:  History of Present Illness    CHIEF COMPLAINT:  Ms. Guerrero presents for follow-up of chronic urinary symptoms, including recurrent urinary tract infections and overactive bladder symptoms.    HPI:  Ms. Guerrero reports ongoing urinary symptoms, including a persistent bladder infection. Her primary symptom is a strong odor in her urine, without associated pain or burning. She denies significant urinary leakage, except when delaying urination, particularly when distracted by activities like watching television. Ms. Guerrero urinates approximately every 2 hours and has a history of persistent blood in her urine.    Ms. Guerrero is currently using hormone cream a few days a week for her condition, taking D-Mannose supplements and a probiotic gummy twice daily. She reports having lost 30 lbs recently.    Ms. Guerrero's last urological workup, including a cystoscopy, was in July 2019. She mentions recent imaging studies, including a stress test with dye administered by Dr. Carlos,  which reportedly showed good results.    For overactive bladder symptoms, patient is currently taking VESIcare (solifenacin), which is causing dry mouth as a side effect. She previously tried Gemtesa, which helped partially but also caused dry mouth. She recalls trying another medication (possibly Myrbetriq) prescribed by Dr. Seymour, which caused nausea.    Ms. Guerrero denies pain or burning with urination and leaking urine when coughing or sneezing.    PERTINENT MEDICATIONS:  VESIcare (Solifenacin), for overactive bladder, causes dry mouth  D-Mannose  Probiotic gummy, every morning and nightly  Hormone cream, a few days a week  Discontinued Gemtesa due to causing dry mouth    PERTINENT MEDICAL HISTORY:  Overactive bladder  Recurrent UTIs  Microhematuria  Arthritis    PERTINENT SURGICAL HISTORY:  Cystoscopy: July 2019  Stent placement    PERTINENT TEST RESULTS:  Urinalysis: April, showed blood  Urinalysis: Current visit, trace blood Stress test: Recent, Dr. Carlos reported favorable results  Cystoscopy: July 2019  Bone density: Done regularly CT urogram (CTU): 2019  Ultrasound of kidneys: 2023, reported as unremarkable  CT chest: Done regularly    SH:SUBSTANCE USE:  Smoking: Quit 15 years ago, smoked for 50 years       My notes:  Oab with urgency and occ incontinence upon standing. Last time wrote for gemtesa and myrbetriq. Not covered? She doesn't know. Still taking vesicare. Wears a thin pad. Pvr by scan: 13  Still having chronic uti (no symptoms except foul smell)Daily Supplements:D-mannose 2000mg daily, probiotic with lactobacillus twice a day. Estrace twice a week.   Long h/o MH and tobacco hx. Last ctu in 2019. Last rbus 2023 neg. Last cysto 7/19, last cytology 5/30/19. Hasn't had any GH. Urine today with tr bld/tr leuk.     Previous urine cultures pasted here: Anticoagulation:  Yes - asa 81mg daily. +50 pack yr hx of smoking. +fam hx of stones.   4/23/25 1+bld, 6 rbc/5 sq  2/10/25 Citrobacter, Nit+/tr  leuk  3/7/25  E.coli, Small wbc/small bld  Component       Urine Culture, Routine   Latest Ref Rng & Units          5/22/2023       Final report   4/14/2023       ESCHERICHIA COLI (A) . . .   3/14/2023      2:41 PM KLEBSIELLA OXYTOCA (A) . . .   3/14/2023      2:41 PM ESCHERICHIA COLI (A) . . .   7/25/2022       Final report   7/1/2019       ESCHERICHIA COLI (A) . . .   5/30/2019       ESCHERICHIA COLI . . .     5/30/19            E.coli, void: mod blood, asx but treated, cytology: neg  12/10/18          E.coli, void: n/a  10/20/17          No cx, void: nit+/2+bld/1+leuk  4/6/17              E.coli, void: tr bld  9/9/16              No cx, void:1+bld/nit+  9/4/13              No cx, void: 250 bld/wbc+  11/11/12          No cx, void: lrg bld/nit+/1+leuk  8/5/08              No cx, void: sml blood/nit+          Review of Systems:  As above.     There were no vitals filed for this visit.    Assessment:     Kathy Seymour is a 74 y.o. female with Recurrent Urinary Tract Infections BUT REALLY ASYMPTOMATIC BACTERIURIA. NO TX NEEDED. NEED TO R/O HYDRO. BLADDER EMPTIES. RF: PREDIABETES and pads.    1. OAB (overactive bladder)    2. Asymptomatic bacteriuria    3. Microhematuria          copy and paste all of the below into their patient instructions    's typed/written- Abbreviated/Short Plan:  Oab- dc vesicare. Causes dry mouth. Sending in myrbetriq 50mg once daily, gemtesa not covered.   Recurrent uti, asymptomatic- continue estrace cream twice a week, probiotic twice daily and d mannose twice a day. Consider utiva cranberry capsules once a day again. Continue bidet.   Microehematuria- previous gray in 2019 but does have h/o smoking.  Send urine for cytology. If it comes back abnormal recommend repeat imaging with ctu and cysto. (Last rbus 2023, last ctu 2019, last cysto 2019).    Fu in 3 months with urology np to check urine and see how she's doing on myrbetriq.       The following assessment plan was  created by [employer] via ambient listening:  Assessment & Plan    R82.71 Asymptomatic bacteriuria  N32.81 OAB (overactive bladder)  R31.29 Microhematuria    IMPRESSION:   Considered changing overactive bladder medication due to dry mouth side effects from current VESIcare (solifenacin) regimen and discontinued VESIcare, started Myrbetriq 15 mg daily for overactive bladder.   Evaluated chronic UTI symptoms, noting only foul odor without pain or burning.   Assessed urinary habits, including frequency and urgency.   Reviewed smoking history and previous workup for hematuria, considering need for updated imaging due to increased bladder cancer risk in former smokers.    Please review the short plan as above for concise and accurate plan. The dictated/AI generated plan may have some inaccuracies .    PLAN SUMMARY:   Ordered urine cytology due to trace blood in current sample   Discontinued VESIcare   Started Myrbetriq 15 mg daily for overactive bladder   Continue using D-Mannose supplement   Apply hormone cream every other day   Consider using UTIVA cranberry capsules daily for UTI prevention   Follow up in 3 months to check urine and assess Myrbetriq effectiveness    ASYMPTOMATIC BACTERIURIA:   Ms. Guerrero to consider using UTIVA cranberry capsules daily for UTI prevention.   Ms. Guerrero to continue using D-Mannose supplement.   Follow up in 3 months to check urine.    OAB (OVERACTIVE BLADDER):   Discontinued VESIcare, started Myrbetriq 15 mg daily for overactive bladder.   Follow up in 3 months to assess effectiveness of Myrbetriq.   Ms. Guerrero to apply hormone cream every other day.    MICROHEMATURIA:   Educated on increased risk of bladder cancer in former smokers and importance of reporting visible blood in urine.   Explained that smoking history can have long-term health implications even after quitting.   Ms. Guerrero to report any visible blood in urine immediately.   Ordered urine cytology due to trace  blood found in current sample.   Contact the office if visible blood is seen in urine.             This note was generated with the assistance of ambient listening technology. Verbal consent was obtained by the patient and accompanying visitor(s) for the recording of patient appointment to facilitate this note. I attest to having reviewed and edited the generated note for accuracy, though some syntax or spelling errors may persist. Please contact the author of this note for any clarification.        Visit today included increased complexity associated with the care of the episodic problem addressed and managing the longitudinal care of the patient due to the serious and/or complex managed problem(s)

## 2025-06-12 ENCOUNTER — OFFICE VISIT (OUTPATIENT)
Dept: UROLOGY | Facility: CLINIC | Age: 75
End: 2025-06-12
Payer: MEDICARE

## 2025-06-12 DIAGNOSIS — R82.71 ASYMPTOMATIC BACTERIURIA: ICD-10-CM

## 2025-06-12 DIAGNOSIS — R31.29 MICROHEMATURIA: ICD-10-CM

## 2025-06-12 DIAGNOSIS — N32.81 OAB (OVERACTIVE BLADDER): Primary | ICD-10-CM

## 2025-06-12 PROCEDURE — 1101F PT FALLS ASSESS-DOCD LE1/YR: CPT | Mod: CPTII,S$GLB,, | Performed by: UROLOGY

## 2025-06-12 PROCEDURE — 3288F FALL RISK ASSESSMENT DOCD: CPT | Mod: CPTII,S$GLB,, | Performed by: UROLOGY

## 2025-06-12 PROCEDURE — G2211 COMPLEX E/M VISIT ADD ON: HCPCS | Mod: S$GLB,,, | Performed by: UROLOGY

## 2025-06-12 PROCEDURE — 1160F RVW MEDS BY RX/DR IN RCRD: CPT | Mod: CPTII,S$GLB,, | Performed by: UROLOGY

## 2025-06-12 PROCEDURE — 1159F MED LIST DOCD IN RCRD: CPT | Mod: CPTII,S$GLB,, | Performed by: UROLOGY

## 2025-06-12 PROCEDURE — 1126F AMNT PAIN NOTED NONE PRSNT: CPT | Mod: CPTII,S$GLB,, | Performed by: UROLOGY

## 2025-06-12 PROCEDURE — 99214 OFFICE O/P EST MOD 30 MIN: CPT | Mod: S$GLB,,, | Performed by: UROLOGY

## 2025-06-12 PROCEDURE — 99999 PR PBB SHADOW E&M-EST. PATIENT-LVL III: CPT | Mod: PBBFAC,,, | Performed by: UROLOGY

## 2025-06-12 RX ORDER — MIRABEGRON 50 MG/1
50 TABLET, FILM COATED, EXTENDED RELEASE ORAL EVERY MORNING
Qty: 90 TABLET | Refills: 3 | Status: SHIPPED | OUTPATIENT
Start: 2025-06-12 | End: 2026-06-12

## 2025-06-18 DIAGNOSIS — G62.9 NEUROPATHY: ICD-10-CM

## 2025-06-18 DIAGNOSIS — F32.0 MILD MAJOR DEPRESSION: ICD-10-CM

## 2025-06-18 RX ORDER — DULOXETIN HYDROCHLORIDE 30 MG/1
30 CAPSULE, DELAYED RELEASE ORAL DAILY
Qty: 90 CAPSULE | Refills: 3 | Status: SHIPPED | OUTPATIENT
Start: 2025-06-18

## 2025-06-18 NOTE — TELEPHONE ENCOUNTER
Copied from CRM #[id number]. Topic: General Inquiry - Return Call  >> Jun 18, 2025 10:53 AM Donya wrote:  Type: Needs Medical Advice         Who Called: pt  Best Call Back Number:[phone]  Additional Information: Requesting a call back regarding  pt returned office call about RX. Pt statin her Rx is DULoxetine (CYMBALTA)     Pt takes  60 MG capsule  and 30 MG       Pt is out of the 30MG at this time.       Please Advise- Thank you

## 2025-06-18 NOTE — TELEPHONE ENCOUNTER
Pt sates she takes  60 MG capsule  and 30 MG of Duloxetine. Need a refill on 30 mg    LOV 05/14/25  LAB 05/21/25  Next OV 05/18/26

## 2025-06-26 ENCOUNTER — HOSPITAL ENCOUNTER (OUTPATIENT)
Dept: RADIOLOGY | Facility: CLINIC | Age: 75
Discharge: HOME | End: 2025-06-26
Attending: PHYSICIAN ASSISTANT
Payer: MEDICARE

## 2025-06-26 ENCOUNTER — RESULTS FOLLOW-UP (OUTPATIENT)
Dept: ENDOCRINOLOGY | Facility: CLINIC | Age: 75
End: 2025-06-26

## 2025-06-26 DIAGNOSIS — Z78.0 POSTMENOPAUSAL: ICD-10-CM

## 2025-06-26 PROCEDURE — 77080 DXA BONE DENSITY AXIAL: CPT | Mod: 26,,, | Performed by: RADIOLOGY

## 2025-06-26 PROCEDURE — 77080 DXA BONE DENSITY AXIAL: CPT | Mod: TC,PO

## 2025-07-25 ENCOUNTER — TELEPHONE (OUTPATIENT)
Dept: PULMONOLOGY | Facility: CLINIC | Age: 75
End: 2025-07-25
Payer: MEDICARE

## 2025-07-28 DIAGNOSIS — Z00.00 ENCOUNTER FOR MEDICARE ANNUAL WELLNESS EXAM: ICD-10-CM

## 2025-08-04 DIAGNOSIS — E05.90 HYPERTHYROIDISM: ICD-10-CM

## 2025-08-04 RX ORDER — METHIMAZOLE 5 MG/1
TABLET ORAL
Qty: 90 TABLET | Refills: 3 | Status: SHIPPED | OUTPATIENT
Start: 2025-08-04

## 2025-08-20 ENCOUNTER — OFFICE VISIT (OUTPATIENT)
Dept: PODIATRY | Facility: CLINIC | Age: 75
End: 2025-08-20
Payer: MEDICARE

## 2025-08-20 VITALS — HEIGHT: 65 IN | WEIGHT: 156.06 LBS | BODY MASS INDEX: 26 KG/M2 | RESPIRATION RATE: 16 BRPM

## 2025-08-20 DIAGNOSIS — M20.41 HAMMER TOES OF BOTH FEET: ICD-10-CM

## 2025-08-20 DIAGNOSIS — M20.42 HAMMER TOES OF BOTH FEET: ICD-10-CM

## 2025-08-20 DIAGNOSIS — L85.1 ACQUIRED KERATODERMA: ICD-10-CM

## 2025-08-20 DIAGNOSIS — B35.1 ONYCHOMYCOSIS DUE TO DERMATOPHYTE: Primary | ICD-10-CM

## 2025-08-20 DIAGNOSIS — M79.671 PAIN IN BOTH FEET: ICD-10-CM

## 2025-08-20 DIAGNOSIS — M79.672 PAIN IN BOTH FEET: ICD-10-CM

## 2025-08-20 PROCEDURE — 99203 OFFICE O/P NEW LOW 30 MIN: CPT | Mod: HCNC,S$GLB,, | Performed by: PODIATRIST

## 2025-08-20 PROCEDURE — 99999 PR PBB SHADOW E&M-EST. PATIENT-LVL III: CPT | Mod: PBBFAC,HCNC,, | Performed by: PODIATRIST

## 2025-08-20 PROCEDURE — 1159F MED LIST DOCD IN RCRD: CPT | Mod: CPTII,HCNC,S$GLB, | Performed by: PODIATRIST

## 2025-08-20 PROCEDURE — 3288F FALL RISK ASSESSMENT DOCD: CPT | Mod: CPTII,HCNC,S$GLB, | Performed by: PODIATRIST

## 2025-08-20 PROCEDURE — 1101F PT FALLS ASSESS-DOCD LE1/YR: CPT | Mod: CPTII,HCNC,S$GLB, | Performed by: PODIATRIST

## 2025-08-20 PROCEDURE — 1160F RVW MEDS BY RX/DR IN RCRD: CPT | Mod: CPTII,HCNC,S$GLB, | Performed by: PODIATRIST

## 2025-08-22 ENCOUNTER — PATIENT MESSAGE (OUTPATIENT)
Dept: ENDOCRINOLOGY | Facility: CLINIC | Age: 75
End: 2025-08-22
Payer: MEDICARE

## 2025-08-22 DIAGNOSIS — E05.90 HYPERTHYROIDISM: ICD-10-CM

## 2025-08-22 RX ORDER — METHIMAZOLE 5 MG/1
TABLET ORAL
Qty: 90 TABLET | Refills: 3 | OUTPATIENT
Start: 2025-08-22

## 2025-08-26 RX ORDER — METOPROLOL SUCCINATE 25 MG/1
25 TABLET, EXTENDED RELEASE ORAL
Qty: 90 TABLET | Refills: 3 | Status: SHIPPED | OUTPATIENT
Start: 2025-08-26

## 2025-08-28 ENCOUNTER — PATIENT MESSAGE (OUTPATIENT)
Dept: PODIATRY | Facility: CLINIC | Age: 75
End: 2025-08-28
Payer: MEDICARE

## 2025-08-28 DIAGNOSIS — L85.1 ACQUIRED KERATODERMA: ICD-10-CM

## 2025-08-28 DIAGNOSIS — B35.1 ONYCHOMYCOSIS DUE TO DERMATOPHYTE: Primary | ICD-10-CM

## 2025-08-28 RX ORDER — UREA 40 %
CREAM (GRAM) TOPICAL DAILY
Qty: 198 G | Refills: 5 | Status: SHIPPED | OUTPATIENT
Start: 2025-08-28

## 2025-08-28 RX ORDER — FLUCONAZOLE 200 MG/1
200 TABLET ORAL WEEKLY
Qty: 28 TABLET | Refills: 0 | Status: SHIPPED | OUTPATIENT
Start: 2025-08-28 | End: 2026-03-06

## (undated) DEVICE — SEE MEDLINE ITEM 152651

## (undated) DEVICE — CONTAINER SPECIMEN STRL 4OZ

## (undated) DEVICE — GLOVE SURG ULTRA TOUCH 6

## (undated) DEVICE — SYR 10CC LUER LOCK

## (undated) DEVICE — SHEET DRAPE MEDIUM

## (undated) DEVICE — SET CYSTO IRRIGATION UNIV SPIK

## (undated) DEVICE — SEE ITEM #152308

## (undated) DEVICE — WATER STERILE INJ 500ML BAG